# Patient Record
Sex: MALE | Race: OTHER | HISPANIC OR LATINO | URBAN - METROPOLITAN AREA
[De-identification: names, ages, dates, MRNs, and addresses within clinical notes are randomized per-mention and may not be internally consistent; named-entity substitution may affect disease eponyms.]

---

## 2019-08-28 ENCOUNTER — OUTPATIENT (OUTPATIENT)
Dept: OUTPATIENT SERVICES | Facility: HOSPITAL | Age: 69
LOS: 1 days | End: 2019-08-28
Payer: COMMERCIAL

## 2019-08-28 VITALS
RESPIRATION RATE: 16 BRPM | HEIGHT: 60 IN | WEIGHT: 158.07 LBS | OXYGEN SATURATION: 99 % | DIASTOLIC BLOOD PRESSURE: 89 MMHG | TEMPERATURE: 98 F | HEART RATE: 80 BPM | SYSTOLIC BLOOD PRESSURE: 174 MMHG

## 2019-08-28 DIAGNOSIS — Z95.5 PRESENCE OF CORONARY ANGIOPLASTY IMPLANT AND GRAFT: ICD-10-CM

## 2019-08-28 DIAGNOSIS — N18.6 END STAGE RENAL DISEASE: ICD-10-CM

## 2019-08-28 DIAGNOSIS — I10 ESSENTIAL (PRIMARY) HYPERTENSION: ICD-10-CM

## 2019-08-28 DIAGNOSIS — I77.0 ARTERIOVENOUS FISTULA, ACQUIRED: Chronic | ICD-10-CM

## 2019-08-28 DIAGNOSIS — Z95.5 PRESENCE OF CORONARY ANGIOPLASTY IMPLANT AND GRAFT: Chronic | ICD-10-CM

## 2019-08-28 DIAGNOSIS — Z01.818 ENCOUNTER FOR OTHER PREPROCEDURAL EXAMINATION: ICD-10-CM

## 2019-08-28 DIAGNOSIS — Z95.828 PRESENCE OF OTHER VASCULAR IMPLANTS AND GRAFTS: Chronic | ICD-10-CM

## 2019-08-28 PROCEDURE — 87641 MR-STAPH DNA AMP PROBE: CPT

## 2019-08-28 PROCEDURE — 87640 STAPH A DNA AMP PROBE: CPT

## 2019-08-28 PROCEDURE — G0463: CPT

## 2019-08-28 RX ORDER — SODIUM CHLORIDE 9 MG/ML
3 INJECTION INTRAMUSCULAR; INTRAVENOUS; SUBCUTANEOUS EVERY 8 HOURS
Refills: 0 | Status: DISCONTINUED | OUTPATIENT
Start: 2019-09-06 | End: 2019-09-14

## 2019-08-28 NOTE — H&P PST ADULT - NSICDXPASTSURGICALHX_GEN_ALL_CORE_FT
PAST SURGICAL HISTORY:  AVF (arteriovenous fistula) Left Arm    S/P dialysis catheter insertion Right chest    Stented coronary artery

## 2019-08-28 NOTE — H&P PST ADULT - HISTORY OF PRESENT ILLNESS
70 yo male with history of HTN, DM, HLD, CAD, Hyperphosphatemia, Stroke, reports the above. Patient is going for Right Elbow Arteriovenous Fistula Creation on 9/6/19

## 2019-08-28 NOTE — H&P PST ADULT - NSANTHOSAYNRD_GEN_A_CORE
No. MONAE screening performed.  STOP BANG Legend: 0-2 = LOW Risk; 3-4 = INTERMEDIATE Risk; 5-8 = HIGH Risk

## 2019-08-28 NOTE — H&P PST ADULT - NSICDXPASTMEDICALHX_GEN_ALL_CORE_FT
PAST MEDICAL HISTORY:  Coronary artery disease with stent    Diabetes     History of BPH     Hyperlipidemia     Hyperphosphatemia     Hypertension     Stroke

## 2019-08-28 NOTE — H&P PST ADULT - REASON FOR ADMISSION
I need another access for dialysis. I am actually having HD via my right chest catheter. The left AVF does not work

## 2019-08-28 NOTE — H&P PST ADULT - NSICDXPROBLEM_GEN_ALL_CORE_FT
PROBLEM DIAGNOSES  Problem: End stage renal disease  Assessment and Plan: Right Elbow Arteriovenous Fistula Creation    Problem: Stented coronary artery  Assessment and Plan: Continue ASA daily  Follow your doctor's instruction regarding Plavix    Problem: Hypertension  Assessment and Plan: Continue your BP medications daily as prescribed

## 2019-08-29 LAB
MRSA PCR RESULT.: SIGNIFICANT CHANGE UP
S AUREUS DNA NOSE QL NAA+PROBE: SIGNIFICANT CHANGE UP

## 2019-09-05 ENCOUNTER — TRANSCRIPTION ENCOUNTER (OUTPATIENT)
Age: 69
End: 2019-09-05

## 2019-09-05 RX ORDER — CHLORHEXIDINE GLUCONATE 213 G/1000ML
1 SOLUTION TOPICAL DAILY
Refills: 0 | Status: DISCONTINUED | OUTPATIENT
Start: 2019-09-06 | End: 2019-09-14

## 2019-09-06 ENCOUNTER — OUTPATIENT (OUTPATIENT)
Dept: OUTPATIENT SERVICES | Facility: HOSPITAL | Age: 69
LOS: 1 days | End: 2019-09-06
Payer: COMMERCIAL

## 2019-09-06 VITALS
RESPIRATION RATE: 16 BRPM | SYSTOLIC BLOOD PRESSURE: 174 MMHG | OXYGEN SATURATION: 99 % | HEART RATE: 71 BPM | DIASTOLIC BLOOD PRESSURE: 56 MMHG | HEIGHT: 60 IN | WEIGHT: 158.07 LBS | TEMPERATURE: 98 F

## 2019-09-06 VITALS
DIASTOLIC BLOOD PRESSURE: 62 MMHG | RESPIRATION RATE: 19 BRPM | SYSTOLIC BLOOD PRESSURE: 127 MMHG | HEART RATE: 76 BPM | OXYGEN SATURATION: 97 % | TEMPERATURE: 99 F

## 2019-09-06 DIAGNOSIS — Z95.5 PRESENCE OF CORONARY ANGIOPLASTY IMPLANT AND GRAFT: Chronic | ICD-10-CM

## 2019-09-06 DIAGNOSIS — Z95.828 PRESENCE OF OTHER VASCULAR IMPLANTS AND GRAFTS: Chronic | ICD-10-CM

## 2019-09-06 DIAGNOSIS — I77.0 ARTERIOVENOUS FISTULA, ACQUIRED: Chronic | ICD-10-CM

## 2019-09-06 DIAGNOSIS — N18.6 END STAGE RENAL DISEASE: ICD-10-CM

## 2019-09-06 LAB
ANION GAP SERPL CALC-SCNC: 6 MMOL/L — SIGNIFICANT CHANGE UP (ref 5–17)
BUN SERPL-MCNC: 30 MG/DL — HIGH (ref 7–18)
CALCIUM SERPL-MCNC: 9.4 MG/DL — SIGNIFICANT CHANGE UP (ref 8.4–10.5)
CHLORIDE SERPL-SCNC: 99 MMOL/L — SIGNIFICANT CHANGE UP (ref 96–108)
CO2 SERPL-SCNC: 31 MMOL/L — SIGNIFICANT CHANGE UP (ref 22–31)
CREAT SERPL-MCNC: 7.57 MG/DL — HIGH (ref 0.5–1.3)
GLUCOSE BLDC GLUCOMTR-MCNC: 112 MG/DL — HIGH (ref 70–99)
GLUCOSE BLDC GLUCOMTR-MCNC: 132 MG/DL — HIGH (ref 70–99)
GLUCOSE SERPL-MCNC: 137 MG/DL — HIGH (ref 70–99)
POTASSIUM SERPL-MCNC: 4.3 MMOL/L — SIGNIFICANT CHANGE UP (ref 3.5–5.3)
POTASSIUM SERPL-SCNC: 4.3 MMOL/L — SIGNIFICANT CHANGE UP (ref 3.5–5.3)
SODIUM SERPL-SCNC: 136 MMOL/L — SIGNIFICANT CHANGE UP (ref 135–145)

## 2019-09-06 PROCEDURE — 36820 AV FUSION/FOREARM VEIN: CPT | Mod: RT

## 2019-09-06 PROCEDURE — 82962 GLUCOSE BLOOD TEST: CPT

## 2019-09-06 PROCEDURE — 36415 COLL VENOUS BLD VENIPUNCTURE: CPT

## 2019-09-06 PROCEDURE — 80048 BASIC METABOLIC PNL TOTAL CA: CPT

## 2019-09-06 RX ORDER — ACETAMINOPHEN 500 MG
1000 TABLET ORAL ONCE
Refills: 0 | Status: DISCONTINUED | OUTPATIENT
Start: 2019-09-06 | End: 2019-09-06

## 2019-09-06 RX ORDER — METOPROLOL TARTRATE 50 MG
1 TABLET ORAL
Qty: 0 | Refills: 0 | DISCHARGE

## 2019-09-06 RX ORDER — SODIUM CHLORIDE 9 MG/ML
1000 INJECTION INTRAMUSCULAR; INTRAVENOUS; SUBCUTANEOUS
Refills: 0 | Status: DISCONTINUED | OUTPATIENT
Start: 2019-09-06 | End: 2019-09-06

## 2019-09-06 RX ORDER — OXYCODONE AND ACETAMINOPHEN 5; 325 MG/1; MG/1
1 TABLET ORAL ONCE
Refills: 0 | Status: DISCONTINUED | OUTPATIENT
Start: 2019-09-06 | End: 2019-09-14

## 2019-09-06 RX ADMIN — SODIUM CHLORIDE 3 MILLILITER(S): 9 INJECTION INTRAMUSCULAR; INTRAVENOUS; SUBCUTANEOUS at 09:33

## 2019-09-06 RX ADMIN — CHLORHEXIDINE GLUCONATE 1 APPLICATION(S): 213 SOLUTION TOPICAL at 09:35

## 2019-09-06 NOTE — ASU PATIENT PROFILE, ADULT - PSH
AVF (arteriovenous fistula)  Left Arm  S/P dialysis catheter insertion  Right chest  Stented coronary artery

## 2019-09-06 NOTE — ASU DISCHARGE PLAN (ADULT/PEDIATRIC) - CALL YOUR DOCTOR IF YOU HAVE ANY OF THE FOLLOWING:
Fever greater than (need to indicate Fahrenheit or Celsius)/Wound/Surgical Site with redness, or foul smelling discharge or pus/Pain not relieved by Medications/Bleeding that does not stop/Swelling that gets worse Numbness, tingling, color or temperature change to extremity/Bleeding that does not stop/Swelling that gets worse/Wound/Surgical Site with redness, or foul smelling discharge or pus/Pain not relieved by Medications/Fever greater than (need to indicate Fahrenheit or Celsius)

## 2019-09-06 NOTE — ASU DISCHARGE PLAN (ADULT/PEDIATRIC) - CARE PROVIDER_API CALL
Nabeel Hoyt (MD)  Surgery; Thoracic Surgery; Vascular Surgery  [address] [facility], [address]  [place] [address]  Phone: [phone]  Fax: [phone]  Follow Up Time: 2 weeks

## 2019-09-06 NOTE — ASU PATIENT PROFILE, ADULT - PMH
Coronary artery disease  with stent  Diabetes    History of BPH    Hyperlipidemia    Hyperphosphatemia    Hypertension    Stroke

## 2019-10-11 PROBLEM — I10 ESSENTIAL (PRIMARY) HYPERTENSION: Chronic | Status: ACTIVE | Noted: 2019-08-28

## 2019-10-11 PROBLEM — Z87.438 PERSONAL HISTORY OF OTHER DISEASES OF MALE GENITAL ORGANS: Chronic | Status: ACTIVE | Noted: 2019-08-28

## 2019-10-11 PROBLEM — E83.39 OTHER DISORDERS OF PHOSPHORUS METABOLISM: Chronic | Status: ACTIVE | Noted: 2019-08-28

## 2019-10-11 PROBLEM — I25.10 ATHEROSCLEROTIC HEART DISEASE OF NATIVE CORONARY ARTERY WITHOUT ANGINA PECTORIS: Chronic | Status: ACTIVE | Noted: 2019-08-28

## 2019-10-11 PROBLEM — E11.9 TYPE 2 DIABETES MELLITUS WITHOUT COMPLICATIONS: Chronic | Status: ACTIVE | Noted: 2019-08-28

## 2019-10-11 PROBLEM — E78.5 HYPERLIPIDEMIA, UNSPECIFIED: Chronic | Status: ACTIVE | Noted: 2019-08-28

## 2019-10-18 ENCOUNTER — OUTPATIENT (OUTPATIENT)
Dept: OUTPATIENT SERVICES | Facility: HOSPITAL | Age: 69
LOS: 1 days | End: 2019-10-18
Payer: COMMERCIAL

## 2019-10-18 VITALS
WEIGHT: 160.06 LBS | HEART RATE: 62 BPM | OXYGEN SATURATION: 99 % | TEMPERATURE: 97 F | DIASTOLIC BLOOD PRESSURE: 50 MMHG | HEIGHT: 64 IN | SYSTOLIC BLOOD PRESSURE: 117 MMHG | RESPIRATION RATE: 18 BRPM

## 2019-10-18 DIAGNOSIS — Z95.5 PRESENCE OF CORONARY ANGIOPLASTY IMPLANT AND GRAFT: Chronic | ICD-10-CM

## 2019-10-18 DIAGNOSIS — I10 ESSENTIAL (PRIMARY) HYPERTENSION: ICD-10-CM

## 2019-10-18 DIAGNOSIS — N18.6 END STAGE RENAL DISEASE: ICD-10-CM

## 2019-10-18 DIAGNOSIS — I25.10 ATHEROSCLEROTIC HEART DISEASE OF NATIVE CORONARY ARTERY WITHOUT ANGINA PECTORIS: ICD-10-CM

## 2019-10-18 DIAGNOSIS — Z01.818 ENCOUNTER FOR OTHER PREPROCEDURAL EXAMINATION: ICD-10-CM

## 2019-10-18 DIAGNOSIS — E11.9 TYPE 2 DIABETES MELLITUS WITHOUT COMPLICATIONS: ICD-10-CM

## 2019-10-18 DIAGNOSIS — Z95.828 PRESENCE OF OTHER VASCULAR IMPLANTS AND GRAFTS: Chronic | ICD-10-CM

## 2019-10-18 DIAGNOSIS — I77.0 ARTERIOVENOUS FISTULA, ACQUIRED: Chronic | ICD-10-CM

## 2019-10-18 PROCEDURE — 87640 STAPH A DNA AMP PROBE: CPT

## 2019-10-18 PROCEDURE — G0463: CPT

## 2019-10-18 RX ORDER — CLOPIDOGREL BISULFATE 75 MG/1
1 TABLET, FILM COATED ORAL
Qty: 0 | Refills: 0 | DISCHARGE

## 2019-10-18 NOTE — H&P PST ADULT - NEGATIVE ALLERGY TYPES
no outdoor environmental allergies/no indoor environmental allergies/no reactions to medicines/no reactions to insect bites/no reactions to food/no reactions to animals

## 2019-10-18 NOTE — H&P PST ADULT - VENOUS THROMBOEMBOLISM CURRENT STATUS
(1) other risk factor (includes escalating BMI, pack-years of smoking, diabetes requiring insulin, chemotherapy, female gender and length of surgery)/(2) central venous access

## 2019-10-18 NOTE — H&P PST ADULT - NSICDXPROBLEM_GEN_ALL_CORE_FT
PROBLEM DIAGNOSES  Problem: End stage renal disease  Assessment and Plan: Right Elbow Arteriovenous Fistula Creation    Problem: Stented coronary artery  Assessment and Plan: Continue ASA daily  Follow your doctor's instruction regarding Plavix    Problem: Hypertension  Assessment and Plan: Continue your BP medications daily as prescribed PROBLEM DIAGNOSES  Problem: Diabetes mellitus  Assessment and Plan: Hold insulin injection on the day of surgery.    Problem: HTN, age 0-18  Assessment and Plan: Continue antihypertensive medication as prescribed.    Problem: ESRD on dialysis  Assessment and Plan: Revision of right elbow AV fistula    Problem: CAD S/P percutaneous coronary angioplasty  Assessment and Plan: Continue Aspirin daily.

## 2019-10-18 NOTE — H&P PST ADULT - NSICDXPASTMEDICALHX_GEN_ALL_CORE_FT
PAST MEDICAL HISTORY:  Coronary artery disease with stent    Diabetes     History of BPH     Hyperlipidemia     Hyperphosphatemia     Hypertension     Stroke PAST MEDICAL HISTORY:  Back pain     Coronary artery disease with stent    Diabetes     ESRD on hemodialysis     History of BPH     Hyperlipidemia     Hyperphosphatemia     Hypertension     Stroke

## 2019-10-18 NOTE — H&P PST ADULT - NSICDXPASTSURGICALHX_GEN_ALL_CORE_FT
PAST SURGICAL HISTORY:  AVF (arteriovenous fistula) Left Arm    S/P dialysis catheter insertion Right chest    Stented coronary artery PAST SURGICAL HISTORY:  AVF (arteriovenous fistula) right arm    S/P dialysis catheter insertion Right chest perma cath    Stented coronary artery

## 2019-10-18 NOTE — H&P PST ADULT - REASON FOR ADMISSION
I need another access for dialysis. I am actually having HD via my right chest catheter. The left AVF does not work "I need revision of right AV fistula in my elbow"

## 2019-10-18 NOTE — H&P PST ADULT - NS MD HP INPLANTS MED DEV
Vascular stents/Clips/Cardiac stent Vascular stents/Clips/Cardiac stent, right chest perma cath, right elbow AV fistula

## 2019-10-18 NOTE — H&P PST ADULT - HISTORY OF PRESENT ILLNESS
68 yo male with history of HTN, DM, HLD, CAD, Hyperphosphatemia, Stroke, reports the above. Patient is going for Right Elbow Arteriovenous Fistula Creation on 9/6/19 70 yo male with history of ESRD on HD, HTN, DM, HLD, CAD with PCI on daily aspirin, no longer on Plavix, Hyperphosphatemia, Stroke presents today for presurgical evaluation. Patient is going for Right Elbow Arteriovenous Fistula revision on 10/25/19.

## 2019-10-18 NOTE — H&P PST ADULT - ASSESSMENT
68 yo male is scheduled for : Right Elbow Arteriovenous Fistula Creation, on 9/6/19 68 yo male is scheduled for : Right Elbow Arteriovenous Fistula revision on 10/25/19.

## 2019-10-19 LAB
MRSA PCR RESULT.: SIGNIFICANT CHANGE UP
S AUREUS DNA NOSE QL NAA+PROBE: SIGNIFICANT CHANGE UP

## 2019-10-22 PROBLEM — M54.9 DORSALGIA, UNSPECIFIED: Chronic | Status: ACTIVE | Noted: 2019-10-18

## 2019-10-22 PROBLEM — N18.6 END STAGE RENAL DISEASE: Chronic | Status: ACTIVE | Noted: 2019-10-18

## 2019-10-24 VITALS
SYSTOLIC BLOOD PRESSURE: 135 MMHG | TEMPERATURE: 98 F | HEART RATE: 65 BPM | WEIGHT: 164.91 LBS | OXYGEN SATURATION: 97 % | HEIGHT: 61 IN | RESPIRATION RATE: 16 BRPM | DIASTOLIC BLOOD PRESSURE: 42 MMHG

## 2019-10-24 NOTE — H&P ADULT - NSICDXPASTSURGICALHX_GEN_ALL_CORE_FT
PAST SURGICAL HISTORY:  AVF (arteriovenous fistula) right arm    S/P dialysis catheter insertion Right chest perma cath    Stented coronary artery PAST SURGICAL HISTORY:  AVF (arteriovenous fistula) right arm x 2, left arm x 1    S/P dialysis catheter insertion Right chest perma cath    Stented coronary artery 2004-5 at [facility]

## 2019-10-24 NOTE — H&P ADULT - RS GEN PE MLT RESP DETAILS PC
no rales/no rhonchi/good air movement/respirations non-labored/no wheezes/clear to auscultation bilaterally/breath sounds equal

## 2019-10-24 NOTE — H&P ADULT - HISTORY OF PRESENT ILLNESS
SKELETON HX    70 y/o Malagasy Speaking Male w/ PMHX HTN, HLD, DM, Hyperphosphatemia, CVA, ESRD on Dialysis s/p AV fistula creation 9/6/19 to R UE (who presently requires revision of AV fistula), CAD s/p prior PCI,     who presented to cardiologist c/o…  Pt had a NST on 10/11/19 showed a small, severe, partially reversible defect of the basal to mid inferior wall. Echo on 10/11/19 showed EF 67%, no wall motion abnormalities, decreased LV diastolic compliance/ elevated LA pressure.   Due to pts risk factors, CCS Angina Class _ Sx, abnormal NST, need for pre-operative clearance prior to AV fistula revision, pt is referred for cardiac catheterization w/ possible intervention. 70 y/o, former smoker, French Speaking Male w/ PMHX HTN, HLD, DM, Hyperphosphatemia, CVA (2017 - residual R sided weakness), ESRD on Dialysis (Tue/Thurs/Sat) s/p AV fistula creation 9/6/19 to R UE (who presently requires revision of AV fistula), Parkinson's dz, CAD (s/p PCI @ [facility] 14-15 years ago), who presented to cardiologist with c/o intermittent right sided, non-radiating, mild "stabbing" chest pain. He denies any SOB, dizziness, palpitations, syncope, PND, melena, fevers, or chills. Of note, he does admit to orthopnea, but states that he only sleeps with one pillow. Pt had a NST on 10/11/19 showed a small, severe, partially reversible defect of the basal to mid inferior wall. Echo on 10/11/19 showed EF 67%, no wall motion abnormalities, decreased LV diastolic compliance/ elevated LA pressure. Due to pts risk factors, CCS Angina Class IV Sx, known CAD, abnormal NST, and need for pre-operative clearance prior to AV fistula revision, pt is referred for cardiac catheterization w/ possible intervention.     Of note, per pharmacy, patient is prescribed 50 units of insulin twice daily, patient states he takes it on some sort of sliding scale, will take "35-40" units if his bg is 150-200, will take "40-45" units if his bg is 200-250. Cannot say what he would do if his bg was higher than that. The pharmacy states that patient last picked up cincalcet in December. Plavix script x 6 months was sent in January by a Dr. Miah Young, last of 6 month course picked up July 2019.

## 2019-10-24 NOTE — H&P ADULT - NSHPLABSRESULTS_GEN_ALL_CORE
13.3   7.82  )-----------( 148      ( 25 Oct 2019 09:45 )             42.1     10-25    138  |  92<L>  |  33<H>  ----------------------------<  153<H>  5.9<H>   |  31  |  8.17<H>    Ca    9.9      25 Oct 2019 09:45    TPro  8.7<H>  /  Alb  4.5  /  TBili  0.4  /  DBili  x   /  AST  18  /  ALT  9<L>  /  AlkPhos  161<H>  10-25    PT/INR - ( 25 Oct 2019 09:45 )   PT: 12.6 sec;   INR: 1.11          PTT - ( 25 Oct 2019 09:45 )  PTT:32.1 sec    EKG: NSR @ 65 BPM. RBBB. No evidence of acute ischemia.

## 2019-10-24 NOTE — H&P ADULT - NSICDXPASTMEDICALHX_GEN_ALL_CORE_FT
PAST MEDICAL HISTORY:  Back pain     Coronary artery disease with stent    Diabetes     ESRD on hemodialysis     History of BPH     Hyperlipidemia     Hyperphosphatemia     Hypertension     Stroke PAST MEDICAL HISTORY:  Back pain     Coronary artery disease with stent    Diabetes     ESRD on hemodialysis     History of BPH     Hyperlipidemia     Hyperphosphatemia     Hypertension     Parkinsons disease     Stroke 2017, residual right sided weakness

## 2019-10-24 NOTE — H&P ADULT - NSHPSOCIALHISTORY_GEN_ALL_CORE
Denies ETOH and Recreational drug use. States that he quit smoking 30 years ago, before that was smoking 4-6 cigarettes per week.

## 2019-10-24 NOTE — H&P ADULT - REASON FOR ADMISSION
CCS Angina Class _ Sx, abnormal NST, need for pre-operative clearance prior to AV fistula revision Cardiac cath, need for pre-operative clearance prior to AV fistula revision

## 2019-10-24 NOTE — H&P ADULT - VASCULAR DETAILS
Bilateral non functioning fistulas on upper extremities. Functioning fistula present in R arm (palpable thrill)

## 2019-10-24 NOTE — H&P ADULT - ASSESSMENT
70 y/o, former smoker, Armenian Speaking Male w/ PMHX HTN, HLD, DM, Hyperphosphatemia, CVA (residual R sided weakness), ESRD on Dialysis (Tue/Thurs/Sat) s/p AV fistula creation 9/6/19 to R UE (who presently requires revision of AV fistula), Parkinson's dz, CAD (s/p PCI @ [facility] 14-15 years ago), who presents to [facility] for cardiac catheterization in the setting of pts risk factors, CCS Angina Class IV Sx, abnormal NST, known CAD, and need for pre-operative clearance prior to AV fistula revision.    ASA Class III  Mallampati Class III    Patient took his morning dose of aspirin this am. Per Dr. Ramos, to hold off on load due to future AV fistula revision.   No IV fluids ordered as patient is on Dialysis.   Diabetes orders in place.     Risks & benefits of procedure and alternative therapy have been explained to the patient including but not limited to: allergic reaction, bleeding with possible need for blood transfusion, infection, renal and vascular compromise, limb damage, arrhythmia, stroke, vessel dissection/perforation, Myocardial infarction, emergent CABG. Informed consent obtained and in chart. 70 y/o, former smoker, Marshallese Speaking Male w/ PMHX HTN, HLD, DM, Hyperphosphatemia, CVA (residual R sided weakness), ESRD on Dialysis (Tue/Thurs/Sat) s/p AV fistula creation 9/6/19 to R UE (who presently requires revision of AV fistula), Parkinson's dz, CAD (s/p PCI @ [facility] 14-15 years ago), who presents to [facility] for cardiac catheterization in the setting of pts risk factors, CCS Angina Class IV Sx, abnormal NST, known CAD, and need for pre-operative clearance prior to AV fistula revision.    ASA Class III  Mallampati Class III    Patient took his morning dose of aspirin this am. Per Dr. Ramos, to hold off on load due to future AV fistula revision.   No IV fluids ordered as patient is on Dialysis.   Diabetes orders in place.   Potassium 5.9, Dr. Ramos aware.     Risks & benefits of procedure and alternative therapy have been explained to the patient including but not limited to: allergic reaction, bleeding with possible need for blood transfusion, infection, renal and vascular compromise, limb damage, arrhythmia, stroke, vessel dissection/perforation, Myocardial infarction, emergent CABG. Informed consent obtained and in chart.       Consent and hx obtained with [facility]  # [id number].

## 2019-10-25 ENCOUNTER — OUTPATIENT (OUTPATIENT)
Dept: OUTPATIENT SERVICES | Facility: HOSPITAL | Age: 69
LOS: 1 days | Discharge: HOME | End: 2019-10-25
Payer: MEDICARE

## 2019-10-25 DIAGNOSIS — I67.9 CEREBROVASCULAR DISEASE, UNSPECIFIED: ICD-10-CM

## 2019-10-25 DIAGNOSIS — I25.82 CHRONIC TOTAL OCCLUSION OF CORONARY ARTERY: ICD-10-CM

## 2019-10-25 DIAGNOSIS — E78.5 HYPERLIPIDEMIA, UNSPECIFIED: ICD-10-CM

## 2019-10-25 DIAGNOSIS — Z95.828 PRESENCE OF OTHER VASCULAR IMPLANTS AND GRAFTS: Chronic | ICD-10-CM

## 2019-10-25 DIAGNOSIS — I25.110 ATHEROSCLEROTIC HEART DISEASE OF NATIVE CORONARY ARTERY WITH UNSTABLE ANGINA PECTORIS: ICD-10-CM

## 2019-10-25 DIAGNOSIS — I25.84 CORONARY ATHEROSCLEROSIS DUE TO CALCIFIED CORONARY LESION: ICD-10-CM

## 2019-10-25 DIAGNOSIS — Z79.84 LONG TERM (CURRENT) USE OF ORAL HYPOGLYCEMIC DRUGS: ICD-10-CM

## 2019-10-25 DIAGNOSIS — I10 ESSENTIAL (PRIMARY) HYPERTENSION: ICD-10-CM

## 2019-10-25 DIAGNOSIS — E11.9 TYPE 2 DIABETES MELLITUS WITHOUT COMPLICATIONS: ICD-10-CM

## 2019-10-25 DIAGNOSIS — R94.39 ABNORMAL RESULT OF OTHER CARDIOVASCULAR FUNCTION STUDY: ICD-10-CM

## 2019-10-25 DIAGNOSIS — Z95.5 PRESENCE OF CORONARY ANGIOPLASTY IMPLANT AND GRAFT: Chronic | ICD-10-CM

## 2019-10-25 DIAGNOSIS — I77.0 ARTERIOVENOUS FISTULA, ACQUIRED: Chronic | ICD-10-CM

## 2019-10-25 LAB
ALBUMIN SERPL ELPH-MCNC: 4.5 G/DL — SIGNIFICANT CHANGE UP (ref 3.3–5)
ALP SERPL-CCNC: 161 U/L — HIGH (ref 40–120)
ALT FLD-CCNC: 9 U/L — LOW (ref 10–45)
ANION GAP SERPL CALC-SCNC: 15 MMOL/L — SIGNIFICANT CHANGE UP (ref 5–17)
APTT BLD: 32.1 SEC — SIGNIFICANT CHANGE UP (ref 27.5–36.3)
AST SERPL-CCNC: 18 U/L — SIGNIFICANT CHANGE UP (ref 10–40)
BASOPHILS # BLD AUTO: 0.05 K/UL — SIGNIFICANT CHANGE UP (ref 0–0.2)
BASOPHILS NFR BLD AUTO: 0.6 % — SIGNIFICANT CHANGE UP (ref 0–2)
BILIRUB SERPL-MCNC: 0.4 MG/DL — SIGNIFICANT CHANGE UP (ref 0.2–1.2)
BUN SERPL-MCNC: 33 MG/DL — HIGH (ref 7–23)
CALCIUM SERPL-MCNC: 9.9 MG/DL — SIGNIFICANT CHANGE UP (ref 8.4–10.5)
CHLORIDE SERPL-SCNC: 92 MMOL/L — LOW (ref 96–108)
CHOLEST SERPL-MCNC: 96 MG/DL — SIGNIFICANT CHANGE UP (ref 10–199)
CK SERPL-CCNC: 49 U/L — SIGNIFICANT CHANGE UP (ref 30–200)
CO2 SERPL-SCNC: 31 MMOL/L — SIGNIFICANT CHANGE UP (ref 22–31)
CREAT SERPL-MCNC: 8.17 MG/DL — HIGH (ref 0.5–1.3)
CRP SERPL-MCNC: 0.42 MG/DL — HIGH (ref 0–0.4)
EOSINOPHIL # BLD AUTO: 0.31 K/UL — SIGNIFICANT CHANGE UP (ref 0–0.5)
EOSINOPHIL NFR BLD AUTO: 4 % — SIGNIFICANT CHANGE UP (ref 0–6)
GLUCOSE BLDC GLUCOMTR-MCNC: 103 MG/DL — HIGH (ref 70–99)
GLUCOSE SERPL-MCNC: 153 MG/DL — HIGH (ref 70–99)
HBA1C BLD-MCNC: 5.9 % — HIGH (ref 4–5.6)
HCT VFR BLD CALC: 42.1 % — SIGNIFICANT CHANGE UP (ref 39–50)
HDLC SERPL-MCNC: 38 MG/DL — LOW
HGB BLD-MCNC: 13.3 G/DL — SIGNIFICANT CHANGE UP (ref 13–17)
IMM GRANULOCYTES NFR BLD AUTO: 0.3 % — SIGNIFICANT CHANGE UP (ref 0–1.5)
INR BLD: 1.11 — SIGNIFICANT CHANGE UP (ref 0.88–1.16)
LIPID PNL WITH DIRECT LDL SERPL: 23 MG/DL — SIGNIFICANT CHANGE UP
LYMPHOCYTES # BLD AUTO: 1.75 K/UL — SIGNIFICANT CHANGE UP (ref 1–3.3)
LYMPHOCYTES # BLD AUTO: 22.4 % — SIGNIFICANT CHANGE UP (ref 13–44)
MCHC RBC-ENTMCNC: 30.4 PG — SIGNIFICANT CHANGE UP (ref 27–34)
MCHC RBC-ENTMCNC: 31.6 GM/DL — LOW (ref 32–36)
MCV RBC AUTO: 96.3 FL — SIGNIFICANT CHANGE UP (ref 80–100)
MONOCYTES # BLD AUTO: 0.67 K/UL — SIGNIFICANT CHANGE UP (ref 0–0.9)
MONOCYTES NFR BLD AUTO: 8.6 % — SIGNIFICANT CHANGE UP (ref 2–14)
NEUTROPHILS # BLD AUTO: 5.02 K/UL — SIGNIFICANT CHANGE UP (ref 1.8–7.4)
NEUTROPHILS NFR BLD AUTO: 64.1 % — SIGNIFICANT CHANGE UP (ref 43–77)
NRBC # BLD: 0 /100 WBCS — SIGNIFICANT CHANGE UP (ref 0–0)
PLATELET # BLD AUTO: 148 K/UL — LOW (ref 150–400)
POTASSIUM SERPL-MCNC: 5.9 MMOL/L — HIGH (ref 3.5–5.3)
POTASSIUM SERPL-SCNC: 5.9 MMOL/L — HIGH (ref 3.5–5.3)
PROT SERPL-MCNC: 8.7 G/DL — HIGH (ref 6–8.3)
PROTHROM AB SERPL-ACNC: 12.6 SEC — SIGNIFICANT CHANGE UP (ref 10–12.9)
RBC # BLD: 4.37 M/UL — SIGNIFICANT CHANGE UP (ref 4.2–5.8)
RBC # FLD: 14.6 % — HIGH (ref 10.3–14.5)
SODIUM SERPL-SCNC: 138 MMOL/L — SIGNIFICANT CHANGE UP (ref 135–145)
TOTAL CHOLESTEROL/HDL RATIO MEASUREMENT: 2.5 RATIO — LOW (ref 3.4–9.6)
TRIGL SERPL-MCNC: 176 MG/DL — HIGH (ref 10–149)
WBC # BLD: 7.82 K/UL — SIGNIFICANT CHANGE UP (ref 3.8–10.5)
WBC # FLD AUTO: 7.82 K/UL — SIGNIFICANT CHANGE UP (ref 3.8–10.5)

## 2019-10-25 PROCEDURE — 93010 ELECTROCARDIOGRAM REPORT: CPT | Mod: 59

## 2019-10-25 PROCEDURE — C1769: CPT

## 2019-10-25 PROCEDURE — 82962 GLUCOSE BLOOD TEST: CPT

## 2019-10-25 PROCEDURE — 93458 L HRT ARTERY/VENTRICLE ANGIO: CPT

## 2019-10-25 PROCEDURE — 85025 COMPLETE CBC W/AUTO DIFF WBC: CPT

## 2019-10-25 PROCEDURE — 85730 THROMBOPLASTIN TIME PARTIAL: CPT

## 2019-10-25 PROCEDURE — C1760: CPT

## 2019-10-25 PROCEDURE — 86140 C-REACTIVE PROTEIN: CPT

## 2019-10-25 PROCEDURE — 82550 ASSAY OF CK (CPK): CPT

## 2019-10-25 PROCEDURE — 93005 ELECTROCARDIOGRAM TRACING: CPT

## 2019-10-25 PROCEDURE — C1894: CPT

## 2019-10-25 PROCEDURE — 83036 HEMOGLOBIN GLYCOSYLATED A1C: CPT

## 2019-10-25 PROCEDURE — 80053 COMPREHEN METABOLIC PANEL: CPT

## 2019-10-25 PROCEDURE — C1887: CPT

## 2019-10-25 PROCEDURE — 80061 LIPID PANEL: CPT

## 2019-10-25 PROCEDURE — 85610 PROTHROMBIN TIME: CPT

## 2019-10-25 PROCEDURE — 93567 NJX CAR CTH SPRVLV AORTGRPHY: CPT

## 2019-10-25 PROCEDURE — 93567 NJX CAR CTH SPRVLV AORTGRPHY: CPT | Mod: 59

## 2019-10-25 PROCEDURE — 93458 L HRT ARTERY/VENTRICLE ANGIO: CPT | Mod: 26

## 2019-10-25 RX ORDER — GLUCAGON INJECTION, SOLUTION 0.5 MG/.1ML
1 INJECTION, SOLUTION SUBCUTANEOUS ONCE
Refills: 0 | Status: DISCONTINUED | OUTPATIENT
Start: 2019-10-25 | End: 2019-11-17

## 2019-10-25 RX ORDER — DEXTROSE 50 % IN WATER 50 %
15 SYRINGE (ML) INTRAVENOUS ONCE
Refills: 0 | Status: DISCONTINUED | OUTPATIENT
Start: 2019-10-25 | End: 2019-11-17

## 2019-10-25 RX ORDER — AMLODIPINE BESYLATE 2.5 MG/1
1 TABLET ORAL
Qty: 0 | Refills: 0 | DISCHARGE

## 2019-10-25 RX ORDER — SODIUM CHLORIDE 9 MG/ML
1000 INJECTION, SOLUTION INTRAVENOUS
Refills: 0 | Status: DISCONTINUED | OUTPATIENT
Start: 2019-10-25 | End: 2019-11-17

## 2019-10-25 RX ORDER — DEXTROSE 50 % IN WATER 50 %
12.5 SYRINGE (ML) INTRAVENOUS ONCE
Refills: 0 | Status: DISCONTINUED | OUTPATIENT
Start: 2019-10-25 | End: 2019-11-17

## 2019-10-25 RX ORDER — DEXTROSE 50 % IN WATER 50 %
25 SYRINGE (ML) INTRAVENOUS ONCE
Refills: 0 | Status: DISCONTINUED | OUTPATIENT
Start: 2019-10-25 | End: 2019-11-17

## 2019-10-25 RX ORDER — INSULIN LISPRO 100/ML
VIAL (ML) SUBCUTANEOUS ONCE
Refills: 0 | Status: DISCONTINUED | OUTPATIENT
Start: 2019-10-25 | End: 2019-11-17

## 2019-10-25 RX ORDER — TAMSULOSIN HYDROCHLORIDE 0.4 MG/1
1 CAPSULE ORAL
Qty: 0 | Refills: 0 | DISCHARGE

## 2019-10-25 NOTE — PROGRESS NOTE ADULT - SUBJECTIVE AND OBJECTIVE BOX
Interventional Cardiology PA SDA Discharge Note    Patient without complaints. Ambulated and voided without difficulties    Afebrile, VSS    Ext: Right Groin: No hematoma, no bruit, dressing; C/D/I  	  Pulses: 1+ intact DP/PT to baseline     A/P:  68 y/o, former smoker, Citizen of [place] Speaking Male w/ PMHX HTN, HLD, DM, Hyperphosphatemia, CVA (residual R sided weakness), ESRD on Dialysis (Tue/Thurs/Sat) s/p AV fistula creation 9/6/19 to R UE (who presently requires revision of AV fistula), Parkinson's dz, CAD (s/p PCI @ [facility] 14-15 years ago), who presents to [facility] for cardiac catheterization in the setting of pts risk factors, CCS Angina Class IV Sx, abnormal NST, known CAD, and need for pre-operative clearance prior to AV fistula revision.  Pt is now s/p dx cardiac cath on 10/25/19 revealing pLAD 30% before stent 20% ISR, m/dLAD mild disease, D1 small size, LCx calcified and continues as large OM1, RCA dominant, heavily calcified, pRCA 30%, mRCA 50%, dRCA 100% long  w/ L-R collaterals to distal PDA/PLS, EF 65%.      1.	Stable for discharge as per attending Dr. Ramos after bed rest, pt voids, groin stable and 30 minutes of ambulation.  2.	Follow-up with PMD/Cardiologist Dr. Ramos in 1-2 weeks  3.	Discharged forms signed and copies in chart

## 2019-11-15 PROBLEM — G20 PARKINSON'S DISEASE: Chronic | Status: ACTIVE | Noted: 2019-10-25

## 2019-11-15 PROBLEM — I63.9 CEREBRAL INFARCTION, UNSPECIFIED: Chronic | Status: ACTIVE | Noted: 2019-08-28

## 2019-11-18 ENCOUNTER — TRANSCRIPTION ENCOUNTER (OUTPATIENT)
Age: 69
End: 2019-11-18

## 2019-11-18 RX ORDER — SODIUM CHLORIDE 9 MG/ML
3 INJECTION INTRAMUSCULAR; INTRAVENOUS; SUBCUTANEOUS EVERY 8 HOURS
Refills: 0 | Status: DISCONTINUED | OUTPATIENT
Start: 2019-11-19 | End: 2019-11-19

## 2019-11-19 ENCOUNTER — OUTPATIENT (OUTPATIENT)
Dept: OUTPATIENT SERVICES | Facility: HOSPITAL | Age: 69
LOS: 1 days | End: 2019-11-19
Payer: COMMERCIAL

## 2019-11-19 VITALS
HEART RATE: 71 BPM | SYSTOLIC BLOOD PRESSURE: 138 MMHG | DIASTOLIC BLOOD PRESSURE: 52 MMHG | RESPIRATION RATE: 16 BRPM | OXYGEN SATURATION: 100 % | TEMPERATURE: 98 F

## 2019-11-19 VITALS
OXYGEN SATURATION: 100 % | HEART RATE: 73 BPM | DIASTOLIC BLOOD PRESSURE: 77 MMHG | RESPIRATION RATE: 20 BRPM | SYSTOLIC BLOOD PRESSURE: 147 MMHG | TEMPERATURE: 98 F

## 2019-11-19 DIAGNOSIS — I77.0 ARTERIOVENOUS FISTULA, ACQUIRED: Chronic | ICD-10-CM

## 2019-11-19 DIAGNOSIS — Z95.5 PRESENCE OF CORONARY ANGIOPLASTY IMPLANT AND GRAFT: Chronic | ICD-10-CM

## 2019-11-19 DIAGNOSIS — Z95.828 PRESENCE OF OTHER VASCULAR IMPLANTS AND GRAFTS: Chronic | ICD-10-CM

## 2019-11-19 DIAGNOSIS — N18.6 END STAGE RENAL DISEASE: ICD-10-CM

## 2019-11-19 LAB
ANION GAP SERPL CALC-SCNC: 9 MMOL/L — SIGNIFICANT CHANGE UP (ref 5–17)
BUN SERPL-MCNC: 31 MG/DL — HIGH (ref 7–18)
CALCIUM SERPL-MCNC: 10 MG/DL — SIGNIFICANT CHANGE UP (ref 8.4–10.5)
CHLORIDE SERPL-SCNC: 97 MMOL/L — SIGNIFICANT CHANGE UP (ref 96–108)
CO2 SERPL-SCNC: 30 MMOL/L — SIGNIFICANT CHANGE UP (ref 22–31)
CREAT SERPL-MCNC: 8.01 MG/DL — HIGH (ref 0.5–1.3)
GLUCOSE BLDC GLUCOMTR-MCNC: 130 MG/DL — HIGH (ref 70–99)
GLUCOSE BLDC GLUCOMTR-MCNC: 93 MG/DL — SIGNIFICANT CHANGE UP (ref 70–99)
GLUCOSE SERPL-MCNC: 121 MG/DL — HIGH (ref 70–99)
POTASSIUM SERPL-MCNC: 5 MMOL/L — SIGNIFICANT CHANGE UP (ref 3.5–5.3)
POTASSIUM SERPL-SCNC: 5 MMOL/L — SIGNIFICANT CHANGE UP (ref 3.5–5.3)
SODIUM SERPL-SCNC: 136 MMOL/L — SIGNIFICANT CHANGE UP (ref 135–145)

## 2019-11-19 PROCEDURE — 80048 BASIC METABOLIC PNL TOTAL CA: CPT

## 2019-11-19 PROCEDURE — 36832 AV FISTULA REVISION OPEN: CPT | Mod: RT

## 2019-11-19 PROCEDURE — C1889: CPT

## 2019-11-19 PROCEDURE — 82962 GLUCOSE BLOOD TEST: CPT

## 2019-11-19 PROCEDURE — 36415 COLL VENOUS BLD VENIPUNCTURE: CPT

## 2019-11-19 RX ORDER — CHLORHEXIDINE GLUCONATE 213 G/1000ML
1 SOLUTION TOPICAL ONCE
Refills: 0 | Status: COMPLETED | OUTPATIENT
Start: 2019-11-19 | End: 2019-11-19

## 2019-11-19 RX ORDER — OXYCODONE AND ACETAMINOPHEN 5; 325 MG/1; MG/1
1 TABLET ORAL ONCE
Refills: 0 | Status: DISCONTINUED | OUTPATIENT
Start: 2019-11-19 | End: 2019-11-19

## 2019-11-19 RX ADMIN — CHLORHEXIDINE GLUCONATE 1 APPLICATION(S): 213 SOLUTION TOPICAL at 10:40

## 2019-11-19 RX ADMIN — SODIUM CHLORIDE 3 MILLILITER(S): 9 INJECTION INTRAMUSCULAR; INTRAVENOUS; SUBCUTANEOUS at 10:39

## 2019-11-19 NOTE — ASU PATIENT PROFILE, ADULT - PMH
Back pain    Coronary artery disease  with stent  Diabetes    ESRD on hemodialysis    History of BPH    Hyperlipidemia    Hyperphosphatemia    Hypertension    Parkinsons disease    Stroke  2017, residual right sided weakness

## 2019-11-19 NOTE — ASU PATIENT PROFILE, ADULT - PSH
AVF (arteriovenous fistula)  right arm x 2, left arm x 1  S/P dialysis catheter insertion  Right chest perma cath  Stented coronary artery  2004-5 at [facility]

## 2019-11-19 NOTE — ASU DISCHARGE PLAN (ADULT/PEDIATRIC) - CALL YOUR DOCTOR IF YOU HAVE ANY OF THE FOLLOWING:
Bleeding that does not stop/Pain not relieved by Medications/Fever greater than (need to indicate Fahrenheit or Celsius)/Wound/Surgical Site with redness, or foul smelling discharge or pus/Numbness, tingling, color or temperature change to extremity

## 2019-11-19 NOTE — ASU DISCHARGE PLAN (ADULT/PEDIATRIC) - CARE PROVIDER_API CALL
Nabeel Hoyt (MD)  Surgery; Thoracic Surgery; Vascular Surgery  [address] [facility], [address]  [place] [id number]  Phone: [phone]  Fax: [phone]  Follow Up Time:

## 2020-05-02 ENCOUNTER — INPATIENT (INPATIENT)
Facility: HOSPITAL | Age: 70
LOS: 3 days | Discharge: HOME | DRG: 314 | End: 2020-05-06
Attending: HOSPITALIST | Admitting: HOSPITALIST
Payer: COMMERCIAL

## 2020-05-02 VITALS
TEMPERATURE: 98 F | RESPIRATION RATE: 16 BRPM | WEIGHT: 119.93 LBS | HEIGHT: 64 IN | DIASTOLIC BLOOD PRESSURE: 84 MMHG | HEART RATE: 96 BPM | OXYGEN SATURATION: 100 % | SYSTOLIC BLOOD PRESSURE: 152 MMHG

## 2020-05-02 DIAGNOSIS — Z95.5 PRESENCE OF CORONARY ANGIOPLASTY IMPLANT AND GRAFT: Chronic | ICD-10-CM

## 2020-05-02 DIAGNOSIS — Z95.828 PRESENCE OF OTHER VASCULAR IMPLANTS AND GRAFTS: Chronic | ICD-10-CM

## 2020-05-02 DIAGNOSIS — E11.9 TYPE 2 DIABETES MELLITUS WITHOUT COMPLICATIONS: ICD-10-CM

## 2020-05-02 DIAGNOSIS — T82.858A STENOSIS OF OTHER VASCULAR PROSTHETIC DEVICES, IMPLANTS AND GRAFTS, INITIAL ENCOUNTER: ICD-10-CM

## 2020-05-02 DIAGNOSIS — R22.0 LOCALIZED SWELLING, MASS AND LUMP, HEAD: ICD-10-CM

## 2020-05-02 DIAGNOSIS — I25.10 ATHEROSCLEROTIC HEART DISEASE OF NATIVE CORONARY ARTERY WITHOUT ANGINA PECTORIS: ICD-10-CM

## 2020-05-02 DIAGNOSIS — G20 PARKINSON'S DISEASE: ICD-10-CM

## 2020-05-02 DIAGNOSIS — I10 ESSENTIAL (PRIMARY) HYPERTENSION: ICD-10-CM

## 2020-05-02 DIAGNOSIS — I82.B12 ACUTE EMBOLISM AND THROMBOSIS OF LEFT SUBCLAVIAN VEIN: ICD-10-CM

## 2020-05-02 DIAGNOSIS — R68.89 OTHER GENERAL SYMPTOMS AND SIGNS: ICD-10-CM

## 2020-05-02 DIAGNOSIS — Z02.9 ENCOUNTER FOR ADMINISTRATIVE EXAMINATIONS, UNSPECIFIED: ICD-10-CM

## 2020-05-02 DIAGNOSIS — N18.6 END STAGE RENAL DISEASE: ICD-10-CM

## 2020-05-02 DIAGNOSIS — I77.0 ARTERIOVENOUS FISTULA, ACQUIRED: Chronic | ICD-10-CM

## 2020-05-02 DIAGNOSIS — Z71.89 OTHER SPECIFIED COUNSELING: ICD-10-CM

## 2020-05-02 LAB
ALBUMIN SERPL ELPH-MCNC: 3.3 G/DL — SIGNIFICANT CHANGE UP (ref 3.3–5)
ALP SERPL-CCNC: 124 U/L — HIGH (ref 40–120)
ALT FLD-CCNC: 19 U/L — SIGNIFICANT CHANGE UP (ref 10–45)
ANION GAP SERPL CALC-SCNC: 14 MMOL/L — SIGNIFICANT CHANGE UP (ref 5–17)
APTT BLD: 36.2 SEC — SIGNIFICANT CHANGE UP (ref 27.5–36.3)
AST SERPL-CCNC: 39 U/L — SIGNIFICANT CHANGE UP (ref 10–40)
BASOPHILS # BLD AUTO: 0 K/UL — SIGNIFICANT CHANGE UP (ref 0–0.2)
BASOPHILS NFR BLD AUTO: 0 % — SIGNIFICANT CHANGE UP (ref 0–2)
BILIRUB SERPL-MCNC: 0.3 MG/DL — SIGNIFICANT CHANGE UP (ref 0.2–1.2)
BUN SERPL-MCNC: 33 MG/DL — HIGH (ref 7–23)
CALCIUM SERPL-MCNC: 10.5 MG/DL — SIGNIFICANT CHANGE UP (ref 8.4–10.5)
CHLORIDE SERPL-SCNC: 93 MMOL/L — LOW (ref 96–108)
CO2 SERPL-SCNC: 32 MMOL/L — HIGH (ref 22–31)
CREAT SERPL-MCNC: 6.63 MG/DL — HIGH (ref 0.5–1.3)
EOSINOPHIL # BLD AUTO: 0.05 K/UL — SIGNIFICANT CHANGE UP (ref 0–0.5)
EOSINOPHIL NFR BLD AUTO: 1 % — SIGNIFICANT CHANGE UP (ref 0–6)
GLUCOSE BLDC GLUCOMTR-MCNC: 211 MG/DL — HIGH (ref 70–99)
GLUCOSE SERPL-MCNC: 138 MG/DL — HIGH (ref 70–99)
HCT VFR BLD CALC: 29.9 % — LOW (ref 39–50)
HGB BLD-MCNC: 8.7 G/DL — LOW (ref 13–17)
IMM GRANULOCYTES NFR BLD AUTO: 0.6 % — SIGNIFICANT CHANGE UP (ref 0–1.5)
INR BLD: 0.92 RATIO — SIGNIFICANT CHANGE UP (ref 0.88–1.16)
LYMPHOCYTES # BLD AUTO: 0.63 K/UL — LOW (ref 1–3.3)
LYMPHOCYTES # BLD AUTO: 13 % — SIGNIFICANT CHANGE UP (ref 13–44)
MCHC RBC-ENTMCNC: 29.1 GM/DL — LOW (ref 32–36)
MCHC RBC-ENTMCNC: 29.4 PG — SIGNIFICANT CHANGE UP (ref 27–34)
MCV RBC AUTO: 101 FL — HIGH (ref 80–100)
MONOCYTES # BLD AUTO: 0.65 K/UL — SIGNIFICANT CHANGE UP (ref 0–0.9)
MONOCYTES NFR BLD AUTO: 13.4 % — SIGNIFICANT CHANGE UP (ref 2–14)
NEUTROPHILS # BLD AUTO: 3.48 K/UL — SIGNIFICANT CHANGE UP (ref 1.8–7.4)
NEUTROPHILS NFR BLD AUTO: 72 % — SIGNIFICANT CHANGE UP (ref 43–77)
NRBC # BLD: 0 /100 WBCS — SIGNIFICANT CHANGE UP (ref 0–0)
PLATELET # BLD AUTO: 167 K/UL — SIGNIFICANT CHANGE UP (ref 150–400)
POTASSIUM SERPL-MCNC: 4 MMOL/L — SIGNIFICANT CHANGE UP (ref 3.5–5.3)
POTASSIUM SERPL-SCNC: 4 MMOL/L — SIGNIFICANT CHANGE UP (ref 3.5–5.3)
PROT SERPL-MCNC: 7.2 G/DL — SIGNIFICANT CHANGE UP (ref 6–8.3)
PROTHROM AB SERPL-ACNC: 10.6 SEC — SIGNIFICANT CHANGE UP (ref 10–12.9)
RBC # BLD: 2.96 M/UL — LOW (ref 4.2–5.8)
RBC # FLD: 17.4 % — HIGH (ref 10.3–14.5)
SARS-COV-2 RNA SPEC QL NAA+PROBE: SIGNIFICANT CHANGE UP
SODIUM SERPL-SCNC: 139 MMOL/L — SIGNIFICANT CHANGE UP (ref 135–145)
WBC # BLD: 4.84 K/UL — SIGNIFICANT CHANGE UP (ref 3.8–10.5)
WBC # FLD AUTO: 4.84 K/UL — SIGNIFICANT CHANGE UP (ref 3.8–10.5)

## 2020-05-02 PROCEDURE — 93990 DOPPLER FLOW TESTING: CPT | Mod: 26

## 2020-05-02 PROCEDURE — 93971 EXTREMITY STUDY: CPT | Mod: 26,59

## 2020-05-02 PROCEDURE — 93010 ELECTROCARDIOGRAM REPORT: CPT

## 2020-05-02 PROCEDURE — 71045 X-RAY EXAM CHEST 1 VIEW: CPT | Mod: 26

## 2020-05-02 PROCEDURE — 99285 EMERGENCY DEPT VISIT HI MDM: CPT

## 2020-05-02 PROCEDURE — 71250 CT THORAX DX C-: CPT | Mod: 26

## 2020-05-02 PROCEDURE — 99222 1ST HOSP IP/OBS MODERATE 55: CPT

## 2020-05-02 PROCEDURE — 99233 SBSQ HOSP IP/OBS HIGH 50: CPT | Mod: GC

## 2020-05-02 RX ORDER — AMLODIPINE BESYLATE 2.5 MG/1
10 TABLET ORAL ONCE
Refills: 0 | Status: DISCONTINUED | OUTPATIENT
Start: 2020-05-02 | End: 2020-05-02

## 2020-05-02 RX ORDER — DEXTROSE 50 % IN WATER 50 %
15 SYRINGE (ML) INTRAVENOUS ONCE
Refills: 0 | Status: DISCONTINUED | OUTPATIENT
Start: 2020-05-02 | End: 2020-05-06

## 2020-05-02 RX ORDER — CARVEDILOL PHOSPHATE 80 MG/1
25 CAPSULE, EXTENDED RELEASE ORAL EVERY 12 HOURS
Refills: 0 | Status: DISCONTINUED | OUTPATIENT
Start: 2020-05-02 | End: 2020-05-06

## 2020-05-02 RX ORDER — GLUCAGON INJECTION, SOLUTION 0.5 MG/.1ML
1 INJECTION, SOLUTION SUBCUTANEOUS ONCE
Refills: 0 | Status: DISCONTINUED | OUTPATIENT
Start: 2020-05-02 | End: 2020-05-06

## 2020-05-02 RX ORDER — INSULIN GLARGINE 100 [IU]/ML
30 INJECTION, SOLUTION SUBCUTANEOUS AT BEDTIME
Refills: 0 | Status: DISCONTINUED | OUTPATIENT
Start: 2020-05-02 | End: 2020-05-04

## 2020-05-02 RX ORDER — HEPARIN SODIUM 5000 [USP'U]/ML
INJECTION INTRAVENOUS; SUBCUTANEOUS
Qty: 25000 | Refills: 0 | Status: DISCONTINUED | OUTPATIENT
Start: 2020-05-02 | End: 2020-05-05

## 2020-05-02 RX ORDER — HEPARIN SODIUM 5000 [USP'U]/ML
4000 INJECTION INTRAVENOUS; SUBCUTANEOUS EVERY 6 HOURS
Refills: 0 | Status: DISCONTINUED | OUTPATIENT
Start: 2020-05-02 | End: 2020-05-02

## 2020-05-02 RX ORDER — SODIUM CHLORIDE 9 MG/ML
1000 INJECTION, SOLUTION INTRAVENOUS
Refills: 0 | Status: DISCONTINUED | OUTPATIENT
Start: 2020-05-02 | End: 2020-05-06

## 2020-05-02 RX ORDER — INSULIN LISPRO 100/ML
VIAL (ML) SUBCUTANEOUS
Refills: 0 | Status: DISCONTINUED | OUTPATIENT
Start: 2020-05-02 | End: 2020-05-06

## 2020-05-02 RX ORDER — HEPARIN SODIUM 5000 [USP'U]/ML
2000 INJECTION INTRAVENOUS; SUBCUTANEOUS EVERY 6 HOURS
Refills: 0 | Status: DISCONTINUED | OUTPATIENT
Start: 2020-05-02 | End: 2020-05-02

## 2020-05-02 RX ORDER — INSULIN LISPRO 100/ML
3 VIAL (ML) SUBCUTANEOUS
Refills: 0 | Status: DISCONTINUED | OUTPATIENT
Start: 2020-05-02 | End: 2020-05-04

## 2020-05-02 RX ORDER — DEXTROSE 50 % IN WATER 50 %
25 SYRINGE (ML) INTRAVENOUS ONCE
Refills: 0 | Status: DISCONTINUED | OUTPATIENT
Start: 2020-05-02 | End: 2020-05-06

## 2020-05-02 RX ORDER — AMLODIPINE BESYLATE 2.5 MG/1
10 TABLET ORAL DAILY
Refills: 0 | Status: DISCONTINUED | OUTPATIENT
Start: 2020-05-03 | End: 2020-05-06

## 2020-05-02 RX ORDER — HEPARIN SODIUM 5000 [USP'U]/ML
2000 INJECTION INTRAVENOUS; SUBCUTANEOUS EVERY 6 HOURS
Refills: 0 | Status: DISCONTINUED | OUTPATIENT
Start: 2020-05-02 | End: 2020-05-04

## 2020-05-02 RX ORDER — HEPARIN SODIUM 5000 [USP'U]/ML
INJECTION INTRAVENOUS; SUBCUTANEOUS
Qty: 25000 | Refills: 0 | Status: DISCONTINUED | OUTPATIENT
Start: 2020-05-02 | End: 2020-05-02

## 2020-05-02 RX ORDER — INSULIN LISPRO 100/ML
VIAL (ML) SUBCUTANEOUS AT BEDTIME
Refills: 0 | Status: DISCONTINUED | OUTPATIENT
Start: 2020-05-02 | End: 2020-05-06

## 2020-05-02 RX ORDER — CARBIDOPA AND LEVODOPA 25; 100 MG/1; MG/1
1 TABLET ORAL
Refills: 0 | Status: DISCONTINUED | OUTPATIENT
Start: 2020-05-02 | End: 2020-05-05

## 2020-05-02 RX ORDER — TAMSULOSIN HYDROCHLORIDE 0.4 MG/1
0.4 CAPSULE ORAL AT BEDTIME
Refills: 0 | Status: DISCONTINUED | OUTPATIENT
Start: 2020-05-02 | End: 2020-05-06

## 2020-05-02 RX ORDER — DEXTROSE 50 % IN WATER 50 %
12.5 SYRINGE (ML) INTRAVENOUS ONCE
Refills: 0 | Status: DISCONTINUED | OUTPATIENT
Start: 2020-05-02 | End: 2020-05-06

## 2020-05-02 RX ORDER — HEPARIN SODIUM 5000 [USP'U]/ML
4500 INJECTION INTRAVENOUS; SUBCUTANEOUS ONCE
Refills: 0 | Status: COMPLETED | OUTPATIENT
Start: 2020-05-02 | End: 2020-05-02

## 2020-05-02 RX ORDER — ATORVASTATIN CALCIUM 80 MG/1
40 TABLET, FILM COATED ORAL AT BEDTIME
Refills: 0 | Status: DISCONTINUED | OUTPATIENT
Start: 2020-05-02 | End: 2020-05-06

## 2020-05-02 RX ORDER — HEPARIN SODIUM 5000 [USP'U]/ML
4500 INJECTION INTRAVENOUS; SUBCUTANEOUS EVERY 6 HOURS
Refills: 0 | Status: DISCONTINUED | OUTPATIENT
Start: 2020-05-02 | End: 2020-05-04

## 2020-05-02 RX ORDER — ASPIRIN/CALCIUM CARB/MAGNESIUM 324 MG
81 TABLET ORAL DAILY
Refills: 0 | Status: DISCONTINUED | OUTPATIENT
Start: 2020-05-02 | End: 2020-05-06

## 2020-05-02 RX ORDER — DEXTROSE 50 % IN WATER 50 %
25 SYRINGE (ML) INTRAVENOUS ONCE
Refills: 0 | Status: DISCONTINUED | OUTPATIENT
Start: 2020-05-02 | End: 2020-05-05

## 2020-05-02 RX ORDER — HEPARIN SODIUM 5000 [USP'U]/ML
4000 INJECTION INTRAVENOUS; SUBCUTANEOUS ONCE
Refills: 0 | Status: DISCONTINUED | OUTPATIENT
Start: 2020-05-02 | End: 2020-05-02

## 2020-05-02 RX ADMIN — TAMSULOSIN HYDROCHLORIDE 0.4 MILLIGRAM(S): 0.4 CAPSULE ORAL at 22:32

## 2020-05-02 RX ADMIN — Medication 0.1 MILLIGRAM(S): at 22:31

## 2020-05-02 RX ADMIN — CARBIDOPA AND LEVODOPA 1 TABLET(S): 25; 100 TABLET ORAL at 22:53

## 2020-05-02 RX ADMIN — HEPARIN SODIUM 4500 UNIT(S): 5000 INJECTION INTRAVENOUS; SUBCUTANEOUS at 18:55

## 2020-05-02 RX ADMIN — HEPARIN SODIUM 1100 UNIT(S)/HR: 5000 INJECTION INTRAVENOUS; SUBCUTANEOUS at 18:58

## 2020-05-02 RX ADMIN — INSULIN GLARGINE 30 UNIT(S): 100 INJECTION, SOLUTION SUBCUTANEOUS at 22:53

## 2020-05-02 RX ADMIN — Medication 81 MILLIGRAM(S): at 22:32

## 2020-05-02 RX ADMIN — ATORVASTATIN CALCIUM 40 MILLIGRAM(S): 80 TABLET, FILM COATED ORAL at 22:32

## 2020-05-02 RX ADMIN — CARVEDILOL PHOSPHATE 25 MILLIGRAM(S): 80 CAPSULE, EXTENDED RELEASE ORAL at 22:32

## 2020-05-02 NOTE — H&P ADULT - PROBLEM SELECTOR PLAN 3
Patient presenting with 2 months of non productive cough, previously found to have GGOs during admission in Jan as well as currently CT chest this admission.  - currently on RA, no WOB   - initial COVID testing neg  - noted the have lymphopenia   - obtain remaining labs cinthya, CRP, procal, will defer ddimer in setting of active clot Patient presenting with 2 months of non productive cough, previously found to have GGOs during admission in Jan as well as currently CT chest this admission.  - currently on RA, no WOB   - noted the have lymphopenia   - obtain remaining labs ferritin, CRP, procal, will defer ddimer in setting of active clot  - initial COVID testing neg, will re-test, f/u results Patient presenting with 2 months of non productive cough, previously found to have bilateral GGOs during admission in Jan as well as currently CT chest this admission.  - currently on RA, no WOB   - noted the have lymphopenia   - obtain remaining labs ferritin, CRP, procal with next labs  - defer ddimer in setting of active clot  - initial COVID testing neg, given high suspivion will re-test, f/u results

## 2020-05-02 NOTE — H&P ADULT - HISTORY OF PRESENT ILLNESS
69  Latvian Speaking male with PMH HTN, HLD, DM, CVA (2017 - residual R sided weakness), ESRD on Dialysis (Tue/Thurs/Sat), Parkinson disease, CAD (s/p PCI ~2005), who was sent in from dialysis for with facial and R arm swelling. Patient did not undergo dialysis last night. Patient himself had noted facial swelling and arm swelling x 1 month- however was not able to see physician for further evaluation. Patient has a complicated vascular hx- He initially had a LUE AVF placed in 2012 which was found to be non functional in 2019. At some point, patient had LUE stent placed 10 years at [facility].  A R permcath was placed in addition to RUE AVF graft. Of note, patient was admitted on Jan 2020 for sepsis 2/2 GPC bacteremia in setting of HD permacath s/p removal on 1/29/10. He had a R upper extremity fistula placed a year ago through which he was able to continued dialysis, which was deemed to functional with no issues. He was since completed 14 day course of Vanco with HD since.       Patient otherwise denying fevers, cough, dyspnea, abdominal pain, nausea, vomiting. Of note, patient had ongoing cough x 2 months. While admitted in Jan 2020 he had imaging which noted GGOs. Patient was tested for COVID a month ago unsure of results.     In ED, afebrile, tachycardic to 100s, SBP 170s-180s and 99% on RA. Labs notable for macrocytic anemia to Hb 8.7, lymphopenia, K is 4.0. Left subclavian vein DVT, no R sided DVT. CTAP non contrast  with bilateral patchy infiltrates. COVID 19 neg, Vascular surgery consulted recommending initiation of AC. 69  Uzbek Speaking male with PMH HTN, HLD, DM, CVA (2017 - residual R sided weakness), ESRD on Dialysis (Tue/Thurs/Sat), Parkinson disease, CAD (s/p PCI ~2005), who was sent in from dialysis for with facial and R arm swelling. Attempted to call wife to obtain collateral information but went to voicemail.     Patient himself had noted facial swelling and arm swelling x 1 month- however was not able to see physician for further evaluation. Patient has a complicated vascular hx- He initially had a LUE AVF placed in 2012 which was found to be non functional in 2019. At some point, patient had LUE stent placed 10 years at [facility].  A right permcath was placed in addition to RUE AVF graft. Of note, patient was admitted on Jan 2020 for sepsis 2/2 GPC bacteremia in setting of HD permacath s/p removal on 1/29/10. He had a R upper extremity fistula placed a year ago through which he was able to continued dialysis, which was deemed to functional with no issues. He was since completed 14 day course of Vanco with HD since. Today patient was noted to have worsening facial swelling today at Hd and was sent in without starting dialysis session.    Patient otherwise denying fevers, cough, dyspnea, abdominal pain, nausea, vomiting. Of note, patient had ongoing cough x 2 months. While admitted in Jan 2020 he had imaging which noted GGOs. Patient was tested for COVID a month ago unsure of results.     In ED, afebrile, tachycardic to 100s, SBP 170s-180s and 99% on RA. Labs notable for macrocytic anemia to Hb 8.7, lymphopenia, K is 4.0. Left subclavian vein DVT, no R sided DVT. CTAP non contrast  with bilateral patchy infiltrates. COVID 19 neg, Vascular surgery consulted recommending initiation of AC. 69  Polish Speaking male with PMH HTN, HLD, DM, CVA (2017 - residual R sided weakness), ESRD on Dialysis (Tue/Thurs/Sat), Parkinson disease, CAD (s/p PCI ~2005), who was sent in from dialysis for with facial and R arm swelling. Attempted to call wife to obtain collateral information but went to voicemail.     Patient himself had noted facial swelling and arm swelling x 1 month- however was not able to see physician for further evaluation. Patient has a complicated vascular hx- He initially had a LUE AVF placed in 2012 which was found to be non functional in 2019. At some point, patient had LUE stent placed 10 years at [facility].  A right permcath was placed in addition to RUE AVF graft. Of note, patient was admitted on Jan 2020 for sepsis 2/2 GPC bacteremia in setting of HD permacath s/p removal on 1/29/10. He had a R upper extremity fistula placed a year ago through which he was able to continued dialysis, which was deemed to functional with no issues. He was since completed 14 day course of Vanco with HD since. Today patient was noted to have worsening facial swelling today at Hd and was sent in without starting dialysis session.    Patient otherwise denying fevers dyspnea, abdominal pain, nausea, vomiting. Of note, patient had ongoing cough x 2 months. While admitted in Jan 2020 he had imaging which noted GGOs. Patient was tested for COVID a month ago unsure of results.     In ED, afebrile, tachycardic to 100s, SBP 170s-180s and 99% on RA. Labs notable for macrocytic anemia to Hb 8.7, lymphopenia, K is 4.0. Left subclavian vein DVT, no R sided DVT. CT Chest non contrast with bilateral patchy infiltrates. COVID 19 neg, Vascular surgery consulted recommending initiation of AC.

## 2020-05-02 NOTE — H&P ADULT - NSHPSOCIALHISTORY_GEN_ALL_CORE
Former smoking and alcohol use, quit 30 years ago  Lives with wife and adult son at home Former smoking and alcohol use, quit 30 years ago  Lives with wife and adult son at home  Ambulates with walker

## 2020-05-02 NOTE — H&P ADULT - ASSESSMENT
69  Salvadorean Speaking male with PMH HTN, HLD, DM, CVA (2017 - residual R sided weakness), ESRD on Dialysis (Tue/Thurs/Sat), Parkinson disease, CAD (s/p PCI ~2005), who was sent in from dialysis for with facial and R arm swelling, found to have possible cental stenosis of RUE AVF as well as subclavian vein DVT as well as  concern for COVID 19 69  Albanian Speaking male with PMH HTN, HLD, DM, CVA (2017 - residual R sided weakness), ESRD on Dialysis (Tue/Thurs/Sat), Parkinson disease, CAD (s/p PCI ~2005), who was sent in from dialysis for with facial and R arm swelling, found to have possible cental stenosis of RUE AVF as well as L subclavian vein DVT as well as  concern for COVID 19

## 2020-05-02 NOTE — H&P ADULT - NSHPREVIEWOFSYSTEMS_GEN_ALL_CORE
REVIEW OF SYSTEMS:    CONSTITUTIONAL: No weakness, fevers or chills  EYES/ENT: No visual changes;  No vertigo or throat pain   NECK: + facial swelling No pain or stiffness  RESPIRATORY: + cough, No wheezing, hemoptysis; No shortness of breath  CARDIOVASCULAR: No chest pain or palpitations  GASTROINTESTINAL: No abdominal pain; nausea, vomiting;  diarrhea or constipation. No hemetemesis, melena or hematochezia.  GENITOURINARY: No dysuria, frequency or hematuria  NEUROLOGICAL: No numbness or weakness  SKIN: No itching, burning, rashes, or lesions   All other review of systems is negative unless indicated above.

## 2020-05-02 NOTE — H&P ADULT - PROBLEM SELECTOR PLAN 1
Patient presenting with facial swelling and RUE swelling in setting of prior bilateral UE AVF grafts/revisions with concern for stenosis of RUE AVF graft  - currently otherwise fistula deemed functional Patient presenting with facial swelling and RUE swelling in setting of prior bilateral UE AVF grafts/revisions with concern for stenosis of RUE AVF graft  - currently otherwise fistula deemed functional  - IR consult in AM for fistula

## 2020-05-02 NOTE — H&P ADULT - PROBLEM SELECTOR PLAN 9
Briefly discussed with patient- would want wife to be proxy with son involved in decision making. Full code at this time

## 2020-05-02 NOTE — ED PROVIDER NOTE - OBJECTIVE STATEMENT
[employer]- Chinese #[id number] 69 year old male PMH ESRD (T/Th/Sat), HTN, CAD s/p stent, p/w facial and arm swelling. Patient BIBEMS from dialysis, did not start today's session. States he woke up 1 month ago and noticed facial swelling. Swelling has remained stable for past month. Has not seen doctor for swelling. No recent medication changes, no change in soaps/shampoo//etc. Denies difficulty swallowing or breathing. Had fever ~1 month ago, tested for COVID but unsure of results. Today, denies fever, chills, cough, cp, sob, abd pain, N/V, leg swelling, or weakness.       [employer]- Thai #[id number]

## 2020-05-02 NOTE — H&P ADULT - PROBLEM SELECTOR PLAN 2
L subclavian vein DVT found on duplex this admission  - currently on heparin gtt, trend PTT and monitor for bleeding  - IR consult in AM  - f/u vascular surgery recs L subclavian vein DVT found on duplex this admission  - currently on heparin gtt, trend PTT and monitor for bleeding  - f/u vascular surgery recs

## 2020-05-02 NOTE — ED PROVIDER NOTE - PHYSICAL EXAMINATION
GENERAL: A&Ox3, non-toxic appearing, no acute distress  HEENT: NCAT, EOMI, oral mucosa moist, normal conjunctiva, no tonsillar erythema or exudate, no neck erythema, no sublingual firmness  RESP: CTAB, no respiratory distress, no wheezes/rhonchi/rales, speaking in full sentences  CV: RRR, no murmurs/rubs/gallops, facial swelling w/o weeping, +R arm swelling w/ 2+ pitting  ABDOMEN: soft, non-tender, non-distended, no guarding  MSK: no visible deformities  NEURO: no focal sensory or motor deficits, SHAH  SKIN: warm, normal color, well perfused, no rash  PSYCH: normal affect    -Guido Nevarez, PGY-1

## 2020-05-02 NOTE — H&P ADULT - PROBLEM SELECTOR PLAN 6
Patient ambulates with walker at baseline. Per scanned in records from [facility] in [facility] last dosing sinemet  QID  - will dose 1 tab now  - clarify dosing in AM Patient ambulates with walker at baseline. Per scanned in records from [facility] in [facility] last dosing sinemet  QID  - will start prior dosing  - clarify dosing in AM

## 2020-05-02 NOTE — CONSULT NOTE ADULT - ASSESSMENT
69M with ESRD on HD p/w right facial and arm swelling for one month, found to have right subclavian thrombus.    -no acute vascular surgery intervention  -recommend therapeutic anticoagulation    page [phone] with vascular surgery questions  Altagracia Amaral, PGY-4 69M with ESRD on HD p/w right facial and arm swelling for one month, found to have thrombosis of the left subclavian stent    -no acute vascular surgery intervention  -follow up duplex official read  -recommend therapeutic anticoagulation    page [phone] with vascular surgery questions  Altagracia Amaral, PGY-4 69M with ESRD on HD p/w right facial and arm swelling for one month, likely 2/2 central stenosis    -no acute vascular surgery intervention  -recommend IR consultation for non emergent fistulogram, as RUE swelling possibly 2/2 central stenosis but fistula still functional  -left sided stent occlusion likely chronic, will follow up official read of duplex  discussed with Dr. Hoyt       page [phone] with vascular surgery questions  Altagracia Amaral, PGY-4

## 2020-05-02 NOTE — ED ADULT NURSE NOTE - OBJECTIVE STATEMENT
69 y M hx of ESRD on dialysis receiving Tu, Th, and Saturday, HTN, and CAD reports from dialysis center with facial swelling x 5 weeks. Pt. reporting going to receive dialysis today and being sent here for facial swelling. Reporting having fevers a month ago and not receiving COVID swab results. Denies SOB, cough, pain or difficulty swallowing, N/V/D. A&Ox3- Beninese speaking. Facial swelling and RUE edema noted on admission. Skin warm and dry- LUE fistula + thrill. Breathing comfortably- no distress. Abdomen soft, nontender, nondistended. Safety maintained- bed locked in lowest position.

## 2020-05-02 NOTE — H&P ADULT - NSHPPHYSICALEXAM_GEN_ALL_CORE
PHYSICAL EXAM:    Vital Signs Last 24 Hrs  T(C): 36.5 (02 May 2020 20:16), Max: 36.9 (02 May 2020 13:10)  T(F): 97.7 (02 May 2020 20:16), Max: 98.4 (02 May 2020 13:10)  HR: 109 (02 May 2020 20:16) (91 - 109)  BP: 182/77 (02 May 2020 20:16) (145/52 - 185/74)  BP(mean): --  RR: 20 (02 May 2020 20:16) (16 - 20)  SpO2: 98% (02 May 2020 20:16) (98% - 100%)    General: No acute distress, resting in bed  HEENT: Facial plethora. PERRL. EOMI.  No scleral icterus or injection.    Neck: Supple.  Full ROM.  No JVD.  No thyromegaly. No lymphadenopathy.   Heart: RRR.  Normal S1 and S2.  No murmurs, rubs, or gallops.   Lungs: CTAB. No wheezes, crackles, or rhonchi.    Abdomen: BS+, soft, NT/ND.  No organomegaly.  Skin: Warm and dry.  No rashes.  Extremities: No edema, clubbing, or cyanosis.  2+ peripheral pulses b/l.  Musculoskeletal: No deformities.  No spinal or paraspinal tenderness.  Neuro: A&Ox3.  CN II-XII intact.  5/5 strength in UE and LE b/l.  Tactile sensation intact in UE and LE b/l.  Cerebellar function intact PHYSICAL EXAM:    Vital Signs Last 24 Hrs  T(C): 36.5 (02 May 2020 20:16), Max: 36.9 (02 May 2020 13:10)  T(F): 97.7 (02 May 2020 20:16), Max: 98.4 (02 May 2020 13:10)  HR: 109 (02 May 2020 20:16) (91 - 109)  BP: 182/77 (02 May 2020 20:16) (145/52 - 185/74)  BP(mean): --  RR: 20 (02 May 2020 20:16) (16 - 20)  SpO2: 98% (02 May 2020 20:16) (98% - 100%)    General: No acute distress, resting in bed  HEENT: Facial plethora. PERRL. EOMI.  No scleral icterus or injection.    Neck: Supple.  Full ROM.  No JVD.  No thyromegaly. No lymphadenopathy.   Heart: RRR.  Normal S1 and S2.  No murmurs  Lungs: Diminished breath sounds bilateral  Abdomen: BS+, soft, NT/ND.  No organomegaly.  Skin: Warm and dry.  No rashes.  Extremities: No edema, clubbing, or cyanosis.  Difficult to palpate radial/ulnar pulses bilaterals, but otherwise warm with full strength  Neuro: A&Ox3.  4/5 strength in RE and 5/5 LE b/l.  Tactile sensation intact in UE and LE b/l.

## 2020-05-02 NOTE — ED PROVIDER NOTE - NS ED ROS FT
GENERAL: no fever, no chills  EYES: no change in vision, no irritation, no discharge, no redness, no pain  HEENT: no trouble swallowing or speaking, no throat pain, no ear pain, +facial swelling  CARDIAC: no chest pain, no palpitations   PULMONARY: no cough, no shortness of breath, no wheezing  GI: no abdominal pain, no nausea, no vomiting  : no changes in urination, no dysuria, +oliguric  SKIN: no rashes  NEURO: no headache, no numbness, no weakness  MSK: no joint pain, no muscle pain, + midline back pain, no calf pain     -Guido Nevarez, PGY-1

## 2020-05-02 NOTE — H&P ADULT - PROBLEM SELECTOR PLAN 4
ESRD on HD TThSa via RUE AVF with complex UE vascular. Last full session on Thur, today was sent in from HD without full session   - currently with normal K/ no vol overload, no need for urgent Hd at this time  - nephrology consult in AM  - ok to use fistula   - f/u med rec in AM ESRD on HD TThSa via RUE AVF with complex UE vascular. Last full session on Thur, today was sent in from HD without full session   - currently with normal K/ no vol overload, no need for urgent Hd at this time  - nephrology consult in AM  - ok to use fistula per vascular  - f/u med rec in AM

## 2020-05-02 NOTE — H&P ADULT - PROBLEM SELECTOR PLAN 5
CAD s/p stent in 2005 on ASA at home  - c/w ASA   - c/w statin  - per records appears on carvedilol 25 BId, will resume here  - obtain EKG CAD s/p stent in 2005 on ASA at home  - c/w ASA   - c/w statin  - per records appears on carvedilol 25 BID, will resume here  - obtain EKG

## 2020-05-02 NOTE — ED PROVIDER NOTE - ATTENDING CONTRIBUTION TO CARE
70yo male pmh ESRD facial swelling x 1 month, slightly worsening. Otherwise feels well and is asymptomatic. Sent in from HD, did not receive session today. On exam pt with RUE swelling extending to hand and diffuse facial swellign with right side of face more severe than left. No airway compromise, lungs clear, abd soft, AVF +thrill, normal sensation and strength in extremities. Concern for RUE DVT vs AVF thrombus vs SVC syndrome vs pancoast tumor causing congestion. Labs, US RUE, CXR, possible CT chest if any findings on CXR. Will require admission for HD.

## 2020-05-02 NOTE — ED PROVIDER NOTE - PROGRESS NOTE DETAILS
DH: Informed by vascular tech that patient w/ functioning fistula of RUE and no obvious occlusion, however, left subclavian vein appears thrombosed at site of former graft. Will cs vascular surgery regarding treatment options, possible AC.

## 2020-05-02 NOTE — H&P ADULT - PROBLEM SELECTOR PLAN 7
Hx of HTN unclear of home meds appears per scanned in records in [facility] appears amlodipine 10 mg and carvedilol 25 mg BID  - c/w amlodipine  - will resume carvedilol 12.5 mg BID Hx of HTN unclear of home meds appears per scanned in records in [facility] appears amlodipine 10 mg and carvedilol 25 mg BID and clonidine 0.1 mg BID  - c/w amlodipine   - will resume carvedilol 12.5 mg BID  - resume clonidine 0.1 mg BID

## 2020-05-02 NOTE — ED PROCEDURE NOTE - PROCEDURE ADDITIONAL DETAILS
Peripheral IV access in the Emergency Department obtained under dynamic ultrasound guidance with dark nonpulsatile blood return in the L brachial vein. 18g catheter was flushed afterwards without any resistance or resulting extravasation. IV catheter confirmed in compressible vein after insertion.

## 2020-05-02 NOTE — ED ADULT NURSE REASSESSMENT NOTE - NS ED NURSE REASSESS COMMENT FT1
received patient from day RN Pat, a/o x 3, heparin gtt running per order, comfort and safety provided.

## 2020-05-02 NOTE — H&P ADULT - NSHPLABSRESULTS_GEN_ALL_CORE
CBC Full  -  ( 02 May 2020 14:56 )  WBC Count : 4.84 K/uL  Hemoglobin : 8.7 g/dL  Hematocrit : 29.9 %  Platelet Count - Automated : 167 K/uL  Mean Cell Volume : 101.0 fl  Mean Cell Hemoglobin : 29.4 pg  Mean Cell Hemoglobin Concentration : 29.1 gm/dL  Auto Neutrophil # : 3.48 K/uL  Auto Lymphocyte # : 0.63 K/uL  Auto Monocyte # : 0.65 K/uL  Auto Eosinophil # : 0.05 K/uL  Auto Basophil # : 0.00 K/uL  Auto Neutrophil % : 72.0 %  Auto Lymphocyte % : 13.0 %  Auto Monocyte % : 13.4 %  Auto Eosinophil % : 1.0 %  Auto Basophil % : 0.0 %    05-02    139  |  93<L>  |  33<H>  ----------------------------<  138<H>  4.0   |  32<H>  |  6.63<H>    Ca    10.5      02 May 2020 14:56    TPro  7.2  /  Alb  3.3  /  TBili  0.3  /  DBili  x   /  AST  39  /  ALT  19  /  AlkPhos  124<H>  05-02    LIVER FUNCTIONS - ( 02 May 2020 14:56 )  Alb: 3.3 g/dL / Pro: 7.2 g/dL / ALK PHOS: 124 U/L / ALT: 19 U/L / AST: 39 U/L / GGT: x             PT/INR - ( 02 May 2020 14:56 )   PT: 10.6 sec;   INR: 0.92 ratio         PTT - ( 02 May 2020 14:56 )  PTT:36.2 sec      COVID-19 PCR . (05.02.20 @ 17:37)    COVID-19 PCR: NotDetec: This test has been validated by [employer] to be accurate;  though it has not been FDA cleared/approved by the usual pathway.      Imaging:    < from: CT Chest No Cont (05.02.20 @ 17:20) >    IMPRESSION:     Patchy opacities are noted within both lungs. Differential diagnoses includes infection, such as viral pneumonia and/or injury to the lung secondary to infection.    No superior sulcus tumor as clinically questioned.    Anasarca.    < end of copied text >      < from: [facility] Duplex Upper Ext Vein Scan, Right (05.02.20 @ 16:30) >    INTERPRETATION:  Left subclavian vein DVT.    No evidence of right upper extremity deep venous thrombosis.    < end of copied text > CBC Full  -  ( 02 May 2020 14:56 )  WBC Count : 4.84 K/uL  Hemoglobin : 8.7 g/dL  Hematocrit : 29.9 %  Platelet Count - Automated : 167 K/uL  Mean Cell Volume : 101.0 fl  Mean Cell Hemoglobin : 29.4 pg  Mean Cell Hemoglobin Concentration : 29.1 gm/dL  Auto Neutrophil # : 3.48 K/uL  Auto Lymphocyte # : 0.63 K/uL  Auto Monocyte # : 0.65 K/uL  Auto Eosinophil # : 0.05 K/uL  Auto Basophil # : 0.00 K/uL  Auto Neutrophil % : 72.0 %  Auto Lymphocyte % : 13.0 %  Auto Monocyte % : 13.4 %  Auto Eosinophil % : 1.0 %  Auto Basophil % : 0.0 %    05-02    139  |  93<L>  |  33<H>  ----------------------------<  138<H>  4.0   |  32<H>  |  6.63<H>    Ca    10.5      02 May 2020 14:56    TPro  7.2  /  Alb  3.3  /  TBili  0.3  /  DBili  x   /  AST  39  /  ALT  19  /  AlkPhos  124<H>  05-02    LIVER FUNCTIONS - ( 02 May 2020 14:56 )  Alb: 3.3 g/dL / Pro: 7.2 g/dL / ALK PHOS: 124 U/L / ALT: 19 U/L / AST: 39 U/L / GGT: x             PT/INR - ( 02 May 2020 14:56 )   PT: 10.6 sec;   INR: 0.92 ratio         PTT - ( 02 May 2020 14:56 )  PTT:36.2 sec      COVID-19 PCR . (05.02.20 @ 17:37)    COVID-19 PCR: NotDetec: This test has been validated by [employer] to be accurate;  though it has not been FDA cleared/approved by the usual pathway.      Imaging:    < from: CT Chest No Cont (05.02.20 @ 17:20) >    IMPRESSION:     Patchy opacities are noted within both lungs. Differential diagnoses includes infection, such as viral pneumonia and/or injury to the lung secondary to infection.    No superior sulcus tumor as clinically questioned.    Anasarca.    < end of copied text >      < from: [facility] Duplex Upper Ext Vein Scan, Right (05.02.20 @ 16:30) >    INTERPRETATION:  Left subclavian vein DVT.    No evidence of right upper extremity deep venous thrombosis.    < end of copied text >    labs and imaging personally reviewed

## 2020-05-02 NOTE — H&P ADULT - PROBLEM SELECTOR PLAN 10
DVT: on full dose AC  Diet: Renal/CC diet    Transitions of Care Status:  1.  Name of PCP:  2.  PCP Contacted on Admission: [ ] Y    [ ] N    3.  PCP contacted at Discharge: [ ] Y    [ ] N    [ ] N/A  4.  Post-Discharge Appointment Date and Location:  5.  Summary of Handoff given to PCP: DVT: on full dose AC  Diet: Renal/CC diet  Ambulate with assistance  Transitions of Care Status:  1.  Name of PCP:  2.  PCP Contacted on Admission: [ ] Y    [ ] N    3.  PCP contacted at Discharge: [ ] Y    [ ] N    [ ] N/A  4.  Post-Discharge Appointment Date and Location:  5.  Summary of Handoff given to PCP:

## 2020-05-02 NOTE — ED PROVIDER NOTE - CARE PLAN
Principal Discharge DX:	Facial swelling Principal Discharge DX:	Facial swelling  Secondary Diagnosis:	Suspected COVID-19 virus infection

## 2020-05-02 NOTE — H&P ADULT - NSICDXPASTMEDICALHX_GEN_ALL_CORE_FT
PAST MEDICAL HISTORY:  Back pain     CAD (coronary artery disease)     Coronary artery disease with stent    Diabetes     ESRD (end stage renal disease)     ESRD on hemodialysis     History of BPH     Hyperlipidemia     Hyperphosphatemia     Hypertension     Hypertension     Parkinsons disease     Stroke 2017, residual right sided weakness

## 2020-05-02 NOTE — H&P ADULT - ATTENDING COMMENTS
I have precepted this case with house staff and agree with resident note above and have edited it where appropriate.  As per hospital policy, my physical exam has been deferred amidst COVID-19 outbreak. Physical exam from ER Provider note reviewed.   69  Citizen of [place] Speaking male with PMH HTN, HLD, DM, CVA (2017 - residual R sided weakness), ESRD on Dialysis (Tue/Thurs/Sat), Parkinson disease, CAD (s/p PCI ~2005), who was sent in from dialysis for with facial and R arm swelling, found to have possible cental stenosis of RUE AVF as well as L subclavian vein DVT as well as  concern for COVID 19. Repeat covid pending. Needs IR eval for fistulogram in am. House renal to be called for nonemergent HD in am defer to am team to arrange. Per vascular, fistula still functional despite possible central stenosis. c/w hep gtt for subclav dvt.   Care to be assumed by day hospitalist at 8 am.  This patient was assigned to me by the hospitalist in charge; my involvement in this case has consisted of the initial history, physical, chart review, and management plan.  This patient was previously unknown to me.

## 2020-05-02 NOTE — H&P ADULT - NSICDXPASTSURGICALHX_GEN_ALL_CORE_FT
PAST SURGICAL HISTORY:  AVF (arteriovenous fistula) right arm x with revision, left arm x 1    S/P dialysis catheter insertion Right chest perma cath removed in jan 2020    S/P primary angioplasty with coronary stent     Stented coronary artery 2004-5 at [facility]

## 2020-05-02 NOTE — H&P ADULT - PROBLEM SELECTOR PLAN 8
Hx of DM on insulin 70/30, patient unsure of dose but report possibly 30 units BID at home  - home dose approximates to 42u long acting and 6 units premeal  - given uncertainty of home dose, will give Lantus 25 units and ISS  - ISS and FSG qAC and qHS Hx of DM on insulin 70/30, patient unsure of dose but report possibly 30 units BID at home  - home dose approximates to 42u long acting and 6 units premeal  - given uncertainty of home dose, will give Lantus 30 units, premeal 3 and ISS  - ISS and FSG qAC and qHS

## 2020-05-02 NOTE — ED PROVIDER NOTE - CLINICAL SUMMARY MEDICAL DECISION MAKING FREE TEXT BOX
Guido Nevarez, PGY1: 69 year old male PMH ESRD (T/Th/Sat), HTN, CAD s/p stent, p/w facial and arm swelling x1 month, stable. Patient from dialysis, unable to do today's session. No fever, SOB, cough, CP. C/f thoracic mass vs UE DVT vs fluid retention. Plan for EKG, CXR, labs, COVID swab, reassess.

## 2020-05-02 NOTE — CONSULT NOTE ADULT - SUBJECTIVE AND OBJECTIVE BOX
VASCULAR SURGERY CONSULT NOTE    69M with ESRD on HD p/w right facial and arm swelling for one month, found to have srigh       PAST MEDICAL & SURGICAL HISTORY:  CAD (coronary artery disease)  Hypertension  ESRD (end stage renal disease)  S/P primary angioplasty with coronary stent    [  ] No significant past history as reviewed with the patient and family    FAMILY HISTORY:    [  ] Family history not pertinent as reviewed with the patient and family    SOCIAL HISTORY:    MEDICATIONS  (STANDING):  heparin   Injectable 4000 Unit(s) IV Push once  heparin  Infusion.  Unit(s)/Hr (10 mL/Hr) IV Continuous <Continuous>    MEDICATIONS  (PRN):  heparin   Injectable 4000 Unit(s) IV Push every 6 hours PRN For aPTT less than 40  heparin   Injectable 2000 Unit(s) IV Push every 6 hours PRN For aPTT between 40 - 57    Allergies    No Known Allergies    Intolerances        Vital Signs Last 24 Hrs  T(C): 36.1 (02 May 2020 16:38), Max: 36.9 (02 May 2020 13:10)  T(F): 97 (02 May 2020 16:38), Max: 98.4 (02 May 2020 13:10)  HR: 91 (02 May 2020 18:08) (91 - 96)  BP: 185/74 (02 May 2020 18:08) (152/84 - 185/74)  BP(mean): --  RR: 20 (02 May 2020 18:08) (16 - 20)  SpO2: 99% (02 May 2020 18:08) (99% - 100%)  Daily Height in cm: 162.56 (02 May 2020 13:10)    Daily                             8.7    4.84  )-----------( 167      ( 02 May 2020 14:56 )             29.9     05-02    139  |  93<L>  |  33<H>  ----------------------------<  138<H>  4.0   |  32<H>  |  6.63<H>    Ca    10.5      02 May 2020 14:56    TPro  7.2  /  Alb  3.3  /  TBili  0.3  /  DBili  x   /  AST  39  /  ALT  19  /  AlkPhos  124<H>  05-02    PT/INR - ( 02 May 2020 14:56 )   PT: 10.6 sec;   INR: 0.92 ratio         PTT - ( 02 May 2020 14:56 )  PTT:36.2 sec      IMAGING STUDIES: VASCULAR SURGERY CONSULT NOTE    69M with ESRD on HD p/w right facial and arm swelling for one month, found to have right subclavian thrombus.      PAST MEDICAL & SURGICAL HISTORY:  CAD (coronary artery disease)  Hypertension  ESRD (end stage renal disease)  S/P primary angioplasty with coronary stent    [  ] No significant past history as reviewed with the patient and family    FAMILY HISTORY:    [  ] Family history not pertinent as reviewed with the patient and family    SOCIAL HISTORY:    MEDICATIONS  (STANDING):  heparin   Injectable 4000 Unit(s) IV Push once  heparin  Infusion.  Unit(s)/Hr (10 mL/Hr) IV Continuous <Continuous>    MEDICATIONS  (PRN):  heparin   Injectable 4000 Unit(s) IV Push every 6 hours PRN For aPTT less than 40  heparin   Injectable 2000 Unit(s) IV Push every 6 hours PRN For aPTT between 40 - 57    Allergies    No Known Allergies    Intolerances        Vital Signs Last 24 Hrs  T(C): 36.1 (02 May 2020 16:38), Max: 36.9 (02 May 2020 13:10)  T(F): 97 (02 May 2020 16:38), Max: 98.4 (02 May 2020 13:10)  HR: 91 (02 May 2020 18:08) (91 - 96)  BP: 185/74 (02 May 2020 18:08) (152/84 - 185/74)  BP(mean): --  RR: 20 (02 May 2020 18:08) (16 - 20)  SpO2: 99% (02 May 2020 18:08) (99% - 100%)  Daily Height in cm: 162.56 (02 May 2020 13:10)    Daily                             8.7    4.84  )-----------( 167      ( 02 May 2020 14:56 )             29.9     05-02    139  |  93<L>  |  33<H>  ----------------------------<  138<H>  4.0   |  32<H>  |  6.63<H>    Ca    10.5      02 May 2020 14:56    TPro  7.2  /  Alb  3.3  /  TBili  0.3  /  DBili  x   /  AST  39  /  ALT  19  /  AlkPhos  124<H>  05-02    PT/INR - ( 02 May 2020 14:56 )   PT: 10.6 sec;   INR: 0.92 ratio         PTT - ( 02 May 2020 14:56 )  PTT:36.2 sec      IMAGING STUDIES: VASCULAR SURGERY CONSULT NOTE    69M with ESRD on HD p/w worsening right facial and arm swelling for one month, found to have occlusion of left subclavian stent. As per patient, he noticed the swelling three weeks ago and now it has extended to his right face. He reports that he has LUE stents placed ten years ago at [facility], where most of his care has been. He also says that his fistula in the right arm was placed one year ago and has been functioning well. He denies taking aspirin or any anticoagulation at home.       PAST MEDICAL & SURGICAL HISTORY:  CAD (coronary artery disease)  Hypertension  ESRD (end stage renal disease)  S/P primary angioplasty with coronary stent      FAMILY HISTORY:    [  ] Family history not pertinent as reviewed with the patient and family    SOCIAL HISTORY:    MEDICATIONS  (STANDING):  heparin   Injectable 4000 Unit(s) IV Push once  heparin  Infusion.  Unit(s)/Hr (10 mL/Hr) IV Continuous <Continuous>    MEDICATIONS  (PRN):  heparin   Injectable 4000 Unit(s) IV Push every 6 hours PRN For aPTT less than 40  heparin   Injectable 2000 Unit(s) IV Push every 6 hours PRN For aPTT between 40 - 57    Allergies    No Known Allergies    Intolerances    PHYSICAL EXAM    Vital Signs Last 24 Hrs  T(C): 36.1 (02 May 2020 16:38), Max: 36.9 (02 May 2020 13:10)  T(F): 97 (02 May 2020 16:38), Max: 98.4 (02 May 2020 13:10)  HR: 91 (02 May 2020 18:08) (91 - 96)  BP: 185/74 (02 May 2020 18:08) (152/84 - 185/74)  BP(mean): --  RR: 20 (02 May 2020 18:08) (16 - 20)  SpO2: 99% (02 May 2020 18:08) (99% - 100%)  Daily Height in cm: 162.56 (02 May 2020 13:10)    Daily     General: WN/WD NAD  Neurology: A&Ox3, nonfocal, SHAH x 4  Head:  apparent swelling of the face  ENT:  Mucosa moist, no ulcerations  Neck:  Supple, no sinuses or palpable masses  Lymphatic:  No palpable cervical, supraclavicular, axillary or inguinal adenopathy  Respiratory: CTA B/L  CV: RRR, S1S2, no murmur  Abdominal: Soft, NT, ND no palpable mass  MSK: right arm with swelling, no phlegmasia, palpable thrill in the AVF and distal pulses, left arm with no swelling, palpable distal pulses                            8.7    4.84  )-----------( 167      ( 02 May 2020 14:56 )             29.9     05-02    139  |  93<L>  |  33<H>  ----------------------------<  138<H>  4.0   |  32<H>  |  6.63<H>    Ca    10.5      02 May 2020 14:56    TPro  7.2  /  Alb  3.3  /  TBili  0.3  /  DBili  x   /  AST  39  /  ALT  19  /  AlkPhos  124<H>  05-02    PT/INR - ( 02 May 2020 14:56 )   PT: 10.6 sec;   INR: 0.92 ratio         PTT - ( 02 May 2020 14:56 )  PTT:36.2 sec      IMAGING STUDIES: VASCULAR SURGERY CONSULT NOTE    69M with ESRD on HD p/w worsening right facial and arm swelling for one month and found to have occlusion of left subclavian stent. As per patient, he noticed the swelling three weeks ago and now it has extended to his right face. RUE brachiocephalic AVF placed by Dr. Hoyt on 9/6/2019 and reportedly has been revised but is still functioning. He reports that he has LUE stents placed ten years ago at [facility], where most of his care has been. He denies taking aspirin or any anticoagulation at home.       PAST MEDICAL & SURGICAL HISTORY:  CAD (coronary artery disease)  Hypertension  ESRD (end stage renal disease)  S/P primary angioplasty with coronary stent      FAMILY HISTORY:    [  ] Family history not pertinent as reviewed with the patient and family    SOCIAL HISTORY:    MEDICATIONS  (STANDING):  heparin   Injectable 4000 Unit(s) IV Push once  heparin  Infusion.  Unit(s)/Hr (10 mL/Hr) IV Continuous <Continuous>    MEDICATIONS  (PRN):  heparin   Injectable 4000 Unit(s) IV Push every 6 hours PRN For aPTT less than 40  heparin   Injectable 2000 Unit(s) IV Push every 6 hours PRN For aPTT between 40 - 57    Allergies    No Known Allergies    Intolerances    PHYSICAL EXAM    Vital Signs Last 24 Hrs  T(C): 36.1 (02 May 2020 16:38), Max: 36.9 (02 May 2020 13:10)  T(F): 97 (02 May 2020 16:38), Max: 98.4 (02 May 2020 13:10)  HR: 91 (02 May 2020 18:08) (91 - 96)  BP: 185/74 (02 May 2020 18:08) (152/84 - 185/74)  BP(mean): --  RR: 20 (02 May 2020 18:08) (16 - 20)  SpO2: 99% (02 May 2020 18:08) (99% - 100%)  Daily Height in cm: 162.56 (02 May 2020 13:10)    Daily     General: WN/WD NAD  Neurology: A&Ox3, nonfocal, SHAH x 4  Head:  apparent swelling of the face  ENT:  Mucosa moist, no ulcerations  Neck:  Supple, no sinuses or palpable masses  Lymphatic:  No palpable cervical, supraclavicular, axillary or inguinal adenopathy  Respiratory: CTA B/L  CV: RRR, S1S2, no murmur  Abdominal: Soft, NT, ND no palpable mass  MSK: right arm with swelling, no phlegmasia, palpable thrill in the AVF and distal pulses, left arm with no swelling, palpable distal pulses                            8.7    4.84  )-----------( 167      ( 02 May 2020 14:56 )             29.9     05-02    139  |  93<L>  |  33<H>  ----------------------------<  138<H>  4.0   |  32<H>  |  6.63<H>    Ca    10.5      02 May 2020 14:56    TPro  7.2  /  Alb  3.3  /  TBili  0.3  /  DBili  x   /  AST  39  /  ALT  19  /  AlkPhos  124<H>  05-02    PT/INR - ( 02 May 2020 14:56 )   PT: 10.6 sec;   INR: 0.92 ratio         PTT - ( 02 May 2020 14:56 )  PTT:36.2 sec      IMAGING STUDIES:

## 2020-05-03 ENCOUNTER — TRANSCRIPTION ENCOUNTER (OUTPATIENT)
Age: 70
End: 2020-05-03

## 2020-05-03 LAB
A1C WITH ESTIMATED AVERAGE GLUCOSE RESULT: 6.3 % — HIGH (ref 4–5.6)
ANION GAP SERPL CALC-SCNC: 16 MMOL/L — SIGNIFICANT CHANGE UP (ref 5–17)
APTT BLD: 114.4 SEC — HIGH (ref 27.5–36.3)
APTT BLD: 177.9 SEC — CRITICAL HIGH (ref 27.5–36.3)
APTT BLD: 69.9 SEC — HIGH (ref 27.5–36.3)
BASE EXCESS BLDV CALC-SCNC: 7.1 MMOL/L — HIGH (ref -2–2)
BUN SERPL-MCNC: 42 MG/DL — HIGH (ref 7–23)
CA-I SERPL-SCNC: 1.29 MMOL/L — SIGNIFICANT CHANGE UP (ref 1.12–1.3)
CALCIUM SERPL-MCNC: 9.8 MG/DL — SIGNIFICANT CHANGE UP (ref 8.4–10.5)
CHLORIDE BLDV-SCNC: 99 MMOL/L — SIGNIFICANT CHANGE UP (ref 96–108)
CHLORIDE SERPL-SCNC: 94 MMOL/L — LOW (ref 96–108)
CO2 BLDV-SCNC: 34 MMOL/L — HIGH (ref 22–30)
CO2 SERPL-SCNC: 28 MMOL/L — SIGNIFICANT CHANGE UP (ref 22–31)
CREAT SERPL-MCNC: 7.67 MG/DL — HIGH (ref 0.5–1.3)
CRP SERPL-MCNC: 1.23 MG/DL — HIGH (ref 0–0.4)
D DIMER BLD IA.RAPID-MCNC: 441 NG/ML DDU — HIGH
ESTIMATED AVERAGE GLUCOSE: 134 MG/DL — HIGH (ref 68–114)
FERRITIN SERPL-MCNC: 3073 NG/ML — HIGH (ref 30–400)
GAS PNL BLDV: 137 MMOL/L — SIGNIFICANT CHANGE UP (ref 135–145)
GAS PNL BLDV: SIGNIFICANT CHANGE UP
GAS PNL BLDV: SIGNIFICANT CHANGE UP
GLUCOSE BLDC GLUCOMTR-MCNC: 101 MG/DL — HIGH (ref 70–99)
GLUCOSE BLDC GLUCOMTR-MCNC: 121 MG/DL — HIGH (ref 70–99)
GLUCOSE BLDC GLUCOMTR-MCNC: 96 MG/DL — SIGNIFICANT CHANGE UP (ref 70–99)
GLUCOSE BLDV-MCNC: 142 MG/DL — HIGH (ref 70–99)
GLUCOSE SERPL-MCNC: 120 MG/DL — HIGH (ref 70–99)
HCO3 BLDV-SCNC: 33 MMOL/L — HIGH (ref 21–29)
HCT VFR BLD CALC: 25.7 % — LOW (ref 39–50)
HCT VFR BLD CALC: 27.8 % — LOW (ref 39–50)
HCT VFR BLDA CALC: 26 % — LOW (ref 39–50)
HGB BLD CALC-MCNC: 8.4 G/DL — LOW (ref 13–17)
HGB BLD-MCNC: 7.5 G/DL — LOW (ref 13–17)
HGB BLD-MCNC: 8.1 G/DL — LOW (ref 13–17)
LACTATE BLDV-MCNC: 1.6 MMOL/L — SIGNIFICANT CHANGE UP (ref 0.7–2)
LDH SERPL L TO P-CCNC: 208 U/L — SIGNIFICANT CHANGE UP (ref 50–242)
MAGNESIUM SERPL-MCNC: 2.3 MG/DL — SIGNIFICANT CHANGE UP (ref 1.6–2.6)
MCHC RBC-ENTMCNC: 29.1 GM/DL — LOW (ref 32–36)
MCHC RBC-ENTMCNC: 29.2 GM/DL — LOW (ref 32–36)
MCHC RBC-ENTMCNC: 29.5 PG — SIGNIFICANT CHANGE UP (ref 27–34)
MCHC RBC-ENTMCNC: 29.6 PG — SIGNIFICANT CHANGE UP (ref 27–34)
MCV RBC AUTO: 101.2 FL — HIGH (ref 80–100)
MCV RBC AUTO: 101.5 FL — HIGH (ref 80–100)
NRBC # BLD: 0 /100 WBCS — SIGNIFICANT CHANGE UP (ref 0–0)
NRBC # BLD: 0 /100 WBCS — SIGNIFICANT CHANGE UP (ref 0–0)
OTHER CELLS CSF MANUAL: 8 ML/DL — LOW (ref 18–22)
PCO2 BLDV: 55 MMHG — HIGH (ref 35–50)
PH BLDV: 7.39 — SIGNIFICANT CHANGE UP (ref 7.35–7.45)
PHOSPHATE SERPL-MCNC: 5.6 MG/DL — HIGH (ref 2.5–4.5)
PLATELET # BLD AUTO: 159 K/UL — SIGNIFICANT CHANGE UP (ref 150–400)
PLATELET # BLD AUTO: 161 K/UL — SIGNIFICANT CHANGE UP (ref 150–400)
PO2 BLDV: 40 MMHG — SIGNIFICANT CHANGE UP (ref 25–45)
POTASSIUM BLDV-SCNC: 3.8 MMOL/L — SIGNIFICANT CHANGE UP (ref 3.5–5.3)
POTASSIUM SERPL-MCNC: 4.7 MMOL/L — SIGNIFICANT CHANGE UP (ref 3.5–5.3)
POTASSIUM SERPL-SCNC: 4.7 MMOL/L — SIGNIFICANT CHANGE UP (ref 3.5–5.3)
PROCALCITONIN SERPL-MCNC: 1.34 NG/ML — HIGH (ref 0.02–0.1)
RBC # BLD: 2.54 M/UL — LOW (ref 4.2–5.8)
RBC # BLD: 2.74 M/UL — LOW (ref 4.2–5.8)
RBC # FLD: 17.8 % — HIGH (ref 10.3–14.5)
RBC # FLD: 18.1 % — HIGH (ref 10.3–14.5)
SAO2 % BLDV: 68 % — SIGNIFICANT CHANGE UP (ref 67–88)
SARS-COV-2 RNA SPEC QL NAA+PROBE: SIGNIFICANT CHANGE UP
SODIUM SERPL-SCNC: 138 MMOL/L — SIGNIFICANT CHANGE UP (ref 135–145)
WBC # BLD: 4.58 K/UL — SIGNIFICANT CHANGE UP (ref 3.8–10.5)
WBC # BLD: 5.24 K/UL — SIGNIFICANT CHANGE UP (ref 3.8–10.5)
WBC # FLD AUTO: 4.58 K/UL — SIGNIFICANT CHANGE UP (ref 3.8–10.5)
WBC # FLD AUTO: 5.24 K/UL — SIGNIFICANT CHANGE UP (ref 3.8–10.5)

## 2020-05-03 PROCEDURE — 99223 1ST HOSP IP/OBS HIGH 75: CPT | Mod: GC

## 2020-05-03 RX ORDER — CARBIDOPA AND LEVODOPA 25; 100 MG/1; MG/1
1 TABLET ORAL
Qty: 0 | Refills: 0 | DISCHARGE

## 2020-05-03 RX ADMIN — CARBIDOPA AND LEVODOPA 1 TABLET(S): 25; 100 TABLET ORAL at 17:49

## 2020-05-03 RX ADMIN — Medication 0.1 MILLIGRAM(S): at 17:49

## 2020-05-03 RX ADMIN — Medication 0.1 MILLIGRAM(S): at 05:33

## 2020-05-03 RX ADMIN — CARBIDOPA AND LEVODOPA 1 TABLET(S): 25; 100 TABLET ORAL at 11:26

## 2020-05-03 RX ADMIN — HEPARIN SODIUM 900 UNIT(S)/HR: 5000 INJECTION INTRAVENOUS; SUBCUTANEOUS at 03:59

## 2020-05-03 RX ADMIN — Medication 3 UNIT(S): at 18:54

## 2020-05-03 RX ADMIN — CARBIDOPA AND LEVODOPA 1 TABLET(S): 25; 100 TABLET ORAL at 05:34

## 2020-05-03 RX ADMIN — HEPARIN SODIUM 800 UNIT(S)/HR: 5000 INJECTION INTRAVENOUS; SUBCUTANEOUS at 17:59

## 2020-05-03 RX ADMIN — HEPARIN SODIUM 800 UNIT(S)/HR: 5000 INJECTION INTRAVENOUS; SUBCUTANEOUS at 10:58

## 2020-05-03 RX ADMIN — CARVEDILOL PHOSPHATE 25 MILLIGRAM(S): 80 CAPSULE, EXTENDED RELEASE ORAL at 17:49

## 2020-05-03 RX ADMIN — AMLODIPINE BESYLATE 10 MILLIGRAM(S): 2.5 TABLET ORAL at 05:34

## 2020-05-03 RX ADMIN — CARVEDILOL PHOSPHATE 25 MILLIGRAM(S): 80 CAPSULE, EXTENDED RELEASE ORAL at 05:34

## 2020-05-03 RX ADMIN — HEPARIN SODIUM 0 UNIT(S)/HR: 5000 INJECTION INTRAVENOUS; SUBCUTANEOUS at 02:46

## 2020-05-03 RX ADMIN — Medication 81 MILLIGRAM(S): at 11:25

## 2020-05-03 NOTE — PROGRESS NOTE ADULT - PROBLEM SELECTOR PLAN 10
DVT: on full dose AC  Diet: Renal/CC diet  Ambulate with assistance  Transitions of Care Status:  1.  Name of PCP:  2.  PCP Contacted on Admission: [ ] Y    [ ] N    3.  PCP contacted at Discharge: [ ] Y    [ ] N    [ ] N/A  4.  Post-Discharge Appointment Date and Location:  5.  Summary of Handoff given to PCP:

## 2020-05-03 NOTE — PROGRESS NOTE ADULT - PROBLEM SELECTOR PLAN 2
L subclavian vein DVT found on duplex this admission  - currently on heparin gtt (5/2- ), trend PTT and monitor for bleeding  - f/u vascular surgery recs (pager [phone]), appreciate recs

## 2020-05-03 NOTE — DISCHARGE NOTE PROVIDER - CARE PROVIDER_API CALL
Brendon Garg)  Diagnostic Radiology; [facility]  [address] [place] [address]  Phone: [phone]  Fax: [phone]  Follow Up Time:     Nabeel Hoyt)  Surgery; Thoracic Surgery; Vascular Surgery  [address] [place] [address]  Phone: [phone]  Fax: [phone]  Follow Up Time:

## 2020-05-03 NOTE — PROGRESS NOTE ADULT - PROBLEM SELECTOR PLAN 4
ESRD on HD TThSa via RUE AVF with complex UE vascular. Last full session on Thur, today was sent in from HD without full session   - currently with normal K/ no vol overload, no need for urgent Hd at this time  - nephrology consult in AM on 5/3  - ok to use fistula per vascular sx  - f/u med rec in AM

## 2020-05-03 NOTE — CONSULT NOTE ADULT - SUBJECTIVE AND OBJECTIVE BOX
[facility] DIVISION OF KIDNEY DISEASES AND HYPERTENSION -- [phone]  -- INITIAL CONSULT NOTE  --------------------------------------------------------------------------------  HPI: 69-year-old male with medical history of HTN, HLD, DM, CVA (2017 - residual R sided weakness), ESRD on Dialysis (Tue/Thurs/Sat), Parkinson disease, CAD (s/p PCI ~2005), who was sent in from dialysis for with facial and R arm swelling, found to have possible cental stenosis of RUE AVF as well as L subclavian vein DVT (CT 5/2) on heparin gtt (5/2-). Pt. also admitted as r/o COVID 19 given chronic cough and GGO on CTA. Nephrology consulted for ESRD management. Pt. goes to [facility] and follows with ? Dr Pineda. Last outpatient HD on 4/30/20.    PAST HISTORY  --------------------------------------------------------------------------------  PAST MEDICAL & SURGICAL HISTORY:  CAD (coronary artery disease)  Hypertension  ESRD (end stage renal disease)  Parkinsons disease  ESRD on hemodialysis  Back pain  History of BPH  Stroke: 2017, residual right sided weakness  Hyperlipidemia  Coronary artery disease: with stent  Hyperphosphatemia  Hypertension  Diabetes  S/P primary angioplasty with coronary stent  Stented coronary artery: 2004-5 at [facility]  AVF (arteriovenous fistula): right arm x with revision, left arm x 1  S/P dialysis catheter insertion: Right chest perma cath removed in jan 2020    FAMILY HISTORY:  FH: type 2 diabetes: In father    PAST SOCIAL HISTORY:    ALLERGIES & MEDICATIONS  --------------------------------------------------------------------------------  Allergies    No Known Allergies    Intolerances      Standing Inpatient Medications  amLODIPine   Tablet 10 milliGRAM(s) Oral daily  aspirin enteric coated 81 milliGRAM(s) Oral daily  atorvastatin 40 milliGRAM(s) Oral at bedtime  carbidopa/levodopa  25/250 1 Tablet(s) Oral four times a day  carvedilol 25 milliGRAM(s) Oral every 12 hours  cloNIDine 0.1 milliGRAM(s) Oral every 12 hours  dextrose 5%. 1000 milliLiter(s) IV Continuous <Continuous>  dextrose 50% Injectable 12.5 Gram(s) IV Push once  dextrose 50% Injectable 25 Gram(s) IV Push once  dextrose 50% Injectable 25 Gram(s) IV Push once  guaiFENesin  milliGRAM(s) Oral every 12 hours  heparin  Infusion.  Unit(s)/Hr IV Continuous <Continuous>  insulin glargine Injectable (LANTUS) 30 Unit(s) SubCutaneous at bedtime  insulin lispro (HumaLOG) corrective regimen sliding scale   SubCutaneous three times a day before meals  insulin lispro (HumaLOG) corrective regimen sliding scale   SubCutaneous at bedtime  insulin lispro Injectable (HumaLOG) 3 Unit(s) SubCutaneous three times a day before meals  tamsulosin 0.4 milliGRAM(s) Oral at bedtime    PRN Inpatient Medications  benzonatate 100 milliGRAM(s) Oral three times a day PRN  dextrose 40% Gel 15 Gram(s) Oral once PRN  glucagon  Injectable 1 milliGRAM(s) IntraMuscular once PRN  heparin   Injectable 4500 Unit(s) IV Push every 6 hours PRN  heparin   Injectable 2000 Unit(s) IV Push every 6 hours PRN      REVIEW OF SYSTEMS  --------------------------------------------------------------------------------  Unable to obtain    VITALS/PHYSICAL EXAM  --------------------------------------------------------------------------------  T(C): 36.9 (05-03-20 @ 05:26), Max: 36.9 (05-02-20 @ 13:10)  HR: 65 (05-03-20 @ 05:26) (65 - 109)  BP: 157/67 (05-03-20 @ 05:26) (136/71 - 185/74)  RR: 20 (05-03-20 @ 05:26) (16 - 20)  SpO2: 98% (05-03-20 @ 05:26) (97% - 100%)  Wt(kg): --  Height (cm): 162.56 (05-02-20 @ 13:10)  Weight (kg): 58.2 (05-02-20 @ 18:43)  BMI (kg/m2): 22 (05-02-20 @ 18:43)  BSA (m2): 1.62 (05-02-20 @ 18:43)      05-02-20 @ 07:01  -  05-03-20 @ 07:00  --------------------------------------------------------  IN: 0 mL / OUT: 50 mL / NET: -50 mL    05-03-20 @ 07:01  -  05-03-20 @ 11:36  --------------------------------------------------------  IN: 240 mL / OUT: 0 mL / NET: 240 mL      Physical Exam:  	Vascular access: BHAVESH HUANG    LABS/STUDIES  --------------------------------------------------------------------------------              8.1    5.24  >-----------<  159      [05-03-20 @ 10:14]              27.8     138  |  94  |  42  ----------------------------<  120      [05-03-20 @ 10:14]  4.7   |  28  |  7.67        Ca     9.8     [05-03-20 @ 10:14]      Mg     2.3     [05-03-20 @ 10:14]      Phos  5.6     [05-03-20 @ 10:14]    TPro  7.2  /  Alb  3.3  /  TBili  0.3  /  DBili  x   /  AST  39  /  ALT  19  /  AlkPhos  124  [05-02-20 @ 14:56]    PT/INR: PT 10.6 , INR 0.92       [05-02-20 @ 14:56]  PTT: 114.4      [05-03-20 @ 10:14]          [05-03-20 @ 01:39]    Creatinine Trend:  SCr 7.67 [05-03 @ 10:14]  SCr 6.63 [05-02 @ 14:56]

## 2020-05-03 NOTE — PROGRESS NOTE ADULT - ASSESSMENT
69  Serbian Speaking male with PMH HTN, HLD, DM, CVA (2017 - residual R sided weakness), ESRD on Dialysis (Tue/Thurs/Sat), Parkinson disease, CAD (s/p PCI ~2005), who was sent in from dialysis for with facial and R arm swelling, found to have possible cental stenosis of RUE AVF as well as L subclavian vein DVT (CT 5/2) on heparin gtt (5/2-), r/o COVID 19 given chronic cough and GGO on CTA.    Vascular Sx following; appreciate IR and Nephrology consult recommendations. 69  [place] Speaking male with PMH HTN, HLD, DM, CVA (2017 - residual R sided weakness), ESRD on Dialysis (Tue/Thurs/Sat), Parkinson disease, CAD (s/p PCI ~2005), who was sent in from dialysis for with facial and R arm swelling, found to have possible cental stenosis of RUE AVF as well as L subclavian vein DVT (CT 5/2) on heparin gtt (5/2-), r/o COVID 19 given chronic cough and GGO on CTA.    Vascular Sx following; appreciate IR and Nephrology recommendations.

## 2020-05-03 NOTE — PROGRESS NOTE ADULT - PROBLEM SELECTOR PLAN 6
Patient ambulates with walker at baseline. Per scanned in records from [facility] in [facility] last dosing sinemet  QID  - will start prior dosing  - clarify dosing in AM Patient ambulates with walker at baseline. Per scanned in records from [facility] in [facility] last dosing sinemet  QID  - will start prior dosing  - clarify dosing in AM; will med rec 5/3 am with pharmacy

## 2020-05-03 NOTE — DISCHARGE NOTE PROVIDER - CARE PROVIDERS DIRECT ADDRESSES
[email or web address]

## 2020-05-03 NOTE — PROGRESS NOTE ADULT - SUBJECTIVE AND OBJECTIVE BOX
Amita Serrano MD, S  Internal Medicine PGY1  [facility] Pager: [phone] | NS: [phone]  After 7pm, please call RAHUL FERNÁNDEZ  69y  MRN: [id number]    : ***  Subjective/Overnight events: No acute events overnight, on RA, afebrile. On heparin gtt for L subclavian vein DVT. Patient seen and examined. Denies CP, HA, Abd pain, N/V, F/C, diarrhea/constipation, dysuria.     ROS: facial swelling, cough (x2 months)    Patient is a 69y old  Male who presents with a chief complaint of facial swelling (02 May 2020 21:00)    MEDICATIONS  (STANDING):  amLODIPine   Tablet 10 milliGRAM(s) Oral daily  aspirin enteric coated 81 milliGRAM(s) Oral daily  atorvastatin 40 milliGRAM(s) Oral at bedtime  carbidopa/levodopa  25/250 1 Tablet(s) Oral four times a day  carvedilol 25 milliGRAM(s) Oral every 12 hours  cloNIDine 0.1 milliGRAM(s) Oral every 12 hours  dextrose 5%. 1000 milliLiter(s) (50 mL/Hr) IV Continuous <Continuous>  dextrose 50% Injectable 12.5 Gram(s) IV Push once  dextrose 50% Injectable 25 Gram(s) IV Push once  dextrose 50% Injectable 25 Gram(s) IV Push once  heparin  Infusion.  Unit(s)/Hr (11 mL/Hr) IV Continuous <Continuous>  insulin glargine Injectable (LANTUS) 30 Unit(s) SubCutaneous at bedtime  insulin lispro (HumaLOG) corrective regimen sliding scale   SubCutaneous three times a day before meals  insulin lispro (HumaLOG) corrective regimen sliding scale   SubCutaneous at bedtime  insulin lispro Injectable (HumaLOG) 3 Unit(s) SubCutaneous three times a day before meals  tamsulosin 0.4 milliGRAM(s) Oral at bedtime    MEDICATIONS  (PRN):  dextrose 40% Gel 15 Gram(s) Oral once PRN Blood Glucose LESS THAN 70 milliGRAM(s)/deciliter  glucagon  Injectable 1 milliGRAM(s) IntraMuscular once PRN Glucose LESS THAN 70 milligrams/deciliter  heparin   Injectable 4500 Unit(s) IV Push every 6 hours PRN For aPTT less than 40  heparin   Injectable 2000 Unit(s) IV Push every 6 hours PRN For aPTT between 40 - 57      Objective:    Vitals: Vital Signs Last 24 Hrs  T(C): 36.9 (05-03-20 @ 05:26), Max: 36.9 (05-02-20 @ 13:10)  T(F): 98.5 (05-03-20 @ 05:26), Max: 98.5 (05-03-20 @ 05:26)  HR: 65 (05-03-20 @ 05:26) (65 - 109)  BP: 157/67 (05-03-20 @ 05:26) (136/71 - 185/74)  BP(mean): --  RR: 20 (05-03-20 @ 05:26) (16 - 20)  SpO2: 98% (05-03-20 @ 05:26) (97% - 100%)            I&O's Summary    02 May 2020 07:01  -  03 May 2020 07:00  --------------------------------------------------------  IN: 0 mL / OUT: 50 mL / NET: -50 mL        PHYSICAL EXAM:  GENERAL: NAD, Awake and alert, on RA  HEENT: Eyes tracking, sclera clear, PERRL EOMI. Facial fullness, no JVD  CV: RRR S1S2, no m/r/g  PULM: CTA b/l, no crackles or wheezes  ABD: Soft, nontender, nondistended, +BS  EXT:  No edema on b/l LE, R arm with swelling, R AVF with +distal pulses, no swelling in L arm  SKIN: Warm and well perfused, no erythema or rashes noted  NEURO: A&O x3, motor strength 5/5 b/l, CNII-XII grossly intact         LABS:  05-02    139  |  93<L>  |  33<H>  ----------------------------<  138<H>  4.0   |  32<H>  |  6.63<H>    Ca    10.5      02 May 2020 14:56    TPro  7.2  /  Alb  3.3  /  TBili  0.3  /  DBili  x   /  AST  39  /  ALT  19  /  AlkPhos  124<H>  05-02      PT/INR - ( 02 May 2020 14:56 )   PT: 10.6 sec;   INR: 0.92 ratio         PTT - ( 03 May 2020 01:39 )  PTT:177.9 sec                                        7.5    4.58  )-----------( 161      ( 03 May 2020 01:39 )             25.7                         8.7    4.84  )-----------( 167      ( 02 May 2020 14:56 )             29.9     CAPILLARY BLOOD GLUCOSE      POCT Blood Glucose.: 96 mg/dL (03 May 2020 08:20)  POCT Blood Glucose.: 211 mg/dL (02 May 2020 20:25)      RADIOLOGY & ADDITIONAL TESTS:    Imaging Personally Reviewed:  [ ] YES  [ ] NO      Consultants involved in case:   Consultant(s) Notes Reviewed:  [ ] YES  [ ] NO:   Care Discussed with Consultants/Other Providers [ ] YES  [ ] NO Amita Serrano MD, S  Internal Medicine PGY1  [facility] Pager: [phone] | NS: [phone]  After 7pm, please call RAHUL FERNÁNDEZ  69y  MRN: [id number]    : [id number]  Subjective/Overnight events: No acute events overnight, on RA, afebrile. On heparin gtt for L subclavian vein DVT. C/o cough. Patient seen and examined. Denies CP, HA, Abd pain, N/V, F/C, diarrhea/constipation, dysuria.     Outpt nephrologist is Dr. Pineda ? ([facility]?) last seen approx 1 year ago, Pharmacy that pt uses is [facility], and HD center is [facility] <[email or web address]>([address] [facility], [place] [address]). Wife is Cristobal ([phone]). Per patient last HD session was Tuesday.     ROS: facial swelling (x2mo), cough (x2 months)    Patient is a 69y old  Male who presents with a chief complaint of facial swelling (02 May 2020 21:00)    MEDICATIONS  (STANDING):  amLODIPine   Tablet 10 milliGRAM(s) Oral daily  aspirin enteric coated 81 milliGRAM(s) Oral daily  atorvastatin 40 milliGRAM(s) Oral at bedtime  carbidopa/levodopa  25/250 1 Tablet(s) Oral four times a day  carvedilol 25 milliGRAM(s) Oral every 12 hours  cloNIDine 0.1 milliGRAM(s) Oral every 12 hours  dextrose 5%. 1000 milliLiter(s) (50 mL/Hr) IV Continuous <Continuous>  dextrose 50% Injectable 12.5 Gram(s) IV Push once  dextrose 50% Injectable 25 Gram(s) IV Push once  dextrose 50% Injectable 25 Gram(s) IV Push once  heparin  Infusion.  Unit(s)/Hr (11 mL/Hr) IV Continuous <Continuous>  insulin glargine Injectable (LANTUS) 30 Unit(s) SubCutaneous at bedtime  insulin lispro (HumaLOG) corrective regimen sliding scale   SubCutaneous three times a day before meals  insulin lispro (HumaLOG) corrective regimen sliding scale   SubCutaneous at bedtime  insulin lispro Injectable (HumaLOG) 3 Unit(s) SubCutaneous three times a day before meals  tamsulosin 0.4 milliGRAM(s) Oral at bedtime    MEDICATIONS  (PRN):  dextrose 40% Gel 15 Gram(s) Oral once PRN Blood Glucose LESS THAN 70 milliGRAM(s)/deciliter  glucagon  Injectable 1 milliGRAM(s) IntraMuscular once PRN Glucose LESS THAN 70 milligrams/deciliter  heparin   Injectable 4500 Unit(s) IV Push every 6 hours PRN For aPTT less than 40  heparin   Injectable 2000 Unit(s) IV Push every 6 hours PRN For aPTT between 40 - 57      Objective:    Vitals: Vital Signs Last 24 Hrs  T(C): 36.9 (05-03-20 @ 05:26), Max: 36.9 (05-02-20 @ 13:10)  T(F): 98.5 (05-03-20 @ 05:26), Max: 98.5 (05-03-20 @ 05:26)  HR: 65 (05-03-20 @ 05:26) (65 - 109)  BP: 157/67 (05-03-20 @ 05:26) (136/71 - 185/74)  BP(mean): --  RR: 20 (05-03-20 @ 05:26) (16 - 20)  SpO2: 98% (05-03-20 @ 05:26) (97% - 100%)            I&O's Summary    02 May 2020 07:01  -  03 May 2020 07:00  --------------------------------------------------------  IN: 0 mL / OUT: 50 mL / NET: -50 mL        PHYSICAL EXAM:  GENERAL: NAD, Awake and alert, on RA  HEENT: Eyes tracking, sclera clear, PERRL EOMI. Facial fullness, no JVD  CV: RRR S1S2, no m/r/g  PULM: CTA b/l, no crackles or wheezes  ABD: Soft, nontender, nondistended, +BS  EXT:  No edema on b/l LE, R arm with swelling, R AVF with +distal pulses, no swelling in L arm  SKIN: Warm and well perfused, no erythema or rashes noted  NEURO: A&O x3, motor strength 5/5 b/l, CNII-XII grossly intact         LABS:  05-02    139  |  93<L>  |  33<H>  ----------------------------<  138<H>  4.0   |  32<H>  |  6.63<H>    Ca    10.5      02 May 2020 14:56    TPro  7.2  /  Alb  3.3  /  TBili  0.3  /  DBili  x   /  AST  39  /  ALT  19  /  AlkPhos  124<H>  05-02      PT/INR - ( 02 May 2020 14:56 )   PT: 10.6 sec;   INR: 0.92 ratio         PTT - ( 03 May 2020 01:39 )  PTT:177.9 sec                                        7.5    4.58  )-----------( 161      ( 03 May 2020 01:39 )             25.7                         8.7    4.84  )-----------( 167      ( 02 May 2020 14:56 )             29.9     CAPILLARY BLOOD GLUCOSE      POCT Blood Glucose.: 96 mg/dL (03 May 2020 08:20)  POCT Blood Glucose.: 211 mg/dL (02 May 2020 20:25)      RADIOLOGY & ADDITIONAL TESTS:    Imaging Personally Reviewed:  [ ] YES  [ ] NO      Consultants involved in case:   Consultant(s) Notes Reviewed:  [ ] YES  [ ] NO:   Care Discussed with Consultants/Other Providers [ ] YES  [ ] NO

## 2020-05-03 NOTE — PROGRESS NOTE ADULT - PROBLEM SELECTOR PLAN 5
CAD s/p stent in 2005 on ASA at home  - c/w ASA   - c/w statin  - per records appears on carvedilol 25 BID, will resume here  - obtain EKG

## 2020-05-03 NOTE — PROGRESS NOTE ADULT - PROBLEM SELECTOR PLAN 3
Patient presenting with 2 months of non productive cough, previously found to have bilateral GGOs during admission in Jan as well as currently CT chest this admission (5/2).  - currently on RA, no WOB   - noted the have lymphopenia   - obtain remaining labs ferritin, CRP, procal with next labs  - defer ddimer in setting of active clot  - initial COVID testing neg; f/u repeat test

## 2020-05-03 NOTE — DISCHARGE NOTE PROVIDER - NSDCCPCAREPLAN_GEN_ALL_CORE_FT
PRINCIPAL DISCHARGE DIAGNOSIS  Diagnosis: Arm swelling  Assessment and Plan of Treatment: You presented to the hospital with facial and arm swelling x 1 month. This was thought to be due to an occulsion in your right fistula (for dialysis). Vascular surgery was consulted and recommended getting something called a fistulogram which is a special x-ray procedure that checks the blood flow in your fistula. For this procedure interventional radiology was consulted and recommended holding off on the procedure at this time due to chest imaging that was concerning for COVID. They have recommended getting the procedure done at a later time outside of the hospital.  The fistula is still considered functional and can continue to be used for dialysis. Please follow up with interventional radiology [phone] following your discharge for further instructions regarding obtaining the fistulogram. Please follow up with your primary care doctor 1-2 weeks following your discharge for further management.      SECONDARY DISCHARGE DIAGNOSES  Diagnosis: Subclavian vein occlusion  Assessment and Plan of Treatment: You were found to have a blood clot in one of your veins in your left arm. For this you were started on a blood thinner (eliquis). You should continue to take this medication for 6 months. Please continue to take all your medications as recommended following your discharge. Please make an appointment to see the vascular surgery team (Dr. Garg) for further management. Please follow up with your primary care doctor 1-2 weeks following your discharge for further management.    Diagnosis: Suspected COVID-19 virus infection  Assessment and Plan of Treatment: You presented with symptoms concerning for COVID. You have had a positive test result in the past. Your COVID results on this admission were however negative. Imaging done of your chest shows signs of inflammation that may be due to your previous COVID infection. Please consult or primary care doctor or return to the E.D should you experience recurring fevers, chills, Chest pain or difficulty breathing. PRINCIPAL DISCHARGE DIAGNOSIS  Diagnosis: Arm swelling  Assessment and Plan of Treatment: You presented to the hospital with facial and arm swelling x 1 month. This was thought to be due to an occulsion in your right fistula (for dialysis). Vascular surgery was consulted and recommended getting something called a fistulogram which is a special x-ray procedure that checks the blood flow in your fistula. For this procedure interventional radiology was consulted and recommended holding off on the procedure at this time due to chest imaging that was concerning for COVID. They have recommended getting the procedure done at a later time outside of the hospital.  The fistula is still considered functional and can continue to be used for dialysis. Please follow up with interventional radiology [phone] following your discharge for further instructions regarding obtaining the fistulogram. Please follow up with your primary care doctor 1-2 weeks following your discharge for further management.      SECONDARY DISCHARGE DIAGNOSES  Diagnosis: Subclavian vein occlusion  Assessment and Plan of Treatment: You were found to have a blood clot in one of your veins in your left arm. For this you were started on a blood thinner (eliquis). You should continue to take this medication for 6 months. Please continue to take all your medications as recommended following your discharge. Please make an appointment to see the vascular surgery team (Dr. Garg) for further management. Please follow up with your primary care doctor 1-2 weeks following your discharge for further management.    Diagnosis: Type 2 diabetes mellitus  Assessment and Plan of Treatment: You have a history of diabetes for which you were on insulin at home. You were placed on insulin during this admission which led to severely low blood sugar levels. Your insulin has since been discontinued. Please DO NOT continue taking insulin at home following your discharge. Please follow up with your primary care doctor 1-2 weeks following your discharge for further management of your disease.    Diagnosis: Suspected COVID-19 virus infection  Assessment and Plan of Treatment: You presented with symptoms concerning for COVID. You have had a positive test result in the past. Your COVID results on this admission were however negative. Imaging done of your chest shows signs of inflammation that may be due to your previous COVID infection. Please consult or primary care doctor or return to the E.D should you experience recurring fevers, chills, Chest pain or difficulty breathing.

## 2020-05-03 NOTE — CONSULT NOTE ADULT - ASSESSMENT
Pt. with ESRD on HD admitted with DVT    ESRD on HD   Pt. with ESRD on HD TIW (MWF). Last HD as outpatient was on 7/20/18 via RIJ tunneled HD catheter. Pt. clinically stable. Serum potassium WNL today. Will arrange for maintenance HD today. Monitor labs. Pt. with ESRD on HD admitted with DVT    ESRD on HD   Pt. with ESRD on HD TIW (TTS). Last HD as outpatient was on 4/30/20 via AVG. Pt. clinically stable. Serum potassium WNL today. Will arrange for maintenance HD tomorrow. Vascular note reviewed. Monitor labs.    HTN  BP at target range. Monitor BP on current BP medication1. Plan for HD tomorrow. Low salt diet.    Anemia  Patient with anemia in the setting of ESRD. Hemoglobin below target range. Will need to determine if patient receives JOVAN. Monitor hemoglobin. Pt. with ESRD on HD admitted with DVT    ESRD on HD   Pt. with ESRD on HD TIW (TTS). Last HD as outpatient was on 4/30/20 via AVG. Pt. clinically stable. Serum potassium WNL today. Will arrange for maintenance HD tomorrow or Tuesday depending on labs. Will need to obtain consent; wife unavailable [phone]. Vascular note reviewed. Monitor labs.    HTN  BP at target range. Monitor BP on current BP medication1. Plan for HD tomorrow. Low salt diet.    Anemia due to ckd  Patient with anemia in the setting of ESRD. Hemoglobin below target range. Will need to determine if patient receives JOVAN. Monitor hemoglobin.    vascular access problem- right avf; right side swelling, right face swelling; ?clot in setting of r.o covid19  RUE brachiocephalic AVF placed by Dr. Hoyt on 9/6/2019    IR for fistulagram per vascular surgery  follow up vasc  surgery/IR  hd depending on labs and vascular situation

## 2020-05-03 NOTE — DISCHARGE NOTE PROVIDER - NSDCMRMEDTOKEN_GEN_ALL_CORE_FT
amLODIPine 10 mg oral tablet: 1 tab(s) orally once a day  Artificial Tears ophthalmic solution: 1 drop(s) to each affected eye 4 times a day, As Needed  aspirin 81 mg oral tablet: 1 tab(s) orally once a day  carbidopa-levodopa 25 mg-100 mg oral tablet: 1 tab(s) orally 4 times a day    CORRECTION: Patient will bring home meds  carvedilol 25 mg oral tablet: 1 tab(s) orally 2 times a day  cinacalcet 60 mg oral tablet: 1 tab(s) orally once a day  cloNIDine 0.1 mg oral tablet: 1 tab(s) orally 2 times a day  gabapentin 300 mg oral capsule: 1 cap(s) orally 2 times a day  NovoLOG FlexPen 100 units/mL injectable solution: 45 unit(s) injectable 2 times a day  Refresh PM ophthalmic ointment: 1 application to each affected eye once a day (at bedtime)  rosuvastatin 20 mg oral tablet: 1 tab(s) orally once a day  sevelamer carbonate 800 mg oral tablet: 2 tab(s) orally 3 times a day (with meals)  tamsulosin 0.4 mg oral capsule: 1 cap(s) orally once a day  Thera-Tabs M oral tablet: 1 tab(s) orally once a day amLODIPine 10 mg oral tablet: 1 tab(s) orally once a day  Artificial Tears ophthalmic solution: 1 drop(s) to each affected eye 4 times a day, As Needed  aspirin 81 mg oral tablet: 1 tab(s) orally once a day  carbidopa-levodopa 25 mg-100 mg oral tablet: 1 tab(s) orally 4 times a day    carvedilol 12.5 mg oral tablet: 1 tab(s) orally 2 times a day  cinacalcet 60 mg oral tablet: 1 tab(s) orally once a day    NOTE: per pharmacy last filled in October 2019 - needs prior authorization  cloNIDine 0.1 mg oral tablet: 1 tab(s) orally 2 times a day  ferrous sulfate 325 mg (65 mg elemental iron) oral tablet: 1 tab(s) orally 2 times a day  gabapentin 300 mg oral capsule: 1 cap(s) orally 2 times a day    NOTE: per pharmacy last filled in October 2019  NovoLOG Mix 70/30 subcutaneous suspension: NOTE: Pharmacy prescription is for 50 units BID, not able to verify with patient  Refresh PM ophthalmic ointment: 1 application to each affected eye once a day (at bedtime)  Renal Caps oral capsule: 1 cap(s) orally once a day  rosuvastatin 10 mg oral tablet: 1 tab(s) orally once a day  sevelamer carbonate 800 mg oral tablet: 1 tab(s) orally 3 times a day (with meals)  tamsulosin 0.4 mg oral capsule: 1 cap(s) orally once a day (at bedtime) amLODIPine 10 mg oral tablet: 1 tab(s) orally once a day  Artificial Tears ophthalmic solution: 1 drop(s) to each affected eye 4 times a day, As Needed  aspirin 81 mg oral tablet: 1 tab(s) orally once a day  carbidopa-levodopa 25 mg-100 mg oral tablet: 1 tab(s) orally 4 times a day    carvedilol 12.5 mg oral tablet: 1 tab(s) orally 2 times a day  cloNIDine 0.1 mg oral tablet: 1 tab(s) orally 2 times a day  Eliquis 5 mg oral tablet: 1 tab(s) orally 2 times a day  ferrous sulfate 325 mg (65 mg elemental iron) oral tablet: 1 tab(s) orally 2 times a day  Refresh PM ophthalmic ointment: 1 application to each affected eye once a day (at bedtime)  Renal Caps oral capsule: 1 cap(s) orally once a day  rosuvastatin 10 mg oral tablet: 1 tab(s) orally once a day  sevelamer carbonate 800 mg oral tablet: 1 tab(s) orally 3 times a day (with meals)  tamsulosin 0.4 mg oral capsule: 1 cap(s) orally once a day (at bedtime)

## 2020-05-03 NOTE — PROGRESS NOTE ADULT - PROBLEM SELECTOR PLAN 8
Hx of DM on insulin 70/30, patient unsure of dose but report possibly 30 units BID at home  - home dose approximates to 42u long acting and 6 units premeal  - given uncertainty of home dose, will give Lantus 30 units, premeal 3 and ISS  - ISS and FSG qAC and qHS

## 2020-05-03 NOTE — DISCHARGE NOTE PROVIDER - HOSPITAL COURSE
69  Citizen of [place] Speaking male with PMH HTN, HLD, DM, CVA (2017 - residual R sided weakness), ESRD on Dialysis (Tue/Thurs/Sat), Parkinson disease, CAD (s/p PCI ~2005), who was sent in from dialysis for with facial and R arm swelling. Attempted to call wife to obtain collateral information but went to voicemail.         Patient himself had noted facial swelling and arm swelling x 1 month- however was not able to see physician for further evaluation. Patient has a complicated vascular hx- He initially had a LUE AVF placed in 2012 which was found to be non functional in 2019. At some point, patient had LUE stent placed 10 years at [facility].  A right permcath was placed in addition to RUE AVF graft. Of note, patient was admitted on Jan 2020 for sepsis 2/2 GPC bacteremia in setting of HD permacath s/p removal on 1/29/10. He had a R upper extremity fistula placed a year ago through which he was able to continued dialysis, which was deemed to functional with no issues. He was since completed 14 day course of Vanco with HD since. Today patient was noted to have worsening facial swelling today at Hd and was sent in without starting dialysis session.        Patient otherwise denying fevers dyspnea, abdominal pain, nausea, vomiting. Of note, patient had ongoing cough x 2 months. While admitted in Jan 2020 he had imaging which noted GGOs. Patient was tested for COVID a month ago unsure of results.         In ED, afebrile, tachycardic to 100s, SBP 170s-180s and 99% on RA. Labs notable for macrocytic anemia to Hb 8.7, lymphopenia, K is 4.0. Left subclavian vein DVT, no R sided DVT. CT Chest non contrast with bilateral patchy infiltrates. COVID 19 neg, Vascular surgery consulted recommending initiation of AC. 69  [place] Speaking male with PMH HTN, HLD, DM, CVA (2017 - residual R sided weakness), ESRD on Dialysis (Tue/Thurs/Sat), Parkinson disease, CAD (s/p PCI ~2005), who was sent in from dialysis for with facial and R arm swelling. Attempted to call wife to obtain collateral information but went to voicemail.         Patient himself had noted facial swelling and arm swelling x 1 month- however was not able to see physician for further evaluation. Patient has a complicated vascular hx- He initially had a LUE AVF placed in 2012 which was found to be non functional in 2019. At some point, patient had LUE stent placed 10 years at [facility].  A right permcath was placed in addition to RUE AVF graft. Of note, patient was admitted on Jan 2020 for sepsis 2/2 GPC bacteremia in setting of HD permacath s/p removal on 1/29/10. He had a R upper extremity fistula placed a year ago through which he was able to continued dialysis, which was deemed to functional with no issues. He was since completed 14 day course of Vanco with HD since. Today patient was noted to have worsening facial swelling today at Hd and was sent in without starting dialysis session.        Patient otherwise denying fevers dyspnea, abdominal pain, nausea, vomiting. Of note, patient had ongoing cough x 2 months. While admitted in Jan 2020 he had imaging which noted GGOs. Patient was tested for COVID a month ago unsure of results.         In ED, afebrile, tachycardic to 100s, SBP 170s-180s and 99% on RA. Labs notable for macrocytic anemia to Hb 8.7, lymphopenia, K is 4.0. Left subclavian vein DVT, no R sided DVT. CT Chest non contrast with bilateral patchy infiltrates. COVID 19 neg, Vascular surgery consulted recommending initiation of AC. Patient initially started on heparin gtt pending possible fistulogram for RUE AVF. IR consulted for fistulogram, rec'd deferring fistulogram for later time due to lung findings concerning for covid. Patient transitioned to oral AC for subclavian joana thrombosis. Now hemodynamically stable to follow up with vascular surgery and IR outpatient

## 2020-05-03 NOTE — PROGRESS NOTE ADULT - PROBLEM SELECTOR PLAN 1
RIGHT ARM. Patient presenting with facial swelling and RUE swelling in setting of prior bilateral UE AVF grafts/revisions with concern for stenosis of RUE AVF graft ("central stenosis")  - currently otherwise fistula deemed functional per Vascular Sx 5/2, without acute vascular sx intervention recommended  - IR consult in  5/3 AM for non-emergent fistulogram

## 2020-05-04 DIAGNOSIS — D63.8 ANEMIA IN OTHER CHRONIC DISEASES CLASSIFIED ELSEWHERE: ICD-10-CM

## 2020-05-04 DIAGNOSIS — N25.0 RENAL OSTEODYSTROPHY: ICD-10-CM

## 2020-05-04 DIAGNOSIS — R22.0 LOCALIZED SWELLING, MASS AND LUMP, HEAD: ICD-10-CM

## 2020-05-04 LAB
ANION GAP SERPL CALC-SCNC: 18 MMOL/L — HIGH (ref 5–17)
APTT BLD: 134.3 SEC — CRITICAL HIGH (ref 27.5–36.3)
APTT BLD: 54.2 SEC — HIGH (ref 27.5–36.3)
BASOPHILS # BLD AUTO: 0.01 K/UL — SIGNIFICANT CHANGE UP (ref 0–0.2)
BASOPHILS NFR BLD AUTO: 0.2 % — SIGNIFICANT CHANGE UP (ref 0–2)
BUN SERPL-MCNC: 50 MG/DL — HIGH (ref 7–23)
CALCIUM SERPL-MCNC: 9.7 MG/DL — SIGNIFICANT CHANGE UP (ref 8.4–10.5)
CHLORIDE SERPL-SCNC: 93 MMOL/L — LOW (ref 96–108)
CO2 SERPL-SCNC: 26 MMOL/L — SIGNIFICANT CHANGE UP (ref 22–31)
CREAT SERPL-MCNC: 8.91 MG/DL — HIGH (ref 0.5–1.3)
EOSINOPHIL # BLD AUTO: 0.06 K/UL — SIGNIFICANT CHANGE UP (ref 0–0.5)
EOSINOPHIL NFR BLD AUTO: 1.1 % — SIGNIFICANT CHANGE UP (ref 0–6)
FERRITIN SERPL-MCNC: 2844 NG/ML — HIGH (ref 30–400)
FOLATE SERPL-MCNC: 14.4 NG/ML — SIGNIFICANT CHANGE UP
GLUCOSE BLDC GLUCOMTR-MCNC: 101 MG/DL — HIGH (ref 70–99)
GLUCOSE BLDC GLUCOMTR-MCNC: 106 MG/DL — HIGH (ref 70–99)
GLUCOSE BLDC GLUCOMTR-MCNC: 120 MG/DL — HIGH (ref 70–99)
GLUCOSE BLDC GLUCOMTR-MCNC: 147 MG/DL — HIGH (ref 70–99)
GLUCOSE BLDC GLUCOMTR-MCNC: 153 MG/DL — HIGH (ref 70–99)
GLUCOSE BLDC GLUCOMTR-MCNC: 153 MG/DL — HIGH (ref 70–99)
GLUCOSE BLDC GLUCOMTR-MCNC: 189 MG/DL — HIGH (ref 70–99)
GLUCOSE BLDC GLUCOMTR-MCNC: 38 MG/DL — CRITICAL LOW (ref 70–99)
GLUCOSE BLDC GLUCOMTR-MCNC: 43 MG/DL — CRITICAL LOW (ref 70–99)
GLUCOSE BLDC GLUCOMTR-MCNC: 57 MG/DL — LOW (ref 70–99)
GLUCOSE BLDC GLUCOMTR-MCNC: 58 MG/DL — LOW (ref 70–99)
GLUCOSE BLDC GLUCOMTR-MCNC: 71 MG/DL — SIGNIFICANT CHANGE UP (ref 70–99)
GLUCOSE BLDC GLUCOMTR-MCNC: 80 MG/DL — SIGNIFICANT CHANGE UP (ref 70–99)
GLUCOSE BLDC GLUCOMTR-MCNC: 87 MG/DL — SIGNIFICANT CHANGE UP (ref 70–99)
GLUCOSE BLDC GLUCOMTR-MCNC: 88 MG/DL — SIGNIFICANT CHANGE UP (ref 70–99)
GLUCOSE BLDC GLUCOMTR-MCNC: 89 MG/DL — SIGNIFICANT CHANGE UP (ref 70–99)
GLUCOSE SERPL-MCNC: 51 MG/DL — LOW (ref 70–99)
HCT VFR BLD CALC: 27.4 % — LOW (ref 39–50)
HCV AB S/CO SERPL IA: 0.21 S/CO — SIGNIFICANT CHANGE UP (ref 0–0.99)
HCV AB SERPL-IMP: SIGNIFICANT CHANGE UP
HGB BLD-MCNC: 8.1 G/DL — LOW (ref 13–17)
IMM GRANULOCYTES NFR BLD AUTO: 0.4 % — SIGNIFICANT CHANGE UP (ref 0–1.5)
IRON SATN MFR SERPL: 43 % — SIGNIFICANT CHANGE UP (ref 16–55)
IRON SATN MFR SERPL: 79 UG/DL — SIGNIFICANT CHANGE UP (ref 45–165)
LYMPHOCYTES # BLD AUTO: 0.62 K/UL — LOW (ref 1–3.3)
LYMPHOCYTES # BLD AUTO: 11.1 % — LOW (ref 13–44)
MAGNESIUM SERPL-MCNC: 2.4 MG/DL — SIGNIFICANT CHANGE UP (ref 1.6–2.6)
MCHC RBC-ENTMCNC: 29.5 PG — SIGNIFICANT CHANGE UP (ref 27–34)
MCHC RBC-ENTMCNC: 29.6 GM/DL — LOW (ref 32–36)
MCV RBC AUTO: 99.6 FL — SIGNIFICANT CHANGE UP (ref 80–100)
MONOCYTES # BLD AUTO: 0.44 K/UL — SIGNIFICANT CHANGE UP (ref 0–0.9)
MONOCYTES NFR BLD AUTO: 7.9 % — SIGNIFICANT CHANGE UP (ref 2–14)
NEUTROPHILS # BLD AUTO: 4.44 K/UL — SIGNIFICANT CHANGE UP (ref 1.8–7.4)
NEUTROPHILS NFR BLD AUTO: 79.3 % — HIGH (ref 43–77)
NRBC # BLD: 0 /100 WBCS — SIGNIFICANT CHANGE UP (ref 0–0)
PHOSPHATE SERPL-MCNC: 5.8 MG/DL — HIGH (ref 2.5–4.5)
PLATELET # BLD AUTO: 158 K/UL — SIGNIFICANT CHANGE UP (ref 150–400)
POTASSIUM SERPL-MCNC: 4.2 MMOL/L — SIGNIFICANT CHANGE UP (ref 3.5–5.3)
POTASSIUM SERPL-SCNC: 4.2 MMOL/L — SIGNIFICANT CHANGE UP (ref 3.5–5.3)
RBC # BLD: 2.75 M/UL — LOW (ref 4.2–5.8)
RBC # FLD: 18.4 % — HIGH (ref 10.3–14.5)
SODIUM SERPL-SCNC: 137 MMOL/L — SIGNIFICANT CHANGE UP (ref 135–145)
TIBC SERPL-MCNC: 184 UG/DL — LOW (ref 220–430)
TRANSFERRIN SERPL-MCNC: 127 MG/DL — LOW (ref 200–360)
UIBC SERPL-MCNC: 104 UG/DL — LOW (ref 110–370)
VIT B12 SERPL-MCNC: 640 PG/ML — SIGNIFICANT CHANGE UP (ref 232–1245)
WBC # BLD: 5.59 K/UL — SIGNIFICANT CHANGE UP (ref 3.8–10.5)
WBC # FLD AUTO: 5.59 K/UL — SIGNIFICANT CHANGE UP (ref 3.8–10.5)

## 2020-05-04 PROCEDURE — 99233 SBSQ HOSP IP/OBS HIGH 50: CPT | Mod: GC

## 2020-05-04 RX ORDER — INSULIN GLARGINE 100 [IU]/ML
11 INJECTION, SOLUTION SUBCUTANEOUS AT BEDTIME
Refills: 0 | Status: DISCONTINUED | OUTPATIENT
Start: 2020-05-04 | End: 2020-05-04

## 2020-05-04 RX ORDER — SODIUM CHLORIDE 9 MG/ML
1000 INJECTION, SOLUTION INTRAVENOUS
Refills: 0 | Status: DISCONTINUED | OUTPATIENT
Start: 2020-05-04 | End: 2020-05-04

## 2020-05-04 RX ORDER — ERYTHROPOIETIN 10000 [IU]/ML
20000 INJECTION, SOLUTION INTRAVENOUS; SUBCUTANEOUS ONCE
Refills: 0 | Status: COMPLETED | OUTPATIENT
Start: 2020-05-04 | End: 2020-05-04

## 2020-05-04 RX ORDER — SEVELAMER CARBONATE 2400 MG/1
800 POWDER, FOR SUSPENSION ORAL
Refills: 0 | Status: DISCONTINUED | OUTPATIENT
Start: 2020-05-04 | End: 2020-05-06

## 2020-05-04 RX ORDER — CARVEDILOL PHOSPHATE 80 MG/1
1 CAPSULE, EXTENDED RELEASE ORAL
Qty: 0 | Refills: 0 | DISCHARGE

## 2020-05-04 RX ORDER — DEXTROSE 50 % IN WATER 50 %
25 SYRINGE (ML) INTRAVENOUS ONCE
Refills: 0 | Status: COMPLETED | OUTPATIENT
Start: 2020-05-04 | End: 2020-05-04

## 2020-05-04 RX ORDER — INSULIN ASPART 100 [IU]/ML
45 INJECTION, SOLUTION SUBCUTANEOUS
Qty: 0 | Refills: 0 | DISCHARGE

## 2020-05-04 RX ORDER — SEVELAMER CARBONATE 2400 MG/1
2 POWDER, FOR SUSPENSION ORAL
Qty: 0 | Refills: 0 | DISCHARGE

## 2020-05-04 RX ORDER — INSULIN GLARGINE 100 [IU]/ML
7 INJECTION, SOLUTION SUBCUTANEOUS AT BEDTIME
Refills: 0 | Status: DISCONTINUED | OUTPATIENT
Start: 2020-05-04 | End: 2020-05-05

## 2020-05-04 RX ORDER — INSULIN GLARGINE 100 [IU]/ML
15 INJECTION, SOLUTION SUBCUTANEOUS ONCE
Refills: 0 | Status: COMPLETED | OUTPATIENT
Start: 2020-05-04 | End: 2020-05-04

## 2020-05-04 RX ORDER — SODIUM CHLORIDE 9 MG/ML
1000 INJECTION, SOLUTION INTRAVENOUS
Refills: 0 | Status: DISCONTINUED | OUTPATIENT
Start: 2020-05-04 | End: 2020-05-06

## 2020-05-04 RX ORDER — DEXTROSE 50 % IN WATER 50 %
50 SYRINGE (ML) INTRAVENOUS ONCE
Refills: 0 | Status: COMPLETED | OUTPATIENT
Start: 2020-05-04 | End: 2020-05-04

## 2020-05-04 RX ORDER — TAMSULOSIN HYDROCHLORIDE 0.4 MG/1
1 CAPSULE ORAL
Qty: 0 | Refills: 0 | DISCHARGE

## 2020-05-04 RX ORDER — MULTIVIT-MIN/FERROUS GLUCONATE 9 MG/15 ML
1 LIQUID (ML) ORAL
Qty: 0 | Refills: 0 | DISCHARGE

## 2020-05-04 RX ORDER — ROSUVASTATIN CALCIUM 5 MG/1
1 TABLET ORAL
Qty: 0 | Refills: 0 | DISCHARGE

## 2020-05-04 RX ADMIN — TAMSULOSIN HYDROCHLORIDE 0.4 MILLIGRAM(S): 0.4 CAPSULE ORAL at 23:13

## 2020-05-04 RX ADMIN — Medication 600 MILLIGRAM(S): at 23:11

## 2020-05-04 RX ADMIN — Medication 50 MILLILITER(S): at 13:01

## 2020-05-04 RX ADMIN — HEPARIN SODIUM 900 UNIT(S)/HR: 5000 INJECTION INTRAVENOUS; SUBCUTANEOUS at 03:35

## 2020-05-04 RX ADMIN — SEVELAMER CARBONATE 800 MILLIGRAM(S): 2400 POWDER, FOR SUSPENSION ORAL at 23:18

## 2020-05-04 RX ADMIN — Medication 600 MILLIGRAM(S): at 06:51

## 2020-05-04 RX ADMIN — CARVEDILOL PHOSPHATE 25 MILLIGRAM(S): 80 CAPSULE, EXTENDED RELEASE ORAL at 06:51

## 2020-05-04 RX ADMIN — SODIUM CHLORIDE 50 MILLILITER(S): 9 INJECTION, SOLUTION INTRAVENOUS at 02:09

## 2020-05-04 RX ADMIN — HEPARIN SODIUM 2000 UNIT(S): 5000 INJECTION INTRAVENOUS; SUBCUTANEOUS at 03:40

## 2020-05-04 RX ADMIN — AMLODIPINE BESYLATE 10 MILLIGRAM(S): 2.5 TABLET ORAL at 06:52

## 2020-05-04 RX ADMIN — CARBIDOPA AND LEVODOPA 1 TABLET(S): 25; 100 TABLET ORAL at 06:52

## 2020-05-04 RX ADMIN — CARBIDOPA AND LEVODOPA 1 TABLET(S): 25; 100 TABLET ORAL at 01:16

## 2020-05-04 RX ADMIN — ATORVASTATIN CALCIUM 40 MILLIGRAM(S): 80 TABLET, FILM COATED ORAL at 01:16

## 2020-05-04 RX ADMIN — ERYTHROPOIETIN 20000 UNIT(S): 10000 INJECTION, SOLUTION INTRAVENOUS; SUBCUTANEOUS at 20:14

## 2020-05-04 RX ADMIN — CARVEDILOL PHOSPHATE 25 MILLIGRAM(S): 80 CAPSULE, EXTENDED RELEASE ORAL at 23:12

## 2020-05-04 RX ADMIN — Medication 25 GRAM(S): at 06:04

## 2020-05-04 RX ADMIN — SEVELAMER CARBONATE 800 MILLIGRAM(S): 2400 POWDER, FOR SUSPENSION ORAL at 13:24

## 2020-05-04 RX ADMIN — Medication 0.1 MILLIGRAM(S): at 06:52

## 2020-05-04 RX ADMIN — CARBIDOPA AND LEVODOPA 1 TABLET(S): 25; 100 TABLET ORAL at 23:11

## 2020-05-04 RX ADMIN — Medication 0.5 MILLIGRAM(S): at 05:51

## 2020-05-04 RX ADMIN — CARBIDOPA AND LEVODOPA 1 TABLET(S): 25; 100 TABLET ORAL at 13:13

## 2020-05-04 RX ADMIN — Medication 25 GRAM(S): at 09:15

## 2020-05-04 RX ADMIN — HEPARIN SODIUM 0 UNIT(S)/HR: 5000 INJECTION INTRAVENOUS; SUBCUTANEOUS at 14:56

## 2020-05-04 RX ADMIN — INSULIN GLARGINE 15 UNIT(S): 100 INJECTION, SOLUTION SUBCUTANEOUS at 01:16

## 2020-05-04 RX ADMIN — INSULIN GLARGINE 7 UNIT(S): 100 INJECTION, SOLUTION SUBCUTANEOUS at 23:52

## 2020-05-04 RX ADMIN — TAMSULOSIN HYDROCHLORIDE 0.4 MILLIGRAM(S): 0.4 CAPSULE ORAL at 01:18

## 2020-05-04 RX ADMIN — ATORVASTATIN CALCIUM 40 MILLIGRAM(S): 80 TABLET, FILM COATED ORAL at 23:14

## 2020-05-04 RX ADMIN — Medication 0.1 MILLIGRAM(S): at 23:12

## 2020-05-04 RX ADMIN — Medication 81 MILLIGRAM(S): at 13:14

## 2020-05-04 NOTE — PROGRESS NOTE ADULT - PROBLEM SELECTOR PLAN 4
ESRD on HD T Th Sa via RUE AVF with complex UE vascular.   -Last full session on Thur, 4/30  - currently with normal K/ no vol overload, no need for urgent Hd at this time  - nephrology consulted, appreciate recs   - ok to use fistula per vascular sx

## 2020-05-04 NOTE — PROGRESS NOTE ADULT - PROBLEM SELECTOR PLAN 7
Hx of HTN unclear of home meds appears per scanned in records in [facility] appears amlodipine 10 mg and carvedilol 25 mg BID and clonidine 0.1 mg BID  - c/w amlodipine   - will resume carvedilol 12.5 mg BID  - resume clonidine 0.1 mg BID Hx of HTN unclear of home meds appears per scanned in records in [facility] appears amlodipine 10 mg and carvedilol 25 mg BID and clonidine 0.1 mg BID  - c/w amlodipine   - c/w carvedilol 12.5 mg BID  - c/w clonidine 0.1 mg BID

## 2020-05-04 NOTE — CHART NOTE - NSCHARTNOTEFT_GEN_A_CORE
Notified by RN for agitation. Pt seen and evaluated at bedside. Pt was pulling out his IV line, throwing out urinal, screaming at staffs. 0.5mg IV ativan given. Will continue to closely monitor. Will endorese to day team.     Hafsa Allred PA-C  [id number]

## 2020-05-04 NOTE — PROGRESS NOTE ADULT - ASSESSMENT
69  C[place] Speaking male with PMH HTN, HLD, DM, CVA (2017 - residual R sided weakness), ESRD on Dialysis (Tue/Thurs/Sat), Parkinson disease, CAD (s/p PCI ~2005), who was sent in from dialysis for with facial and R arm swelling, found to have possible cental stenosis of RUE AVF as well as L subclavian vein DVT (CT 5/2) on heparin gtt (5/2-), r/o COVID 19 given chronic cough and GGO on CTA.

## 2020-05-04 NOTE — PROGRESS NOTE ADULT - PROBLEM SELECTOR PLAN 2
L subclavian vein DVT found on duplex this admission  - currently on heparin gtt (5/2- ), trend PTT and monitor for bleeding  - f/u vascular surgery recs (pager [phone]), appreciate recs

## 2020-05-04 NOTE — PROGRESS NOTE ADULT - PROBLEM SELECTOR PLAN 1
RIGHT ARM.   -Patient presenting with facial swelling and RUE swelling in setting of prior bilateral UE AVF grafts/revisions with concern for stenosis of RUE AVF graft ("central stenosis")  - Vasc consulted, recs IR consultation for non emergent fistulogram, as RUE swelling possibly 2/2 central stenosis but fistula still functional  - IR consult for non-emergent fistulogram RIGHT ARM.   -Patient presenting with facial swelling and RUE swelling in setting of prior bilateral UE AVF grafts/revisions with concern for stenosis of RUE AVF graft ("central stenosis")  - Vasc consulted, recs IR consultation for non emergent fistulogram, as RUE swelling possibly 2/2 central stenosis but fistula still functional  - IR consulted for non-emergent fistulogram, states would hold for now given concern for COVID on imaging despite negative covid pcr x 2 RIGHT ARM.   -Patient presenting with facial swelling and RUE swelling in setting of prior bilateral UE AVF grafts/revisions with concern for stenosis of RUE AVF graft ("central stenosis")  - Vasc consulted, recs IR consultation for non emergent fistulogram, as RUE swelling possibly 2/2 central stenosis but fistula still functional  - IR consulted for non-emergent fistulogram, states would hold for now given concern for COVID on imaging , possible outpatient follow up

## 2020-05-04 NOTE — PROGRESS NOTE ADULT - PROBLEM SELECTOR PLAN 8
Hx of DM on insulin 70/30, patient unsure of dose but report possibly 30 units BID at home  - home dose approximates to 42u long acting and 6 units premeal  - given uncertainty of home dose, will give Lantus 30 units, premeal 3 and ISS  - ISS and FSG qAC and qHS Hx of DM on insulin 70/30, patient unsure of dose but report possibly 30 units BID at home  - home dose approximates to 42u long acting and 6 units pre-meal  - c/w Lantus 30 units, premeal 3 and ISS  - ISS and FSG qAC and qHS Hx of DM on insulin 70/30, patient unsure of dose but report possibly 30 units BID at home  - home dose approximates to 42u long acting and 6 units pre-meal  - Intermittently hypoglycemic on this admission likely 2/2 being NPO  -Restart feeds  -Start lantus 11 units with ISS  - ISS and FSG qAC and qHS

## 2020-05-04 NOTE — CHART NOTE - NSCHARTNOTEFT_GEN_A_CORE
INTERVENTIONAL RADIOLOGY    consulted for possible fistulagram for possible central stenosis. at this time, the AVG is functioning well, face and arm swelling have been present and stable for 1 month, and the patient has significant patchy opacities within the lungs, will defer fistulagram until lung findings clear. if patient has prolonged hospitalization, may consider repeat imaging and fistulagram if imaging and clinical status is improved vs. outpatient follow up.    Interventional Radiology  [id number]

## 2020-05-04 NOTE — PHARMACOTHERAPY INTERVENTION NOTE - COMMENTS
Updated Outpatient Medication Review with patient's medication list from [facility] pharmacy. Unable to verify list with patient/wife, wife states son left medication bottles at hospital security desk. Discrepancies from pharmacy's list communicated with MD.    Mary Bell, PharmD   [phone]

## 2020-05-04 NOTE — PROGRESS NOTE ADULT - PROBLEM SELECTOR PLAN 1
Verbal consent in chart  last HD session was 4/28/2020  scheduled for HD today  Renal diet  dose meds for ESRD

## 2020-05-04 NOTE — PROGRESS NOTE ADULT - PROBLEM SELECTOR PLAN 3
Patient presenting with 2 months of non productive cough, previously found to have bilateral GGOs during admission in Jan as well as on CT chest on this admission (5/2).  - currently on RA, no WOB   - noted to have lymphopenia   - obtain remaining labs ferritin, CRP, procal with next labs  - defer d-dimer in setting of active clot  - initial COVID testing neg; f/u repeat test Patient presenting with 2 months of non productive cough, previously found to have bilateral GGOs during admission in Jan as well as on CT chest on this admission (5/2).  - currently on RA, no WOB   - noted to have lymphopenia   - defer d-dimer in setting of active clot  - COVID Negative x 2

## 2020-05-04 NOTE — PROGRESS NOTE ADULT - SUBJECTIVE AND OBJECTIVE BOX
[facility] – [place] [place] [facility] - MARCIA Wills / MARCIA Hoyt / ANGEL Dykes/ MARCIA Pisano/ MARCIA Paul/ KADE Claros / KEN Pierson / ARCHIE Campa  ---------------------------------------------------------------------------------------------------------------  seen and examined today for ESRD  Interval : NAD  VITALS:  T(F): 98.1 (05-04-20 @ 06:50), Max: 98.3 (05-03-20 @ 12:48)  HR: 65 (05-04-20 @ 06:50)  BP: 125/51 (05-04-20 @ 06:50)  RR: 18 (05-04-20 @ 06:50)  SpO2: 96% (05-04-20 @ 06:50)  Wt(kg): --    05-03 @ 07:01  -  05-04 @ 07:00  --------------------------------------------------------  IN: 720 mL / OUT: 0 mL / NET: 720 mL      Physical Exam :-  Constitutional: NAD  HEENT: facial swelling  Neck: Supple.  Respiratory: Bilateral equal breath sounds,  Cardiovascular: S1, S2 normal,  Gastrointestinal: Bowel Sounds present, soft, non tender.  Extremities: RUE swelling noted, AVF working well  Neurological: Alert and Oriented x 3, no focal deficits  Psychiatric: Normal mood, normal affect  Data:-  Allergies :   No Known Allergies    Hospital Medications:   MEDICATIONS  (STANDING):  amLODIPine   Tablet 10 milliGRAM(s) Oral daily  aspirin enteric coated 81 milliGRAM(s) Oral daily  atorvastatin 40 milliGRAM(s) Oral at bedtime  carbidopa/levodopa  25/250 1 Tablet(s) Oral four times a day  carvedilol 25 milliGRAM(s) Oral every 12 hours  cloNIDine 0.1 milliGRAM(s) Oral every 12 hours  dextrose 5% + sodium chloride 0.9%. 1000 milliLiter(s) (50 mL/Hr) IV Continuous <Continuous>  dextrose 5%. 1000 milliLiter(s) (50 mL/Hr) IV Continuous <Continuous>  dextrose 50% Injectable 12.5 Gram(s) IV Push once  dextrose 50% Injectable 25 Gram(s) IV Push once  dextrose 50% Injectable 25 Gram(s) IV Push once  guaiFENesin  milliGRAM(s) Oral every 12 hours  heparin  Infusion.  Unit(s)/Hr (11 mL/Hr) IV Continuous <Continuous>  insulin lispro (HumaLOG) corrective regimen sliding scale   SubCutaneous three times a day before meals  insulin lispro (HumaLOG) corrective regimen sliding scale   SubCutaneous at bedtime  insulin lispro Injectable (HumaLOG) 3 Unit(s) SubCutaneous three times a day before meals  tamsulosin 0.4 milliGRAM(s) Oral at bedtime    05-04    137  |  93<L>  |  50<H>  ----------------------------<  51<L>  4.2   |  26  |  8.91<H>    Ca    9.7      04 May 2020 09:58  Phos  5.8     05-04  Mg     2.4     05-04    TPro  7.2  /  Alb  3.3  /  TBili  0.3  /  DBili      /  AST  39  /  ALT  19  /  AlkPhos  124<H>  05-02    Creatinine Trend: 8.91 <--, 7.67 <--, 6.63 <--                        8.1    5.59  )-----------( 158      ( 04 May 2020 09:58 )             27.4

## 2020-05-04 NOTE — PROGRESS NOTE ADULT - SUBJECTIVE AND OBJECTIVE BOX
Sariah Coy MD  PGY 1 Department of Internal Medicine  Pager: [phone] ([facility])   Pager: [phone] ([facility])    Patient is a 69y old  Male who presents with a chief complaint of facial swelling (03 May 2020 12:45)      SUBJECTIVE / OVERNIGHT EVENTS: Pt seen and examined. No acute overnight events.       MEDICATIONS  (STANDING):  amLODIPine   Tablet 10 milliGRAM(s) Oral daily  aspirin enteric coated 81 milliGRAM(s) Oral daily  atorvastatin 40 milliGRAM(s) Oral at bedtime  carbidopa/levodopa  25/250 1 Tablet(s) Oral four times a day  carvedilol 25 milliGRAM(s) Oral every 12 hours  cloNIDine 0.1 milliGRAM(s) Oral every 12 hours  dextrose 5% + sodium chloride 0.9%. 1000 milliLiter(s) (50 mL/Hr) IV Continuous <Continuous>  dextrose 5%. 1000 milliLiter(s) (50 mL/Hr) IV Continuous <Continuous>  dextrose 50% Injectable 12.5 Gram(s) IV Push once  dextrose 50% Injectable 25 Gram(s) IV Push once  dextrose 50% Injectable 25 Gram(s) IV Push once  guaiFENesin  milliGRAM(s) Oral every 12 hours  heparin  Infusion.  Unit(s)/Hr (11 mL/Hr) IV Continuous <Continuous>  insulin glargine Injectable (LANTUS) 30 Unit(s) SubCutaneous at bedtime  insulin lispro (HumaLOG) corrective regimen sliding scale   SubCutaneous three times a day before meals  insulin lispro (HumaLOG) corrective regimen sliding scale   SubCutaneous at bedtime  insulin lispro Injectable (HumaLOG) 3 Unit(s) SubCutaneous three times a day before meals  tamsulosin 0.4 milliGRAM(s) Oral at bedtime    MEDICATIONS  (PRN):  benzonatate 100 milliGRAM(s) Oral three times a day PRN Cough  dextrose 40% Gel 15 Gram(s) Oral once PRN Blood Glucose LESS THAN 70 milliGRAM(s)/deciliter  glucagon  Injectable 1 milliGRAM(s) IntraMuscular once PRN Glucose LESS THAN 70 milligrams/deciliter  heparin   Injectable 4500 Unit(s) IV Push every 6 hours PRN For aPTT less than 40  heparin   Injectable 2000 Unit(s) IV Push every 6 hours PRN For aPTT between 40 - 57      I&O's Summary    03 May 2020 07:01  -  04 May 2020 07:00  --------------------------------------------------------  IN: 720 mL / OUT: 0 mL / NET: 720 mL        Vital Signs Last 24 Hrs  T(C): 36.7 (04 May 2020 06:50), Max: 36.8 (03 May 2020 12:48)  T(F): 98.1 (04 May 2020 06:50), Max: 98.3 (03 May 2020 12:48)  HR: 65 (04 May 2020 06:50) (63 - 72)  BP: 125/51 (04 May 2020 06:50) (107/45 - 195/67)  BP(mean): --  RR: 18 (04 May 2020 06:50) (18 - 20)  SpO2: 96% (04 May 2020 06:50) (95% - 100%)    CAPILLARY BLOOD GLUCOSE      POCT Blood Glucose.: 89 mg/dL (04 May 2020 07:40)  POCT Blood Glucose.: 189 mg/dL (04 May 2020 06:22)  POCT Blood Glucose.: 43 mg/dL (04 May 2020 05:57)  POCT Blood Glucose.: 38 mg/dL (04 May 2020 05:55)  POCT Blood Glucose.: 88 mg/dL (04 May 2020 01:33)  POCT Blood Glucose.: 87 mg/dL (04 May 2020 00:49)  POCT Blood Glucose.: 101 mg/dL (04 May 2020 00:32)  POCT Blood Glucose.: 80 mg/dL (03 May 2020 23:58)  POCT Blood Glucose.: 121 mg/dL (03 May 2020 18:50)  POCT Blood Glucose.: 101 mg/dL (03 May 2020 12:21)  POCT Blood Glucose.: 96 mg/dL (03 May 2020 08:20)      PHYSICAL EXAM:  GENERAL: NAD, Awake and alert, on RA  HEENT: Eyes tracking, sclera clear, PERRL EOMI. Facial fullness, no JVD  CV: RRR S1S2, no m/r/g  PULM: CTA b/l, no crackles or wheezes  ABD: Soft, nontender, nondistended, +BS  EXT:  No edema on b/l LE, R arm with swelling, R AVF with +distal pulses, no swelling in L arm  SKIN: Warm and well perfused, no erythema or rashes noted  NEURO: A&O x3, motor strength 5/5 b/l, CNII-XII grossly intact          LABS:                        8.1    5.24  )-----------( 159      ( 03 May 2020 10:14 )             27.8     Auto Eosinophil # x     / Auto Eosinophil % x     / Auto Neutrophil # x     / Auto Neutrophil % x     / BANDS % x                            7.5    4.58  )-----------( 161      ( 03 May 2020 01:39 )             25.7     Auto Eosinophil # x     / Auto Eosinophil % x     / Auto Neutrophil # x     / Auto Neutrophil % x     / BANDS % x                            8.7    4.84  )-----------( 167      ( 02 May 2020 14:56 )             29.9     Auto Eosinophil # 0.05  / Auto Eosinophil % 1.0   / Auto Neutrophil # 3.48  / Auto Neutrophil % 72.0  / BANDS % x        05-03    138  |  94<L>  |  42<H>  ----------------------------<  120<H>  4.7   |  28  |  7.67<H>  05-02    139  |  93<L>  |  33<H>  ----------------------------<  138<H>  4.0   |  32<H>  |  6.63<H>    Ca    9.8      03 May 2020 10:14  Mg     2.3     05-03  Phos  5.6     05-03  TPro  7.2  /  Alb  3.3  /  TBili  0.3  /  DBili  x   /  AST  39  /  ALT  19  /  AlkPhos  124<H>  05-02    PT/INR - ( 02 May 2020 14:56 )   PT: 10.6 sec;   INR: 0.92 ratio         PTT - ( 04 May 2020 02:28 )  PTT:54.2 sec              RADIOLOGY & ADDITIONAL TESTS:    Imaging Personally Reviewed:    Consultant(s) Notes Reviewed:      Care Discussed with Consultants/Other Providers: Sariah Coy MD  PGY 1 Department of Internal Medicine  Pager: [phone] ([facility])   Pager: [phone] ([facility])    Patient is a 69y old  Male who presents with a chief complaint of facial swelling (03 May 2020 12:45)      SUBJECTIVE / OVERNIGHT EVENTS: Pt seen and examined. No acute overnight events. Denies fevers, chills, N/V, CP, SOB      MEDICATIONS  (STANDING):  amLODIPine   Tablet 10 milliGRAM(s) Oral daily  aspirin enteric coated 81 milliGRAM(s) Oral daily  atorvastatin 40 milliGRAM(s) Oral at bedtime  carbidopa/levodopa  25/250 1 Tablet(s) Oral four times a day  carvedilol 25 milliGRAM(s) Oral every 12 hours  cloNIDine 0.1 milliGRAM(s) Oral every 12 hours  dextrose 5% + sodium chloride 0.9%. 1000 milliLiter(s) (50 mL/Hr) IV Continuous <Continuous>  dextrose 5%. 1000 milliLiter(s) (50 mL/Hr) IV Continuous <Continuous>  dextrose 50% Injectable 12.5 Gram(s) IV Push once  dextrose 50% Injectable 25 Gram(s) IV Push once  dextrose 50% Injectable 25 Gram(s) IV Push once  guaiFENesin  milliGRAM(s) Oral every 12 hours  heparin  Infusion.  Unit(s)/Hr (11 mL/Hr) IV Continuous <Continuous>  insulin glargine Injectable (LANTUS) 30 Unit(s) SubCutaneous at bedtime  insulin lispro (HumaLOG) corrective regimen sliding scale   SubCutaneous three times a day before meals  insulin lispro (HumaLOG) corrective regimen sliding scale   SubCutaneous at bedtime  insulin lispro Injectable (HumaLOG) 3 Unit(s) SubCutaneous three times a day before meals  tamsulosin 0.4 milliGRAM(s) Oral at bedtime    MEDICATIONS  (PRN):  benzonatate 100 milliGRAM(s) Oral three times a day PRN Cough  dextrose 40% Gel 15 Gram(s) Oral once PRN Blood Glucose LESS THAN 70 milliGRAM(s)/deciliter  glucagon  Injectable 1 milliGRAM(s) IntraMuscular once PRN Glucose LESS THAN 70 milligrams/deciliter  heparin   Injectable 4500 Unit(s) IV Push every 6 hours PRN For aPTT less than 40  heparin   Injectable 2000 Unit(s) IV Push every 6 hours PRN For aPTT between 40 - 57      I&O's Summary    03 May 2020 07:01  -  04 May 2020 07:00  --------------------------------------------------------  IN: 720 mL / OUT: 0 mL / NET: 720 mL        Vital Signs Last 24 Hrs  T(C): 36.7 (04 May 2020 06:50), Max: 36.8 (03 May 2020 12:48)  T(F): 98.1 (04 May 2020 06:50), Max: 98.3 (03 May 2020 12:48)  HR: 65 (04 May 2020 06:50) (63 - 72)  BP: 125/51 (04 May 2020 06:50) (107/45 - 195/67)  BP(mean): --  RR: 18 (04 May 2020 06:50) (18 - 20)  SpO2: 96% (04 May 2020 06:50) (95% - 100%)    CAPILLARY BLOOD GLUCOSE      POCT Blood Glucose.: 89 mg/dL (04 May 2020 07:40)  POCT Blood Glucose.: 189 mg/dL (04 May 2020 06:22)  POCT Blood Glucose.: 43 mg/dL (04 May 2020 05:57)  POCT Blood Glucose.: 38 mg/dL (04 May 2020 05:55)  POCT Blood Glucose.: 88 mg/dL (04 May 2020 01:33)  POCT Blood Glucose.: 87 mg/dL (04 May 2020 00:49)  POCT Blood Glucose.: 101 mg/dL (04 May 2020 00:32)  POCT Blood Glucose.: 80 mg/dL (03 May 2020 23:58)  POCT Blood Glucose.: 121 mg/dL (03 May 2020 18:50)  POCT Blood Glucose.: 101 mg/dL (03 May 2020 12:21)  POCT Blood Glucose.: 96 mg/dL (03 May 2020 08:20)      PHYSICAL EXAM:  GENERAL: NAD, Awake and alert, on RA  HEENT: Eyes tracking, sclera clear, PERRL EOMI. Facial fullness, no JVD  CV: RRR S1S2, no m/r/g  PULM: Coarse breath sounds B/L  ABD: Soft, nontender, nondistended, +BS  EXT:  No edema on b/l LE, R arm with swelling, R AVF with +distal pulses, no swelling in L arm  SKIN: Warm and well perfused, no erythema or rashes noted  NEURO: A&O x3, Non-focal       LABS:                        8.1    5.24  )-----------( 159      ( 03 May 2020 10:14 )             27.8     Auto Eosinophil # x     / Auto Eosinophil % x     / Auto Neutrophil # x     / Auto Neutrophil % x     / BANDS % x                            7.5    4.58  )-----------( 161      ( 03 May 2020 01:39 )             25.7     Auto Eosinophil # x     / Auto Eosinophil % x     / Auto Neutrophil # x     / Auto Neutrophil % x     / BANDS % x                            8.7    4.84  )-----------( 167      ( 02 May 2020 14:56 )             29.9     Auto Eosinophil # 0.05  / Auto Eosinophil % 1.0   / Auto Neutrophil # 3.48  / Auto Neutrophil % 72.0  / BANDS % x        05-03    138  |  94<L>  |  42<H>  ----------------------------<  120<H>  4.7   |  28  |  7.67<H>  05-02    139  |  93<L>  |  33<H>  ----------------------------<  138<H>  4.0   |  32<H>  |  6.63<H>    Ca    9.8      03 May 2020 10:14  Mg     2.3     05-03  Phos  5.6     05-03  TPro  7.2  /  Alb  3.3  /  TBili  0.3  /  DBili  x   /  AST  39  /  ALT  19  /  AlkPhos  124<H>  05-02    PT/INR - ( 02 May 2020 14:56 )   PT: 10.6 sec;   INR: 0.92 ratio         PTT - ( 04 May 2020 02:28 )  PTT:54.2 sec              RADIOLOGY & ADDITIONAL TESTS:    Imaging Personally Reviewed:    Consultant(s) Notes Reviewed:      Care Discussed with Consultants/Other Providers:

## 2020-05-04 NOTE — PROGRESS NOTE ADULT - PROBLEM SELECTOR PLAN 6
Patient ambulates with walker at baseline. Per scanned in records from [facility] in [facility] last dosing sinemet  QID  - will start prior dosing  - clarify dosing in AM; will med rec 5/3 am with pharmacy Patient ambulates with walker at baseline. Per scanned in records from [facility] in [facility] last dosing sinemet  QID  - c/ w prior dosing

## 2020-05-04 NOTE — PROGRESS NOTE ADULT - ASSESSMENT
69  Serbian Speaking male with PMH HTN, HLD, DM, CVA (2017 - residual R sided weakness), ESRD on Dialysis (Tue/Thurs/Sat), Parkinson disease, CAD (s/p PCI ~2005), who was sent in from dialysis for with facial and R arm swelling.

## 2020-05-04 NOTE — CHART NOTE - NSCHARTNOTEFT_GEN_A_CORE
Vascular surgery team called regarding possible RUE fistulogram. IR specified they do not believe that the fistulogram is urgent and in the setting of his other comorbidities and lung infiltrates, this may not be the ideal time to perform the fistulogram. Vascular surgery is in agreement that this is not time sensitive and does not need to be performed if it subjects the patient to unnecessary risk. As long as the fistula continues to function, and given the chronic nature of the swelling, patient may perform fistulogram as an outpatient. He should continue to perform conservative measures to aid in the swelling such as elevation or wrapping. If the fistula ceases to function, please re-consult vascular surgery and interventional radiology as the fistulogram would be urgent at this time.     Case discussed with fellow, Dr. Jay Dejesus,  Thank you    Vascular surgery   x [phone]

## 2020-05-05 LAB
ANION GAP SERPL CALC-SCNC: 15 MMOL/L — SIGNIFICANT CHANGE UP (ref 5–17)
APTT BLD: 129.2 SEC — CRITICAL HIGH (ref 27.5–36.3)
APTT BLD: 39.7 SEC — HIGH (ref 27.5–36.3)
BLD GP AB SCN SERPL QL: NEGATIVE — SIGNIFICANT CHANGE UP
BUN SERPL-MCNC: 39 MG/DL — HIGH (ref 7–23)
CALCIUM SERPL-MCNC: 10.2 MG/DL — SIGNIFICANT CHANGE UP (ref 8.4–10.5)
CHLORIDE SERPL-SCNC: 97 MMOL/L — SIGNIFICANT CHANGE UP (ref 96–108)
CO2 SERPL-SCNC: 28 MMOL/L — SIGNIFICANT CHANGE UP (ref 22–31)
CREAT SERPL-MCNC: 7.66 MG/DL — HIGH (ref 0.5–1.3)
GLUCOSE BLDC GLUCOMTR-MCNC: 150 MG/DL — HIGH (ref 70–99)
GLUCOSE BLDC GLUCOMTR-MCNC: 159 MG/DL — HIGH (ref 70–99)
GLUCOSE BLDC GLUCOMTR-MCNC: 176 MG/DL — HIGH (ref 70–99)
GLUCOSE BLDC GLUCOMTR-MCNC: 207 MG/DL — HIGH (ref 70–99)
GLUCOSE BLDC GLUCOMTR-MCNC: 44 MG/DL — CRITICAL LOW (ref 70–99)
GLUCOSE BLDC GLUCOMTR-MCNC: 48 MG/DL — LOW (ref 70–99)
GLUCOSE SERPL-MCNC: 38 MG/DL — CRITICAL LOW (ref 70–99)
HCT VFR BLD CALC: 27.7 % — LOW (ref 39–50)
HCT VFR BLD CALC: 28 % — LOW (ref 39–50)
HGB BLD-MCNC: 8.1 G/DL — LOW (ref 13–17)
HGB BLD-MCNC: 8.2 G/DL — LOW (ref 13–17)
MAGNESIUM SERPL-MCNC: 2.2 MG/DL — SIGNIFICANT CHANGE UP (ref 1.6–2.6)
MCHC RBC-ENTMCNC: 28.9 GM/DL — LOW (ref 32–36)
MCHC RBC-ENTMCNC: 29.5 PG — SIGNIFICANT CHANGE UP (ref 27–34)
MCHC RBC-ENTMCNC: 29.6 GM/DL — LOW (ref 32–36)
MCHC RBC-ENTMCNC: 30.3 PG — SIGNIFICANT CHANGE UP (ref 27–34)
MCV RBC AUTO: 101.8 FL — HIGH (ref 80–100)
MCV RBC AUTO: 102.2 FL — HIGH (ref 80–100)
NRBC # BLD: 0 /100 WBCS — SIGNIFICANT CHANGE UP (ref 0–0)
NRBC # BLD: 0 /100 WBCS — SIGNIFICANT CHANGE UP (ref 0–0)
PHOSPHATE SERPL-MCNC: 5 MG/DL — HIGH (ref 2.5–4.5)
PLATELET # BLD AUTO: 153 K/UL — SIGNIFICANT CHANGE UP (ref 150–400)
PLATELET # BLD AUTO: 155 K/UL — SIGNIFICANT CHANGE UP (ref 150–400)
POTASSIUM SERPL-MCNC: 4.2 MMOL/L — SIGNIFICANT CHANGE UP (ref 3.5–5.3)
POTASSIUM SERPL-SCNC: 4.2 MMOL/L — SIGNIFICANT CHANGE UP (ref 3.5–5.3)
RBC # BLD: 2.71 M/UL — LOW (ref 4.2–5.8)
RBC # BLD: 2.75 M/UL — LOW (ref 4.2–5.8)
RBC # FLD: 19.3 % — HIGH (ref 10.3–14.5)
RBC # FLD: 19.4 % — HIGH (ref 10.3–14.5)
RH IG SCN BLD-IMP: POSITIVE — SIGNIFICANT CHANGE UP
SODIUM SERPL-SCNC: 140 MMOL/L — SIGNIFICANT CHANGE UP (ref 135–145)
WBC # BLD: 5.6 K/UL — SIGNIFICANT CHANGE UP (ref 3.8–10.5)
WBC # BLD: 5.95 K/UL — SIGNIFICANT CHANGE UP (ref 3.8–10.5)
WBC # FLD AUTO: 5.6 K/UL — SIGNIFICANT CHANGE UP (ref 3.8–10.5)
WBC # FLD AUTO: 5.95 K/UL — SIGNIFICANT CHANGE UP (ref 3.8–10.5)

## 2020-05-05 PROCEDURE — 99232 SBSQ HOSP IP/OBS MODERATE 35: CPT | Mod: GC

## 2020-05-05 RX ORDER — ERYTHROPOIETIN 10000 [IU]/ML
20000 INJECTION, SOLUTION INTRAVENOUS; SUBCUTANEOUS
Refills: 0 | Status: DISCONTINUED | OUTPATIENT
Start: 2020-05-07 | End: 2020-05-06

## 2020-05-05 RX ORDER — DEXTROSE 50 % IN WATER 50 %
15 SYRINGE (ML) INTRAVENOUS ONCE
Refills: 0 | Status: COMPLETED | OUTPATIENT
Start: 2020-05-05 | End: 2020-05-05

## 2020-05-05 RX ORDER — CARBIDOPA AND LEVODOPA 25; 100 MG/1; MG/1
1 TABLET ORAL
Refills: 0 | Status: DISCONTINUED | OUTPATIENT
Start: 2020-05-05 | End: 2020-05-06

## 2020-05-05 RX ORDER — APIXABAN 2.5 MG/1
5 TABLET, FILM COATED ORAL
Refills: 0 | Status: DISCONTINUED | OUTPATIENT
Start: 2020-05-05 | End: 2020-05-06

## 2020-05-05 RX ORDER — HEPARIN SODIUM 5000 [USP'U]/ML
900 INJECTION INTRAVENOUS; SUBCUTANEOUS
Qty: 25000 | Refills: 0 | Status: DISCONTINUED | OUTPATIENT
Start: 2020-05-05 | End: 2020-05-05

## 2020-05-05 RX ORDER — DEXTROSE 50 % IN WATER 50 %
25 SYRINGE (ML) INTRAVENOUS ONCE
Refills: 0 | Status: COMPLETED | OUTPATIENT
Start: 2020-05-05 | End: 2020-05-05

## 2020-05-05 RX ORDER — APIXABAN 2.5 MG/1
5 TABLET, FILM COATED ORAL
Refills: 0 | Status: DISCONTINUED | OUTPATIENT
Start: 2020-05-05 | End: 2020-05-05

## 2020-05-05 RX ORDER — SODIUM CHLORIDE 9 MG/ML
1000 INJECTION, SOLUTION INTRAVENOUS
Refills: 0 | Status: DISCONTINUED | OUTPATIENT
Start: 2020-05-05 | End: 2020-05-06

## 2020-05-05 RX ORDER — HYDRALAZINE HCL 50 MG
5 TABLET ORAL ONCE
Refills: 0 | Status: COMPLETED | OUTPATIENT
Start: 2020-05-05 | End: 2020-05-05

## 2020-05-05 RX ADMIN — Medication 5 MILLIGRAM(S): at 21:29

## 2020-05-05 RX ADMIN — SODIUM CHLORIDE 50 MILLILITER(S): 9 INJECTION, SOLUTION INTRAVENOUS at 09:20

## 2020-05-05 RX ADMIN — Medication 0.1 MILLIGRAM(S): at 05:47

## 2020-05-05 RX ADMIN — SEVELAMER CARBONATE 800 MILLIGRAM(S): 2400 POWDER, FOR SUSPENSION ORAL at 18:17

## 2020-05-05 RX ADMIN — AMLODIPINE BESYLATE 10 MILLIGRAM(S): 2.5 TABLET ORAL at 05:47

## 2020-05-05 RX ADMIN — Medication 1: at 12:10

## 2020-05-05 RX ADMIN — Medication 81 MILLIGRAM(S): at 13:58

## 2020-05-05 RX ADMIN — Medication 600 MILLIGRAM(S): at 18:18

## 2020-05-05 RX ADMIN — CARBIDOPA AND LEVODOPA 1 TABLET(S): 25; 100 TABLET ORAL at 23:04

## 2020-05-05 RX ADMIN — ATORVASTATIN CALCIUM 40 MILLIGRAM(S): 80 TABLET, FILM COATED ORAL at 21:29

## 2020-05-05 RX ADMIN — APIXABAN 5 MILLIGRAM(S): 2.5 TABLET, FILM COATED ORAL at 16:00

## 2020-05-05 RX ADMIN — Medication 0.1 MILLIGRAM(S): at 18:18

## 2020-05-05 RX ADMIN — CARVEDILOL PHOSPHATE 25 MILLIGRAM(S): 80 CAPSULE, EXTENDED RELEASE ORAL at 18:18

## 2020-05-05 RX ADMIN — CARVEDILOL PHOSPHATE 25 MILLIGRAM(S): 80 CAPSULE, EXTENDED RELEASE ORAL at 05:47

## 2020-05-05 RX ADMIN — TAMSULOSIN HYDROCHLORIDE 0.4 MILLIGRAM(S): 0.4 CAPSULE ORAL at 21:29

## 2020-05-05 RX ADMIN — HEPARIN SODIUM 700 UNIT(S)/HR: 5000 INJECTION INTRAVENOUS; SUBCUTANEOUS at 12:00

## 2020-05-05 RX ADMIN — SEVELAMER CARBONATE 800 MILLIGRAM(S): 2400 POWDER, FOR SUSPENSION ORAL at 09:53

## 2020-05-05 RX ADMIN — Medication 25 GRAM(S): at 09:15

## 2020-05-05 RX ADMIN — CARBIDOPA AND LEVODOPA 1 TABLET(S): 25; 100 TABLET ORAL at 13:58

## 2020-05-05 RX ADMIN — Medication 100 MILLIGRAM(S): at 13:58

## 2020-05-05 RX ADMIN — CARBIDOPA AND LEVODOPA 1 TABLET(S): 25; 100 TABLET ORAL at 05:47

## 2020-05-05 RX ADMIN — HEPARIN SODIUM 0 UNIT(S)/HR: 5000 INJECTION INTRAVENOUS; SUBCUTANEOUS at 10:40

## 2020-05-05 RX ADMIN — SODIUM CHLORIDE 50 MILLILITER(S): 9 INJECTION, SOLUTION INTRAVENOUS at 21:28

## 2020-05-05 RX ADMIN — HEPARIN SODIUM 900 UNIT(S)/HR: 5000 INJECTION INTRAVENOUS; SUBCUTANEOUS at 01:00

## 2020-05-05 RX ADMIN — Medication 600 MILLIGRAM(S): at 05:47

## 2020-05-05 RX ADMIN — Medication 15 GRAM(S): at 09:20

## 2020-05-05 RX ADMIN — CARBIDOPA AND LEVODOPA 1 TABLET(S): 25; 100 TABLET ORAL at 20:15

## 2020-05-05 RX ADMIN — Medication 1: at 18:17

## 2020-05-05 RX ADMIN — SEVELAMER CARBONATE 800 MILLIGRAM(S): 2400 POWDER, FOR SUSPENSION ORAL at 12:17

## 2020-05-05 NOTE — PROGRESS NOTE ADULT - PROBLEM SELECTOR PLAN 4
ESRD on HD T Th Sa via RUE AVF with complex UE vascular.   -Last full session on Thur, 4/30  - currently with normal K/ no vol overload, no need for urgent Hd at this time  - nephrology consulted, appreciate recs   - ok to use fistula per vascular sx ESRD on HD T Th Sa via RUE AVF with complex UE vascular.   -Last full session on Thur, 5/4 NEXT HD 5/7  - currently with normal K/ no vol overload, no need for urgent Hd at this time  - nephrology consulted, appreciate recs   - ok to use fistula per vascular sx

## 2020-05-05 NOTE — PHYSICAL THERAPY INITIAL EVALUATION ADULT - PERTINENT HX OF CURRENT PROBLEM, REHAB EVAL
70 yo Venezuelan Speaking male w/ PMH HTN, HLD, DM, CVA (2017 - residual R sided weakness), ESRD on Dialysis (Tue/Thurs/Sat), PD, CAD (s/p PCI ~2005), who was sent in from HD w/ facial and R arm swelling, found to have possible central stenosis of RUE AVF as well as L subclavian vein DVT (CT 5/2) on heparin gtt (5/2-), r/o COVID 19 given chronic cough and GGO on CTA.

## 2020-05-05 NOTE — PROGRESS NOTE ADULT - PROBLEM SELECTOR PLAN 1
RIGHT ARM.   -Patient presenting with facial swelling and RUE swelling in setting of prior bilateral UE AVF grafts/revisions with concern for stenosis of RUE AVF graft ("central stenosis")  - Vasc consulted, recs IR consultation for non emergent fistulogram, as RUE swelling possibly 2/2 central stenosis but fistula still functional  - IR consulted for non-emergent fistulogram, states would hold for now given concern for COVID on imaging , possible outpatient follow up

## 2020-05-05 NOTE — PROGRESS NOTE ADULT - ASSESSMENT
69  Japanese Speaking male with PMH HTN, HLD, DM, CVA (2017 - residual R sided weakness), ESRD on Dialysis (Tue/Thurs/Sat), Parkinson disease, CAD (s/p PCI ~2005), who was sent in from dialysis for with facial and R arm swelling, found to have possible cental stenosis of RUE AVF as well as L subclavian vein DVT (CT 5/2) on heparin gtt (5/2-), r/o COVID 19 given chronic cough and GGO on CTA.

## 2020-05-05 NOTE — PROVIDER CONTACT NOTE (OTHER) - ACTION/TREATMENT ORDERED:
Hydralazine ordered. Continue to monitor.
Give pt 8oz of apple juice, recheck fingerstick.
Initiate hypoglycemic protocol. D50 25 mg administered, FS rechecked- 189. Continue to monitor.

## 2020-05-05 NOTE — PROGRESS NOTE ADULT - SUBJECTIVE AND OBJECTIVE BOX
[facility] - MARCIA Wills / MARCIA Hoyt / ANGEL Dykes/ AMRCIA Pisano/ MARCIA Paul/ KADE Claros / KEN Pierson / ARCHIE Campa  ---------------------------------------------------------------------------------------------------------------  seen and examined today for ESRD  Interval : tolerated HD well yesterday  VITALS:  T(F): 98.3 (05-05-20 @ 12:14), Max: 98.3 (05-05-20 @ 12:14)  HR: 71 (05-05-20 @ 12:14)  BP: 122/63 (05-05-20 @ 12:14)  RR: 18 (05-05-20 @ 12:14)  SpO2: 99% (05-05-20 @ 12:14)  Wt(kg): --    05-04 @ 07:01  -  05-05 @ 07:00  --------------------------------------------------------  IN: 840 mL / OUT: 2100 mL / NET: -1260 mL    05-05 @ 07:01  -  05-05 @ 13:40  --------------------------------------------------------  IN: 480 mL / OUT: 0 mL / NET: 480 mL      Physical Exam :-  Constitutional: NAD  Neck: Supple.  Respiratory: Bilateral equal breath sounds,  Cardiovascular: S1, S2 normal,  Gastrointestinal: Bowel Sounds present, soft, non tender.  Extremities: face and right UE edema +  Neurological: Alert and Oriented x 3, no focal deficits  Psychiatric: Normal mood, normal affect  Data:-  Allergies :   No Known Allergies    Hospital Medications:   MEDICATIONS  (STANDING):  amLODIPine   Tablet 10 milliGRAM(s) Oral daily  apixaban 5 milliGRAM(s) Oral two times a day  aspirin enteric coated 81 milliGRAM(s) Oral daily  atorvastatin 40 milliGRAM(s) Oral at bedtime  carbidopa/levodopa  25/250 1 Tablet(s) Oral four times a day  carvedilol 25 milliGRAM(s) Oral every 12 hours  cloNIDine 0.1 milliGRAM(s) Oral every 12 hours  dextrose 5% + sodium chloride 0.9%. 1000 milliLiter(s) (50 mL/Hr) IV Continuous <Continuous>  dextrose 5%. 1000 milliLiter(s) (50 mL/Hr) IV Continuous <Continuous>  dextrose 5%. 1000 milliLiter(s) (50 mL/Hr) IV Continuous <Continuous>  dextrose 50% Injectable 12.5 Gram(s) IV Push once  dextrose 50% Injectable 25 Gram(s) IV Push once  guaiFENesin  milliGRAM(s) Oral every 12 hours  insulin lispro (HumaLOG) corrective regimen sliding scale   SubCutaneous three times a day before meals  insulin lispro (HumaLOG) corrective regimen sliding scale   SubCutaneous at bedtime  sevelamer carbonate 800 milliGRAM(s) Oral three times a day with meals  tamsulosin 0.4 milliGRAM(s) Oral at bedtime    05-05    140  |  97  |  39<H>  ----------------------------<  38<LL>  4.2   |  28  |  7.66<H>    Ca    10.2      05 May 2020 09:32  Phos  5.0     05-05  Mg     2.2     05-05      Creatinine Trend: 7.66 <--, 8.91 <--, 7.67 <--, 6.63 <--                        8.2    5.60  )-----------( 153      ( 05 May 2020 09:32 )             27.7

## 2020-05-05 NOTE — PHYSICAL THERAPY INITIAL EVALUATION ADULT - ADDITIONAL COMMENTS
Pt lives w/ family in an apt w/ 12 steps to negotiate. PTA indep w/ st cane indoors and RW outdoors.

## 2020-05-05 NOTE — PROGRESS NOTE ADULT - ASSESSMENT
69  [place] Speaking male with PMH HTN, HLD, DM, CVA (2017 - residual R sided weakness), ESRD on Dialysis (Tue/Thurs/Sat), Parkinson disease, CAD (s/p PCI ~2005), who was sent in from dialysis for with facial and R arm swelling.

## 2020-05-05 NOTE — PROGRESS NOTE ADULT - PROBLEM SELECTOR PLAN 6
Patient ambulates with walker at baseline. Per scanned in records from [facility] in [facility] last dosing sinemet  QID  - c/ w prior dosing

## 2020-05-05 NOTE — PROGRESS NOTE ADULT - PROBLEM SELECTOR PLAN 7
Hx of HTN unclear of home meds appears per scanned in records in [facility] appears amlodipine 10 mg and carvedilol 25 mg BID and clonidine 0.1 mg BID  - c/w amlodipine   - c/w carvedilol 12.5 mg BID  - c/w clonidine 0.1 mg BID

## 2020-05-05 NOTE — PROGRESS NOTE ADULT - SUBJECTIVE AND OBJECTIVE BOX
Sariah Coy MD  PGY 1 Department of Internal Medicine  Pager: [phone] ([facility])   Pager: [phone] ([facility])    Patient is a 69y old  Male who presents with a chief complaint of facial swelling (04 May 2020 11:55)      SUBJECTIVE / OVERNIGHT EVENTS: Pt seen and examined. No acute overnight events. Denies fevers, chills, CP, SOB, Abdominal pain, N/V, Constipation, Diarrhea        MEDICATIONS  (STANDING):  amLODIPine   Tablet 10 milliGRAM(s) Oral daily  aspirin enteric coated 81 milliGRAM(s) Oral daily  atorvastatin 40 milliGRAM(s) Oral at bedtime  carbidopa/levodopa  25/250 1 Tablet(s) Oral four times a day  carvedilol 25 milliGRAM(s) Oral every 12 hours  cloNIDine 0.1 milliGRAM(s) Oral every 12 hours  dextrose 5% + sodium chloride 0.9%. 1000 milliLiter(s) (50 mL/Hr) IV Continuous <Continuous>  dextrose 5%. 1000 milliLiter(s) (50 mL/Hr) IV Continuous <Continuous>  dextrose 50% Injectable 12.5 Gram(s) IV Push once  dextrose 50% Injectable 25 Gram(s) IV Push once  dextrose 50% Injectable 25 Gram(s) IV Push once  guaiFENesin  milliGRAM(s) Oral every 12 hours  heparin  Infusion. 900 Unit(s)/Hr (9 mL/Hr) IV Continuous <Continuous>  insulin glargine Injectable (LANTUS) 7 Unit(s) SubCutaneous at bedtime  insulin lispro (HumaLOG) corrective regimen sliding scale   SubCutaneous three times a day before meals  insulin lispro (HumaLOG) corrective regimen sliding scale   SubCutaneous at bedtime  sevelamer carbonate 800 milliGRAM(s) Oral three times a day with meals  tamsulosin 0.4 milliGRAM(s) Oral at bedtime    MEDICATIONS  (PRN):  benzonatate 100 milliGRAM(s) Oral three times a day PRN Cough  dextrose 40% Gel 15 Gram(s) Oral once PRN Blood Glucose LESS THAN 70 milliGRAM(s)/deciliter  glucagon  Injectable 1 milliGRAM(s) IntraMuscular once PRN Glucose LESS THAN 70 milligrams/deciliter      I&O's Summary    04 May 2020 07:01  -  05 May 2020 07:00  --------------------------------------------------------  IN: 840 mL / OUT: 2100 mL / NET: -1260 mL        Vital Signs Last 24 Hrs  T(C): 36.6 (05 May 2020 05:09), Max: 36.8 (04 May 2020 17:30)  T(F): 97.8 (05 May 2020 05:09), Max: 98.2 (04 May 2020 17:30)  HR: 69 (05 May 2020 05:09) (61 - 87)  BP: 141/69 (05 May 2020 05:09) (141/69 - 189/63)  BP(mean): --  RR: 16 (05 May 2020 05:09) (16 - 18)  SpO2: 97% (05 May 2020 05:09) (96% - 98%)    CAPILLARY BLOOD GLUCOSE      POCT Blood Glucose.: 147 mg/dL (04 May 2020 23:48)  POCT Blood Glucose.: 153 mg/dL (04 May 2020 22:05)  POCT Blood Glucose.: 120 mg/dL (04 May 2020 17:45)  POCT Blood Glucose.: 57 mg/dL (04 May 2020 12:58)  POCT Blood Glucose.: 71 mg/dL (04 May 2020 12:30)  POCT Blood Glucose.: 153 mg/dL (04 May 2020 09:25)  POCT Blood Glucose.: 58 mg/dL (04 May 2020 08:51)  POCT Blood Glucose.: 89 mg/dL (04 May 2020 07:40)      PHYSICAL EXAM:  GENERAL: NAD, Awake and alert, on RA  HEENT: Eyes tracking, sclera clear, PERRL EOMI. Facial fullness, no JVD  CV: RRR S1S2, no m/r/g  PULM: Coarse breath sounds B/L  ABD: Soft, nontender, nondistended, +BS  EXT:  No edema on b/l LE, R arm with swelling, R AVF with +distal pulses, no swelling in L arm  SKIN: Warm and well perfused, no erythema or rashes noted  NEURO: A&O x3, Non-focal       LABS:                        8.1    5.59  )-----------( 158      ( 04 May 2020 09:58 )             27.4     Auto Eosinophil # 0.06  / Auto Eosinophil % 1.1   / Auto Neutrophil # 4.44  / Auto Neutrophil % 79.3  / BANDS % x                            8.1    5.24  )-----------( 159      ( 03 May 2020 10:14 )             27.8     Auto Eosinophil # x     / Auto Eosinophil % x     / Auto Neutrophil # x     / Auto Neutrophil % x     / BANDS % x        05-04    137  |  93<L>  |  50<H>  ----------------------------<  51<L>  4.2   |  26  |  8.91<H>  05-03    138  |  94<L>  |  42<H>  ----------------------------<  120<H>  4.7   |  28  |  7.67<H>    Ca    9.7      04 May 2020 09:58  Mg     2.4     05-04  Phos  5.8     05-04    PTT - ( 05 May 2020 00:06 )  PTT:39.7 sec              RADIOLOGY & ADDITIONAL TESTS:    Imaging Personally Reviewed:    Consultant(s) Notes Reviewed:      Care Discussed with Consultants/Other Providers: Sariah Coy MD  PGY 1 Department of Internal Medicine  Pager: [phone] ([facility])   Pager: [phone] ([facility])    Patient is a 69y old  Male who presents with a chief complaint of facial swelling (04 May 2020 11:55)    : [id number]    SUBJECTIVE / OVERNIGHT EVENTS: Pt seen and examined. No acute overnight events. Endorses cough  Denies fevers, chills, CP, SOB, Abdominal pain, N/V, Constipation, Diarrhea        MEDICATIONS  (STANDING):  amLODIPine   Tablet 10 milliGRAM(s) Oral daily  aspirin enteric coated 81 milliGRAM(s) Oral daily  atorvastatin 40 milliGRAM(s) Oral at bedtime  carbidopa/levodopa  25/250 1 Tablet(s) Oral four times a day  carvedilol 25 milliGRAM(s) Oral every 12 hours  cloNIDine 0.1 milliGRAM(s) Oral every 12 hours  dextrose 5% + sodium chloride 0.9%. 1000 milliLiter(s) (50 mL/Hr) IV Continuous <Continuous>  dextrose 5%. 1000 milliLiter(s) (50 mL/Hr) IV Continuous <Continuous>  dextrose 50% Injectable 12.5 Gram(s) IV Push once  dextrose 50% Injectable 25 Gram(s) IV Push once  dextrose 50% Injectable 25 Gram(s) IV Push once  guaiFENesin  milliGRAM(s) Oral every 12 hours  heparin  Infusion. 900 Unit(s)/Hr (9 mL/Hr) IV Continuous <Continuous>  insulin glargine Injectable (LANTUS) 7 Unit(s) SubCutaneous at bedtime  insulin lispro (HumaLOG) corrective regimen sliding scale   SubCutaneous three times a day before meals  insulin lispro (HumaLOG) corrective regimen sliding scale   SubCutaneous at bedtime  sevelamer carbonate 800 milliGRAM(s) Oral three times a day with meals  tamsulosin 0.4 milliGRAM(s) Oral at bedtime    MEDICATIONS  (PRN):  benzonatate 100 milliGRAM(s) Oral three times a day PRN Cough  dextrose 40% Gel 15 Gram(s) Oral once PRN Blood Glucose LESS THAN 70 milliGRAM(s)/deciliter  glucagon  Injectable 1 milliGRAM(s) IntraMuscular once PRN Glucose LESS THAN 70 milligrams/deciliter      I&O's Summary    04 May 2020 07:01  -  05 May 2020 07:00  --------------------------------------------------------  IN: 840 mL / OUT: 2100 mL / NET: -1260 mL        Vital Signs Last 24 Hrs  T(C): 36.6 (05 May 2020 05:09), Max: 36.8 (04 May 2020 17:30)  T(F): 97.8 (05 May 2020 05:09), Max: 98.2 (04 May 2020 17:30)  HR: 69 (05 May 2020 05:09) (61 - 87)  BP: 141/69 (05 May 2020 05:09) (141/69 - 189/63)  BP(mean): --  RR: 16 (05 May 2020 05:09) (16 - 18)  SpO2: 97% (05 May 2020 05:09) (96% - 98%)    CAPILLARY BLOOD GLUCOSE      POCT Blood Glucose.: 147 mg/dL (04 May 2020 23:48)  POCT Blood Glucose.: 153 mg/dL (04 May 2020 22:05)  POCT Blood Glucose.: 120 mg/dL (04 May 2020 17:45)  POCT Blood Glucose.: 57 mg/dL (04 May 2020 12:58)  POCT Blood Glucose.: 71 mg/dL (04 May 2020 12:30)  POCT Blood Glucose.: 153 mg/dL (04 May 2020 09:25)  POCT Blood Glucose.: 58 mg/dL (04 May 2020 08:51)  POCT Blood Glucose.: 89 mg/dL (04 May 2020 07:40)      PHYSICAL EXAM:  GENERAL: NAD, Awake and alert, on RA  HEENT: Eyes tracking, sclera clear, PERRL EOMI. Facial fullness, no JVD  CV: RRR S1S2, no m/r/g  PULM: Coarse breath sounds B/L  ABD: Soft, nontender, nondistended, +BS  EXT:  No edema on b/l LE, R arm with swelling, R AVF with +distal pulses, no swelling in L arm  SKIN: Warm and well perfused, no erythema or rashes noted  NEURO: A&O x3, Non-focal       LABS:                        8.1    5.59  )-----------( 158      ( 04 May 2020 09:58 )             27.4     Auto Eosinophil # 0.06  / Auto Eosinophil % 1.1   / Auto Neutrophil # 4.44  / Auto Neutrophil % 79.3  / BANDS % x                            8.1    5.24  )-----------( 159      ( 03 May 2020 10:14 )             27.8     Auto Eosinophil # x     / Auto Eosinophil % x     / Auto Neutrophil # x     / Auto Neutrophil % x     / BANDS % x        05-04    137  |  93<L>  |  50<H>  ----------------------------<  51<L>  4.2   |  26  |  8.91<H>  05-03    138  |  94<L>  |  42<H>  ----------------------------<  120<H>  4.7   |  28  |  7.67<H>    Ca    9.7      04 May 2020 09:58  Mg     2.4     05-04  Phos  5.8     05-04    PTT - ( 05 May 2020 00:06 )  PTT:39.7 sec              RADIOLOGY & ADDITIONAL TESTS:    Imaging Personally Reviewed:    Consultant(s) Notes Reviewed:      Care Discussed with Consultants/Other Providers:

## 2020-05-05 NOTE — PROGRESS NOTE ADULT - PROBLEM SELECTOR PLAN 3
Patient presenting with 2 months of non productive cough, previously found to have bilateral GGOs during admission in Jan as well as on CT chest on this admission (5/2).  - currently on RA, no WOB   - noted to have lymphopenia   - defer d-dimer in setting of active clot  - COVID Negative x 2

## 2020-05-05 NOTE — PROGRESS NOTE ADULT - PROBLEM SELECTOR PLAN 2
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

## 2020-05-05 NOTE — PROGRESS NOTE ADULT - PROBLEM SELECTOR PLAN 8
Hx of DM on insulin 70/30, patient unsure of dose but report possibly 30 units BID at home  - home dose approximates to 42u long acting and 6 units pre-meal  - Intermittently hypoglycemic on this admission likely 2/2 being NPO  -Restart feeds  -Start lantus 11 units with ISS  - ISS and FSG qAC and qHS

## 2020-05-06 ENCOUNTER — TRANSCRIPTION ENCOUNTER (OUTPATIENT)
Age: 70
End: 2020-05-06

## 2020-05-06 VITALS
OXYGEN SATURATION: 100 % | RESPIRATION RATE: 18 BRPM | HEART RATE: 74 BPM | SYSTOLIC BLOOD PRESSURE: 169 MMHG | TEMPERATURE: 98 F | DIASTOLIC BLOOD PRESSURE: 72 MMHG

## 2020-05-06 LAB
ANION GAP SERPL CALC-SCNC: 16 MMOL/L — SIGNIFICANT CHANGE UP (ref 5–17)
BUN SERPL-MCNC: 54 MG/DL — HIGH (ref 7–23)
CALCIUM SERPL-MCNC: 9.9 MG/DL — SIGNIFICANT CHANGE UP (ref 8.4–10.5)
CHLORIDE SERPL-SCNC: 91 MMOL/L — LOW (ref 96–108)
CO2 SERPL-SCNC: 25 MMOL/L — SIGNIFICANT CHANGE UP (ref 22–31)
CREAT SERPL-MCNC: 8.73 MG/DL — HIGH (ref 0.5–1.3)
GLUCOSE BLDC GLUCOMTR-MCNC: 114 MG/DL — HIGH (ref 70–99)
GLUCOSE BLDC GLUCOMTR-MCNC: 132 MG/DL — HIGH (ref 70–99)
GLUCOSE BLDC GLUCOMTR-MCNC: 137 MG/DL — HIGH (ref 70–99)
GLUCOSE SERPL-MCNC: 152 MG/DL — HIGH (ref 70–99)
HCT VFR BLD CALC: 26.7 % — LOW (ref 39–50)
HGB BLD-MCNC: 8 G/DL — LOW (ref 13–17)
MAGNESIUM SERPL-MCNC: 2.2 MG/DL — SIGNIFICANT CHANGE UP (ref 1.6–2.6)
MCHC RBC-ENTMCNC: 30 GM/DL — LOW (ref 32–36)
MCHC RBC-ENTMCNC: 30.4 PG — SIGNIFICANT CHANGE UP (ref 27–34)
MCV RBC AUTO: 101.5 FL — HIGH (ref 80–100)
NRBC # BLD: 0 /100 WBCS — SIGNIFICANT CHANGE UP (ref 0–0)
PHOSPHATE SERPL-MCNC: 4.8 MG/DL — HIGH (ref 2.5–4.5)
PLATELET # BLD AUTO: 138 K/UL — LOW (ref 150–400)
POTASSIUM SERPL-MCNC: 4.5 MMOL/L — SIGNIFICANT CHANGE UP (ref 3.5–5.3)
POTASSIUM SERPL-SCNC: 4.5 MMOL/L — SIGNIFICANT CHANGE UP (ref 3.5–5.3)
RBC # BLD: 2.63 M/UL — LOW (ref 4.2–5.8)
RBC # FLD: 19.4 % — HIGH (ref 10.3–14.5)
SODIUM SERPL-SCNC: 132 MMOL/L — LOW (ref 135–145)
WBC # BLD: 4.65 K/UL — SIGNIFICANT CHANGE UP (ref 3.8–10.5)
WBC # FLD AUTO: 4.65 K/UL — SIGNIFICANT CHANGE UP (ref 3.8–10.5)

## 2020-05-06 PROCEDURE — 71250 CT THORAX DX C-: CPT

## 2020-05-06 PROCEDURE — 83550 IRON BINDING TEST: CPT

## 2020-05-06 PROCEDURE — 86850 RBC ANTIBODY SCREEN: CPT

## 2020-05-06 PROCEDURE — 84295 ASSAY OF SERUM SODIUM: CPT

## 2020-05-06 PROCEDURE — 82962 GLUCOSE BLOOD TEST: CPT

## 2020-05-06 PROCEDURE — 87635 SARS-COV-2 COVID-19 AMP PRB: CPT

## 2020-05-06 PROCEDURE — 93990 DOPPLER FLOW TESTING: CPT

## 2020-05-06 PROCEDURE — 84100 ASSAY OF PHOSPHORUS: CPT

## 2020-05-06 PROCEDURE — 93005 ELECTROCARDIOGRAM TRACING: CPT

## 2020-05-06 PROCEDURE — 82803 BLOOD GASES ANY COMBINATION: CPT

## 2020-05-06 PROCEDURE — 85610 PROTHROMBIN TIME: CPT

## 2020-05-06 PROCEDURE — 86803 HEPATITIS C AB TEST: CPT

## 2020-05-06 PROCEDURE — 82746 ASSAY OF FOLIC ACID SERUM: CPT

## 2020-05-06 PROCEDURE — 84145 PROCALCITONIN (PCT): CPT

## 2020-05-06 PROCEDURE — 99261: CPT

## 2020-05-06 PROCEDURE — 85379 FIBRIN DEGRADATION QUANT: CPT

## 2020-05-06 PROCEDURE — 86140 C-REACTIVE PROTEIN: CPT

## 2020-05-06 PROCEDURE — 83540 ASSAY OF IRON: CPT

## 2020-05-06 PROCEDURE — 86901 BLOOD TYPING SEROLOGIC RH(D): CPT

## 2020-05-06 PROCEDURE — 84466 ASSAY OF TRANSFERRIN: CPT

## 2020-05-06 PROCEDURE — 83615 LACTATE (LD) (LDH) ENZYME: CPT

## 2020-05-06 PROCEDURE — 83036 HEMOGLOBIN GLYCOSYLATED A1C: CPT

## 2020-05-06 PROCEDURE — 71045 X-RAY EXAM CHEST 1 VIEW: CPT

## 2020-05-06 PROCEDURE — 97161 PT EVAL LOW COMPLEX 20 MIN: CPT

## 2020-05-06 PROCEDURE — 86900 BLOOD TYPING SEROLOGIC ABO: CPT

## 2020-05-06 PROCEDURE — 82330 ASSAY OF CALCIUM: CPT

## 2020-05-06 PROCEDURE — 85027 COMPLETE CBC AUTOMATED: CPT

## 2020-05-06 PROCEDURE — 82607 VITAMIN B-12: CPT

## 2020-05-06 PROCEDURE — 99285 EMERGENCY DEPT VISIT HI MDM: CPT | Mod: 25

## 2020-05-06 PROCEDURE — 93971 EXTREMITY STUDY: CPT

## 2020-05-06 PROCEDURE — 82728 ASSAY OF FERRITIN: CPT

## 2020-05-06 PROCEDURE — 85014 HEMATOCRIT: CPT

## 2020-05-06 PROCEDURE — 82435 ASSAY OF BLOOD CHLORIDE: CPT

## 2020-05-06 PROCEDURE — 82947 ASSAY GLUCOSE BLOOD QUANT: CPT

## 2020-05-06 PROCEDURE — 83605 ASSAY OF LACTIC ACID: CPT

## 2020-05-06 PROCEDURE — 80053 COMPREHEN METABOLIC PANEL: CPT

## 2020-05-06 PROCEDURE — 99239 HOSP IP/OBS DSCHRG MGMT >30: CPT | Mod: GC

## 2020-05-06 PROCEDURE — 84132 ASSAY OF SERUM POTASSIUM: CPT

## 2020-05-06 PROCEDURE — 85730 THROMBOPLASTIN TIME PARTIAL: CPT

## 2020-05-06 PROCEDURE — 80048 BASIC METABOLIC PNL TOTAL CA: CPT

## 2020-05-06 PROCEDURE — 83735 ASSAY OF MAGNESIUM: CPT

## 2020-05-06 RX ORDER — CINACALCET 30 MG/1
1 TABLET, FILM COATED ORAL
Qty: 0 | Refills: 0 | DISCHARGE

## 2020-05-06 RX ORDER — INSULIN ASPART 100 [IU]/ML
0 INJECTION, SUSPENSION SUBCUTANEOUS
Qty: 0 | Refills: 0 | DISCHARGE

## 2020-05-06 RX ORDER — GABAPENTIN 400 MG/1
1 CAPSULE ORAL
Qty: 0 | Refills: 0 | DISCHARGE

## 2020-05-06 RX ORDER — APIXABAN 2.5 MG/1
1 TABLET, FILM COATED ORAL
Qty: 60 | Refills: 0
Start: 2020-05-06 | End: 2020-06-04

## 2020-05-06 RX ADMIN — CARBIDOPA AND LEVODOPA 1 TABLET(S): 25; 100 TABLET ORAL at 05:07

## 2020-05-06 RX ADMIN — Medication 81 MILLIGRAM(S): at 12:29

## 2020-05-06 RX ADMIN — APIXABAN 5 MILLIGRAM(S): 2.5 TABLET, FILM COATED ORAL at 17:02

## 2020-05-06 RX ADMIN — AMLODIPINE BESYLATE 10 MILLIGRAM(S): 2.5 TABLET ORAL at 05:07

## 2020-05-06 RX ADMIN — CARVEDILOL PHOSPHATE 25 MILLIGRAM(S): 80 CAPSULE, EXTENDED RELEASE ORAL at 05:07

## 2020-05-06 RX ADMIN — CARVEDILOL PHOSPHATE 25 MILLIGRAM(S): 80 CAPSULE, EXTENDED RELEASE ORAL at 17:02

## 2020-05-06 RX ADMIN — CARBIDOPA AND LEVODOPA 1 TABLET(S): 25; 100 TABLET ORAL at 17:02

## 2020-05-06 RX ADMIN — Medication 600 MILLIGRAM(S): at 17:02

## 2020-05-06 RX ADMIN — APIXABAN 5 MILLIGRAM(S): 2.5 TABLET, FILM COATED ORAL at 05:07

## 2020-05-06 RX ADMIN — SEVELAMER CARBONATE 800 MILLIGRAM(S): 2400 POWDER, FOR SUSPENSION ORAL at 17:03

## 2020-05-06 RX ADMIN — Medication 0.1 MILLIGRAM(S): at 17:01

## 2020-05-06 RX ADMIN — SEVELAMER CARBONATE 800 MILLIGRAM(S): 2400 POWDER, FOR SUSPENSION ORAL at 12:29

## 2020-05-06 RX ADMIN — Medication 600 MILLIGRAM(S): at 05:07

## 2020-05-06 RX ADMIN — Medication 0.1 MILLIGRAM(S): at 05:07

## 2020-05-06 RX ADMIN — CARBIDOPA AND LEVODOPA 1 TABLET(S): 25; 100 TABLET ORAL at 12:29

## 2020-05-06 RX ADMIN — SEVELAMER CARBONATE 800 MILLIGRAM(S): 2400 POWDER, FOR SUSPENSION ORAL at 08:46

## 2020-05-06 NOTE — PROGRESS NOTE ADULT - PROBLEM SELECTOR PLAN 10
DVT: on full dose AC  Diet: Renal/CC diet  Ambulate with assistance  Transitions of Care Status:  1.  Name of PCP:  2.  PCP Contacted on Admission: [ ] Y    [ ] N    3.  PCP contacted at Discharge: [ ] Y    [ ] N    [ ] N/A  4.  Post-Discharge Appointment Date and Location:  5.  Summary of Handoff given to PCP: DVT: on eliquis   Diet: Renal/CC diet  Ambulate with assistance  Transitions of Care Status:  1.  Name of PCP:  2.  PCP Contacted on Admission: [ ] Y    [ ] N    3.  PCP contacted at Discharge: [ ] Y    [ ] N    [ ] N/A  4.  Post-Discharge Appointment Date and Location:  5.  Summary of Handoff given to PCP:

## 2020-05-06 NOTE — PROGRESS NOTE ADULT - PROBLEM SELECTOR PLAN 4
ESRD on HD T Th Sa via RUE AVF with complex UE vascular.   -Last full session on Thur, 5/4 NEXT HD 5/7  - currently with normal K/ no vol overload, no need for urgent Hd at this time  - nephrology consulted, appreciate recs   - ok to use fistula per vascular sx

## 2020-05-06 NOTE — PROGRESS NOTE ADULT - ASSESSMENT
69  [place] Speaking male with PMH HTN, HLD, DM, CVA (2017 - residual R sided weakness), ESRD on Dialysis (Tue/Thurs/Sat), Parkinson disease, CAD (s/p PCI ~2005), who was sent in from dialysis for with facial and R arm swelling.

## 2020-05-06 NOTE — PROGRESS NOTE ADULT - PROBLEM SELECTOR PLAN 6
Patient ambulates with walker at baseline. Per scanned in records from [facility] in [facility] last dosing sinemet  QID  - c/ w prior dosing

## 2020-05-06 NOTE — PROGRESS NOTE ADULT - SUBJECTIVE AND OBJECTIVE BOX
Sariah Coy MD  PGY 1 Department of Internal Medicine  Pager: [phone] ([facility])   Pager: [phone] ([facility])    Patient is a 69y old  Male who presents with a chief complaint of facial swelling (05 May 2020 13:39)      SUBJECTIVE / OVERNIGHT EVENTS: Pt seen and examined. No acute overnight events. Denies fevers, chills, CP, SOB, Abdominal pain, N/V, Constipation, Diarrhea        MEDICATIONS  (STANDING):  amLODIPine   Tablet 10 milliGRAM(s) Oral daily  apixaban 5 milliGRAM(s) Oral two times a day  aspirin enteric coated 81 milliGRAM(s) Oral daily  atorvastatin 40 milliGRAM(s) Oral at bedtime  carbidopa/levodopa  25/100 1 Tablet(s) Oral four times a day  carvedilol 25 milliGRAM(s) Oral every 12 hours  cloNIDine 0.1 milliGRAM(s) Oral every 12 hours  dextrose 5% + sodium chloride 0.9%. 1000 milliLiter(s) (50 mL/Hr) IV Continuous <Continuous>  dextrose 5%. 1000 milliLiter(s) (50 mL/Hr) IV Continuous <Continuous>  dextrose 5%. 1000 milliLiter(s) (50 mL/Hr) IV Continuous <Continuous>  dextrose 50% Injectable 12.5 Gram(s) IV Push once  dextrose 50% Injectable 25 Gram(s) IV Push once  guaiFENesin  milliGRAM(s) Oral every 12 hours  insulin lispro (HumaLOG) corrective regimen sliding scale   SubCutaneous three times a day before meals  insulin lispro (HumaLOG) corrective regimen sliding scale   SubCutaneous at bedtime  sevelamer carbonate 800 milliGRAM(s) Oral three times a day with meals  tamsulosin 0.4 milliGRAM(s) Oral at bedtime    MEDICATIONS  (PRN):  benzonatate 100 milliGRAM(s) Oral three times a day PRN Cough  dextrose 40% Gel 15 Gram(s) Oral once PRN Blood Glucose LESS THAN 70 milliGRAM(s)/deciliter  glucagon  Injectable 1 milliGRAM(s) IntraMuscular once PRN Glucose LESS THAN 70 milligrams/deciliter      I&O's Summary    05 May 2020 07:01  -  06 May 2020 07:00  --------------------------------------------------------  IN: 840 mL / OUT: 0 mL / NET: 840 mL        Vital Signs Last 24 Hrs  T(C): 37.1 (06 May 2020 04:15), Max: 37.1 (06 May 2020 04:15)  T(F): 98.7 (06 May 2020 04:15), Max: 98.7 (06 May 2020 04:15)  HR: 79 (06 May 2020 04:15) (71 - 79)  BP: 151/72 (06 May 2020 04:15) (122/63 - 201/69)  BP(mean): --  RR: 18 (06 May 2020 04:15) (18 - 18)  SpO2: 97% (06 May 2020 04:15) (96% - 99%)    CAPILLARY BLOOD GLUCOSE      POCT Blood Glucose.: 132 mg/dL (06 May 2020 01:53)  POCT Blood Glucose.: 150 mg/dL (05 May 2020 22:05)  POCT Blood Glucose.: 159 mg/dL (05 May 2020 17:27)  POCT Blood Glucose.: 176 mg/dL (05 May 2020 12:10)  POCT Blood Glucose.: 207 mg/dL (05 May 2020 09:33)  POCT Blood Glucose.: 44 mg/dL (05 May 2020 09:15)  POCT Blood Glucose.: 48 mg/dL (05 May 2020 09:00)      PHYSICAL EXAM:     GENERAL: NAD, Awake and alert, on RA  HEENT: Eyes tracking, sclera clear, PERRL EOMI. Facial fullness, no JVD  CV: RRR S1S2, no m/r/g  PULM: Coarse breath sounds B/L  ABD: Soft, nontender, nondistended, +BS  EXT:  No edema on b/l LE, R arm with swelling, R AVF with +distal pulses, no swelling in L arm  SKIN: Warm and well perfused, no erythema or rashes noted  NEURO: A&O x3, Non-focal       LABS:                        8.2    5.60  )-----------( 153      ( 05 May 2020 09:32 )             27.7     Auto Eosinophil # x     / Auto Eosinophil % x     / Auto Neutrophil # x     / Auto Neutrophil % x     / BANDS % x                            8.1    5.59  )-----------( 158      ( 04 May 2020 09:58 )             27.4     Auto Eosinophil # 0.06  / Auto Eosinophil % 1.1   / Auto Neutrophil # 4.44  / Auto Neutrophil % 79.3  / BANDS % x        05-05    140  |  97  |  39<H>  ----------------------------<  38<LL>  4.2   |  28  |  7.66<H>  05-04    137  |  93<L>  |  50<H>  ----------------------------<  51<L>  4.2   |  26  |  8.91<H>    Ca    10.2      05 May 2020 09:32  Mg     2.2     05-05  Phos  5.0     05-05    PTT - ( 05 May 2020 09:32 )  PTT:129.2 sec              RADIOLOGY & ADDITIONAL TESTS:    Imaging Personally Reviewed:    Consultant(s) Notes Reviewed:      Care Discussed with Consultants/Other Providers: Sariah Coy MD  PGY 1 Department of Internal Medicine  Pager: [phone] ([facility])   Pager: [phone] ([facility])    Patient is a 69y old  Male who presents with a chief complaint of facial swelling (05 May 2020 13:39)    : [id number]    SUBJECTIVE / OVERNIGHT EVENTS: Pt seen and examined. No acute overnight events. Endorses cough. Denies fevers, chills, CP, SOB, Abdominal pain, N/V, Constipation, Diarrhea        MEDICATIONS  (STANDING):  amLODIPine   Tablet 10 milliGRAM(s) Oral daily  apixaban 5 milliGRAM(s) Oral two times a day  aspirin enteric coated 81 milliGRAM(s) Oral daily  atorvastatin 40 milliGRAM(s) Oral at bedtime  carbidopa/levodopa  25/100 1 Tablet(s) Oral four times a day  carvedilol 25 milliGRAM(s) Oral every 12 hours  cloNIDine 0.1 milliGRAM(s) Oral every 12 hours  dextrose 5% + sodium chloride 0.9%. 1000 milliLiter(s) (50 mL/Hr) IV Continuous <Continuous>  dextrose 5%. 1000 milliLiter(s) (50 mL/Hr) IV Continuous <Continuous>  dextrose 5%. 1000 milliLiter(s) (50 mL/Hr) IV Continuous <Continuous>  dextrose 50% Injectable 12.5 Gram(s) IV Push once  dextrose 50% Injectable 25 Gram(s) IV Push once  guaiFENesin  milliGRAM(s) Oral every 12 hours  insulin lispro (HumaLOG) corrective regimen sliding scale   SubCutaneous three times a day before meals  insulin lispro (HumaLOG) corrective regimen sliding scale   SubCutaneous at bedtime  sevelamer carbonate 800 milliGRAM(s) Oral three times a day with meals  tamsulosin 0.4 milliGRAM(s) Oral at bedtime    MEDICATIONS  (PRN):  benzonatate 100 milliGRAM(s) Oral three times a day PRN Cough  dextrose 40% Gel 15 Gram(s) Oral once PRN Blood Glucose LESS THAN 70 milliGRAM(s)/deciliter  glucagon  Injectable 1 milliGRAM(s) IntraMuscular once PRN Glucose LESS THAN 70 milligrams/deciliter      I&O's Summary    05 May 2020 07:01  -  06 May 2020 07:00  --------------------------------------------------------  IN: 840 mL / OUT: 0 mL / NET: 840 mL        Vital Signs Last 24 Hrs  T(C): 37.1 (06 May 2020 04:15), Max: 37.1 (06 May 2020 04:15)  T(F): 98.7 (06 May 2020 04:15), Max: 98.7 (06 May 2020 04:15)  HR: 79 (06 May 2020 04:15) (71 - 79)  BP: 151/72 (06 May 2020 04:15) (122/63 - 201/69)  BP(mean): --  RR: 18 (06 May 2020 04:15) (18 - 18)  SpO2: 97% (06 May 2020 04:15) (96% - 99%)    CAPILLARY BLOOD GLUCOSE      POCT Blood Glucose.: 132 mg/dL (06 May 2020 01:53)  POCT Blood Glucose.: 150 mg/dL (05 May 2020 22:05)  POCT Blood Glucose.: 159 mg/dL (05 May 2020 17:27)  POCT Blood Glucose.: 176 mg/dL (05 May 2020 12:10)  POCT Blood Glucose.: 207 mg/dL (05 May 2020 09:33)  POCT Blood Glucose.: 44 mg/dL (05 May 2020 09:15)  POCT Blood Glucose.: 48 mg/dL (05 May 2020 09:00)      PHYSICAL EXAM:     GENERAL: NAD, Awake and alert, on RA  HEENT: Eyes tracking, sclera clear, PERRL EOMI. Facial fullness, no JVD  CV: RRR S1S2, no m/r/g  PULM: Coarse breath sounds B/L  ABD: Soft, nontender, nondistended, +BS  EXT:  No edema on b/l LE, R arm with swelling, R AVF with +distal pulses, no swelling in L arm  SKIN: Warm and well perfused, no erythema or rashes noted  NEURO: A&O x3, Non-focal       LABS:                        8.2    5.60  )-----------( 153      ( 05 May 2020 09:32 )             27.7     Auto Eosinophil # x     / Auto Eosinophil % x     / Auto Neutrophil # x     / Auto Neutrophil % x     / BANDS % x                            8.1    5.59  )-----------( 158      ( 04 May 2020 09:58 )             27.4     Auto Eosinophil # 0.06  / Auto Eosinophil % 1.1   / Auto Neutrophil # 4.44  / Auto Neutrophil % 79.3  / BANDS % x        05-05    140  |  97  |  39<H>  ----------------------------<  38<LL>  4.2   |  28  |  7.66<H>  05-04    137  |  93<L>  |  50<H>  ----------------------------<  51<L>  4.2   |  26  |  8.91<H>    Ca    10.2      05 May 2020 09:32  Mg     2.2     05-05  Phos  5.0     05-05    PTT - ( 05 May 2020 09:32 )  PTT:129.2 sec              RADIOLOGY & ADDITIONAL TESTS:    Imaging Personally Reviewed:    Consultant(s) Notes Reviewed:      Care Discussed with Consultants/Other Providers: Sariah Coy MD  PGY 1 Department of Internal Medicine  Pager: [phone] ([facility])   Pager: [phone] ([facility])    Patient is a 69y old  Male who presents with a chief complaint of facial swelling (05 May 2020 13:39)    : [id number]    SUBJECTIVE / OVERNIGHT EVENTS: Pt seen and examined. No acute overnight events. Endorses cough. Denies fevers, chills, CP, SOB, Abdominal pain, N/V, Constipation, Diarrhea        MEDICATIONS  (STANDING):  amLODIPine   Tablet 10 milliGRAM(s) Oral daily  apixaban 5 milliGRAM(s) Oral two times a day  aspirin enteric coated 81 milliGRAM(s) Oral daily  atorvastatin 40 milliGRAM(s) Oral at bedtime  carbidopa/levodopa  25/100 1 Tablet(s) Oral four times a day  carvedilol 25 milliGRAM(s) Oral every 12 hours  cloNIDine 0.1 milliGRAM(s) Oral every 12 hours  dextrose 5% + sodium chloride 0.9%. 1000 milliLiter(s) (50 mL/Hr) IV Continuous <Continuous>  dextrose 5%. 1000 milliLiter(s) (50 mL/Hr) IV Continuous <Continuous>  dextrose 5%. 1000 milliLiter(s) (50 mL/Hr) IV Continuous <Continuous>  dextrose 50% Injectable 12.5 Gram(s) IV Push once  dextrose 50% Injectable 25 Gram(s) IV Push once  guaiFENesin  milliGRAM(s) Oral every 12 hours  insulin lispro (HumaLOG) corrective regimen sliding scale   SubCutaneous three times a day before meals  insulin lispro (HumaLOG) corrective regimen sliding scale   SubCutaneous at bedtime  sevelamer carbonate 800 milliGRAM(s) Oral three times a day with meals  tamsulosin 0.4 milliGRAM(s) Oral at bedtime    MEDICATIONS  (PRN):  benzonatate 100 milliGRAM(s) Oral three times a day PRN Cough  dextrose 40% Gel 15 Gram(s) Oral once PRN Blood Glucose LESS THAN 70 milliGRAM(s)/deciliter  glucagon  Injectable 1 milliGRAM(s) IntraMuscular once PRN Glucose LESS THAN 70 milligrams/deciliter      I&O's Summary    05 May 2020 07:01  -  06 May 2020 07:00  --------------------------------------------------------  IN: 840 mL / OUT: 0 mL / NET: 840 mL        Vital Signs Last 24 Hrs  T(C): 37.1 (06 May 2020 04:15), Max: 37.1 (06 May 2020 04:15)  T(F): 98.7 (06 May 2020 04:15), Max: 98.7 (06 May 2020 04:15)  HR: 79 (06 May 2020 04:15) (71 - 79)  BP: 151/72 (06 May 2020 04:15) (122/63 - 201/69)  BP(mean): --  RR: 18 (06 May 2020 04:15) (18 - 18)  SpO2: 97% (06 May 2020 04:15) (96% - 99%)    CAPILLARY BLOOD GLUCOSE      POCT Blood Glucose.: 132 mg/dL (06 May 2020 01:53)  POCT Blood Glucose.: 150 mg/dL (05 May 2020 22:05)  POCT Blood Glucose.: 159 mg/dL (05 May 2020 17:27)  POCT Blood Glucose.: 176 mg/dL (05 May 2020 12:10)  POCT Blood Glucose.: 207 mg/dL (05 May 2020 09:33)  POCT Blood Glucose.: 44 mg/dL (05 May 2020 09:15)  POCT Blood Glucose.: 48 mg/dL (05 May 2020 09:00)      PHYSICAL EXAM:     GENERAL: NAD, Awake and alert, on RA  HEENT: Eyes tracking, sclera clear, PERRL EOMI. Facial fullness, no JVD  CV: RRR S1S2, no m/r/g  PULM: Coarse breath sounds B/L  ABD: Soft, nontender, nondistended, +BS  EXT:  No edema on b/l LE, R arm with swelling, R AVF with +distal pulses, no swelling in L arm  SKIN: Warm and well perfused, no erythema or rashes noted  NEURO: A&O x3, Non-focal       LABS:                        8.2    5.60  )-----------( 153      ( 05 May 2020 09:32 )             27.7     Auto Eosinophil # x     / Auto Eosinophil % x     / Auto Neutrophil # x     / Auto Neutrophil % x     / BANDS % x                            8.1    5.59  )-----------( 158      ( 04 May 2020 09:58 )             27.4     Auto Eosinophil # 0.06  / Auto Eosinophil % 1.1   / Auto Neutrophil # 4.44  / Auto Neutrophil % 79.3  / BANDS % x        05-05    140  |  97  |  39<H>  ----------------------------<  38<LL>  4.2   |  28  |  7.66<H>  05-04    137  |  93<L>  |  50<H>  ----------------------------<  51<L>  4.2   |  26  |  8.91<H>    Ca    10.2      05 May 2020 09:32  Mg     2.2     05-05  Phos  5.0     05-05    PTT - ( 05 May 2020 09:32 )  PTT:129.2 sec              RADIOLOGY & ADDITIONAL TESTS:    Imaging Personally Reviewed:    Consultant(s) Notes Reviewed:  Renal    Care Discussed with Consultants/Other Providers:

## 2020-05-06 NOTE — PROGRESS NOTE ADULT - I WAS PHYSICALLY PRESENT FOR THE KEY PORTIONS OF THE EVALUATION AND MANAGEMENT (E/M) SERVICE PROVIDED.  I AGREE WITH THE ABOVE HISTORY, PHYSICAL, AND PLAN WHICH I HAVE REVIEWED AND EDITED WHERE APPROPRIATE
Up to BR with assist to void large amount, [facility] instructions given, voices understanding, U/2 after voiding.
Statement Selected

## 2020-05-06 NOTE — DISCHARGE NOTE NURSING/CASE MANAGEMENT/SOCIAL WORK - PATIENT PORTAL LINK FT
You can access the FollowMyHealth Patient Portal offered by [facility] by registering at the following website: [email or web address]. By joining "[employer]’s FollowMyHealth portal, you will also be able to view your health information using other applications (apps) compatible with our system.

## 2020-05-06 NOTE — PROGRESS NOTE ADULT - REASON FOR ADMISSION
facial swelling

## 2020-05-06 NOTE — PROGRESS NOTE ADULT - ASSESSMENT
69  Japanese Speaking male with PMH HTN, HLD, DM, CVA (2017 - residual R sided weakness), ESRD on Dialysis (Tue/Thurs/Sat), Parkinson disease, CAD (s/p PCI ~2005), who was sent in from dialysis for with facial and R arm swelling, found to have possible cental stenosis of RUE AVF as well as L subclavian vein DVT (CT 5/2) on heparin gtt (5/2-), r/o COVID 19 given chronic cough and GGO on CTA. 69  [place] Speaking male with PMH HTN, HLD, DM, CVA (2017 - residual R sided weakness), ESRD on Dialysis (Tue/Thurs/Sat), Parkinson disease, CAD (s/p PCI ~2005), who was sent in from dialysis for with facial and R arm swelling, found to have possible cental stenosis of RUE AVF as well as L subclavian vein DVT (CT 5/2) on heparin gtt (5/2-), r/o COVID 19 given chronic cough and GGO on CTA.

## 2020-05-06 NOTE — PROGRESS NOTE ADULT - PROBLEM SELECTOR PLAN 8
Hx of DM on insulin 70/30, patient unsure of dose but report possibly 30 units BID at home  - home dose approximates to 42u long acting and 6 units pre-meal  - Intermittently hypoglycemic on this admission   -Lantus discontinued  - ISS and FSG qAC and qHS

## 2020-05-06 NOTE — PROGRESS NOTE ADULT - SUBJECTIVE AND OBJECTIVE BOX
[facility] - MARCIA Wills / MARCIA Hoyt / ANGEL Dykes/ MARCIA Pisano/ MARCIA Paul/ KADE Claros / KEN Pierson / ARCHIE Campa  ---------------------------------------------------------------------------------------------------------------  seen and examined today for ESRD  Interval : NAD  VITALS:  T(F): 98.7 (05-06-20 @ 04:15), Max: 98.7 (05-06-20 @ 04:15)  HR: 79 (05-06-20 @ 04:15)  BP: 151/72 (05-06-20 @ 04:15)  RR: 18 (05-06-20 @ 04:15)  SpO2: 97% (05-06-20 @ 04:15)  Wt(kg): --    05-05 @ 07:01  -  05-06 @ 07:00  --------------------------------------------------------  IN: 840 mL / OUT: 0 mL / NET: 840 mL      Physical Exam :-  Constitutional: NAD  HEENT: facial swelling +  Respiratory: Bilateral equal breath sounds,  Cardiovascular: S1, S2 normal,  Gastrointestinal: Bowel Sounds present, soft, non tender.  Extremities: No edema  Neurological: Alert and Oriented x 3, no focal deficits  Psychiatric: Normal mood, normal affect  Data:-  Allergies :   No Known Allergies    Hospital Medications:   MEDICATIONS  (STANDING):  amLODIPine   Tablet 10 milliGRAM(s) Oral daily  apixaban 5 milliGRAM(s) Oral two times a day  aspirin enteric coated 81 milliGRAM(s) Oral daily  atorvastatin 40 milliGRAM(s) Oral at bedtime  carbidopa/levodopa  25/100 1 Tablet(s) Oral four times a day  carvedilol 25 milliGRAM(s) Oral every 12 hours  cloNIDine 0.1 milliGRAM(s) Oral every 12 hours  dextrose 5% + sodium chloride 0.9%. 1000 milliLiter(s) (50 mL/Hr) IV Continuous <Continuous>  dextrose 5%. 1000 milliLiter(s) (50 mL/Hr) IV Continuous <Continuous>  dextrose 5%. 1000 milliLiter(s) (50 mL/Hr) IV Continuous <Continuous>  dextrose 50% Injectable 12.5 Gram(s) IV Push once  dextrose 50% Injectable 25 Gram(s) IV Push once  guaiFENesin  milliGRAM(s) Oral every 12 hours  insulin lispro (HumaLOG) corrective regimen sliding scale   SubCutaneous three times a day before meals  insulin lispro (HumaLOG) corrective regimen sliding scale   SubCutaneous at bedtime  sevelamer carbonate 800 milliGRAM(s) Oral three times a day with meals  tamsulosin 0.4 milliGRAM(s) Oral at bedtime    05-06    132<L>  |  91<L>  |  54<H>  ----------------------------<  152<H>  4.5   |  25  |  8.73<H>    Ca    9.9      06 May 2020 10:16  Phos  4.8     05-06  Mg     2.2     05-06      Creatinine Trend: 8.73 <--, 7.66 <--, 8.91 <--, 7.67 <--, 6.63 <--                        8.0    4.65  )-----------( 138      ( 06 May 2020 10:16 )             26.7

## 2020-05-06 NOTE — PROGRESS NOTE ADULT - PROBLEM SELECTOR PLAN 7
Hx of HTN unclear of home meds appears per scanned in records in [facility] appears amlodipine 10 mg and carvedilol 25 mg BID and clonidine 0.1 mg BID  - c/w amlodipine   - c/w carvedilol 12.5 mg BID  - c/w clonidine 0.1 mg BID Hx of HTN unclear of home meds appears per scanned in records in [facility] appears amlodipine 10 mg and carvedilol 25 mg BID and clonidine 0.1 mg BID  - c/w amlodipine   - c/w carvedilol 25 mg BID  - c/w clonidine 0.1 mg BID

## 2020-05-06 NOTE — PROGRESS NOTE ADULT - ATTENDING COMMENTS
For any question, call:  Cell # [phone]  Pager # [phone]  Callback # [phone]
For any question, call:  Cell # [phone]  Pager # [phone]  Callback # [phone]
For any question, call:  Cell # [phone]  Pager # [phone]  Callback # [phone]
69 year old male w ESRD p/w facial and arm swelling, found to have possible cental stenosis of RUE AVF as well as L subclavian vein DVT. No intervention at this time per Vascular. Transitioned to Eliquis today. D/c planning for tomorrow.
I have reviewed this case with the resident and agree with the contents of this note.
-Patient seen/examined on 5/6/20. Case/plan discussed with the intern and resident as reviewed/edited by me above and in any comments below.  -Patient stable/improved for DC today with outpatient vascular and renal follow up.  -36 minutes spent on the discharge process.
Await vascular input.

## 2020-05-06 NOTE — PROGRESS NOTE ADULT - PROBLEM SELECTOR PLAN 5
CAD s/p stent in 2005 on ASA at home  - c/w ASA   - c/w statin  - per records appears on carvedilol 25 BID, will resume here  - obtain EKG CAD s/p stent in 2005 on ASA at home  - c/w ASA   - c/w statin  - per records appears on carvedilol 25mg BID, resumed here

## 2020-06-01 ENCOUNTER — OUTPATIENT (OUTPATIENT)
Dept: OUTPATIENT SERVICES | Facility: HOSPITAL | Age: 70
LOS: 1 days | End: 2020-06-01

## 2020-06-01 DIAGNOSIS — Z95.828 PRESENCE OF OTHER VASCULAR IMPLANTS AND GRAFTS: Chronic | ICD-10-CM

## 2020-06-01 DIAGNOSIS — Z95.5 PRESENCE OF CORONARY ANGIOPLASTY IMPLANT AND GRAFT: Chronic | ICD-10-CM

## 2020-06-01 DIAGNOSIS — I77.0 ARTERIOVENOUS FISTULA, ACQUIRED: Chronic | ICD-10-CM

## 2020-06-08 NOTE — DISCHARGE NOTE PROVIDER - NSDCCAREPROVSEEN_GEN_ALL_CORE_FT
CC:  Patient presents with: Follow - Up: Blood pressure, anxiety, liver enzymes       HPI: 54year old male presenting for video visit to follow-up on blood pressure, elevated liver enzymes, and anxiety.    Has not traveled at all since March and now [employer] needs:         Medical: Not on file        Non-medical: Not on file    Tobacco Use      Smoking status: Former Smoker        Packs/day: 0.00        Years: 10.00        Pack years: 0        Quit date: 2018        Years since quittin.5      Smokeles • Metoprolol Succinate ER 50 MG Oral Tablet 24 Hr TAKE 1 AND 1/2 TABS BY MOUTH DAILY TOTALLING 75MG 135 tablet 1       Patient has no known allergies.       Vitals:    06/08/20  1538   Pulse: 70     Wt Readings from Last 6 Encounters:  12/03/19 : 194 lb ( provide continuity of care in the best interest of the provider-patient relationship, due to the ongoing public health crisis/national emergency and because of restrictions of visitation.  There are limitations of this visit as no physical exam could be per [facility] Medicine, Team 2

## 2020-06-23 DIAGNOSIS — Z71.89 OTHER SPECIFIED COUNSELING: ICD-10-CM

## 2020-06-23 PROBLEM — I10 ESSENTIAL (PRIMARY) HYPERTENSION: Chronic | Status: ACTIVE | Noted: 2020-05-02

## 2020-06-23 PROBLEM — N18.6 END STAGE RENAL DISEASE: Chronic | Status: ACTIVE | Noted: 2020-05-02

## 2020-06-23 PROBLEM — I25.10 ATHEROSCLEROTIC HEART DISEASE OF NATIVE CORONARY ARTERY WITHOUT ANGINA PECTORIS: Chronic | Status: ACTIVE | Noted: 2020-05-02

## 2020-12-21 NOTE — H&P PST ADULT - HEIGHT IN CM
Staging Info: By selecting yes to the question above you will include information on AJCC 8 tumor staging in your Mohs note. Information on tumor staging will be automatically added for SCCs on the head and neck. AJCC 8 includes tumor size, tumor depth, perineural involvement and bone invasion. 162.56

## 2021-03-11 NOTE — H&P ADULT - ASSESSMENT
69 y/o, former smoker, Guinean Speaking Male POOR HISTORIAN  w/ PMHX HTN, HLD, DM (on insulin), Hyperphosphatemia, CVA (2017 - residual R sided weakness), ESRD on Dialysis (Tue/Thurs/Sat) s/p AV fistula creation 9/6/19 to RUE ( s/p revision of AV fistula), Parkinson's dz, hypothyroidism, BPH, Pneumonia due to COVID in the past,  CAD s/p dx cardiac cath on 10/25/19 (see information below), LHC on 11/23/20 as per MD note s/p PCI to dLCX who presented to cardiologist for follow-up after his recent admission to  for NSTEMI. LHC in  as per MD note revealed severe 2 vessel disease and pt underwent PTCA of OM1 lesion, significant residual LAD disease, who now presents for staged PCI of residual LAD.               ASA _III____				Mallampati class: ___III______	                Sedation Plan:  Moderate     Patient Is Suitable Candidate For Sedation  Yes       Risks & benefits of procedure and sedation and risks and benefits for the alternative therapy have been explained to the patient in layman’s terms including but not limited to: allergic reaction, bleeding, infection, arrhythmia, respiratory compromise, renal and vascular compromise, limb damage, MI, CVA, emergent CABG/Vascular Surgery and death. Informed consent obtained and in chart.    Plan:  -consent signed and in chart (with  # [id number])  -no IVF (pt is ESRD on HD, s/p last HD yesterday 3/11, due for next session 3/13)  -ASA/ Plavix 75mg (takes daily)

## 2021-03-11 NOTE — H&P ADULT - NSICDXPASTSURGICALHX_GEN_ALL_CORE_FT
PAST SURGICAL HISTORY:  AVF (arteriovenous fistula) right arm x with revision, left arm x 1    S/P dialysis catheter insertion Right chest perma cath removed in jan 2020    S/P primary angioplasty with coronary stent     Stented coronary artery 2004-5 at [facility]

## 2021-03-11 NOTE — H&P ADULT - NSHPLABSRESULTS_GEN_ALL_CORE
ECG: NSR @ 70bpm, RBBB, QTc 481      Labs:                          10.6   5.18  )-----------( 108      ( 12 Mar 2021 09:46 )             33.2       Auto Neutrophil %: 76.2 % (03-12-21 @ 09:46)  Auto Immature Granulocyte %: 0.2 % (03-12-21 @ 09:46)    03-12    139  |  93<L>  |  32<H>  ----------------------------<  159<H>  4.8   |  32<H>  |  7.77<H>      Calcium, Total Serum: 10.8 mg/dL (03-12-21 @ 09:46)      LFTs:             8.4  | 0.4  | 13       ------------------[210     ( 12 Mar 2021 09:46 )  4.6  | x    | <5               Coags:     12.6   ----< 1.05    ( 12 Mar 2021 09:46 )     31.4        CARDIAC MARKERS ( 12 Mar 2021 09:46 )  x     / x     / 50 U/L / x     / 1.9 ng/mL

## 2021-03-11 NOTE — H&P ADULT - HISTORY OF PRESENT ILLNESS
COVID: results pending, expedited in corelab  pharmacy:  Escort:   cardiologist:     verify meds      69 y/o, former smoker, Hebrew Speaking Male w/ PMHX HTN, HLD, DM (on insulin), Hyperphosphatemia, CVA (2017 - residual R sided weakness), ESRD on Dialysis (Tue/Thurs/Sat) s/p AV fistula creation 9/6/19 to RUE ( s/p revision of AV fistula), Parkinson's dz, BPH, Pneumonia due to COVID in the past,  CAD s/p dx cardiac cath on 10/25/19 (see information below), LHC on 11/23/20 as per MD note s/p PCI to dLCX who presented to cardiologist for follow-up after his recent admission to  for NSTEMI. LHC in  as per MD note revealed severe 2 vessel disease and pt underwent PTCA of OM1 lesion, significant residual LAD disease. Pt claims that he is doing well. Denies CP, SOB, dizziness, syncope, LE edema, PND/orthopnea, palpitations, n/v, fever/chills, blood in the stool. Pt claims that he is compliant with his DAPT. ECHO was done on 2/2/21 which showed mild MR, EF 55%, basal inferolateral, basal anterolateral, mid inferolateral and mid anterolateral segments are hypokinetic. In light of pt's risk factors, residual CAD, pt is now referred to [facility] for recommended staged PCI.     dx cardiac cath in [facility] on 10/25/19 revealing pLAD 30% before stent 20% ISR, m/dLAD mild disease, D1 small size, LCx calcified and continues as large OM1, RCA dominant, heavily calcified, pRCA 30%, mRCA 50%, dRCA 100% long  w/ L-R collaterals to distal PDA/PLS, EF 65%.   COVID: results pending, expedited in corelab  pharmacy: [facility] [address] [phone]  Escort: wife   cardiologist: Richard Gillis     The history was taken with the help of [employer] [employer]  # [id number] (Seun)    verify meds      71 y/o, former smoker, Citizen [place] Speaking Male POOR HISTORIAN  w/ PMHX HTN, HLD, DM (on insulin), Hyperphosphatemia, CVA (2017 - residual R sided weakness), ESRD on Dialysis (Tue/Thurs/Sat) s/p AV fistula creation 9/6/19 to RUE ( s/p revision of AV fistula), Parkinson's dz, hypothyroidism, BPH, Pneumonia due to COVID in the past,  CAD s/p dx cardiac cath on 10/25/19 (see information below), LHC on 11/23/20 as per MD note s/p PCI to dLCX who presented to cardiologist for follow-up after his recent admission to  for NSTEMI. LHC in  as per MD note revealed severe 2 vessel disease and pt underwent PTCA of OM1 lesion, significant residual LAD disease. Pt claims that he has intermittent 7/10 SSCP independent of activity with accompanied palpitations.  Denies SOB, dizziness, syncope, LE edema, PND/orthopnea, n/v, fever/chills, blood in the stool. Pt claims that he is compliant with his DAPT. ECHO was done on 2/2/21 which showed mild MR, EF 55%, basal inferolateral, basal anterolateral, mid inferolateral and mid anterolateral segments are hypokinetic. In light of pt's risk factors, residual CAD, pt is now referred to [facility] for recommended staged PCI.     dx cardiac cath in [facility] on 10/25/19 revealing pLAD 30% before stent 20% ISR, m/dLAD mild disease, D1 small size, LCx calcified and continues as large OM1, RCA dominant, heavily calcified, pRCA 30%, mRCA 50%, dRCA 100% long  w/ L-R collaterals to distal PDA/PLS, EF 65%.   COVID: 3/10 NEG HIE  pharmacy: [facility] [address] [phone]  Escort: wife   cardiologist: Richard Gillis     The history was taken with the help of [employer] [employer]  # [id number] (Seun)    69 y/o, former smoker, Azeri Speaking Male POOR HISTORIAN  w/ PMHX HTN, HLD, DM (on insulin), Hyperphosphatemia, CVA (2017 - residual R sided weakness), ESRD on Dialysis (Tue/Thurs/Sat) s/p AV fistula creation 9/6/19 to RUE ( s/p revision of AV fistula), Parkinson's dz, hypothyroidism, BPH, Pneumonia due to COVID in the past,  CAD s/p dx cardiac cath on 10/25/19 (see information below), LHC on 11/23/20 as per MD note s/p PCI to dLCX who presented to cardiologist for follow-up after his recent admission to  for NSTEMI. LHC in  as per MD note revealed severe 2 vessel disease and pt underwent PTCA of OM1 lesion, significant residual LAD disease. Pt claims that he has intermittent 7/10 SSCP independent of activity with accompanied palpitations.  Denies SOB, dizziness, syncope, LE edema, PND/orthopnea, n/v, fever/chills, blood in the stool. Pt claims that he is compliant with his DAPT. ECHO was done on 2/2/21 which showed mild MR, EF 55%, basal inferolateral, basal anterolateral, mid inferolateral and mid anterolateral segments are hypokinetic. In light of pt's risk factors, residual CAD, pt is now referred to [facility] for recommended staged PCI.     dx cardiac cath in [facility] on 10/25/19 revealing pLAD 30% before stent 20% ISR, m/dLAD mild disease, D1 small size, LCx calcified and continues as large OM1, RCA dominant, heavily calcified, pRCA 30%, mRCA 50%, dRCA 100% long  w/ L-R collaterals to distal PDA/PLS, EF 65%.

## 2021-03-12 ENCOUNTER — INPATIENT (INPATIENT)
Facility: HOSPITAL | Age: 71
LOS: 0 days | Discharge: HOME | DRG: 246 | End: 2021-03-13
Attending: INTERNAL MEDICINE | Admitting: INTERNAL MEDICINE
Payer: MEDICARE

## 2021-03-12 VITALS
RESPIRATION RATE: 16 BRPM | SYSTOLIC BLOOD PRESSURE: 145 MMHG | OXYGEN SATURATION: 99 % | HEART RATE: 66 BPM | DIASTOLIC BLOOD PRESSURE: 60 MMHG

## 2021-03-12 DIAGNOSIS — Z95.5 PRESENCE OF CORONARY ANGIOPLASTY IMPLANT AND GRAFT: Chronic | ICD-10-CM

## 2021-03-12 DIAGNOSIS — I77.0 ARTERIOVENOUS FISTULA, ACQUIRED: Chronic | ICD-10-CM

## 2021-03-12 DIAGNOSIS — Z95.828 PRESENCE OF OTHER VASCULAR IMPLANTS AND GRAFTS: Chronic | ICD-10-CM

## 2021-03-12 LAB
A1C WITH ESTIMATED AVERAGE GLUCOSE RESULT: 6.7 % — HIGH (ref 4–5.6)
ALBUMIN SERPL ELPH-MCNC: 4.6 G/DL — SIGNIFICANT CHANGE UP (ref 3.3–5)
ALP SERPL-CCNC: 210 U/L — HIGH (ref 40–120)
ALT FLD-CCNC: <5 U/L — LOW (ref 10–45)
ANION GAP SERPL CALC-SCNC: 14 MMOL/L — SIGNIFICANT CHANGE UP (ref 5–17)
APTT BLD: 31.4 SEC — SIGNIFICANT CHANGE UP (ref 27.5–35.5)
AST SERPL-CCNC: 13 U/L — SIGNIFICANT CHANGE UP (ref 10–40)
BASOPHILS # BLD AUTO: 0.02 K/UL — SIGNIFICANT CHANGE UP (ref 0–0.2)
BASOPHILS NFR BLD AUTO: 0.4 % — SIGNIFICANT CHANGE UP (ref 0–2)
BILIRUB SERPL-MCNC: 0.4 MG/DL — SIGNIFICANT CHANGE UP (ref 0.2–1.2)
BUN SERPL-MCNC: 32 MG/DL — HIGH (ref 7–23)
CALCIUM SERPL-MCNC: 10.8 MG/DL — HIGH (ref 8.4–10.5)
CHLORIDE SERPL-SCNC: 93 MMOL/L — LOW (ref 96–108)
CHOLEST SERPL-MCNC: 114 MG/DL — SIGNIFICANT CHANGE UP
CK MB CFR SERPL CALC: 1.9 NG/ML — SIGNIFICANT CHANGE UP (ref 0–6.7)
CK SERPL-CCNC: 50 U/L — SIGNIFICANT CHANGE UP (ref 30–200)
CO2 SERPL-SCNC: 32 MMOL/L — HIGH (ref 22–31)
CREAT SERPL-MCNC: 7.77 MG/DL — HIGH (ref 0.5–1.3)
CRP SERPL-MCNC: 35.6 MG/L — HIGH (ref 0–4)
EOSINOPHIL # BLD AUTO: 0.09 K/UL — SIGNIFICANT CHANGE UP (ref 0–0.5)
EOSINOPHIL NFR BLD AUTO: 1.7 % — SIGNIFICANT CHANGE UP (ref 0–6)
ESTIMATED AVERAGE GLUCOSE: 146 MG/DL — HIGH (ref 68–114)
GLUCOSE BLDC GLUCOMTR-MCNC: 124 MG/DL — HIGH (ref 70–99)
GLUCOSE BLDC GLUCOMTR-MCNC: 141 MG/DL — HIGH (ref 70–99)
GLUCOSE BLDC GLUCOMTR-MCNC: 180 MG/DL — HIGH (ref 70–99)
GLUCOSE SERPL-MCNC: 159 MG/DL — HIGH (ref 70–99)
HCT VFR BLD CALC: 33.2 % — LOW (ref 39–50)
HDLC SERPL-MCNC: 50 MG/DL — SIGNIFICANT CHANGE UP
HGB BLD-MCNC: 10.6 G/DL — LOW (ref 13–17)
IMM GRANULOCYTES NFR BLD AUTO: 0.2 % — SIGNIFICANT CHANGE UP (ref 0–1.5)
INR BLD: 1.05 — SIGNIFICANT CHANGE UP (ref 0.88–1.16)
LIPID PNL WITH DIRECT LDL SERPL: 40 MG/DL — SIGNIFICANT CHANGE UP
LYMPHOCYTES # BLD AUTO: 0.63 K/UL — LOW (ref 1–3.3)
LYMPHOCYTES # BLD AUTO: 12.2 % — LOW (ref 13–44)
MCHC RBC-ENTMCNC: 31.7 PG — SIGNIFICANT CHANGE UP (ref 27–34)
MCHC RBC-ENTMCNC: 31.9 GM/DL — LOW (ref 32–36)
MCV RBC AUTO: 99.4 FL — SIGNIFICANT CHANGE UP (ref 80–100)
MONOCYTES # BLD AUTO: 0.48 K/UL — SIGNIFICANT CHANGE UP (ref 0–0.9)
MONOCYTES NFR BLD AUTO: 9.3 % — SIGNIFICANT CHANGE UP (ref 2–14)
NEUTROPHILS # BLD AUTO: 3.95 K/UL — SIGNIFICANT CHANGE UP (ref 1.8–7.4)
NEUTROPHILS NFR BLD AUTO: 76.2 % — SIGNIFICANT CHANGE UP (ref 43–77)
NON HDL CHOLESTEROL: 64 MG/DL — SIGNIFICANT CHANGE UP
NRBC # BLD: 0 /100 WBCS — SIGNIFICANT CHANGE UP (ref 0–0)
PLATELET # BLD AUTO: 108 K/UL — LOW (ref 150–400)
POTASSIUM SERPL-MCNC: 4.8 MMOL/L — SIGNIFICANT CHANGE UP (ref 3.5–5.3)
POTASSIUM SERPL-SCNC: 4.8 MMOL/L — SIGNIFICANT CHANGE UP (ref 3.5–5.3)
PROT SERPL-MCNC: 8.4 G/DL — HIGH (ref 6–8.3)
PROTHROM AB SERPL-ACNC: 12.6 SEC — SIGNIFICANT CHANGE UP (ref 10.6–13.6)
RBC # BLD: 3.34 M/UL — LOW (ref 4.2–5.8)
RBC # FLD: 16.5 % — HIGH (ref 10.3–14.5)
SODIUM SERPL-SCNC: 139 MMOL/L — SIGNIFICANT CHANGE UP (ref 135–145)
TRIGL SERPL-MCNC: 120 MG/DL — SIGNIFICANT CHANGE UP
WBC # BLD: 5.18 K/UL — SIGNIFICANT CHANGE UP (ref 3.8–10.5)
WBC # FLD AUTO: 5.18 K/UL — SIGNIFICANT CHANGE UP (ref 3.8–10.5)

## 2021-03-12 PROCEDURE — 93458 L HRT ARTERY/VENTRICLE ANGIO: CPT | Mod: 26,59

## 2021-03-12 PROCEDURE — 99152 MOD SED SAME PHYS/QHP 5/>YRS: CPT

## 2021-03-12 PROCEDURE — 92928 PRQ TCAT PLMT NTRAC ST 1 LES: CPT | Mod: LC

## 2021-03-12 PROCEDURE — 93571 IV DOP VEL&/PRESS C FLO 1ST: CPT | Mod: 26,LD

## 2021-03-12 RX ORDER — CLOPIDOGREL BISULFATE 75 MG/1
75 TABLET, FILM COATED ORAL DAILY
Refills: 0 | Status: DISCONTINUED | OUTPATIENT
Start: 2021-03-12 | End: 2021-03-12

## 2021-03-12 RX ORDER — DEXTROSE 50 % IN WATER 50 %
12.5 SYRINGE (ML) INTRAVENOUS ONCE
Refills: 0 | Status: DISCONTINUED | OUTPATIENT
Start: 2021-03-12 | End: 2021-03-13

## 2021-03-12 RX ORDER — SODIUM CHLORIDE 9 MG/ML
1000 INJECTION, SOLUTION INTRAVENOUS
Refills: 0 | Status: DISCONTINUED | OUTPATIENT
Start: 2021-03-12 | End: 2021-03-13

## 2021-03-12 RX ORDER — TICAGRELOR 90 MG/1
180 TABLET ORAL ONCE
Refills: 0 | Status: COMPLETED | OUTPATIENT
Start: 2021-03-12 | End: 2021-03-12

## 2021-03-12 RX ORDER — CHLORHEXIDINE GLUCONATE 213 G/1000ML
1 SOLUTION TOPICAL ONCE
Refills: 0 | Status: DISCONTINUED | OUTPATIENT
Start: 2021-03-12 | End: 2021-03-12

## 2021-03-12 RX ORDER — GLUCAGON INJECTION, SOLUTION 0.5 MG/.1ML
1 INJECTION, SOLUTION SUBCUTANEOUS ONCE
Refills: 0 | Status: DISCONTINUED | OUTPATIENT
Start: 2021-03-12 | End: 2021-03-13

## 2021-03-12 RX ORDER — DEXTROSE 50 % IN WATER 50 %
25 SYRINGE (ML) INTRAVENOUS ONCE
Refills: 0 | Status: DISCONTINUED | OUTPATIENT
Start: 2021-03-12 | End: 2021-03-13

## 2021-03-12 RX ORDER — AMLODIPINE BESYLATE 2.5 MG/1
1 TABLET ORAL
Qty: 0 | Refills: 0 | DISCHARGE

## 2021-03-12 RX ORDER — TAMSULOSIN HYDROCHLORIDE 0.4 MG/1
0.4 CAPSULE ORAL DAILY
Refills: 0 | Status: DISCONTINUED | OUTPATIENT
Start: 2021-03-12 | End: 2021-03-13

## 2021-03-12 RX ORDER — SEVELAMER CARBONATE 2400 MG/1
800 POWDER, FOR SUSPENSION ORAL
Refills: 0 | Status: DISCONTINUED | OUTPATIENT
Start: 2021-03-12 | End: 2021-03-13

## 2021-03-12 RX ORDER — FERROUS SULFATE 325(65) MG
325 TABLET ORAL DAILY
Refills: 0 | Status: DISCONTINUED | OUTPATIENT
Start: 2021-03-12 | End: 2021-03-13

## 2021-03-12 RX ORDER — DEXTROSE 50 % IN WATER 50 %
15 SYRINGE (ML) INTRAVENOUS ONCE
Refills: 0 | Status: DISCONTINUED | OUTPATIENT
Start: 2021-03-12 | End: 2021-03-13

## 2021-03-12 RX ORDER — SODIUM ZIRCONIUM CYCLOSILICATE 10 G/10G
5 POWDER, FOR SUSPENSION ORAL
Refills: 0 | Status: DISCONTINUED | OUTPATIENT
Start: 2021-03-12 | End: 2021-03-13

## 2021-03-12 RX ORDER — ASPIRIN/CALCIUM CARB/MAGNESIUM 324 MG
325 TABLET ORAL ONCE
Refills: 0 | Status: COMPLETED | OUTPATIENT
Start: 2021-03-12 | End: 2021-03-12

## 2021-03-12 RX ORDER — CARBIDOPA AND LEVODOPA 25; 100 MG/1; MG/1
1 TABLET ORAL
Refills: 0 | Status: DISCONTINUED | OUTPATIENT
Start: 2021-03-12 | End: 2021-03-13

## 2021-03-12 RX ORDER — TICAGRELOR 90 MG/1
90 TABLET ORAL EVERY 12 HOURS
Refills: 0 | Status: DISCONTINUED | OUTPATIENT
Start: 2021-03-12 | End: 2021-03-13

## 2021-03-12 RX ORDER — INSULIN LISPRO 100/ML
2 VIAL (ML) SUBCUTANEOUS
Refills: 0 | Status: DISCONTINUED | OUTPATIENT
Start: 2021-03-12 | End: 2021-03-13

## 2021-03-12 RX ORDER — CLOPIDOGREL BISULFATE 75 MG/1
75 TABLET, FILM COATED ORAL ONCE
Refills: 0 | Status: COMPLETED | OUTPATIENT
Start: 2021-03-12 | End: 2021-03-12

## 2021-03-12 RX ORDER — PANTOPRAZOLE SODIUM 20 MG/1
40 TABLET, DELAYED RELEASE ORAL
Refills: 0 | Status: DISCONTINUED | OUTPATIENT
Start: 2021-03-12 | End: 2021-03-13

## 2021-03-12 RX ORDER — ATORVASTATIN CALCIUM 80 MG/1
80 TABLET, FILM COATED ORAL AT BEDTIME
Refills: 0 | Status: DISCONTINUED | OUTPATIENT
Start: 2021-03-12 | End: 2021-03-13

## 2021-03-12 RX ORDER — LISINOPRIL 2.5 MG/1
10 TABLET ORAL DAILY
Refills: 0 | Status: DISCONTINUED | OUTPATIENT
Start: 2021-03-13 | End: 2021-03-13

## 2021-03-12 RX ORDER — ASPIRIN/CALCIUM CARB/MAGNESIUM 324 MG
81 TABLET ORAL DAILY
Refills: 0 | Status: DISCONTINUED | OUTPATIENT
Start: 2021-03-12 | End: 2021-03-13

## 2021-03-12 RX ORDER — CARVEDILOL PHOSPHATE 80 MG/1
12.5 CAPSULE, EXTENDED RELEASE ORAL
Refills: 0 | Status: DISCONTINUED | OUTPATIENT
Start: 2021-03-12 | End: 2021-03-13

## 2021-03-12 RX ORDER — INSULIN LISPRO 100/ML
VIAL (ML) SUBCUTANEOUS
Refills: 0 | Status: DISCONTINUED | OUTPATIENT
Start: 2021-03-12 | End: 2021-03-13

## 2021-03-12 RX ORDER — AMLODIPINE BESYLATE 2.5 MG/1
5 TABLET ORAL DAILY
Refills: 0 | Status: DISCONTINUED | OUTPATIENT
Start: 2021-03-12 | End: 2021-03-13

## 2021-03-12 RX ORDER — CARBIDOPA AND LEVODOPA 25; 100 MG/1; MG/1
1 TABLET ORAL
Qty: 0 | Refills: 0 | DISCHARGE

## 2021-03-12 RX ORDER — INSULIN GLARGINE 100 [IU]/ML
6 INJECTION, SOLUTION SUBCUTANEOUS AT BEDTIME
Refills: 0 | Status: DISCONTINUED | OUTPATIENT
Start: 2021-03-12 | End: 2021-03-13

## 2021-03-12 RX ORDER — ROSUVASTATIN CALCIUM 5 MG/1
1 TABLET ORAL
Qty: 0 | Refills: 0 | DISCHARGE

## 2021-03-12 RX ADMIN — INSULIN GLARGINE 6 UNIT(S): 100 INJECTION, SOLUTION SUBCUTANEOUS at 22:08

## 2021-03-12 RX ADMIN — SODIUM ZIRCONIUM CYCLOSILICATE 5 GRAM(S): 10 POWDER, FOR SUSPENSION ORAL at 18:00

## 2021-03-12 RX ADMIN — CLOPIDOGREL BISULFATE 75 MILLIGRAM(S): 75 TABLET, FILM COATED ORAL at 10:44

## 2021-03-12 RX ADMIN — AMLODIPINE BESYLATE 5 MILLIGRAM(S): 2.5 TABLET ORAL at 18:00

## 2021-03-12 RX ADMIN — Medication 325 MILLIGRAM(S): at 10:44

## 2021-03-12 RX ADMIN — SEVELAMER CARBONATE 800 MILLIGRAM(S): 2400 POWDER, FOR SUSPENSION ORAL at 18:00

## 2021-03-12 RX ADMIN — CARBIDOPA AND LEVODOPA 1 TABLET(S): 25; 100 TABLET ORAL at 18:00

## 2021-03-12 RX ADMIN — ATORVASTATIN CALCIUM 80 MILLIGRAM(S): 80 TABLET, FILM COATED ORAL at 22:06

## 2021-03-12 RX ADMIN — Medication 1: at 17:59

## 2021-03-12 RX ADMIN — TAMSULOSIN HYDROCHLORIDE 0.4 MILLIGRAM(S): 0.4 CAPSULE ORAL at 22:06

## 2021-03-12 RX ADMIN — Medication 2 UNIT(S): at 17:59

## 2021-03-12 RX ADMIN — Medication 0.1 MILLIGRAM(S): at 18:00

## 2021-03-12 RX ADMIN — Medication 325 MILLIGRAM(S): at 18:00

## 2021-03-12 RX ADMIN — CARVEDILOL PHOSPHATE 12.5 MILLIGRAM(S): 80 CAPSULE, EXTENDED RELEASE ORAL at 18:00

## 2021-03-12 RX ADMIN — TICAGRELOR 90 MILLIGRAM(S): 90 TABLET ORAL at 22:07

## 2021-03-12 NOTE — CONSULT NOTE ADULT - SUBJECTIVE AND OBJECTIVE BOX
Patient is a 70y old  Male who presents with a chief complaint of staged PCI (11 Mar 2021 11:39)    HPI:  COVID: 3/10 NEG HIE  pharmacy: [facility] [address] [phone]  Escort: wife   cardiologist: Richard Gillis     The history from chart reveiw was taken with the help of [place] [employer]  # [id number] (Seun) & dialysis consent and hx from #[id number] (Marguerite)    71 y/o, former smoker, Libyan Speaking Male POOR HISTORIAN  w/ PMHX HTN, HLD, DM (on insulin), Hyperphosphatemia, CVA (2017 - residual R sided weakness), ESRD on Dialysis (Tue/Thurs/Sat) s/p AV fistula creation 9/6/19 to RUE ( s/p revision of AV fistula), Parkinson's dz, hypothyroidism, BPH, Pneumonia due to COVID in the past,  CAD s/p dx cardiac cath on 10/25/19 (see information below), LHC on 11/23/20 as per MD note s/p PCI to dLCX who presented to [facility] for recommended staged PCI. Now s/p intervention with plan for admission overnight for observation. Renal consulted for dialysis.    PAST MEDICAL & SURGICAL HISTORY:  CAD (coronary artery disease)    Hypertension    ESRD (end stage renal disease)    Parkinsons disease    ESRD on hemodialysis    Back pain    History of BPH    Stroke  2017, residual right sided weakness    Hyperlipidemia    Coronary artery disease  with stent    Hyperphosphatemia    Hypertension    Diabetes    S/P primary angioplasty with coronary stent    Stented coronary artery  2004-5 at [facility]    AVF (arteriovenous fistula)  right arm x with revision, left arm x 1    S/P dialysis catheter insertion  Right chest perma cath removed in jan 2020        Allergies:  No Known Allergies      Home Medications:   amLODIPine   Tablet 5 milliGRAM(s) Oral daily  aspirin enteric coated 81 milliGRAM(s) Oral daily  atorvastatin 80 milliGRAM(s) Oral at bedtime  carbidopa/levodopa  25/100 1 Tablet(s) Oral <User Schedule>  carvedilol 12.5 milliGRAM(s) Oral two times a day  cloNIDine 0.1 milliGRAM(s) Oral two times a day  ferrous    sulfate 325 milliGRAM(s) Oral daily  pantoprazole    Tablet 40 milliGRAM(s) Oral before breakfast  sevelamer carbonate 800 milliGRAM(s) Oral three times a day with meals  sodium zirconium cyclosilicate 5 Gram(s) Oral two times a day  tamsulosin 0.4 milliGRAM(s) Oral daily  ticagrelor 90 milliGRAM(s) Oral every 12 hours      Hospital Medications:   MEDICATIONS  (STANDING):  amLODIPine   Tablet 5 milliGRAM(s) Oral daily  aspirin enteric coated 81 milliGRAM(s) Oral daily  atorvastatin 80 milliGRAM(s) Oral at bedtime  carbidopa/levodopa  25/100 1 Tablet(s) Oral <User Schedule>  carvedilol 12.5 milliGRAM(s) Oral two times a day  cloNIDine 0.1 milliGRAM(s) Oral two times a day  ferrous    sulfate 325 milliGRAM(s) Oral daily  pantoprazole    Tablet 40 milliGRAM(s) Oral before breakfast  sevelamer carbonate 800 milliGRAM(s) Oral three times a day with meals  sodium zirconium cyclosilicate 5 Gram(s) Oral two times a day  tamsulosin 0.4 milliGRAM(s) Oral daily  ticagrelor 90 milliGRAM(s) Oral every 12 hours      SOCIAL HISTORY:  Denies ETOh, Smoking,     Family History:  FAMILY HISTORY:  FH: type 2 diabetes  In father    ROS:  RESPIRATORY: No shortness of breath  CARDIOVASCULAR: No Chest pain  MUSCULOSKELETAL: No leg swelling    VITALS:  T(F): --  HR: --  BP: --  RR: --  SpO2: --  Wt(kg): --      CAPILLARY BLOOD GLUCOSE      POCT Blood Glucose.: 141 mg/dL (12 Mar 2021 13:29)    PHYSICAL EXAM:  GENERAL: Alert, awake, oriented x3 on RA  CHEST/LUNG: Bilateral clear breath sounds, no rales  HEART: Regular rate and rhythm, no murmur  EXTREMITIES: no pedal oedema  ACCESS: RUE AVF w/bruit and mild thrill     LABS:  03-12    139  |  93<L>  |  32<H>  ----------------------------<  159<H>  4.8   |  32<H>  |  7.77<H>    Ca    10.8<H>      12 Mar 2021 09:46    TPro  8.4<H>  /  Alb  4.6  /  TBili  0.4  /  DBili      /  AST  13  /  ALT  <5<L>  /  AlkPhos  210<H>  03-12    Creatinine Trend: 7.77 <--                        10.6   5.18  )-----------( 108      ( 12 Mar 2021 09:46 )             33.2       Urine Studies:            RADIOLOGY & ADDITIONAL STUDIES:    EKG findings reviewed.    Imaging Personally Reviewed:  [x] YES  [ ] NO    Consultant(s) and primary physician Notes Reviewed:  [x] YES  [ ] NO    Care Discussed with Primary team/ Consultants/Other Providers [x] YES  [ ] NO

## 2021-03-12 NOTE — PATIENT PROFILE ADULT - TOBACCO USE
SAFETY CHECKLIST  ID Bands and Risk clasps correct and in place (DNR, Fall risk, Allergy, Latex, Limb):  Yes  All Lines Reconciled and labeled correctly: Yes  Whiteboard updated:Yes  Environmental interventions (bed/chair alarm on, call light, side rails, restraints, sitter....): Yes    Kelsie Landry RN on 2/24/2020 at 7:20 AM         Never smoker

## 2021-03-12 NOTE — CONSULT NOTE ADULT - ASSESSMENT
Assessment/Plan:   69 y/o, former smoker, Georgian Speaking Male POOR HISTORIAN  w/ PMHX HTN, HLD, DM (on insulin), Hyperphosphatemia, CVA (2017 - residual R sided weakness), ESRD on Dialysis (Tue/Thurs/Sat) s/p AV fistula creation 9/6/19 to RUE ( s/p revision of AV fistula), Parkinson's dz, hypothyroidism, BPH, Pneumonia due to COVID in the past,  CAD s/p dx cardiac cath on 10/25/19 (see information below), LHC on 11/23/20 as per MD note s/p PCI to dLCX who presented to [facility] for recommended staged PCI. Now s/p intervention with plan for admission overnight for observation. Renal consulted for dialysis.    ESRD on HD TTS @ [address]  Usual rx 3.5h 2.5L; records pending  HD tomorrow per regular schedule  EDW tbd  Euvolaemic, electrolytes and bicarb acceptable  BP: UF with HD, norvasc 5, coreg 12.5q12h. clonidine 0.1 BID  Anaemia- epo with HD, on PO iron  BMD: renvela 800 TID    Daily weights, strict I&Os, renally dose meds, renal diet    Thank you for the opportunity to participate in the care of your patient. The nephrology service remains available to assist with any questions or concerns. Please feel free to reach us by paging the on-call nephrology fellow for urgent issues or as below.     Julia Mg M.D.   PGY-4  Pager: [phone]

## 2021-03-13 ENCOUNTER — TRANSCRIPTION ENCOUNTER (OUTPATIENT)
Age: 71
End: 2021-03-13

## 2021-03-13 VITALS — TEMPERATURE: 98 F

## 2021-03-13 LAB
ANION GAP SERPL CALC-SCNC: 17 MMOL/L — SIGNIFICANT CHANGE UP (ref 5–17)
BUN SERPL-MCNC: 50 MG/DL — HIGH (ref 7–23)
CALCIUM SERPL-MCNC: 9.8 MG/DL — SIGNIFICANT CHANGE UP (ref 8.4–10.5)
CHLORIDE SERPL-SCNC: 93 MMOL/L — LOW (ref 96–108)
CO2 SERPL-SCNC: 26 MMOL/L — SIGNIFICANT CHANGE UP (ref 22–31)
CREAT SERPL-MCNC: 9.62 MG/DL — HIGH (ref 0.5–1.3)
GLUCOSE BLDC GLUCOMTR-MCNC: 109 MG/DL — HIGH (ref 70–99)
GLUCOSE SERPL-MCNC: 100 MG/DL — HIGH (ref 70–99)
HBV CORE AB SER-ACNC: SIGNIFICANT CHANGE UP
HBV SURFACE AB SER-ACNC: REACTIVE — SIGNIFICANT CHANGE UP
HBV SURFACE AG SER-ACNC: SIGNIFICANT CHANGE UP
HCT VFR BLD CALC: 28.4 % — LOW (ref 39–50)
HCV AB S/CO SERPL IA: 0.09 S/CO — SIGNIFICANT CHANGE UP
HCV AB SERPL-IMP: SIGNIFICANT CHANGE UP
HGB BLD-MCNC: 9.2 G/DL — LOW (ref 13–17)
MAGNESIUM SERPL-MCNC: 2.3 MG/DL — SIGNIFICANT CHANGE UP (ref 1.6–2.6)
MCHC RBC-ENTMCNC: 31.1 PG — SIGNIFICANT CHANGE UP (ref 27–34)
MCHC RBC-ENTMCNC: 32.4 GM/DL — SIGNIFICANT CHANGE UP (ref 32–36)
MCV RBC AUTO: 95.9 FL — SIGNIFICANT CHANGE UP (ref 80–100)
NRBC # BLD: 0 /100 WBCS — SIGNIFICANT CHANGE UP (ref 0–0)
PHOSPHATE SERPL-MCNC: 5.3 MG/DL — HIGH (ref 2.5–4.5)
PLATELET # BLD AUTO: 100 K/UL — LOW (ref 150–400)
POTASSIUM SERPL-MCNC: 5.1 MMOL/L — SIGNIFICANT CHANGE UP (ref 3.5–5.3)
POTASSIUM SERPL-SCNC: 5.1 MMOL/L — SIGNIFICANT CHANGE UP (ref 3.5–5.3)
RBC # BLD: 2.96 M/UL — LOW (ref 4.2–5.8)
RBC # FLD: 16.4 % — HIGH (ref 10.3–14.5)
SODIUM SERPL-SCNC: 136 MMOL/L — SIGNIFICANT CHANGE UP (ref 135–145)
WBC # BLD: 5.83 K/UL — SIGNIFICANT CHANGE UP (ref 3.8–10.5)
WBC # FLD AUTO: 5.83 K/UL — SIGNIFICANT CHANGE UP (ref 3.8–10.5)

## 2021-03-13 PROCEDURE — 99239 HOSP IP/OBS DSCHRG MGMT >30: CPT

## 2021-03-13 RX ORDER — CLOPIDOGREL BISULFATE 75 MG/1
1 TABLET, FILM COATED ORAL
Qty: 0 | Refills: 0 | DISCHARGE

## 2021-03-13 RX ORDER — TICAGRELOR 90 MG/1
1 TABLET ORAL
Qty: 60 | Refills: 0
Start: 2021-03-13 | End: 2021-04-11

## 2021-03-13 RX ORDER — TICAGRELOR 90 MG/1
1 TABLET ORAL
Qty: 60 | Refills: 11
Start: 2021-03-13 | End: 2022-03-07

## 2021-03-13 RX ADMIN — Medication 325 MILLIGRAM(S): at 08:33

## 2021-03-13 RX ADMIN — TICAGRELOR 90 MILLIGRAM(S): 90 TABLET ORAL at 08:33

## 2021-03-13 RX ADMIN — SEVELAMER CARBONATE 800 MILLIGRAM(S): 2400 POWDER, FOR SUSPENSION ORAL at 08:37

## 2021-03-13 RX ADMIN — CARBIDOPA AND LEVODOPA 1 TABLET(S): 25; 100 TABLET ORAL at 06:49

## 2021-03-13 RX ADMIN — PANTOPRAZOLE SODIUM 40 MILLIGRAM(S): 20 TABLET, DELAYED RELEASE ORAL at 06:50

## 2021-03-13 RX ADMIN — SODIUM ZIRCONIUM CYCLOSILICATE 5 GRAM(S): 10 POWDER, FOR SUSPENSION ORAL at 06:52

## 2021-03-13 RX ADMIN — AMLODIPINE BESYLATE 5 MILLIGRAM(S): 2.5 TABLET ORAL at 14:18

## 2021-03-13 RX ADMIN — Medication 81 MILLIGRAM(S): at 08:33

## 2021-03-13 RX ADMIN — Medication 2 UNIT(S): at 08:08

## 2021-03-13 NOTE — DISCHARGE NOTE PROVIDER - NSDCCPCAREPLAN_GEN_ALL_CORE_FT
PRINCIPAL DISCHARGE DIAGNOSIS  Diagnosis: CAD (coronary artery disease)  Assessment and Plan of Treatment: You were found to have blockages in the arteries of your heart, also known as Coronary Artery Disease. You underwent a cardiac angiogram on 3/12/21 and received a stent to the obtuse marginal artery. You still have one more blockage in the the left anterior descending artery that you must return in 4-6 weeks to have a stent placed.  PLEASE CONTINUE ASPIRIN 81MG DAILY AND BRILINTA 90MG TWICEA  A DAY. DO NOT STOP THESE MEDICATIONS FOR ANY REASON AS THEY ARE KEEPING YOUR STENT OPEN AND PREVENTING A HEART ATTACK.   Avoid strenuous activity or heavy lifting anything more than 5lbs for the next five days. Do not take a bath or swim for the next five days; you may shower. For any bleeding or hematoma formation (hardened blood collection under the skin) at the access site of your right groin please hold pressure and go to the emergency room. Please follow up with Dr. Ramos in 1-2 weeks. For recurrent chest pain, please call your doctor or go to the emergency room.      SECONDARY DISCHARGE DIAGNOSES  Diagnosis: ESRD on hemodialysis  Assessment and Plan of Treatment: Please continue going to Dialysis on Tuesday, Thursday and Saturday. Please continue your Renal tabs daily, Lokelma twice a day and Renvela 800mg three times a day.    Diagnosis: HTN (hypertension)  Assessment and Plan of Treatment: Please continue CLONIDINE 0.1MG TWICE DAILY, CARVEDILOL 12.5MG TWICE DAILY, AMLODIPINE 5MG DAILY, LISINOPRIL 10MG DAILY as listed to keep your blood pressure controlled. For blood pressure that is too high or too low please see your doctor or go to the emergency room as necessary.    Diagnosis: HLD (hyperlipidemia)  Assessment and Plan of Treatment: Please continue ATORVASTATIN 80MG at bedtime to keep your cholesterol low. High cholesterol contributes to heart disease.    Diagnosis: DM (diabetes mellitus)  Assessment and Plan of Treatment: Your Hemoglobin A1c is 6.7% and your goal A1c is less than 7.0%. This number measures your average blood sugar level over the last three months. Please continue your medications as listed for diabetes. Maintain a low carbohydrate, low sugar diet, exercise, monitor your fingerstick blood sugars regarly and follow up with your Endocrinologist/Primary Care Doctor.     PRINCIPAL DISCHARGE DIAGNOSIS  Diagnosis: CAD (coronary artery disease)  Assessment and Plan of Treatment: You were found to have blockages in the arteries of your heart, also known as Coronary Artery Disease. You underwent a cardiac angiogram on 3/12/21 and received a stent to the obtuse marginal artery. You still have one more blockage in the the left anterior descending artery that you must return in 4-6 weeks to have a stent placed.  PLEASE CONTINUE ASPIRIN 81MG DAILY AND BRILINTA 90MG TWICEA  A DAY. DO NOT STOP THESE MEDICATIONS FOR ANY REASON AS THEY ARE KEEPING YOUR STENT OPEN AND PREVENTING A HEART ATTACK.   Avoid strenuous activity or heavy lifting anything more than 5lbs for the next five days. Do not take a bath or swim for the next five days; you may shower. For any bleeding or hematoma formation (hardened blood collection under the skin) at the access site of your right groin please hold pressure and go to the emergency room. Please follow up with Dr. Ramos in 1-2 weeks and at that time make an appointment to return in 4-6 weeks to get that remaining blockage fixed. For recurrent chest pain, please call your doctor or go to the emergency room.      SECONDARY DISCHARGE DIAGNOSES  Diagnosis: ESRD on hemodialysis  Assessment and Plan of Treatment: Please continue going to Dialysis on Tuesday, Thursday and Saturday. Please continue your Renal tabs daily, Lokelma twice a day and Renvela 800mg three times a day.    Diagnosis: HTN (hypertension)  Assessment and Plan of Treatment: Please continue CLONIDINE 0.1MG TWICE DAILY, CARVEDILOL 12.5MG TWICE DAILY, AMLODIPINE 5MG DAILY, LISINOPRIL 10MG DAILY as listed to keep your blood pressure controlled. For blood pressure that is too high or too low please see your doctor or go to the emergency room as necessary.    Diagnosis: HLD (hyperlipidemia)  Assessment and Plan of Treatment: Please continue ATORVASTATIN 80MG at bedtime to keep your cholesterol low. High cholesterol contributes to heart disease.    Diagnosis: DM (diabetes mellitus)  Assessment and Plan of Treatment: Your Hemoglobin A1c is 6.7% and your goal A1c is less than 7.0%. This number measures your average blood sugar level over the last three months. Please continue your medications as listed for diabetes. Maintain a low carbohydrate, low sugar diet, exercise, monitor your fingerstick blood sugars regarly and follow up with your Endocrinologist/Primary Care Doctor.

## 2021-03-13 NOTE — PROGRESS NOTE ADULT - SUBJECTIVE AND OBJECTIVE BOX
Patient was seen and evaluated on dialysis.     Vitals:  HR: 74 (03-13-21 @ 11:40)  BP: 147/56 (03-13-21 @ 11:40)  Wt(kg): --    PHYSICAL EXAM:  GENERAL: Alert, NAD  CHEST/LUNG: Bilateral clear breath sounds, no rales  HEART: Regular rate and rhythm, no murmur  EXTREMITIES: no pedal edema  ACCESS: RUE AVF w/bruit and mild thrill       Labs:  03-13    136  |  93<L>  |  50<H>  ----------------------------<  100<H>  5.1   |  26  |  9.62<H>    Ca    9.8      13 Mar 2021 05:56  Phos  5.3     03-13  Mg     2.3     03-13    TPro  8.4<H>  /  Alb  4.6  /  TBili  0.4  /  DBili  x   /  AST  13  /  ALT  <5<L>  /  AlkPhos  210<H>  03-12    Continue dialysis:   Dialyzer:   180    QB: 400  K bath: 2  Goal UF:    2.5   L   over       210        min  Patient is tolerating the procedure well.   Continue full treatment as prescribed.

## 2021-03-13 NOTE — PROGRESS NOTE ADULT - ASSESSMENT
71 y/o, former smoker, Thai Speaking Male POOR HISTORIAN  w/ PMHX HTN, HLD, DM (on insulin), Hyperphosphatemia, CVA (2017 - residual R sided weakness), ESRD on Dialysis (Tue/Thurs/Sat) s/p AV fistula creation 9/6/19 to RUE ( s/p revision of AV fistula), Parkinson's dz, hypothyroidism, BPH, Pneumonia due to COVID in the past,  CAD s/p dx cardiac cath on 10/25/19 (see information below), LHC on 11/23/20 as per MD note s/p PCI to dLCX who presented to [facility] for recommended staged PCI. Now s/p intervention with plan for admission overnight for observation. Renal consulted for dialysis.    ESRD on HD TTS @ [address]  Usual rx 3.5h 2.5L; records pending  HD today per regular schedule  EDW tbd  Euvolaemic, electrolytes and bicarb acceptable  BP: UF with HD, norvasc 5, coreg 12.5q12h. clonidine 0.1 BID  Anaemia- epo with HD, on PO iron  BMD: renvela 800 TID    Daily weights, strict I&Os, renally dose meds, renal diet

## 2021-03-13 NOTE — DISCHARGE NOTE PROVIDER - CARE PROVIDER_API CALL
Carlin Ramos (MD)  Cardiovascular Disease; Interventional Cardiology  [address], [address] [place] [address]  Phone: [phone]  Fax: [phone]  Follow Up Time: 1 week

## 2021-03-13 NOTE — DISCHARGE NOTE NURSING/CASE MANAGEMENT/SOCIAL WORK - PATIENT PORTAL LINK FT
You can access the FollowMyHealth Patient Portal offered by [facility] by registering at the following website: [email or web address]. By joining [employer]’s FollowMyHealth portal, you will also be able to view your health information using other applications (apps) compatible with our system.

## 2021-03-13 NOTE — DISCHARGE NOTE PROVIDER - HOSPITAL COURSE
71 y/o, former smoker, [place] Speaking Male POOR HISTORIAN  w/ PMHX HTN, HLD, DM, CAD s/p PCI (SURYA dLCx 11/23/20 with residual OM1 and LAD), Hyperphosphatemia, ESRD on  HD T/Th/Sat (s/p revision of RUE AV fistula), CVA (2017 - residual R sided weakness), Parkinson's dz, hypothyroidism, BPH, COVID with underlying PNA, who presented to cardiologist for follow-up after his recent admission to [facility] for NSTEMI. LHC in  as per MD note revealed severe 2 vessel disease and pt underwent PTCA of OM1 lesion, significant residual LAD disease. Pt claims that he has intermittent 7/10 SSCP independent of activity with accompanied palpitations.  Denies SOB, dizziness, syncope, LE edema, PND/orthopnea, n/v, fever/chills, blood in the stool. Pt claims that he is compliant with his DAPT. ECHO was done on 2/2/21 which showed mild MR, EF 55%, basal inferolateral, basal anterolateral, mid inferolateral and mid anterolateral segments are hypokinetic. In light of pt's risk factors, residual CAD, pt is now referred to [facility] for recommended staged PCI.   Pt now s/p cardiac cath 3/12/21 with SURYA Om1 with residual dLAD with plan to return for staged Roto/PCI in 4-6 weeks, access L groin PC. Post cath pt reloaded with Brilinta 180mg and admitted overnight for observation and telemetry monitoring. Pt underwent regularly scheduled HD session on Saturday with no complications. Pt seen and examined at bedside this AM without any complaints or events overnight, access site stable non TTP, without ecchymosis, bleeding or hematoma, pulse 2+ no bruits. VSS, labs and telemetry reviewed and pt stable for discharge as discussed with Dr. Bashir. Pt has received appropriate discharge instructions, including medication regimen, access site management and follow up with Dr. Ramos in 1-2 weeks.  Discharge medication regimen has been reviewed with attending with plan for patient to take ASA 81mg QD, Brilinta 90mg BID, Clonidine 0.1mg BID, Carvedilol 12.5mg BID, Amlodipine 5mg QD, Atorvastatin 80mg QD, Lisinopril 10mg QD, Renvela 800mg TID, Pantoprazole 40mg QD, Novolin 70/30 3u TID.

## 2021-03-13 NOTE — DISCHARGE NOTE PROVIDER - NSDCMRMEDTOKEN_GEN_ALL_CORE_FT
amLODIPine 5 mg oral tablet: 1 tab(s) orally once a day  aspirin 81 mg oral tablet: 1 tab(s) orally once a day  atorvastatin 80 mg oral tablet: 1 tab(s) orally once a day  carbidopa-levodopa 25 mg-100 mg oral tablet: 1 tab(s) orally 2 times a day  carvedilol 12.5 mg oral tablet: 1 tab(s) orally 2 times a day  cloNIDine 0.1 mg oral tablet: 1 tab(s) orally 2 times a day  ferrous sulfate 325 mg (65 mg elemental iron) oral tablet: 1 tab(s) orally 2 times a day  lisinopril 10 mg oral tablet: 1 tab(s) orally once a day  Lokelma 5 g oral powder for reconstitution: orally 2 times a day  NovoLIN 70/30 subcutaneous suspension: 3 unit(s) subcutaneous 3 times a day  Plavix 75 mg oral tablet: 1 tab(s) orally once a day  Protonix 40 mg oral delayed release tablet: 1 tab(s) orally once a day  Renal Caps oral capsule: 1 cap(s) orally once a day  sevelamer carbonate 800 mg oral tablet: 1 tab(s) orally 3 times a day (with meals)  tamsulosin 0.4 mg oral capsule: 1 cap(s) orally once a day (at bedtime)   amLODIPine 5 mg oral tablet: 1 tab(s) orally once a day  aspirin 81 mg oral tablet: 1 tab(s) orally once a day  atorvastatin 80 mg oral tablet: 1 tab(s) orally once a day  carbidopa-levodopa 25 mg-100 mg oral tablet: 1 tab(s) orally 2 times a day  carvedilol 12.5 mg oral tablet: 1 tab(s) orally 2 times a day  cloNIDine 0.1 mg oral tablet: 1 tab(s) orally 2 times a day  ferrous sulfate 325 mg (65 mg elemental iron) oral tablet: 1 tab(s) orally 2 times a day  lisinopril 10 mg oral tablet: 1 tab(s) orally once a day  Lokelma 5 g oral powder for reconstitution: orally 2 times a day  NovoLIN 70/30 subcutaneous suspension: 3 unit(s) subcutaneous 3 times a day  Protonix 40 mg oral delayed release tablet: 1 tab(s) orally once a day  Renal Caps oral capsule: 1 cap(s) orally once a day  sevelamer carbonate 800 mg oral tablet: 1 tab(s) orally 3 times a day (with meals)  tamsulosin 0.4 mg oral capsule: 1 cap(s) orally once a day (at bedtime)  ticagrelor 90 mg oral tablet: 1 tab(s) orally every 12 hours

## 2021-03-19 DIAGNOSIS — Z87.891 PERSONAL HISTORY OF NICOTINE DEPENDENCE: ICD-10-CM

## 2021-03-19 DIAGNOSIS — E03.9 HYPOTHYROIDISM, UNSPECIFIED: ICD-10-CM

## 2021-03-19 DIAGNOSIS — Z99.2 DEPENDENCE ON RENAL DIALYSIS: ICD-10-CM

## 2021-03-19 DIAGNOSIS — E11.22 TYPE 2 DIABETES MELLITUS WITH DIABETIC CHRONIC KIDNEY DISEASE: ICD-10-CM

## 2021-03-19 DIAGNOSIS — T82.855A STENOSIS OF CORONARY ARTERY STENT, INITIAL ENCOUNTER: ICD-10-CM

## 2021-03-19 DIAGNOSIS — I25.2 OLD MYOCARDIAL INFARCTION: ICD-10-CM

## 2021-03-19 DIAGNOSIS — Z79.82 LONG TERM (CURRENT) USE OF ASPIRIN: ICD-10-CM

## 2021-03-19 DIAGNOSIS — I12.0 HYPERTENSIVE CHRONIC KIDNEY DISEASE WITH STAGE 5 CHRONIC KIDNEY DISEASE OR END STAGE RENAL DISEASE: ICD-10-CM

## 2021-03-19 DIAGNOSIS — G20 PARKINSON'S DISEASE: ICD-10-CM

## 2021-03-19 DIAGNOSIS — D64.9 ANEMIA, UNSPECIFIED: ICD-10-CM

## 2021-03-19 DIAGNOSIS — E83.39 OTHER DISORDERS OF PHOSPHORUS METABOLISM: ICD-10-CM

## 2021-03-19 DIAGNOSIS — Z86.16 PERSONAL HISTORY OF COVID-19: ICD-10-CM

## 2021-03-19 DIAGNOSIS — Z95.5 PRESENCE OF CORONARY ANGIOPLASTY IMPLANT AND GRAFT: ICD-10-CM

## 2021-03-19 DIAGNOSIS — Z79.4 LONG TERM (CURRENT) USE OF INSULIN: ICD-10-CM

## 2021-03-19 DIAGNOSIS — I69.351 HEMIPLEGIA AND HEMIPARESIS FOLLOWING CEREBRAL INFARCTION AFFECTING RIGHT DOMINANT SIDE: ICD-10-CM

## 2021-03-19 DIAGNOSIS — I25.10 ATHEROSCLEROTIC HEART DISEASE OF NATIVE CORONARY ARTERY WITHOUT ANGINA PECTORIS: ICD-10-CM
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
## 2021-03-19 DIAGNOSIS — Z79.02 LONG TERM (CURRENT) USE OF ANTITHROMBOTICS/ANTIPLATELETS: ICD-10-CM

## 2021-03-19 DIAGNOSIS — I34.0 NONRHEUMATIC MITRAL (VALVE) INSUFFICIENCY: ICD-10-CM
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
## 2021-03-19 DIAGNOSIS — E78.5 HYPERLIPIDEMIA, UNSPECIFIED: ICD-10-CM

## 2021-03-19 DIAGNOSIS — N18.6 END STAGE RENAL DISEASE: ICD-10-CM

## 2021-03-19 DIAGNOSIS — N40.0 BENIGN PROSTATIC HYPERPLASIA WITHOUT LOWER URINARY TRACT SYMPTOMS: ICD-10-CM

## 2021-03-31 PROCEDURE — C1894: CPT

## 2021-03-31 PROCEDURE — C1725: CPT

## 2021-03-31 PROCEDURE — 82550 ASSAY OF CK (CPK): CPT

## 2021-03-31 PROCEDURE — 80061 LIPID PANEL: CPT

## 2021-03-31 PROCEDURE — 86706 HEP B SURFACE ANTIBODY: CPT

## 2021-03-31 PROCEDURE — C1760: CPT

## 2021-03-31 PROCEDURE — 85610 PROTHROMBIN TIME: CPT

## 2021-03-31 PROCEDURE — C1753: CPT

## 2021-03-31 PROCEDURE — 85027 COMPLETE CBC AUTOMATED: CPT

## 2021-03-31 PROCEDURE — 87340 HEPATITIS B SURFACE AG IA: CPT

## 2021-03-31 PROCEDURE — 85730 THROMBOPLASTIN TIME PARTIAL: CPT

## 2021-03-31 PROCEDURE — 86704 HEP B CORE ANTIBODY TOTAL: CPT

## 2021-03-31 PROCEDURE — C1769: CPT

## 2021-03-31 PROCEDURE — 83036 HEMOGLOBIN GLYCOSYLATED A1C: CPT

## 2021-03-31 PROCEDURE — 82962 GLUCOSE BLOOD TEST: CPT

## 2021-03-31 PROCEDURE — C1887: CPT

## 2021-03-31 PROCEDURE — 85025 COMPLETE CBC W/AUTO DIFF WBC: CPT

## 2021-03-31 PROCEDURE — 82553 CREATINE MB FRACTION: CPT

## 2021-03-31 PROCEDURE — 86803 HEPATITIS C AB TEST: CPT

## 2021-03-31 PROCEDURE — 86140 C-REACTIVE PROTEIN: CPT

## 2021-03-31 PROCEDURE — 36415 COLL VENOUS BLD VENIPUNCTURE: CPT

## 2021-03-31 PROCEDURE — C1874: CPT

## 2021-03-31 PROCEDURE — 84100 ASSAY OF PHOSPHORUS: CPT

## 2021-03-31 PROCEDURE — 80048 BASIC METABOLIC PNL TOTAL CA: CPT

## 2021-03-31 PROCEDURE — 80053 COMPREHEN METABOLIC PANEL: CPT

## 2021-03-31 PROCEDURE — 90935 HEMODIALYSIS ONE EVALUATION: CPT

## 2021-03-31 PROCEDURE — 83735 ASSAY OF MAGNESIUM: CPT

## 2021-04-22 NOTE — PROGRESS NOTE ADULT - PROBLEM SELECTOR PLAN 7
Unable to offer due to clinical condition ABSCESS Hx of HTN unclear of home meds appears per scanned in records in [facility] appears amlodipine 10 mg and carvedilol 25 mg BID and clonidine 0.1 mg BID  - c/w amlodipine   - will resume carvedilol 12.5 mg BID  - resume clonidine 0.1 mg BID

## 2021-05-12 VITALS
HEIGHT: 62 IN | TEMPERATURE: 98 F | HEART RATE: 69 BPM | OXYGEN SATURATION: 99 % | WEIGHT: 134.04 LBS | DIASTOLIC BLOOD PRESSURE: 53 MMHG | SYSTOLIC BLOOD PRESSURE: 118 MMHG | RESPIRATION RATE: 15 BRPM

## 2021-05-12 NOTE — H&P ADULT - ASSESSMENT
71 y/o, former smoker, Turkish speaking Male poor historian w/ PMHX HTN, HLD, T2DM, CAD s/p multiple PCIs (most recent 3/12/21 w/ SURYA x1 OM1, patent dLCx stent, residual dLAD, access: L groin PC), Hyperphosphatemia, ESRD on HD T/Th/Sat (s/p revision of RUE AV fistula), CVA (2017 - residual R sided weakness), Parkinson's dz, hypothyroidism, BPH, COVID with underlying PNA, who now presents to [facility] referred by Cardiologist Dr. Ramos for staged Rota/PCI of known residual dLAD disease. In light of patient's risk factors, known residual CAD, and CCS class 4 anginal/anginal-equivalent symptoms, patient is referred to [facility] for cardiac catheterization w/ possible intervention if clinically indicated.    - Patient on dialysis T/Th/Sat, last HD 5/13/21, next HD due Sat 5/15/21   - Labs reviewed, K 5.6 (Dr. Ramos aware), other labs stable   - Nephrology consulted, recs pending   - As per Dr. Ramos, will give ASA 81 mg and Brilinta 90 mg pre-procedure   - EKG w/ NSR @ 68 bpm, RBBB, TWI I/aVL/V5-V6   - Mallampati class III   - CCS class IV   - ASA class III   - Sedation plan: moderate   - Patient Is Suitable Candidate For Sedation: yes   - Risks & benefits of procedure and sedation and risks and benefits for the alternative therapy have been explained to the patient in layman’s terms via Turkish telephone  #[id number] including but not limited to: allergic reaction, bleeding, infection, arrhythmia, respiratory compromise, renal and vascular compromise, limb damage, MI, CVA, emergent CABG/Vascular Surgery and death. Informed consent obtained and in chart.   71 y/o, former smoker, Sudanese speaking Male poor historian w/ PMHX HTN, HLD, T2DM, CAD s/p multiple PCIs (most recent 3/12/21 w/ SURYA x1 OM1, patent dLCx stent, residual dLAD, access: L groin PC), Hyperphosphatemia, ESRD on HD T/Th/Sat (s/p revision of RUE AV fistula), CVA (2017 - residual R sided weakness), Parkinson's dz, hypothyroidism, BPH, COVID with underlying PNA, who now presents to [facility] referred by Cardiologist Dr. Ramos for staged Rota/PCI of known residual dLAD disease. In light of patient's risk factors, known residual CAD, and CCS class 4 anginal/anginal-equivalent symptoms, patient is referred to [facility] for staged PCI.     - Patient on dialysis T/Th/Sat, last HD 5/13/21, next HD due Sat 5/15/21   - Labs reviewed, K 5.6 (Dr. Ramos aware), other labs stable   - Nephrology consulted given elevated K, recommended Lokelma 10g pre-procedure and repeat labs post-procedure, will determine based on post-procedure labs timing of next HD session (i.e., today vs tomorrow)   - As per Dr. Ramos, will give ASA 81 mg and Brilinta 90 mg pre-procedure   - EKG w/ NSR @ 68 bpm, RBBB, TWI I/aVL/V5-V6   - Mallampati class III   - CCS class IV   - ASA class III   - Sedation plan: moderate   - Patient Is Suitable Candidate For Sedation: yes   - Risks & benefits of procedure and sedation and risks and benefits for the alternative therapy have been explained to the patient in layman’s terms via Sudanese telephone  #[id number] including but not limited to: allergic reaction, bleeding, infection, arrhythmia, respiratory compromise, renal and vascular compromise, limb damage, MI, CVA, emergent CABG/Vascular Surgery and death. Informed consent obtained and in chart.

## 2021-05-12 NOTE — H&P ADULT - NSICDXPASTSURGICALHX_GEN_ALL_CORE_FT
PAST SURGICAL HISTORY:  AVF (arteriovenous fistula) right arm x with revision, left arm x 1    S/P dialysis catheter insertion Right chest perma cath removed in jan 2020    S/P primary angioplasty with coronary stent     Stented coronary artery 2004-5 at [facility]

## 2021-05-12 NOTE — H&P ADULT - HISTORY OF PRESENT ILLNESS
COVID: Negative 5/10 in HIE  Pharmacy:   Escort: [facility] service  Cardiologist: Dr. Ramos    *Pt states no change in meds since last admission, please confirm on arrival.    69 y/o, former smoker, Korean speaking Male poor historian w/ PMHX HTN, HLD, T2DM, CAD s/p multiple PCIs (most recent 3/12/21 w/ SURYA x1 OM1, patent dLCx stent, residual dLAD, access: L groin PC), Hyperphosphatemia, ESRD on HD T/Th/Sat (s/p revision of RUE AV fistula), CVA (2017 - residual R sided weakness), Parkinson's dz, hypothyroidism, BPH, COVID with underlying PNA, who now presents to [facility] referred by Cardiologist Dr. Ramos for staged Rota/PCI of known residual dLAD disease. Pt endorses continued intermittent 4/10 substernal CP radiating to the jaw since his last procedure w/ onset at rest and alleviated w/ rest and tylenol. Pt denies any SOB, dizziness, syncope, LE edema, PND/orthopnea, n/v, fever/chills, and blood in the stool. Pt reports good compliance w/ DAPT. In light of patient's risk factors, known residual CAD, and CCS class 4 anginal/anginal-equivalent symptoms, patient is referred to [facility] for cardiac catheterization w/ possible intervention if clinically indicated.    ECHO 2/2/21 which showed mild MR, EF 55%, basal inferolateral, basal anterolateral, mid inferolateral and mid anterolateral segments are hypokinetic.   COVID: Negative 5/10 but reswabbing 5/12, f/u  Pharmacy: [facility] [address] [address], [place], [place] [id number] [phone]  Escort: [facility]  Cardiologist: Dr. Ramos    *Pt states no change in meds since last admission, please confirm on arrival.    71 y/o, former smoker, Turkish speaking Male poor historian w/ PMHX HTN, HLD, T2DM, CAD s/p multiple PCIs (most recent 3/12/21 w/ SURYA x1 OM1, patent dLCx stent, residual dLAD, access: L groin PC), Hyperphosphatemia, ESRD on HD T/Th/Sat (s/p revision of RUE AV fistula), CVA (2017 - residual R sided weakness), Parkinson's dz, hypothyroidism, BPH, COVID with underlying PNA, who now presents to [facility] referred by Cardiologist Dr. Ramos for staged Rota/PCI of known residual dLAD disease. Pt endorses continued intermittent 4/10 substernal CP radiating to the jaw since his last procedure w/ onset at rest and alleviated w/ rest and tylenol. Pt denies any SOB, dizziness, syncope, LE edema, PND/orthopnea, n/v, fever/chills, and blood in the stool. Pt reports good compliance w/ DAPT. In light of patient's risk factors, known residual CAD, and CCS class 4 anginal/anginal-equivalent symptoms, patient is referred to [facility] for cardiac catheterization w/ possible intervention if clinically indicated.    ECHO 2/2/21 which showed mild MR, EF 55%, basal inferolateral, basal anterolateral, mid inferolateral and mid anterolateral segments are hypokinetic.   COVID: Negative 5/10 but reswabbing 5/12, f/u  Pharmacy: [facility] [address] [place] [address], [place], [place] [address] [phone]  Escort: [facility] service  Cardiologist: Dr. Ramos    *History obtained via [employer]  ID# [id number]  *Pt states no change in meds since last admission, please confirm on arrival.    69 y/o, former smoker, Gibraltarian speaking Male poor historian w/ PMHX HTN, HLD, T2DM, CAD s/p multiple PCIs (most recent 3/12/21 w/ SURYA x1 OM1, patent dLCx stent, residual dLAD, access: L groin PC), Hyperphosphatemia, ESRD on HD T/Th/Sat (s/p revision of RUE AV fistula), CVA (2017 - residual R sided weakness), Parkinson's dz, hypothyroidism, BPH, COVID with underlying PNA, who now presents to [facility] referred by Cardiologist Dr. Ramos for staged Rota/PCI of known residual dLAD disease. Pt endorses continued intermittent 4/10 substernal CP radiating to the jaw since his last procedure w/ onset at rest and alleviated w/ rest and tylenol. Pt denies any SOB, dizziness, syncope, LE edema, PND/orthopnea, n/v, fever/chills, and blood in the stool. Pt reports good compliance w/ DAPT. In light of patient's risk factors, known residual CAD, and CCS class 4 anginal/anginal-equivalent symptoms, patient is referred to [facility] for cardiac catheterization w/ possible intervention if clinically indicated.    ECHO 2/2/21 which showed mild MR, EF 55%, basal inferolateral, basal anterolateral, mid inferolateral and mid anterolateral segments are hypokinetic.   COVID: Negative 5/10 but reswabbing 5/12, f/u  Pharmacy: [facility] [address], [place], [place] [address] [phone]  Escort: [facility]  Cardiologist: Dr. Ramos    *History obtained via [employer]  ID# [id number]     69 y/o, former smoker, [place] citizen speaking Male poor historian w/ PMHX HTN, HLD, T2DM, CAD s/p multiple PCIs (most recent 3/12/21 w/ SURYA x1 OM1, patent dLCx stent, residual dLAD, access: L groin PC), Hyperphosphatemia, ESRD on HD T/Th/Sat (s/p revision of RUE AV fistula), CVA (2017 - residual R sided weakness), Parkinson's dz, hypothyroidism, BPH, COVID with underlying PNA, who now presents to [facility] referred by Cardiologist Dr. Ramos for staged Rota/PCI of known residual dLAD disease. Pt endorses continued intermittent 4/10 substernal CP radiating to the jaw since his last procedure w/ onset at rest and alleviated w/ rest and tylenol. Pt denies any SOB, dizziness, syncope, LE edema, PND/orthopnea, n/v, fever/chills, and blood in the stool. Pt reports good compliance w/ DAPT. In light of patient's risk factors, known residual CAD, and CCS class 4 anginal/anginal-equivalent symptoms, patient is referred to [facility] for cardiac catheterization w/ possible intervention if clinically indicated.    ECHO 2/2/21 which showed mild MR, EF 55%, basal inferolateral, basal anterolateral, mid inferolateral and mid anterolateral segments are hypokinetic.   COVID: Negative 5/12 per [facility]   Pharmacy: [facility] [address] [place], [place], [place] [address] [phone]  Escort: [facility]  Cardiologist: Dr. Ramos    *History obtained via [employer]  ID# [id number]     69 y/o, former smoker, Albanian speaking Male poor historian w/ PMHX HTN, HLD, T2DM, CAD s/p multiple PCIs (most recent 3/12/21 w/ SURYA x1 OM1, patent dLCx stent, residual dLAD, access: L groin PC), Hyperphosphatemia, ESRD on HD T/Th/Sat (s/p revision of RUE AV fistula), CVA (2017 - residual R sided weakness), Parkinson's dz, hypothyroidism, BPH, COVID with underlying PNA, who now presents to [facility] referred by Cardiologist Dr. Ramos for staged Rota/PCI of known residual dLAD disease. Pt endorses continued intermittent 4/10 substernal CP radiating to the jaw since his last procedure w/ onset at rest and alleviated w/ rest and tylenol. Pt denies any SOB, dizziness, syncope, LE edema, PND/orthopnea, n/v, fever/chills, and blood in the stool. Pt reports good compliance w/ DAPT. In light of patient's risk factors, known residual CAD, and CCS class 4 anginal/anginal-equivalent symptoms, patient is referred to [facility] for staged PCI.     ECHO 2/2/21 which showed mild MR, EF 55%, basal inferolateral, basal anterolateral, mid inferolateral and mid anterolateral segments are hypokinetic.

## 2021-05-14 ENCOUNTER — TRANSCRIPTION ENCOUNTER (OUTPATIENT)
Age: 71
End: 2021-05-14

## 2021-05-14 ENCOUNTER — INPATIENT (INPATIENT)
Facility: HOSPITAL | Age: 71
LOS: 0 days | Discharge: HOME | DRG: 246 | End: 2021-05-15
Attending: HOSPITALIST | Admitting: HOSPITALIST
Payer: MEDICARE

## 2021-05-14 DIAGNOSIS — Z95.828 PRESENCE OF OTHER VASCULAR IMPLANTS AND GRAFTS: Chronic | ICD-10-CM

## 2021-05-14 DIAGNOSIS — Z95.5 PRESENCE OF CORONARY ANGIOPLASTY IMPLANT AND GRAFT: Chronic | ICD-10-CM

## 2021-05-14 DIAGNOSIS — I77.0 ARTERIOVENOUS FISTULA, ACQUIRED: Chronic | ICD-10-CM

## 2021-05-14 LAB
A1C WITH ESTIMATED AVERAGE GLUCOSE RESULT: 7.3 % — HIGH (ref 4–5.6)
ALBUMIN SERPL ELPH-MCNC: 4.5 G/DL — SIGNIFICANT CHANGE UP (ref 3.3–5)
ALP SERPL-CCNC: 206 U/L — HIGH (ref 40–120)
ALT FLD-CCNC: 9 U/L — LOW (ref 10–45)
ANION GAP SERPL CALC-SCNC: 15 MMOL/L — SIGNIFICANT CHANGE UP (ref 5–17)
ANION GAP SERPL CALC-SCNC: 16 MMOL/L — SIGNIFICANT CHANGE UP (ref 5–17)
APTT BLD: 33.3 SEC — SIGNIFICANT CHANGE UP (ref 27.5–35.5)
AST SERPL-CCNC: 14 U/L — SIGNIFICANT CHANGE UP (ref 10–40)
BASOPHILS # BLD AUTO: 0.03 K/UL — SIGNIFICANT CHANGE UP (ref 0–0.2)
BASOPHILS NFR BLD AUTO: 0.4 % — SIGNIFICANT CHANGE UP (ref 0–2)
BILIRUB SERPL-MCNC: 0.4 MG/DL — SIGNIFICANT CHANGE UP (ref 0.2–1.2)
BUN SERPL-MCNC: 38 MG/DL — HIGH (ref 7–23)
BUN SERPL-MCNC: 42 MG/DL — HIGH (ref 7–23)
CALCIUM SERPL-MCNC: 10 MG/DL — SIGNIFICANT CHANGE UP (ref 8.4–10.5)
CALCIUM SERPL-MCNC: 10.2 MG/DL — SIGNIFICANT CHANGE UP (ref 8.4–10.5)
CHLORIDE SERPL-SCNC: 90 MMOL/L — LOW (ref 96–108)
CHLORIDE SERPL-SCNC: 92 MMOL/L — LOW (ref 96–108)
CHOLEST SERPL-MCNC: 77 MG/DL — SIGNIFICANT CHANGE UP
CO2 SERPL-SCNC: 30 MMOL/L — SIGNIFICANT CHANGE UP (ref 22–31)
CO2 SERPL-SCNC: 31 MMOL/L — SIGNIFICANT CHANGE UP (ref 22–31)
CREAT SERPL-MCNC: 6.89 MG/DL — HIGH (ref 0.5–1.3)
CREAT SERPL-MCNC: 7.63 MG/DL — HIGH (ref 0.5–1.3)
EOSINOPHIL # BLD AUTO: 0.23 K/UL — SIGNIFICANT CHANGE UP (ref 0–0.5)
EOSINOPHIL NFR BLD AUTO: 2.7 % — SIGNIFICANT CHANGE UP (ref 0–6)
ESTIMATED AVERAGE GLUCOSE: 163 MG/DL — HIGH (ref 68–114)
GLUCOSE BLDC GLUCOMTR-MCNC: 118 MG/DL — HIGH (ref 70–99)
GLUCOSE BLDC GLUCOMTR-MCNC: 125 MG/DL — HIGH (ref 70–99)
GLUCOSE BLDC GLUCOMTR-MCNC: 147 MG/DL — HIGH (ref 70–99)
GLUCOSE SERPL-MCNC: 132 MG/DL — HIGH (ref 70–99)
GLUCOSE SERPL-MCNC: 155 MG/DL — HIGH (ref 70–99)
HCT VFR BLD CALC: 36.5 % — LOW (ref 39–50)
HDLC SERPL-MCNC: 31 MG/DL — LOW
HGB BLD-MCNC: 11.8 G/DL — LOW (ref 13–17)
IMM GRANULOCYTES NFR BLD AUTO: 0.4 % — SIGNIFICANT CHANGE UP (ref 0–1.5)
INR BLD: 0.98 — SIGNIFICANT CHANGE UP (ref 0.88–1.16)
LIPID PNL WITH DIRECT LDL SERPL: 15 MG/DL — SIGNIFICANT CHANGE UP
LYMPHOCYTES # BLD AUTO: 1.79 K/UL — SIGNIFICANT CHANGE UP (ref 1–3.3)
LYMPHOCYTES # BLD AUTO: 21.2 % — SIGNIFICANT CHANGE UP (ref 13–44)
MAGNESIUM SERPL-MCNC: 2.4 MG/DL — SIGNIFICANT CHANGE UP (ref 1.6–2.6)
MCHC RBC-ENTMCNC: 30.9 PG — SIGNIFICANT CHANGE UP (ref 27–34)
MCHC RBC-ENTMCNC: 32.3 GM/DL — SIGNIFICANT CHANGE UP (ref 32–36)
MCV RBC AUTO: 95.5 FL — SIGNIFICANT CHANGE UP (ref 80–100)
MONOCYTES # BLD AUTO: 0.74 K/UL — SIGNIFICANT CHANGE UP (ref 0–0.9)
MONOCYTES NFR BLD AUTO: 8.8 % — SIGNIFICANT CHANGE UP (ref 2–14)
NEUTROPHILS # BLD AUTO: 5.61 K/UL — SIGNIFICANT CHANGE UP (ref 1.8–7.4)
NEUTROPHILS NFR BLD AUTO: 66.5 % — SIGNIFICANT CHANGE UP (ref 43–77)
NON HDL CHOLESTEROL: 46 MG/DL — SIGNIFICANT CHANGE UP
NRBC # BLD: 0 /100 WBCS — SIGNIFICANT CHANGE UP (ref 0–0)
PHOSPHATE SERPL-MCNC: 3.9 MG/DL — SIGNIFICANT CHANGE UP (ref 2.5–4.5)
PLATELET # BLD AUTO: 122 K/UL — LOW (ref 150–400)
POTASSIUM SERPL-MCNC: 5.1 MMOL/L — SIGNIFICANT CHANGE UP (ref 3.5–5.3)
POTASSIUM SERPL-MCNC: 5.6 MMOL/L — HIGH (ref 3.5–5.3)
POTASSIUM SERPL-SCNC: 5.1 MMOL/L — SIGNIFICANT CHANGE UP (ref 3.5–5.3)
POTASSIUM SERPL-SCNC: 5.6 MMOL/L — HIGH (ref 3.5–5.3)
PROT SERPL-MCNC: 8.7 G/DL — HIGH (ref 6–8.3)
PROTHROM AB SERPL-ACNC: 11.8 SEC — SIGNIFICANT CHANGE UP (ref 10.6–13.6)
RBC # BLD: 3.82 M/UL — LOW (ref 4.2–5.8)
RBC # FLD: 14.1 % — SIGNIFICANT CHANGE UP (ref 10.3–14.5)
SODIUM SERPL-SCNC: 137 MMOL/L — SIGNIFICANT CHANGE UP (ref 135–145)
SODIUM SERPL-SCNC: 137 MMOL/L — SIGNIFICANT CHANGE UP (ref 135–145)
TRIGL SERPL-MCNC: 156 MG/DL — HIGH
WBC # BLD: 8.43 K/UL — SIGNIFICANT CHANGE UP (ref 3.8–10.5)
WBC # FLD AUTO: 8.43 K/UL — SIGNIFICANT CHANGE UP (ref 3.8–10.5)

## 2021-05-14 PROCEDURE — 92978 ENDOLUMINL IVUS OCT C 1ST: CPT | Mod: 26,LD

## 2021-05-14 PROCEDURE — 99152 MOD SED SAME PHYS/QHP 5/>YRS: CPT

## 2021-05-14 PROCEDURE — 99222 1ST HOSP IP/OBS MODERATE 55: CPT

## 2021-05-14 PROCEDURE — 93010 ELECTROCARDIOGRAM REPORT: CPT

## 2021-05-14 PROCEDURE — 93454 CORONARY ARTERY ANGIO S&I: CPT | Mod: 26,59

## 2021-05-14 PROCEDURE — 92928 PRQ TCAT PLMT NTRAC ST 1 LES: CPT | Mod: LD

## 2021-05-14 RX ORDER — LISINOPRIL 2.5 MG/1
10 TABLET ORAL ONCE
Refills: 0 | Status: DISCONTINUED | OUTPATIENT
Start: 2021-05-15 | End: 2021-05-15

## 2021-05-14 RX ORDER — TICAGRELOR 90 MG/1
90 TABLET ORAL
Refills: 0 | Status: DISCONTINUED | OUTPATIENT
Start: 2021-05-15 | End: 2021-05-15

## 2021-05-14 RX ORDER — GLUCAGON INJECTION, SOLUTION 0.5 MG/.1ML
1 INJECTION, SOLUTION SUBCUTANEOUS ONCE
Refills: 0 | Status: DISCONTINUED | OUTPATIENT
Start: 2021-05-14 | End: 2021-05-15

## 2021-05-14 RX ORDER — ATORVASTATIN CALCIUM 80 MG/1
80 TABLET, FILM COATED ORAL AT BEDTIME
Refills: 0 | Status: DISCONTINUED | OUTPATIENT
Start: 2021-05-14 | End: 2021-05-15

## 2021-05-14 RX ORDER — ASPIRIN/CALCIUM CARB/MAGNESIUM 324 MG
81 TABLET ORAL ONCE
Refills: 0 | Status: COMPLETED | OUTPATIENT
Start: 2021-05-14 | End: 2021-05-14

## 2021-05-14 RX ORDER — MORPHINE SULFATE 50 MG/1
2 CAPSULE, EXTENDED RELEASE ORAL ONCE
Refills: 0 | Status: DISCONTINUED | OUTPATIENT
Start: 2021-05-14 | End: 2021-05-14

## 2021-05-14 RX ORDER — ASPIRIN/CALCIUM CARB/MAGNESIUM 324 MG
81 TABLET ORAL DAILY
Refills: 0 | Status: DISCONTINUED | OUTPATIENT
Start: 2021-05-15 | End: 2021-05-15

## 2021-05-14 RX ORDER — DEXTROSE 50 % IN WATER 50 %
25 SYRINGE (ML) INTRAVENOUS ONCE
Refills: 0 | Status: DISCONTINUED | OUTPATIENT
Start: 2021-05-14 | End: 2021-05-15

## 2021-05-14 RX ORDER — SEVELAMER CARBONATE 2400 MG/1
1 POWDER, FOR SUSPENSION ORAL
Qty: 0 | Refills: 0 | DISCHARGE

## 2021-05-14 RX ORDER — DEXTROSE 50 % IN WATER 50 %
12.5 SYRINGE (ML) INTRAVENOUS ONCE
Refills: 0 | Status: DISCONTINUED | OUTPATIENT
Start: 2021-05-14 | End: 2021-05-15

## 2021-05-14 RX ORDER — TAMSULOSIN HYDROCHLORIDE 0.4 MG/1
0.4 CAPSULE ORAL AT BEDTIME
Refills: 0 | Status: DISCONTINUED | OUTPATIENT
Start: 2021-05-14 | End: 2021-05-15

## 2021-05-14 RX ORDER — INSULIN NPH HUM/REG INSULIN HM 70-30/ML
3 VIAL (ML) SUBCUTANEOUS
Qty: 0 | Refills: 0 | DISCHARGE

## 2021-05-14 RX ORDER — SODIUM ZIRCONIUM CYCLOSILICATE 10 G/10G
10 POWDER, FOR SUSPENSION ORAL ONCE
Refills: 0 | Status: COMPLETED | OUTPATIENT
Start: 2021-05-14 | End: 2021-05-14

## 2021-05-14 RX ORDER — CARBIDOPA AND LEVODOPA 25; 100 MG/1; MG/1
1 TABLET ORAL
Refills: 0 | Status: DISCONTINUED | OUTPATIENT
Start: 2021-05-14 | End: 2021-05-15

## 2021-05-14 RX ORDER — FERROUS SULFATE 325(65) MG
1 TABLET ORAL
Qty: 0 | Refills: 0 | DISCHARGE

## 2021-05-14 RX ORDER — DEXTROSE 50 % IN WATER 50 %
15 SYRINGE (ML) INTRAVENOUS ONCE
Refills: 0 | Status: DISCONTINUED | OUTPATIENT
Start: 2021-05-14 | End: 2021-05-15

## 2021-05-14 RX ORDER — SODIUM CHLORIDE 9 MG/ML
1000 INJECTION, SOLUTION INTRAVENOUS
Refills: 0 | Status: DISCONTINUED | OUTPATIENT
Start: 2021-05-14 | End: 2021-05-15

## 2021-05-14 RX ORDER — AMLODIPINE BESYLATE 2.5 MG/1
5 TABLET ORAL DAILY
Refills: 0 | Status: DISCONTINUED | OUTPATIENT
Start: 2021-05-14 | End: 2021-05-15

## 2021-05-14 RX ORDER — CARVEDILOL PHOSPHATE 80 MG/1
12.5 CAPSULE, EXTENDED RELEASE ORAL
Refills: 0 | Status: DISCONTINUED | OUTPATIENT
Start: 2021-05-14 | End: 2021-05-15

## 2021-05-14 RX ORDER — CHLORHEXIDINE GLUCONATE 213 G/1000ML
1 SOLUTION TOPICAL ONCE
Refills: 0 | Status: DISCONTINUED | OUTPATIENT
Start: 2021-05-14 | End: 2021-05-14

## 2021-05-14 RX ORDER — SODIUM ZIRCONIUM CYCLOSILICATE 10 G/10G
10 POWDER, FOR SUSPENSION ORAL DAILY
Refills: 0 | Status: DISCONTINUED | OUTPATIENT
Start: 2021-05-15 | End: 2021-05-15

## 2021-05-14 RX ORDER — TICAGRELOR 90 MG/1
90 TABLET ORAL ONCE
Refills: 0 | Status: COMPLETED | OUTPATIENT
Start: 2021-05-14 | End: 2021-05-14

## 2021-05-14 RX ORDER — SODIUM ZIRCONIUM CYCLOSILICATE 10 G/10G
0 POWDER, FOR SUSPENSION ORAL
Qty: 0 | Refills: 0 | DISCHARGE

## 2021-05-14 RX ORDER — INSULIN LISPRO 100/ML
VIAL (ML) SUBCUTANEOUS ONCE
Refills: 0 | Status: COMPLETED | OUTPATIENT
Start: 2021-05-14 | End: 2021-05-14

## 2021-05-14 RX ORDER — ATORVASTATIN CALCIUM 80 MG/1
1 TABLET, FILM COATED ORAL
Qty: 0 | Refills: 0 | DISCHARGE

## 2021-05-14 RX ADMIN — TAMSULOSIN HYDROCHLORIDE 0.4 MILLIGRAM(S): 0.4 CAPSULE ORAL at 22:58

## 2021-05-14 RX ADMIN — Medication 81 MILLIGRAM(S): at 10:48

## 2021-05-14 RX ADMIN — MORPHINE SULFATE 2 MILLIGRAM(S): 50 CAPSULE, EXTENDED RELEASE ORAL at 18:23

## 2021-05-14 RX ADMIN — AMLODIPINE BESYLATE 5 MILLIGRAM(S): 2.5 TABLET ORAL at 23:00

## 2021-05-14 RX ADMIN — ATORVASTATIN CALCIUM 80 MILLIGRAM(S): 80 TABLET, FILM COATED ORAL at 22:58

## 2021-05-14 RX ADMIN — MORPHINE SULFATE 2 MILLIGRAM(S): 50 CAPSULE, EXTENDED RELEASE ORAL at 18:29

## 2021-05-14 RX ADMIN — CARVEDILOL PHOSPHATE 12.5 MILLIGRAM(S): 80 CAPSULE, EXTENDED RELEASE ORAL at 17:52

## 2021-05-14 RX ADMIN — SODIUM ZIRCONIUM CYCLOSILICATE 10 GRAM(S): 10 POWDER, FOR SUSPENSION ORAL at 11:30

## 2021-05-14 RX ADMIN — Medication 0.1 MILLIGRAM(S): at 17:52

## 2021-05-14 RX ADMIN — CARBIDOPA AND LEVODOPA 1 TABLET(S): 25; 100 TABLET ORAL at 17:52

## 2021-05-14 RX ADMIN — TICAGRELOR 90 MILLIGRAM(S): 90 TABLET ORAL at 10:47

## 2021-05-14 NOTE — DISCHARGE NOTE PROVIDER - NSDCCPCAREPLAN_GEN_ALL_CORE_FT
PRINCIPAL DISCHARGE DIAGNOSIS  Diagnosis: CAD (coronary artery disease)  Assessment and Plan of Treatment: You had successful percutaneous coronary intervention with a drug eluding stent placed in your proximal left anterior descending artery.   Your stent in your left circumflex was widely open.  --Continue Aspirin 81mg by mouth daily and Brilinta 90mg  by mouth twice daily.   --The dressing was removed from your groin prior to discharge and you may shower once you get home.   Avoid baths, hot tubs, or swimming for 5 days to prevent infection.   ---If you notice bleeding from the site, hardening and pain at the site, drainage or redness from the site, coolness/paleness of the extremity, swelling, or fever, please call [phone].  *All of your needed prescriptions have been sent electronically to your pharmacy.  --Please follow up with Dr. Ramos in 1-2 weeks at [facility] ([phone]        SECONDARY DISCHARGE DIAGNOSES  Diagnosis: ESRD on hemodialysis  Assessment and Plan of Treatment: You underwent hemodialysis prior to discharge and tolerated it well.    --Please resume your Tuesday, Thursday and Saturday hemodialysis sessions outpatient.  --Continue to follow up with your outpatient Nephrologist for monitoring.       Diagnosis: Diabetes mellitus, type 2  Assessment and Plan of Treatment: You have a diagnosis of diabetes and should continue all of your diabetic medications as prescribed.       Diagnosis: HLD (hyperlipidemia)  Assessment and Plan of Treatment: Please continue your daily statin to ensure your cardiac stent remains open.  --Continue Atorvastatin 80mg daily      Diagnosis: HTN (hypertension)  Assessment and Plan of Treatment: You have a history of elevated blood pressure and you should continue your blood pressure medications as prescribed.  --Please continue Carvedilol (Coreg) 12.5mg by mouth 2 times daily, Amlodipine 5mg by mouth daily and Lisinopril 10mg by mouth daily      Diagnosis: Encounter for cardiac rehabilitation  Assessment and Plan of Treatment: We have provided you with a prescription for cardiac rehab which is medically supervised exercise program for your heart and has been shown to improve the quantity and quality of life of people with heart disease like yours. You should attend cardiac rehab 3 times per week for 12 weeks. We have provided you with a list of nearby facilities. Please call your insurance carrier to determine which of these facilities are covered under your plan.   ---Please bring this prescription with you to your follow up appointment with your cardiologist who can then further assist you to enroll into a cardiac rehab program.       PRINCIPAL DISCHARGE DIAGNOSIS  Diagnosis: CAD (coronary artery disease)  Assessment and Plan of Treatment: You had successful percutaneous coronary intervention with a drug eluding cardaic stent placed in your proximal left anterior descending artery.  Your previous stent in your left circumflex artery was widely open.  --Continue Aspirin 81mg by mouth once daily and Brilinta 90mg by mouth twice daily.   --The dressing was removed from your right groin site prior to discharge and you may shower once you get home. NO strenous activity or heavy lifting more than 5 lbs for 5 days. Avoid baths, hot tubs, or swimming for 5 days to prevent infection.   ---If you notice bleeding from the site, hardening and pain at the site, drainage or redness from the site, coolness/paleness of the extremity, swelling, or fever, please call [phone].  *All of your needed prescriptions have been sent electronically to your pharmacy.  --Please follow up with Dr. Ramos in 1-2 weeks at [facility].        SECONDARY DISCHARGE DIAGNOSES  Diagnosis: ESRD on hemodialysis  Assessment and Plan of Treatment: You underwent hemodialysis prior to discharge and tolerated it well.    --Please resume your Tuesday, Thursday and Saturday hemodialysis sessions outpatient.  --Continue to follow up with your outpatient Nephrologist for monitoring.       Diagnosis: Diabetes mellitus, type 2  Assessment and Plan of Treatment: You have a diagnosis of diabetes and should continue all of your diabetic medications as prescribed.       Diagnosis: HLD (hyperlipidemia)  Assessment and Plan of Treatment: Please continue your daily statin to ensure your cardiac stent remains open. Continue Atorvastatin 80mg daily      Diagnosis: HTN (hypertension)  Assessment and Plan of Treatment: You have a history of elevated blood pressure and you should continue your blood pressure medications as prescribed.  --Please continue Carvedilol (Coreg) 12.5mg by mouth 2 times daily, Amlodipine 5mg by mouth daily and Lisinopril 10mg by mouth daily.      Diagnosis: Encounter for cardiac rehabilitation  Assessment and Plan of Treatment: We have provided you with a prescription for cardiac rehab which is medically supervised exercise program for your heart and has been shown to improve the quantity and quality of life of people with heart disease like yours. You should attend cardiac rehab 3 times per week for 12 weeks. We have provided you with a list of nearby facilities. Please call your insurance carrier to determine which of these facilities are covered under your plan.   ---Please bring this prescription with you to your follow up appointment with your cardiologist who can then further assist you to enroll into a cardiac rehab program.

## 2021-05-14 NOTE — CONSULT NOTE ADULT - SUBJECTIVE AND OBJECTIVE BOX
Patient is a 70y old  Male who presents with a chief complaint of Cardiac cath (12 May 2021 18:33)    HPI:  COVID: Negative 5/12 per [facility]   History obtained via chart review and  Mila ID [id number]    71 y/o, former smoker, Yoruba speaking Male poor historian w/ PMHX HTN, HLD, T2DM, CAD s/p multiple PCIs (most recent 3/12/21 w/ SURYA x1 OM1, patent dLCx stent, residual dLAD, access: L groin PC), Hyperphosphatemia, ESRD on HD T/Th/Sat (s/p revision of RUE AV fistula), CVA (2017 - residual R sided weakness), Parkinson's dz, hypothyroidism, BPH, COVID with underlying PNA, who now presents to [facility] referred by Cardiologist Dr. Ramos for staged Rota/PCI of known residual dLAD disease. Pt endorses continued intermittent 4/10 substernal CP radiating to the jaw since his last procedure w/ onset at rest and alleviated w/ rest and tylenol. Pt denies any SOB, dizziness, syncope, LE edema, PND/orthopnea, n/v, fever/chills, and blood in the stool. Pt reports good compliance w/ DAPT. In light of patient's risk factors, known residual CAD, and CCS class 4 anginal/anginal-equivalent symptoms, patient is referred to [facility] for staged PCI. Now s/p procedure. Renal consulted for dialysis management. HyperK 5.6 peroperatively with no ECG changes, given lokelma 10g. Pt isplanned for overnight admission and dialysis in AM.    PAST MEDICAL & SURGICAL HISTORY:  Diabetes    Hypertension    Hyperphosphatemia    Coronary artery disease  with stent    Hyperlipidemia    Stroke  2017, residual right sided weakness    History of BPH    Back pain    ESRD on hemodialysis    Parkinsons disease    ESRD (end stage renal disease)    Hypertension    CAD (coronary artery disease)    S/P dialysis catheter insertion  Right chest perma cath removed in jan 2020    AVF (arteriovenous fistula)  right arm x with revision, left arm x 1    Stented coronary artery  2004-5 at [facility]    S/P primary angioplasty with coronary stent        Allergies:  No Known Allergies      Home Medications:   amLODIPine   Tablet 5 milliGRAM(s) Oral daily  aspirin enteric coated 81 milliGRAM(s) Oral daily  atorvastatin 80 milliGRAM(s) Oral at bedtime  carbidopa/levodopa  25/100 1 Tablet(s) Oral <User Schedule>  carvedilol 12.5 milliGRAM(s) Oral two times a day  cloNIDine 0.1 milliGRAM(s) Oral two times a day  dextrose 40% Gel 15 Gram(s) Oral Once  dextrose 5%. 1000 milliLiter(s) IV Continuous <Continuous>  dextrose 5%. 1000 milliLiter(s) IV Continuous <Continuous>  dextrose 50% Injectable 25 Gram(s) IV Push Once  dextrose 50% Injectable 12.5 Gram(s) IV Push Once  dextrose 50% Injectable 25 Gram(s) IV Push Once  glucagon  Injectable 1 milliGRAM(s) IntraMuscular Once  insulin lispro (ADMELOG) corrective regimen sliding scale   SubCutaneous Once  tamsulosin 0.4 milliGRAM(s) Oral at bedtime      Hospital Medications:   MEDICATIONS  (STANDING):  amLODIPine   Tablet 5 milliGRAM(s) Oral daily  aspirin enteric coated 81 milliGRAM(s) Oral daily  atorvastatin 80 milliGRAM(s) Oral at bedtime  carbidopa/levodopa  25/100 1 Tablet(s) Oral <User Schedule>  carvedilol 12.5 milliGRAM(s) Oral two times a day  cloNIDine 0.1 milliGRAM(s) Oral two times a day  dextrose 40% Gel 15 Gram(s) Oral Once  dextrose 5%. 1000 milliLiter(s) (50 mL/Hr) IV Continuous <Continuous>  dextrose 5%. 1000 milliLiter(s) (100 mL/Hr) IV Continuous <Continuous>  dextrose 50% Injectable 25 Gram(s) IV Push Once  dextrose 50% Injectable 12.5 Gram(s) IV Push Once  dextrose 50% Injectable 25 Gram(s) IV Push Once  glucagon  Injectable 1 milliGRAM(s) IntraMuscular Once  insulin lispro (ADMELOG) corrective regimen sliding scale   SubCutaneous Once  tamsulosin 0.4 milliGRAM(s) Oral at bedtime      SOCIAL HISTORY:  Denies ETOh, Smoking,     Family History:  FAMILY HISTORY:  FH: type 2 diabetes  In father    ROS:  RESPIRATORY: No shortness of breath  CARDIOVASCULAR: No Chest pain  MUSCULOSKELETAL: No leg swelling    VITALS:  T(F): --  HR: --  BP: --  RR: --  SpO2: --  Wt(kg): --    Height (cm): 157.5 (05-14 @ 10:50)  Weight (kg): 60.8 (05-14 @ 10:50)  BMI (kg/m2): 24.5 (05-14 @ 10:50)  BSA (m2): 1.61 (05-14 @ 10:50)  CAPILLARY BLOOD GLUCOSE      POCT Blood Glucose.: 125 mg/dL (14 May 2021 13:40)      PHYSICAL EXAM:  GENERAL: Alert, awake, oriented x3 on RA  CHEST/LUNG: Bilateral clear breath sounds, no rales  HEART: Regular rate and rhythm, no murmur  EXTREMITIES: trace pedal oedema  ACCESS: RUE AVF w/bruit and thrill     LABS:  05-14    137  |  90<L>  |  38<H>  ----------------------------<  155<H>  5.6<H>   |  31  |  6.89<H>    Ca    10.2      14 May 2021 09:30  Phos  3.9     05-14    TPro  8.7<H>  /  Alb  4.5  /  TBili  0.4  /  DBili      /  AST  14  /  ALT  9<L>  /  AlkPhos  206<H>  05-14    Creatinine Trend: 6.89 <--                        11.8   8.43  )-----------( 122      ( 14 May 2021 09:30 )             36.5       Urine Studies:            RADIOLOGY & ADDITIONAL STUDIES:    EKG findings reviewed.    Imaging Personally Reviewed:  [x] YES  [ ] NO    Consultant(s) and primary physician Notes Reviewed:  [x] YES  [ ] NO    Care Discussed with Primary team/ Consultants/Other Providers [x] YES  [ ] NO

## 2021-05-14 NOTE — DISCHARGE NOTE PROVIDER - INSTRUCTIONS
A Mediterranean Diet is recommended! Some suggestions include continue incorporating 2 or more servings per day of vegetables, fruits, and whole grains. Increase intake of fish and legumes/beans to 2 or more servings per week. Aim to increase intake of healthy fats, such as olive oil and avocados, and have a handful of nuts/seeds most days. Reduce red/processed meat consumption to 2 or fewer times per week

## 2021-05-14 NOTE — DISCHARGE NOTE PROVIDER - PROVIDER TOKENS
FREE:[LAST:[shanti],FIRST:[wai],PHONE:[(   )    -],FAX:[(   )    -],ADDRESS:[[facility]  [phone]]]

## 2021-05-14 NOTE — PATIENT PROFILE ADULT - FUNCTIONAL SCREEN CURRENT LEVEL: COMMUNICATION, MLM
Patient with reproducible pain on exam. No midline pain. Recommend ice/hest therapy, f/u with spine and  take pain meds PRN.
0 = understands/communicates without difficulty

## 2021-05-14 NOTE — CONSULT NOTE ADULT - ATTENDING COMMENTS
ESRD on HD TTS s/p HD yesterday admitted for planned cardiac cath (staged) s/p first today. Hyperkalemia s/p Lokelma 10gm, Hold Lisinopril tmrw morning if remains hyperkalemic. Plan for dialysis tmrw

## 2021-05-14 NOTE — DISCHARGE NOTE PROVIDER - NSDCFUADDINST_GEN_ALL_CORE_FT
No heavy lifting, strenuous activity, bending, straining or unnecessary stair climbing  for 2 weeks. No sex for 1 week.  No driving for 2 days. You may shower 24 hours following procedure but avoid baths and swimming for 1 week. Check groin site for bleeding and/or swelling daily following procedure. Call your doctor/cardiologist immediately should it occur or if you have increased/persistent pain at the site. Follow up with your cardiologist in 1- 2 weeks. You may call [facility] Cardiovascular unit at  [phone] after office hours and weekends  with any questions or concerns following your procedure. Take medications as prescribed.

## 2021-05-14 NOTE — DISCHARGE NOTE PROVIDER - HOSPITAL COURSE
69 y/o, former smoker, St [place] speaking Male poor historian w/ PMHX HTN, HLD, T2DM, CAD s/p multiple PCIs (most recent 3/12/21 w/ SURYA x1 OM1, patent dLCx stent, residual dLAD, access: L groin PC), Hyperphosphatemia, ESRD on HD T/Th/Sat (s/p revision of RUE AV fistula), CVA (2017 - residual R sided weakness), Parkinson's dz, hypothyroidism, BPH, COVID with underlying PNA, who now presents to [facility] referred by Cardiologist Dr. Ramos for staged Rota/PCI of known residual dLAD disease. Pt endorses continued intermittent 4/10 substernal CP radiating to the jaw since his last procedure w/ onset at rest and alleviated w/ rest and tylenol. Pt denies any SOB, dizziness, syncope, LE edema, PND/orthopnea, n/v, fever/chills, and blood in the stool. Pt reports good compliance w/ DAPT. In light of patient's risk factors, known residual CAD, and CCS class 4 anginal/anginal-equivalent symptoms, patient is referred to [facility] for staged PCI.   ECHO 2/2/21 which showed mild MR, EF 55%, basal inferolateral, basal anterolateral, mid inferolateral and mid anterolateral segments are hypokinetic.    Pt is s/p Staged PCI 5/14:  Lithotripsy assisted PCI/SURYA pLAD, Widely patent previous stent in pLCx, Mod ISR in OM 3, EF 55 TTE, no LV Gram done.  RFA PC.  Pt noted with hyperkalemia 5.6 precath, given Lokelma 10mg stat x 1 with repeat BMP with K+ _____________. Total contrast 106ml given intra-procedure.  Post cath, pt denies any anginal symptoms and underwent  HD on Saturday morning without any complications.   Pt is to resume his regular schedule on Tues, Thurs and Saturdays outpatient.     Pt seen at bedside today, examined found NAD, HD stable, denies any complaints of CP, SOB or palpitations at this time.  VSS.  EKG showed no acute ischemic events.  Tele Monitor/Labs reviewed.  Right Groin access stable without hematoma or bleed.  Right pulse stable, 2+, back to baseline.   Pt is to continue ASA/Brilinta and Atorvastatin as prescribed.   Pt is deemed stable for discharge per Dr De La Cruz  with follow up in 1-2 weeks with Dr Ramos for post discharge check-up.  Rx sent to pt preferred pharmacy.  Discharge plans and instruction discussed with pt who is agreeable with plan.  Pt is advised to return to the nearest ED if worsening symptoms of CP, SOB or palpitations or severe bleeding from access site occurs.    Dx:  Unstable Angina: YES         If YES: 7 day Follow-Up Appointment Made: Yes           Cardiac Rehab (Unstable Angina):            *Education on benefits of Cardiac Rehab provided to patient: Yes         *Referral and Prescription Given for Cardiac Rehab : Yes         *Pt given list of locations & instructed to contact their insurance company to review list of participating providers+    Statin Prescribed (PCI this admission):  Yes  DAPT: Prescriptions for Aspirin/Brilinta:  Yes                      69 y/o, former smoker, Greek speaking Male, poor historian w/ PMHx HTN, HLD, T2DM, CAD s/p multiple PCIs (most recent 3/12/21 w/ SURYA x1 OM1, patent dLCx stent, residual dLAD, access: L groin PC), Hyperphosphatemia, ESRD on HD T/Th/Sat (s/p revision of RUE AV fistula), CVA (2017 - residual R sided weakness), Parkinson's dz, hypothyroidism, BPH, COVID with underlying PNA, who now presents to [facility] referred by Cardiologist Dr. Ramos for staged Rota/PCI of known residual dLAD disease. Pt endorses continued intermittent 4/10 substernal CP radiating to the jaw since his last procedure w/ onset at rest and alleviated w/ rest and Tylenol. Pt denies any SOB, dizziness, syncope, LE edema, PND/orthopnea, n/v, fever/chills, and blood in the stool. Pt reports good compliance w/ DAPT. In light of patient's risk factors, known residual CAD, and CCS class 4 anginal/anginal-equivalent symptoms, patient is referred to [facility] for staged PCI. ECHO 2/2/21 which showed mild MR, EF 55%, basal inferolateral, basal anterolateral, mid inferolateral and mid anterolateral segments are hypokinetic.    Pt is s/p Staged PCI 5/14: Lithotripsy assisted PCI/SURYA pLAD, widely patent previous stent in pLCx, moderate ISR in OM 3, EF 55% on TTE, no LV Gram done.  R groin site Perclose, site w/ mild oozing overnight s/p manual compression.  Pt noted with hyperkalemia 5.6 precath, given Lokelma 10mg stat x 1 with repeat BMP with K5.1. Total contrast 106ml given intra-procedure.  Post cath, pt denies any anginal symptoms. Hyperkalemic 6 in AM and underwent HD on Saturday morning without any complications. Pt is to resume his regular schedule on Tues, Thurs and Saturdays as outpatient.     Pt seen at bedside on day of discharge, examined found NAD, HD stable, denies any complaints of CP, SOB or palpitations at this time.  VSS.  EKG showed no acute ischemic events.  Tele Monitor/Labs reviewed.  Right Groin access stable without hematoma or bleed.  Right pulse stable, 2+, back to baseline.  Pt is to continue ASA/Brilinta and Atorvastatin as prescribed.   Pt is deemed stable for discharge per Dr De La Cruz with follow up in 1-2 weeks with Dr Ramos for post discharge check-up.  Rx sent to pt preferred pharmacy.  Discharge plans and instruction discussed with pt who is agreeable with plan.  Pt is advised to return to the nearest ED if worsening symptoms of CP, SOB or palpitations or severe bleeding from access site occurs.    Cardiac Rehab (Unstable Angina):            *Education on benefits of Cardiac Rehab provided to patient: Yes         *Referral and Prescription Given for Cardiac Rehab : Yes         *Pt given list of locations & instructed to contact their insurance company to review list of participating providers               Discussed with patient via  Eleanor ID# [id number].

## 2021-05-14 NOTE — DISCHARGE NOTE PROVIDER - NSDCMRMEDTOKEN_GEN_ALL_CORE_FT
amLODIPine 5 mg oral tablet: 1 tab(s) orally once a day  aspirin 81 mg oral tablet: 1 tab(s) orally once a day  atorvastatin 80 mg oral tablet: 1 tab(s) orally once a day (at bedtime)  carbidopa-levodopa 25 mg-100 mg oral tablet: 1 tab(s) orally 2 times a day  carvedilol 12.5 mg oral tablet: 1 tab(s) orally 2 times a day  cloNIDine 0.1 mg oral tablet: 1 tab(s) orally 2 times a day  ferrous sulfate 324 mg (65 mg elemental iron) oral tablet: 1 tab(s) orally 2 times a day  lisinopril 10 mg oral tablet: 1 tab(s) orally once a day  Lokelma 5 g oral powder for reconstitution: orally once a day  NovoLIN R 100 units/mL injectable solution: 3 unit(s) injectable 3 times a day before meals  Protonix 40 mg oral delayed release tablet: 1 tab(s) orally once a day  Renal Caps oral capsule: 1 cap(s) orally once a day  Renal Caps oral capsule: 1 cap(s) orally once a day  sevelamer carbonate 800 mg oral tablet: 3 tab(s) orally 3 times a day (with meals)  tamsulosin 0.4 mg oral capsule: 1 cap(s) orally once a day (at bedtime)  ticagrelor 90 mg oral tablet: 1 tab(s) orally every 12 hours   amLODIPine 5 mg oral tablet: 1 tab(s) orally once a day  aspirin 81 mg oral delayed release tablet: 1 tab(s) orally once a day  atorvastatin 80 mg oral tablet: 1 tab(s) orally once a day (at bedtime)  carbidopa-levodopa 25 mg-100 mg oral tablet: 1 tab(s) orally 2 times a day  carvedilol 12.5 mg oral tablet: 1 tab(s) orally 2 times a day  cloNIDine 0.1 mg oral tablet: 1 tab(s) orally 2 times a day  ferrous sulfate 324 mg (65 mg elemental iron) oral tablet: 1 tab(s) orally 2 times a day  lisinopril 10 mg oral tablet: 1 tab(s) orally once a day  Lokelma 5 g oral powder for reconstitution: orally once a day  NovoLIN R 100 units/mL injectable solution: 3 unit(s) injectable 3 times a day before meals  Protonix 40 mg oral delayed release tablet: 1 tab(s) orally once a day  Renal Caps oral capsule: 1 cap(s) orally once a day  Renal Caps oral capsule: 1 cap(s) orally once a day  sevelamer carbonate 800 mg oral tablet: 3 tab(s) orally 3 times a day (with meals)  tamsulosin 0.4 mg oral capsule: 1 cap(s) orally once a day (at bedtime)  ticagrelor 90 mg oral tablet: 1 tab(s) orally every 12 hours   amLODIPine 5 mg oral tablet: 1 tab(s) orally once a day  aspirin 81 mg oral delayed release tablet: 1 tab(s) orally once a day  atorvastatin 80 mg oral tablet: 1 tab(s) orally once a day (at bedtime)  carbidopa-levodopa 25 mg-100 mg oral tablet: 1 tab(s) orally 2 times a day  Cardiac Rehab. 3 times a week for 12 weeks. Dx CAD s/p PCI:   carvedilol 12.5 mg oral tablet: 1 tab(s) orally 2 times a day  cloNIDine 0.1 mg oral tablet: 1 tab(s) orally 2 times a day  ferrous sulfate 324 mg (65 mg elemental iron) oral tablet: 1 tab(s) orally 2 times a day  lisinopril 10 mg oral tablet: 1 tab(s) orally once a day  Lokelma 5 g oral powder for reconstitution: orally once a day  NovoLIN R 100 units/mL injectable solution: 3 unit(s) injectable 3 times a day before meals  Protonix 40 mg oral delayed release tablet: 1 tab(s) orally once a day  Renal Caps oral capsule: 1 cap(s) orally once a day  Renal Caps oral capsule: 1 cap(s) orally once a day  sevelamer carbonate 800 mg oral tablet: 3 tab(s) orally 3 times a day (with meals)  tamsulosin 0.4 mg oral capsule: 1 cap(s) orally once a day (at bedtime)  ticagrelor 90 mg oral tablet: 1 tab(s) orally every 12 hours

## 2021-05-14 NOTE — CONSULT NOTE ADULT - ASSESSMENT
Assessment/Plan:   69 y/o, former smoker, [place] Konger speaking Male poor historian w/ PMHX HTN, HLD, T2DM, CAD s/p multiple PCIs (most recent 3/12/21 w/ SURYA x1 OM1, patent dLCx stent, residual dLAD, access: L groin PC), Hyperphosphatemia, ESRD on HD T/Th/Sat (s/p revision of RUE AV fistula), CVA (2017 - residual R sided weakness), Parkinson's dz, hypothyroidism, BPH, COVID with underlying PNA, who now presents to [facility] for staged PCI. Now s/p procedure. Renal consulted for dialysis management.     ESRD on HD TTS @ [address]  Usual rx 3.5h 2.5L; records pending  HD tomorrow per regular schedule  EDW 134lbs  Euvolaemic, hyperK s/p 10g lokelma; repeat labs pending  BP: UF with HD, continue home meds, norvasc 5, coreg 12.5q12h. clonidine 0.1 BID  Anaemia- epo with HD, on PO iron  BMD: phos at goal    Daily weights, strict I&Os, renally dose meds, renal diet    Thank you for the opportunity to participate in the care of your patient. The nephrology service remains available to assist with any questions or concerns. Please feel free to reach us by paging the on-call nephrology fellow for urgent issues or as below.     Julia Mg M.D.   PGY-4  Pager: [phone]

## 2021-05-15 ENCOUNTER — TRANSCRIPTION ENCOUNTER (OUTPATIENT)
Age: 71
End: 2021-05-15

## 2021-05-15 VITALS — TEMPERATURE: 98 F

## 2021-05-15 LAB
ANION GAP SERPL CALC-SCNC: 18 MMOL/L — HIGH (ref 5–17)
BUN SERPL-MCNC: 54 MG/DL — HIGH (ref 7–23)
CALCIUM SERPL-MCNC: 10.2 MG/DL — SIGNIFICANT CHANGE UP (ref 8.4–10.5)
CHLORIDE SERPL-SCNC: 90 MMOL/L — LOW (ref 96–108)
CO2 SERPL-SCNC: 28 MMOL/L — SIGNIFICANT CHANGE UP (ref 22–31)
COVID-19 SPIKE DOMAIN AB INTERP: POSITIVE
COVID-19 SPIKE DOMAIN ANTIBODY RESULT: >250 U/ML — HIGH
CREAT SERPL-MCNC: 8.82 MG/DL — HIGH (ref 0.5–1.3)
GLUCOSE SERPL-MCNC: 100 MG/DL — HIGH (ref 70–99)
HBV SURFACE AB SER-ACNC: REACTIVE
HCT VFR BLD CALC: 34.3 % — LOW (ref 39–50)
HGB BLD-MCNC: 11.2 G/DL — LOW (ref 13–17)
MAGNESIUM SERPL-MCNC: 2.6 MG/DL — SIGNIFICANT CHANGE UP (ref 1.6–2.6)
MCHC RBC-ENTMCNC: 30.5 PG — SIGNIFICANT CHANGE UP (ref 27–34)
MCHC RBC-ENTMCNC: 32.7 GM/DL — SIGNIFICANT CHANGE UP (ref 32–36)
MCV RBC AUTO: 93.5 FL — SIGNIFICANT CHANGE UP (ref 80–100)
NRBC # BLD: 0 /100 WBCS — SIGNIFICANT CHANGE UP (ref 0–0)
PLATELET # BLD AUTO: 105 K/UL — LOW (ref 150–400)
POTASSIUM SERPL-MCNC: 6 MMOL/L — HIGH (ref 3.5–5.3)
POTASSIUM SERPL-SCNC: 6 MMOL/L — HIGH (ref 3.5–5.3)
RBC # BLD: 3.67 M/UL — LOW (ref 4.2–5.8)
RBC # FLD: 14.1 % — SIGNIFICANT CHANGE UP (ref 10.3–14.5)
SARS-COV-2 IGG+IGM SERPL QL IA: >250 U/ML — HIGH
SARS-COV-2 IGG+IGM SERPL QL IA: POSITIVE
SODIUM SERPL-SCNC: 136 MMOL/L — SIGNIFICANT CHANGE UP (ref 135–145)
WBC # BLD: 6.99 K/UL — SIGNIFICANT CHANGE UP (ref 3.8–10.5)
WBC # FLD AUTO: 6.99 K/UL — SIGNIFICANT CHANGE UP (ref 3.8–10.5)

## 2021-05-15 PROCEDURE — 86706 HEP B SURFACE ANTIBODY: CPT

## 2021-05-15 PROCEDURE — 85027 COMPLETE CBC AUTOMATED: CPT

## 2021-05-15 PROCEDURE — 84100 ASSAY OF PHOSPHORUS: CPT

## 2021-05-15 PROCEDURE — C1874: CPT

## 2021-05-15 PROCEDURE — 80053 COMPREHEN METABOLIC PANEL: CPT

## 2021-05-15 PROCEDURE — 85025 COMPLETE CBC W/AUTO DIFF WBC: CPT

## 2021-05-15 PROCEDURE — 85610 PROTHROMBIN TIME: CPT

## 2021-05-15 PROCEDURE — 83735 ASSAY OF MAGNESIUM: CPT

## 2021-05-15 PROCEDURE — C1725: CPT

## 2021-05-15 PROCEDURE — 80048 BASIC METABOLIC PNL TOTAL CA: CPT

## 2021-05-15 PROCEDURE — 83036 HEMOGLOBIN GLYCOSYLATED A1C: CPT

## 2021-05-15 PROCEDURE — 85730 THROMBOPLASTIN TIME PARTIAL: CPT

## 2021-05-15 PROCEDURE — C1887: CPT

## 2021-05-15 PROCEDURE — C1760: CPT

## 2021-05-15 PROCEDURE — 36415 COLL VENOUS BLD VENIPUNCTURE: CPT

## 2021-05-15 PROCEDURE — 93005 ELECTROCARDIOGRAM TRACING: CPT

## 2021-05-15 PROCEDURE — 90935 HEMODIALYSIS ONE EVALUATION: CPT

## 2021-05-15 PROCEDURE — C1769: CPT

## 2021-05-15 PROCEDURE — 99232 SBSQ HOSP IP/OBS MODERATE 35: CPT

## 2021-05-15 PROCEDURE — 86769 SARS-COV-2 COVID-19 ANTIBODY: CPT

## 2021-05-15 PROCEDURE — 82962 GLUCOSE BLOOD TEST: CPT

## 2021-05-15 PROCEDURE — C1894: CPT

## 2021-05-15 PROCEDURE — 80061 LIPID PANEL: CPT

## 2021-05-15 PROCEDURE — C1753: CPT

## 2021-05-15 RX ORDER — ACETAMINOPHEN 500 MG
650 TABLET ORAL ONCE
Refills: 0 | Status: COMPLETED | OUTPATIENT
Start: 2021-05-15 | End: 2021-05-15

## 2021-05-15 RX ORDER — ASPIRIN/CALCIUM CARB/MAGNESIUM 324 MG
1 TABLET ORAL
Qty: 30 | Refills: 11
Start: 2021-05-15 | End: 2022-05-09

## 2021-05-15 RX ORDER — ASPIRIN/CALCIUM CARB/MAGNESIUM 324 MG
1 TABLET ORAL
Qty: 0 | Refills: 0 | DISCHARGE

## 2021-05-15 RX ORDER — TICAGRELOR 90 MG/1
1 TABLET ORAL
Qty: 60 | Refills: 11
Start: 2021-05-15 | End: 2022-05-09

## 2021-05-15 RX ADMIN — Medication 650 MILLIGRAM(S): at 11:12

## 2021-05-15 RX ADMIN — Medication 81 MILLIGRAM(S): at 12:32

## 2021-05-15 RX ADMIN — CARVEDILOL PHOSPHATE 12.5 MILLIGRAM(S): 80 CAPSULE, EXTENDED RELEASE ORAL at 07:14

## 2021-05-15 RX ADMIN — Medication 0.1 MILLIGRAM(S): at 07:14

## 2021-05-15 RX ADMIN — SODIUM ZIRCONIUM CYCLOSILICATE 10 GRAM(S): 10 POWDER, FOR SUSPENSION ORAL at 12:32

## 2021-05-15 RX ADMIN — Medication 650 MILLIGRAM(S): at 12:30

## 2021-05-15 RX ADMIN — CARBIDOPA AND LEVODOPA 1 TABLET(S): 25; 100 TABLET ORAL at 12:32

## 2021-05-15 RX ADMIN — TICAGRELOR 90 MILLIGRAM(S): 90 TABLET ORAL at 07:14

## 2021-05-15 RX ADMIN — AMLODIPINE BESYLATE 5 MILLIGRAM(S): 2.5 TABLET ORAL at 07:14

## 2021-05-15 NOTE — DISCHARGE NOTE NURSING/CASE MANAGEMENT/SOCIAL WORK - NSTRANSFERDENTURES_GEN_A_NUR
----- Message from Blake Germain MD sent at 9/5/2019  9:08 AM CDT -----  Please let her know that all of her blood work looks good.  Her sugar is perfect at 5.8% A1c.   upper/lower

## 2021-05-15 NOTE — PROGRESS NOTE ADULT - SUBJECTIVE AND OBJECTIVE BOX
Patient is a 70y Male seen and evaluated at bedside during HD. NAD, denies any complaints. Pending dc today.    Meds:    amLODIPine   Tablet 5 daily  aspirin enteric coated 81 daily  atorvastatin 80 at bedtime  carbidopa/levodopa  25/100 1 <User Schedule>  carvedilol 12.5 two times a day  cloNIDine 0.1 two times a day  dextrose 40% Gel 15 Once  dextrose 5%. 1000 <Continuous>  dextrose 5%. 1000 <Continuous>  dextrose 50% Injectable 25 Once  dextrose 50% Injectable 12.5 Once  dextrose 50% Injectable 25 Once  glucagon  Injectable 1 Once  sodium zirconium cyclosilicate 10 daily  tamsulosin 0.4 at bedtime  ticagrelor 90 two times a day      T(C): , Max: 36.7 (05-15-21 @ 06:16)  T(F): , Max: 98 (05-15-21 @ 06:16)  HR: 69 (05-15-21 @ 09:00)  BP: 137/55 (05-15-21 @ 09:00)  BP(mean): --  RR: 16 (05-15-21 @ 09:00)  SpO2: 99% (05-15-21 @ 09:00)  Wt(kg): --    05-14 @ 07:01  -  05-15 @ 07:00  --------------------------------------------------------  IN: 180 mL / OUT: 0 mL / NET: 180 mL      Height (cm): 157.5 (05-14 @ 10:50)  Weight (kg): 60.8 (05-14 @ 10:50)  BMI (kg/m2): 24.5 (05-14 @ 10:50)  BSA (m2): 1.61 (05-14 @ 10:50)    Review of Systems:  RESPIRATORY: No shortness of breath  CARDIOVASCULAR: No Chest pain  MUSCULOSKELETAL: No leg swelling    PHYSICAL EXAM:  GENERAL: Alert, awake, oriented x3 on RA  CHEST/LUNG: Bilateral clear breath sounds, no rales  HEART: Regular rate and rhythm, no murmur  EXTREMITIES: trace pedal oedema  ACCESS: RUE AVF w/bruit and thrill     LABS:                        11.2   6.99  )-----------( 105      ( 15 May 2021 06:45 )             34.3     05-15    136  |  90<L>  |  54<H>  ----------------------------<  100<H>  6.0<H>   |  28  |  8.82<H>    Ca    10.2      15 May 2021 07:22  Phos  3.9     05-14  Mg     2.6     05-15    TPro  8.7<H>  /  Alb  4.5  /  TBili  0.4  /  DBili  x   /  AST  14  /  ALT  9<L>  /  AlkPhos  206<H>  05-14      PT/INR - ( 14 May 2021 09:30 )   PT: 11.8 sec;   INR: 0.98          PTT - ( 14 May 2021 09:30 )  PTT:33.3 sec          RADIOLOGY & ADDITIONAL STUDIES:

## 2021-05-15 NOTE — PROGRESS NOTE ADULT - ASSESSMENT
71 y/o, former smoker, Kyrgyz speaking Male poor historian w/ PMHX HTN, HLD, T2DM, CAD s/p multiple PCIs (most recent 3/12/21 w/ SURYA x1 OM1, patent dLCx stent, residual dLAD, access: L groin PC), Hyperphosphatemia, ESRD on HD T/Th/Sat (s/p revision of RUE AV fistula), CVA (2017 - residual R sided weakness), Parkinson's dz, hypothyroidism, BPH, COVID with underlying PNA, who now presents to [facility] for staged PCI. Now s/p procedure. Renal consulted for dialysis management.     ESRD on HD TTS @ [address]  Usual rx 3.5h 2.5L  HD today per regular schedule  EDW 134lbs  BP: UF with HD, continue home meds, norvasc 5, coreg 12.5q12h. clonidine 0.1 BID  Anaemia- no epo with HD- hgb at goal, on PO iron  BMD: phos at goal    Dialyzer: Optiflux [id number]         QB: 400 mL/min         QD: 500 mL/min      K bath: 2  Goal UF: 2.5L                Duration: 210 min     Patient is tolerating the procedure well. Continue full hemodialysis treatment as prescribed.  Daily weights, strict I&Os, renally dose meds, renal diet

## 2021-05-20 DIAGNOSIS — Z95.5 PRESENCE OF CORONARY ANGIOPLASTY IMPLANT AND GRAFT: ICD-10-CM

## 2021-05-20 DIAGNOSIS — E87.5 HYPERKALEMIA: ICD-10-CM

## 2021-05-20 DIAGNOSIS — I34.0 NONRHEUMATIC MITRAL (VALVE) INSUFFICIENCY: ICD-10-CM

## 2021-05-20 DIAGNOSIS — R07.9 CHEST PAIN, UNSPECIFIED: ICD-10-CM

## 2021-05-20 DIAGNOSIS — I12.0 HYPERTENSIVE CHRONIC KIDNEY DISEASE WITH STAGE 5 CHRONIC KIDNEY DISEASE OR END STAGE RENAL DISEASE: ICD-10-CM

## 2021-05-20 DIAGNOSIS — G20 PARKINSON'S DISEASE: ICD-10-CM

## 2021-05-20 DIAGNOSIS — I25.10 ATHEROSCLEROTIC HEART DISEASE OF NATIVE CORONARY ARTERY WITHOUT ANGINA PECTORIS: ICD-10-CM

## 2021-05-20 DIAGNOSIS — Z86.16 PERSONAL HISTORY OF COVID-19: ICD-10-CM

## 2021-05-20 DIAGNOSIS — I25.118 ATHEROSCLEROTIC HEART DISEASE OF NATIVE CORONARY ARTERY WITH OTHER FORMS OF ANGINA PECTORIS: ICD-10-CM

## 2021-05-20 DIAGNOSIS — Z79.82 LONG TERM (CURRENT) USE OF ASPIRIN: ICD-10-CM

## 2021-05-20 DIAGNOSIS — N40.0 BENIGN PROSTATIC HYPERPLASIA WITHOUT LOWER URINARY TRACT SYMPTOMS: ICD-10-CM

## 2021-05-20 DIAGNOSIS — N18.6 END STAGE RENAL DISEASE: ICD-10-CM

## 2021-05-20 DIAGNOSIS — E03.9 HYPOTHYROIDISM, UNSPECIFIED: ICD-10-CM

## 2021-05-20 DIAGNOSIS — Z99.2 DEPENDENCE ON RENAL DIALYSIS: ICD-10-CM

## 2021-05-20 DIAGNOSIS — Z87.891 PERSONAL HISTORY OF NICOTINE DEPENDENCE: ICD-10-CM

## 2021-05-20 DIAGNOSIS — E78.5 HYPERLIPIDEMIA, UNSPECIFIED: ICD-10-CM

## 2021-05-20 DIAGNOSIS — E11.22 TYPE 2 DIABETES MELLITUS WITH DIABETIC CHRONIC KIDNEY DISEASE: ICD-10-CM

## 2021-05-20 DIAGNOSIS — I69.351 HEMIPLEGIA AND HEMIPARESIS FOLLOWING CEREBRAL INFARCTION AFFECTING RIGHT DOMINANT SIDE: ICD-10-CM

## 2021-05-20 DIAGNOSIS — Z79.4 LONG TERM (CURRENT) USE OF INSULIN: ICD-10-CM

## 2021-06-23 VITALS
TEMPERATURE: 98 F | SYSTOLIC BLOOD PRESSURE: 126 MMHG | OXYGEN SATURATION: 100 % | DIASTOLIC BLOOD PRESSURE: 58 MMHG | HEART RATE: 78 BPM | RESPIRATION RATE: 15 BRPM

## 2021-06-23 NOTE — H&P ADULT - HISTORY OF PRESENT ILLNESS
COVID: N  Pharmacy: [facility] [address], [place] [address] [phone]  Escort:   Cardiologist: Dr. Ramos    71 y/o, former smoker, Amharic speaking Male poor historian w/ PMHX HTN, HLD, T2DM, CAD s/p multiple PCIs (most recent 3/12/21 w/ SURYA x1 OM1, patent dLCx stent, residual dLAD, access: L groin PC), Hyperphosphatemia, ESRD on HD T/Th/Sat (s/p revision of RUE AV fistula), CVA (2017 - residual R sided weakness), Parkinson's dz, hypothyroidism, BPH, COVID with underlying PNA, who now presents to [facility] referred by Cardiologist Dr. Ramos for staged Rota/PCI of known residual dLAD disease. Pt endorses continued intermittent 4/10 substernal CP radiating to the jaw since his last procedure w/ onset at rest and alleviated w/ rest and tylenol. Pt denies any SOB, dizziness, syncope, LE edema, PND/orthopnea, n/v, fever/chills, and blood in the stool. Pt reports good compliance w/ DAPT. In light of patient's risk factors, known residual CAD, and CCS class 4 anginal/anginal-equivalent symptoms, patient is referred to [facility] for staged PCI.     ECHO 2/2/21 which showed mild MR, EF 55%, basal inferolateral, basal anterolateral, mid inferolateral and mid anterolateral segments are hypokinetic.   SKELETON     COVID:  Vaccinated  Pharmacy: [facility] [address], [place] [address] [phone]  Escort:   Cardiologist: Dr. Ramos    71 y/o,  former smoker, Citizen of [place] speaking Male poor historian w/ PMHX HTN, HLD, T2DM, Hyperphosphatemia, ESRD on HD T/Th/Sat (s/p revision of RUE AV fistula), CVA (2017 - residual R sided weakness), Parkinson's dz, hypothyroidism, BPH, COVID with underlying PNA, CAD s/p multiple PCIs (most recent Cath procedures 3/12/21 receiving SURYA x1 OM1 and subsequent Cath 5/14/21 receiving a SURYA to pLAD) who presented to Dr. Ramos follow up.  Since procedure patient with c/o....      In light of patient's risk factors, known CAD, and CCS class __ anginal/anginal-equivalent symptoms, patient is referred to [facility] for brachytherapy    ECHO 2/2/21 which showed mild MR, EF 55%, basal inferolateral, basal anterolateral, mid inferolateral and mid anterolateral segments are hypokinetic.    Cath Hx  - Cardiac Cath 5/14/21: LM moderately calcified, pLAD 70%, CX w/ patent SURYA, mid OM1 50% ISR, s/p successful lithotripsy/SURYA to pLAD 70%.    COVID:  Vaccinated  Pharmacy: [facility] [address], [place] [address] [phone]  Escort:   Cardiologist: Dr. Ramos    69 y/o,  former smoker, Vietnamese speaking Male poor historian w/ PMHX HTN, HLD, T2DM, Hyperphosphatemia, ESRD on HD T/Th/Sat (s/p revision of RUE AV fistula), CVA (2017 - residual R sided weakness), Parkinson's dz, hypothyroidism, BPH, COVID with underlying PNA, CAD s/p multiple PCIs (most recent Cath procedures 3/12/21 receiving SURYA x1 OM1 and subsequent Cath 5/14/21 receiving a SURYA to pLAD w/ mid OM1 50% ISR) who presented to Dr. Ramos for follow up.  Since procedure patient with continued c/o substernal chest pain that occurs w/ rest.  Patient denies any SOB, dizziness, syncope, LE swelling, orthopnea, PND, fevers, chills.  In light of patient's risk factors, known CAD, and CCS class IV anginal, patient is referred to [facility] for brachytherapy    ECHO 2/2/21 which showed mild MR, EF 55%, basal inferolateral, basal anterolateral, mid inferolateral and mid anterolateral segments are hypokinetic.    Cath Hx  - Cardiac Cath 5/14/21: LM moderately calcified, pLAD 70%, CX w/ patent SURYA, mid OM1 50% ISR, s/p successful lithotripsy/SURYA to pLAD 70%.    COVID:  Vaccinated  Pharmacy: [facility] [address], [place] [address] [phone]  Cardiologist: Dr. Ramos    69 y/o,  former smoker, Persian speaking Male poor historian w/ PMHX HTN, HLD, T2DM, Hyperphosphatemia, ESRD on HD T/Th/Sat (s/p revision of RUE AV fistula), CVA (2017 - residual R sided weakness), Parkinson's dz, hypothyroidism, BPH, COVID with underlying PNA, CAD s/p multiple PCIs (most recent Cath procedures 3/12/21 receiving SURYA x1 OM1 and subsequent Cath 5/14/21 receiving a SURYA to pLAD w/ mid OM1 50% ISR) who presented to Dr. Ramos for follow up.  Since procedure patient with continued c/o substernal chest pain that occurs w/ rest.  Patient denies any SOB, dizziness, syncope, LE swelling, orthopnea, PND, fevers, chills.  In light of patient's risk factors, known CAD, and CCS class IV anginal, patient is referred to [facility] for brachytherapy    ECHO 2/2/21 which showed mild MR, EF 55%, basal inferolateral, basal anterolateral, mid inferolateral and mid anterolateral segments are hypokinetic.    Cath Hx  - Cardiac Cath 5/14/21: LM moderately calcified, pLAD 70%, CX w/ patent SURYA, mid OM1 50% ISR, s/p successful lithotripsy/SURYA to pLAD 70%.

## 2021-06-23 NOTE — H&P ADULT - NSICDXPASTSURGICALHX_GEN_ALL_CORE_FT
PAST SURGICAL HISTORY:  AVF (arteriovenous fistula) right arm x with revision, left arm x 1    S/P dialysis catheter insertion Right chest perma cath removed in jan 2020    S/P primary angioplasty with coronary stent     Stented coronary artery 2004-5 at [facility]

## 2021-06-23 NOTE — H&P ADULT - ASSESSMENT
71 y/o,  former smoker, Congolese speaking Male poor historian w/ PMHX HTN, HLD, T2DM, Hyperphosphatemia, ESRD on HD T/Th/Sat (s/p revision of RUE AV fistula), CVA (2017 - residual R sided weakness), Parkinson's dz, hypothyroidism, BPH, COVID with underlying PNA, CAD s/p multiple PCIs (most recent Cath procedures 3/12/21 receiving SURYA x1 OM1 and subsequent Cath 5/14/21 receiving a SURYA to pLAD w/ mid OM1 50% ISR) who presents with continued CCS class IV anginal, and known CAD/ISR and patient is referred to [facility] for brachytherapy    - Patient took ASA 81mg this morning.  Last took Brilinta 90m on 6/23 - given Brilinta 90mg PO x1 prior to procedure  - no fluids ordered given HD    Risks & benefits of procedure and alternative therapy have been explained to the patient including but not limited to: allergic reaction, bleeding w/possible need for blood transfusion, infection, renal and vascular compromise, limb damage, arrhythmia, stroke, vessel dissection/perforation, Myocardial infarction, emergent CABG. Informed consent obtained and in chart.    69 y/o,  former smoker, Honduran speaking Male poor historian w/ PMHX HTN, HLD, T2DM, Hyperphosphatemia, ESRD on HD T/Th/Sat (s/p revision of RUE AV fistula), CVA (2017 - residual R sided weakness), Parkinson's dz, hypothyroidism, BPH, COVID with underlying PNA, CAD s/p multiple PCIs (most recent Cath procedures 3/12/21 receiving SURYA x1 OM1 and subsequent Cath 5/14/21 receiving a SURYA to pLAD w/ mid OM1 50% ISR) who presents with continued CCS class IV anginal, and known CAD/ISR and patient is referred to [facility] for brachytherapy    - Patient took ASA 81mg this morning.  Last took Brilinta 90m on 6/23 - given Brilinta 90mg PO x1 prior to procedure  - no fluids ordered given HD  - Renal consulted for possible need for HD during hospital stay    Risks & benefits of procedure and alternative therapy have been explained to the patient including but not limited to: allergic reaction, bleeding w/possible need for blood transfusion, infection, renal and vascular compromise, limb damage, arrhythmia, stroke, vessel dissection/perforation, Myocardial infarction, emergent CABG. Informed consent obtained and in chart.

## 2021-06-24 ENCOUNTER — INPATIENT (INPATIENT)
Facility: HOSPITAL | Age: 71
LOS: 0 days | Discharge: HOME | DRG: 250 | End: 2021-06-25
Attending: INTERNAL MEDICINE | Admitting: INTERNAL MEDICINE
Payer: MEDICARE

## 2021-06-24 DIAGNOSIS — Z95.5 PRESENCE OF CORONARY ANGIOPLASTY IMPLANT AND GRAFT: Chronic | ICD-10-CM

## 2021-06-24 DIAGNOSIS — Z95.828 PRESENCE OF OTHER VASCULAR IMPLANTS AND GRAFTS: Chronic | ICD-10-CM

## 2021-06-24 DIAGNOSIS — I77.0 ARTERIOVENOUS FISTULA, ACQUIRED: Chronic | ICD-10-CM

## 2021-06-24 LAB
A1C WITH ESTIMATED AVERAGE GLUCOSE RESULT: 7.5 % — HIGH (ref 4–5.6)
ALBUMIN SERPL ELPH-MCNC: 4.9 G/DL — SIGNIFICANT CHANGE UP (ref 3.3–5)
ALP SERPL-CCNC: 272 U/L — HIGH (ref 40–120)
ALT FLD-CCNC: 9 U/L — LOW (ref 10–45)
ANION GAP SERPL CALC-SCNC: 17 MMOL/L — SIGNIFICANT CHANGE UP (ref 5–17)
APTT BLD: 33.2 SEC — SIGNIFICANT CHANGE UP (ref 27.5–35.5)
AST SERPL-CCNC: 17 U/L — SIGNIFICANT CHANGE UP (ref 10–40)
BASOPHILS # BLD AUTO: 0.05 K/UL — SIGNIFICANT CHANGE UP (ref 0–0.2)
BASOPHILS NFR BLD AUTO: 0.6 % — SIGNIFICANT CHANGE UP (ref 0–2)
BILIRUB SERPL-MCNC: 0.5 MG/DL — SIGNIFICANT CHANGE UP (ref 0.2–1.2)
BUN SERPL-MCNC: 18 MG/DL — SIGNIFICANT CHANGE UP (ref 7–23)
CALCIUM SERPL-MCNC: 10.8 MG/DL — HIGH (ref 8.4–10.5)
CHLORIDE SERPL-SCNC: 90 MMOL/L — LOW (ref 96–108)
CHOLEST SERPL-MCNC: 96 MG/DL — SIGNIFICANT CHANGE UP
CK MB CFR SERPL CALC: 1.5 NG/ML — SIGNIFICANT CHANGE UP (ref 0–6.7)
CK SERPL-CCNC: 48 U/L — SIGNIFICANT CHANGE UP (ref 30–200)
CO2 SERPL-SCNC: 28 MMOL/L — SIGNIFICANT CHANGE UP (ref 22–31)
CREAT SERPL-MCNC: 5.48 MG/DL — HIGH (ref 0.5–1.3)
EOSINOPHIL # BLD AUTO: 0.21 K/UL — SIGNIFICANT CHANGE UP (ref 0–0.5)
EOSINOPHIL NFR BLD AUTO: 2.3 % — SIGNIFICANT CHANGE UP (ref 0–6)
ESTIMATED AVERAGE GLUCOSE: 169 MG/DL — HIGH (ref 68–114)
GLUCOSE BLDC GLUCOMTR-MCNC: 128 MG/DL — HIGH (ref 70–99)
GLUCOSE BLDC GLUCOMTR-MCNC: 132 MG/DL — HIGH (ref 70–99)
GLUCOSE BLDC GLUCOMTR-MCNC: 153 MG/DL — HIGH (ref 70–99)
GLUCOSE SERPL-MCNC: 132 MG/DL — HIGH (ref 70–99)
HCT VFR BLD CALC: 41.7 % — SIGNIFICANT CHANGE UP (ref 39–50)
HDLC SERPL-MCNC: 38 MG/DL — LOW
HGB BLD-MCNC: 13.1 G/DL — SIGNIFICANT CHANGE UP (ref 13–17)
IMM GRANULOCYTES NFR BLD AUTO: 0.1 % — SIGNIFICANT CHANGE UP (ref 0–1.5)
INR BLD: 1.08 — SIGNIFICANT CHANGE UP (ref 0.88–1.16)
LIPID PNL WITH DIRECT LDL SERPL: 21 MG/DL — SIGNIFICANT CHANGE UP
LYMPHOCYTES # BLD AUTO: 1.69 K/UL — SIGNIFICANT CHANGE UP (ref 1–3.3)
LYMPHOCYTES # BLD AUTO: 18.9 % — SIGNIFICANT CHANGE UP (ref 13–44)
MCHC RBC-ENTMCNC: 29.9 PG — SIGNIFICANT CHANGE UP (ref 27–34)
MCHC RBC-ENTMCNC: 31.4 GM/DL — LOW (ref 32–36)
MCV RBC AUTO: 95.2 FL — SIGNIFICANT CHANGE UP (ref 80–100)
MONOCYTES # BLD AUTO: 0.77 K/UL — SIGNIFICANT CHANGE UP (ref 0–0.9)
MONOCYTES NFR BLD AUTO: 8.6 % — SIGNIFICANT CHANGE UP (ref 2–14)
NEUTROPHILS # BLD AUTO: 6.23 K/UL — SIGNIFICANT CHANGE UP (ref 1.8–7.4)
NEUTROPHILS NFR BLD AUTO: 69.5 % — SIGNIFICANT CHANGE UP (ref 43–77)
NON HDL CHOLESTEROL: 58 MG/DL — SIGNIFICANT CHANGE UP
NRBC # BLD: 0 /100 WBCS — SIGNIFICANT CHANGE UP (ref 0–0)
PLATELET # BLD AUTO: 195 K/UL — SIGNIFICANT CHANGE UP (ref 150–400)
POTASSIUM SERPL-MCNC: 4.8 MMOL/L — SIGNIFICANT CHANGE UP (ref 3.5–5.3)
POTASSIUM SERPL-SCNC: 4.8 MMOL/L — SIGNIFICANT CHANGE UP (ref 3.5–5.3)
PROT SERPL-MCNC: 9.1 G/DL — HIGH (ref 6–8.3)
PROTHROM AB SERPL-ACNC: 12.9 SEC — SIGNIFICANT CHANGE UP (ref 10.6–13.6)
RBC # BLD: 4.38 M/UL — SIGNIFICANT CHANGE UP (ref 4.2–5.8)
RBC # FLD: 14.8 % — HIGH (ref 10.3–14.5)
SODIUM SERPL-SCNC: 135 MMOL/L — SIGNIFICANT CHANGE UP (ref 135–145)
TRIGL SERPL-MCNC: 186 MG/DL — HIGH
WBC # BLD: 8.96 K/UL — SIGNIFICANT CHANGE UP (ref 3.8–10.5)
WBC # FLD AUTO: 8.96 K/UL — SIGNIFICANT CHANGE UP (ref 3.8–10.5)

## 2021-06-24 PROCEDURE — 93571 IV DOP VEL&/PRESS C FLO 1ST: CPT | Mod: 26,LC

## 2021-06-24 PROCEDURE — 99221 1ST HOSP IP/OBS SF/LOW 40: CPT | Mod: 25

## 2021-06-24 PROCEDURE — 93010 ELECTROCARDIOGRAM REPORT: CPT

## 2021-06-24 PROCEDURE — 92974 CATH PLACE CARDIO BRACHYTX: CPT

## 2021-06-24 PROCEDURE — 77290 THER RAD SIMULAJ FIELD CPLX: CPT | Mod: 26

## 2021-06-24 PROCEDURE — 77470 SPECIAL RADIATION TREATMENT: CPT | Mod: 26

## 2021-06-24 PROCEDURE — 77263 THER RADIOLOGY TX PLNG CPLX: CPT

## 2021-06-24 PROCEDURE — 92924 PRQ TRLUML C ATHRC 1 ART&/BR: CPT | Mod: LC

## 2021-06-24 PROCEDURE — 99152 MOD SED SAME PHYS/QHP 5/>YRS: CPT

## 2021-06-24 PROCEDURE — 77770 HDR RDNCL NTRSTL/ICAV BRCHTX: CPT | Mod: 26

## 2021-06-24 PROCEDURE — 77316 BRACHYTX ISODOSE PLAN SIMPLE: CPT | Mod: 26

## 2021-06-24 RX ORDER — CHLORHEXIDINE GLUCONATE 213 G/1000ML
1 SOLUTION TOPICAL ONCE
Refills: 0 | Status: DISCONTINUED | OUTPATIENT
Start: 2021-06-24 | End: 2021-06-24

## 2021-06-24 RX ORDER — INSULIN LISPRO 100/ML
VIAL (ML) SUBCUTANEOUS
Refills: 0 | Status: DISCONTINUED | OUTPATIENT
Start: 2021-06-24 | End: 2021-06-25

## 2021-06-24 RX ORDER — PANTOPRAZOLE SODIUM 20 MG/1
40 TABLET, DELAYED RELEASE ORAL
Refills: 0 | Status: DISCONTINUED | OUTPATIENT
Start: 2021-06-24 | End: 2021-06-25

## 2021-06-24 RX ORDER — LISINOPRIL 2.5 MG/1
10 TABLET ORAL DAILY
Refills: 0 | Status: DISCONTINUED | OUTPATIENT
Start: 2021-06-25 | End: 2021-06-25

## 2021-06-24 RX ORDER — SEVELAMER CARBONATE 2400 MG/1
800 POWDER, FOR SUSPENSION ORAL EVERY 8 HOURS
Refills: 0 | Status: DISCONTINUED | OUTPATIENT
Start: 2021-06-24 | End: 2021-06-25

## 2021-06-24 RX ORDER — AMLODIPINE BESYLATE 2.5 MG/1
5 TABLET ORAL DAILY
Refills: 0 | Status: DISCONTINUED | OUTPATIENT
Start: 2021-06-24 | End: 2021-06-25

## 2021-06-24 RX ORDER — DEXTROSE 50 % IN WATER 50 %
25 SYRINGE (ML) INTRAVENOUS ONCE
Refills: 0 | Status: DISCONTINUED | OUTPATIENT
Start: 2021-06-24 | End: 2021-06-24

## 2021-06-24 RX ORDER — TICAGRELOR 90 MG/1
90 TABLET ORAL ONCE
Refills: 0 | Status: COMPLETED | OUTPATIENT
Start: 2021-06-24 | End: 2021-06-24

## 2021-06-24 RX ORDER — INSULIN LISPRO 100/ML
VIAL (ML) SUBCUTANEOUS
Refills: 0 | Status: DISCONTINUED | OUTPATIENT
Start: 2021-06-24 | End: 2021-06-24

## 2021-06-24 RX ORDER — DEXTROSE 50 % IN WATER 50 %
15 SYRINGE (ML) INTRAVENOUS ONCE
Refills: 0 | Status: DISCONTINUED | OUTPATIENT
Start: 2021-06-24 | End: 2021-06-25

## 2021-06-24 RX ORDER — ASPIRIN/CALCIUM CARB/MAGNESIUM 324 MG
81 TABLET ORAL DAILY
Refills: 0 | Status: DISCONTINUED | OUTPATIENT
Start: 2021-06-25 | End: 2021-06-25

## 2021-06-24 RX ORDER — SODIUM CHLORIDE 9 MG/ML
1000 INJECTION, SOLUTION INTRAVENOUS
Refills: 0 | Status: DISCONTINUED | OUTPATIENT
Start: 2021-06-24 | End: 2021-06-24

## 2021-06-24 RX ORDER — ATORVASTATIN CALCIUM 80 MG/1
80 TABLET, FILM COATED ORAL AT BEDTIME
Refills: 0 | Status: DISCONTINUED | OUTPATIENT
Start: 2021-06-24 | End: 2021-06-25

## 2021-06-24 RX ORDER — DEXTROSE 50 % IN WATER 50 %
25 SYRINGE (ML) INTRAVENOUS ONCE
Refills: 0 | Status: DISCONTINUED | OUTPATIENT
Start: 2021-06-24 | End: 2021-06-25

## 2021-06-24 RX ORDER — TICAGRELOR 90 MG/1
90 TABLET ORAL
Refills: 0 | Status: DISCONTINUED | OUTPATIENT
Start: 2021-06-24 | End: 2021-06-25

## 2021-06-24 RX ORDER — DEXTROSE 50 % IN WATER 50 %
12.5 SYRINGE (ML) INTRAVENOUS ONCE
Refills: 0 | Status: DISCONTINUED | OUTPATIENT
Start: 2021-06-24 | End: 2021-06-24

## 2021-06-24 RX ORDER — INSULIN LISPRO 100/ML
2 VIAL (ML) SUBCUTANEOUS
Refills: 0 | Status: DISCONTINUED | OUTPATIENT
Start: 2021-06-24 | End: 2021-06-25

## 2021-06-24 RX ORDER — SODIUM CHLORIDE 9 MG/ML
1000 INJECTION, SOLUTION INTRAVENOUS
Refills: 0 | Status: DISCONTINUED | OUTPATIENT
Start: 2021-06-24 | End: 2021-06-25

## 2021-06-24 RX ORDER — DEXTROSE 50 % IN WATER 50 %
15 SYRINGE (ML) INTRAVENOUS ONCE
Refills: 0 | Status: DISCONTINUED | OUTPATIENT
Start: 2021-06-24 | End: 2021-06-24

## 2021-06-24 RX ORDER — DEXTROSE 50 % IN WATER 50 %
12.5 SYRINGE (ML) INTRAVENOUS ONCE
Refills: 0 | Status: DISCONTINUED | OUTPATIENT
Start: 2021-06-24 | End: 2021-06-25

## 2021-06-24 RX ORDER — TAMSULOSIN HYDROCHLORIDE 0.4 MG/1
0.4 CAPSULE ORAL AT BEDTIME
Refills: 0 | Status: DISCONTINUED | OUTPATIENT
Start: 2021-06-24 | End: 2021-06-25

## 2021-06-24 RX ORDER — CINACALCET 30 MG/1
60 TABLET, FILM COATED ORAL DAILY
Refills: 0 | Status: DISCONTINUED | OUTPATIENT
Start: 2021-06-24 | End: 2021-06-25

## 2021-06-24 RX ORDER — FERROUS SULFATE 325(65) MG
325 TABLET ORAL
Refills: 0 | Status: DISCONTINUED | OUTPATIENT
Start: 2021-06-24 | End: 2021-06-25

## 2021-06-24 RX ORDER — GLUCAGON INJECTION, SOLUTION 0.5 MG/.1ML
1 INJECTION, SOLUTION SUBCUTANEOUS ONCE
Refills: 0 | Status: DISCONTINUED | OUTPATIENT
Start: 2021-06-24 | End: 2021-06-25

## 2021-06-24 RX ORDER — CARBIDOPA AND LEVODOPA 25; 100 MG/1; MG/1
1 TABLET ORAL
Qty: 0 | Refills: 0 | DISCHARGE

## 2021-06-24 RX ORDER — CARVEDILOL PHOSPHATE 80 MG/1
12.5 CAPSULE, EXTENDED RELEASE ORAL EVERY 12 HOURS
Refills: 0 | Status: DISCONTINUED | OUTPATIENT
Start: 2021-06-24 | End: 2021-06-25

## 2021-06-24 RX ORDER — ISOSORBIDE MONONITRATE 60 MG/1
30 TABLET, EXTENDED RELEASE ORAL DAILY
Refills: 0 | Status: DISCONTINUED | OUTPATIENT
Start: 2021-06-25 | End: 2021-06-25

## 2021-06-24 RX ADMIN — CARVEDILOL PHOSPHATE 12.5 MILLIGRAM(S): 80 CAPSULE, EXTENDED RELEASE ORAL at 22:40

## 2021-06-24 RX ADMIN — AMLODIPINE BESYLATE 5 MILLIGRAM(S): 2.5 TABLET ORAL at 22:40

## 2021-06-24 RX ADMIN — ATORVASTATIN CALCIUM 80 MILLIGRAM(S): 80 TABLET, FILM COATED ORAL at 22:40

## 2021-06-24 RX ADMIN — TAMSULOSIN HYDROCHLORIDE 0.4 MILLIGRAM(S): 0.4 CAPSULE ORAL at 22:40

## 2021-06-24 RX ADMIN — Medication 2: at 22:39

## 2021-06-24 RX ADMIN — TICAGRELOR 90 MILLIGRAM(S): 90 TABLET ORAL at 16:28

## 2021-06-24 RX ADMIN — Medication 325 MILLIGRAM(S): at 22:40

## 2021-06-25 ENCOUNTER — TRANSCRIPTION ENCOUNTER (OUTPATIENT)
Age: 71
End: 2021-06-25

## 2021-06-25 VITALS
OXYGEN SATURATION: 96 % | DIASTOLIC BLOOD PRESSURE: 69 MMHG | HEART RATE: 71 BPM | SYSTOLIC BLOOD PRESSURE: 163 MMHG | RESPIRATION RATE: 18 BRPM

## 2021-06-25 LAB
ANION GAP SERPL CALC-SCNC: 21 MMOL/L — HIGH (ref 5–17)
BUN SERPL-MCNC: 34 MG/DL — HIGH (ref 7–23)
CALCIUM SERPL-MCNC: 9.9 MG/DL — SIGNIFICANT CHANGE UP (ref 8.4–10.5)
CHLORIDE SERPL-SCNC: 87 MMOL/L — LOW (ref 96–108)
CO2 SERPL-SCNC: 25 MMOL/L — SIGNIFICANT CHANGE UP (ref 22–31)
COVID-19 SPIKE DOMAIN AB INTERP: POSITIVE
COVID-19 SPIKE DOMAIN ANTIBODY RESULT: >250 U/ML — HIGH
CREAT SERPL-MCNC: 7.07 MG/DL — HIGH (ref 0.5–1.3)
GLUCOSE BLDC GLUCOMTR-MCNC: 125 MG/DL — HIGH (ref 70–99)
GLUCOSE BLDC GLUCOMTR-MCNC: 167 MG/DL — HIGH (ref 70–99)
GLUCOSE SERPL-MCNC: 100 MG/DL — HIGH (ref 70–99)
HCT VFR BLD CALC: 37 % — LOW (ref 39–50)
HGB BLD-MCNC: 11.9 G/DL — LOW (ref 13–17)
MAGNESIUM SERPL-MCNC: 2.4 MG/DL — SIGNIFICANT CHANGE UP (ref 1.6–2.6)
MCHC RBC-ENTMCNC: 30.2 PG — SIGNIFICANT CHANGE UP (ref 27–34)
MCHC RBC-ENTMCNC: 32.2 GM/DL — SIGNIFICANT CHANGE UP (ref 32–36)
MCV RBC AUTO: 93.9 FL — SIGNIFICANT CHANGE UP (ref 80–100)
NRBC # BLD: 0 /100 WBCS — SIGNIFICANT CHANGE UP (ref 0–0)
PLATELET # BLD AUTO: 146 K/UL — LOW (ref 150–400)
POTASSIUM SERPL-MCNC: 5.2 MMOL/L — SIGNIFICANT CHANGE UP (ref 3.5–5.3)
POTASSIUM SERPL-SCNC: 5.2 MMOL/L — SIGNIFICANT CHANGE UP (ref 3.5–5.3)
RBC # BLD: 3.94 M/UL — LOW (ref 4.2–5.8)
RBC # FLD: 14.8 % — HIGH (ref 10.3–14.5)
SARS-COV-2 IGG+IGM SERPL QL IA: >250 U/ML — HIGH
SARS-COV-2 IGG+IGM SERPL QL IA: POSITIVE
SODIUM SERPL-SCNC: 133 MMOL/L — LOW (ref 135–145)
WBC # BLD: 7.06 K/UL — SIGNIFICANT CHANGE UP (ref 3.8–10.5)
WBC # FLD AUTO: 7.06 K/UL — SIGNIFICANT CHANGE UP (ref 3.8–10.5)

## 2021-06-25 PROCEDURE — 77770 HDR RDNCL NTRSTL/ICAV BRCHTX: CPT

## 2021-06-25 PROCEDURE — 93005 ELECTROCARDIOGRAM TRACING: CPT

## 2021-06-25 PROCEDURE — 85610 PROTHROMBIN TIME: CPT

## 2021-06-25 PROCEDURE — C1717: CPT

## 2021-06-25 PROCEDURE — 77470 SPECIAL RADIATION TREATMENT: CPT

## 2021-06-25 PROCEDURE — 93010 ELECTROCARDIOGRAM REPORT: CPT

## 2021-06-25 PROCEDURE — 80053 COMPREHEN METABOLIC PANEL: CPT

## 2021-06-25 PROCEDURE — 82553 CREATINE MB FRACTION: CPT

## 2021-06-25 PROCEDURE — C1887: CPT

## 2021-06-25 PROCEDURE — C1725: CPT

## 2021-06-25 PROCEDURE — 77316 BRACHYTX ISODOSE PLAN SIMPLE: CPT

## 2021-06-25 PROCEDURE — 80061 LIPID PANEL: CPT

## 2021-06-25 PROCEDURE — 80048 BASIC METABOLIC PNL TOTAL CA: CPT

## 2021-06-25 PROCEDURE — C1894: CPT

## 2021-06-25 PROCEDURE — C1753: CPT

## 2021-06-25 PROCEDURE — 77370 RADIATION PHYSICS CONSULT: CPT

## 2021-06-25 PROCEDURE — 77290 THER RAD SIMULAJ FIELD CPLX: CPT

## 2021-06-25 PROCEDURE — 82550 ASSAY OF CK (CPK): CPT

## 2021-06-25 PROCEDURE — C1769: CPT

## 2021-06-25 PROCEDURE — 83036 HEMOGLOBIN GLYCOSYLATED A1C: CPT

## 2021-06-25 PROCEDURE — 82962 GLUCOSE BLOOD TEST: CPT

## 2021-06-25 PROCEDURE — 85025 COMPLETE CBC W/AUTO DIFF WBC: CPT

## 2021-06-25 PROCEDURE — 36415 COLL VENOUS BLD VENIPUNCTURE: CPT

## 2021-06-25 PROCEDURE — C1728: CPT

## 2021-06-25 PROCEDURE — C1760: CPT

## 2021-06-25 PROCEDURE — 85027 COMPLETE CBC AUTOMATED: CPT

## 2021-06-25 PROCEDURE — 86769 SARS-COV-2 COVID-19 ANTIBODY: CPT

## 2021-06-25 PROCEDURE — 99223 1ST HOSP IP/OBS HIGH 75: CPT

## 2021-06-25 PROCEDURE — 83735 ASSAY OF MAGNESIUM: CPT

## 2021-06-25 PROCEDURE — 85730 THROMBOPLASTIN TIME PARTIAL: CPT

## 2021-06-25 RX ORDER — TICAGRELOR 90 MG/1
1 TABLET ORAL
Qty: 60 | Refills: 11
Start: 2021-06-25 | End: 2022-06-19

## 2021-06-25 RX ORDER — ISOSORBIDE MONONITRATE 60 MG/1
1 TABLET, EXTENDED RELEASE ORAL
Qty: 30 | Refills: 0
Start: 2021-06-25 | End: 2021-07-24

## 2021-06-25 RX ORDER — LISINOPRIL 2.5 MG/1
1 TABLET ORAL
Qty: 0 | Refills: 0 | DISCHARGE

## 2021-06-25 RX ORDER — ASPIRIN/CALCIUM CARB/MAGNESIUM 324 MG
1 TABLET ORAL
Qty: 30 | Refills: 11
Start: 2021-06-25 | End: 2022-06-19

## 2021-06-25 RX ADMIN — Medication 2: at 12:24

## 2021-06-25 RX ADMIN — AMLODIPINE BESYLATE 5 MILLIGRAM(S): 2.5 TABLET ORAL at 06:20

## 2021-06-25 RX ADMIN — TICAGRELOR 90 MILLIGRAM(S): 90 TABLET ORAL at 06:23

## 2021-06-25 RX ADMIN — CARVEDILOL PHOSPHATE 12.5 MILLIGRAM(S): 80 CAPSULE, EXTENDED RELEASE ORAL at 12:21

## 2021-06-25 RX ADMIN — Medication 0.1 MILLIGRAM(S): at 06:19

## 2021-06-25 RX ADMIN — Medication 325 MILLIGRAM(S): at 06:20

## 2021-06-25 RX ADMIN — Medication 2 UNIT(S): at 08:30

## 2021-06-25 RX ADMIN — PANTOPRAZOLE SODIUM 40 MILLIGRAM(S): 20 TABLET, DELAYED RELEASE ORAL at 06:24

## 2021-06-25 RX ADMIN — Medication 2 UNIT(S): at 12:24

## 2021-06-25 RX ADMIN — ISOSORBIDE MONONITRATE 30 MILLIGRAM(S): 60 TABLET, EXTENDED RELEASE ORAL at 12:23

## 2021-06-25 RX ADMIN — Medication 81 MILLIGRAM(S): at 12:23

## 2021-06-25 RX ADMIN — CINACALCET 60 MILLIGRAM(S): 30 TABLET, FILM COATED ORAL at 12:23

## 2021-06-25 RX ADMIN — SEVELAMER CARBONATE 800 MILLIGRAM(S): 2400 POWDER, FOR SUSPENSION ORAL at 12:26

## 2021-06-25 RX ADMIN — SEVELAMER CARBONATE 800 MILLIGRAM(S): 2400 POWDER, FOR SUSPENSION ORAL at 08:34

## 2021-06-25 NOTE — CONSULT NOTE ADULT - ASSESSMENT
68M PMHx ESRD on HD, multiple myeloma, chronic lymphedema, LLE DVT with IVC filter, c dif s/p fecal transplant, who presented to the [facility] ED after being found hypotensive and hypothermic when he went for dialysis. Consult for hemodialysis     Assessment/Plan:     #ESRD on HD TTS @[place], [place]   usual Rx 3.5h 2L   last hemodialysis 6/24 per schedule   no acute indication for hemodialysis   next hemodialysis 6/26 per schedule as out-patient pending discharge   electrolytes at goal, may dose Lokelma 10g PO once and treat w/next hemodialysis tomorrow out-patient   euvolemic, UF w/HD     Nephrologically stable for discharge with out-patient follow-up.     Thank you for the opportunity to participate in the care of your patient. The nephrology service remains available to assist with any questions or concerns. Please feel free to reach us by paging the on-call nephrology fellow for urgent issues or as below.     Dontae Dutton M.D.   PGY-5, Nephrology Fellow   C: [phone]   P: [phone]

## 2021-06-25 NOTE — CONSULT NOTE ADULT - SUBJECTIVE AND OBJECTIVE BOX
Patient is a 70y old  Male who presents with a chief complaint of Cardiac cath (25 Jun 2021 10:43)      HPI:  70M ESRD on hemodialysis TTS, PMHX HTN, HLD, T2DM, CVA (2017 - residual R sided weakness), Parkinson's dz, hypothyroidism, BPH, COVID with underlying PNA, CAD s/p multiple PCIs presents for cardiac cath, now s/p. Denies current CP SOB fever or abdominal pain. Nephrology consulted for dialysis.     PAST MEDICAL & SURGICAL HISTORY:  Diabetes    Hypertension    Hyperphosphatemia    Coronary artery disease  with stent    Hyperlipidemia    Stroke  2017, residual right sided weakness    History of BPH    Back pain    ESRD on hemodialysis    Parkinsons disease    ESRD (end stage renal disease)    Hypertension    CAD (coronary artery disease)    S/P dialysis catheter insertion  Right chest perma cath removed in jan 2020    AVF (arteriovenous fistula)  right arm x with revision, left arm x 1    Stented coronary artery  2004-5 at [facility]    S/P primary angioplasty with coronary stent          Allergies:  No Known Allergies      Home Medications:   amLODIPine   Tablet 5 milliGRAM(s) Oral daily  aspirin enteric coated 81 milliGRAM(s) Oral daily  atorvastatin 80 milliGRAM(s) Oral at bedtime  carvedilol 12.5 milliGRAM(s) Oral every 12 hours  cinacalcet 60 milliGRAM(s) Oral daily  cloNIDine 0.1 milliGRAM(s) Oral two times a day  dextrose 40% Gel 15 Gram(s) Oral Once  dextrose 5%. 1000 milliLiter(s) IV Continuous <Continuous>  dextrose 5%. 1000 milliLiter(s) IV Continuous <Continuous>  dextrose 50% Injectable 25 Gram(s) IV Push Once  dextrose 50% Injectable 12.5 Gram(s) IV Push Once  dextrose 50% Injectable 25 Gram(s) IV Push Once  ferrous    sulfate 325 milliGRAM(s) Oral two times a day  glucagon  Injectable 1 milliGRAM(s) IntraMuscular Once  insulin lispro (ADMELOG) corrective regimen sliding scale   SubCutaneous Before meals and at bedtime  insulin lispro Injectable (ADMELOG) 2 Unit(s) SubCutaneous three times a day before meals  isosorbide   mononitrate ER Tablet (IMDUR) 30 milliGRAM(s) Oral daily  lisinopril 10 milliGRAM(s) Oral daily  pantoprazole    Tablet 40 milliGRAM(s) Oral before breakfast  sevelamer carbonate 800 milliGRAM(s) Oral every 8 hours  tamsulosin 0.4 milliGRAM(s) Oral at bedtime  ticagrelor 90 milliGRAM(s) Oral two times a day      Hospital Medications:   MEDICATIONS  (STANDING):  amLODIPine   Tablet 5 milliGRAM(s) Oral daily  aspirin enteric coated 81 milliGRAM(s) Oral daily  atorvastatin 80 milliGRAM(s) Oral at bedtime  carvedilol 12.5 milliGRAM(s) Oral every 12 hours  cinacalcet 60 milliGRAM(s) Oral daily  cloNIDine 0.1 milliGRAM(s) Oral two times a day  dextrose 40% Gel 15 Gram(s) Oral Once  dextrose 5%. 1000 milliLiter(s) (50 mL/Hr) IV Continuous <Continuous>  dextrose 5%. 1000 milliLiter(s) (100 mL/Hr) IV Continuous <Continuous>  dextrose 50% Injectable 25 Gram(s) IV Push Once  dextrose 50% Injectable 12.5 Gram(s) IV Push Once  dextrose 50% Injectable 25 Gram(s) IV Push Once  ferrous    sulfate 325 milliGRAM(s) Oral two times a day  glucagon  Injectable 1 milliGRAM(s) IntraMuscular Once  insulin lispro (ADMELOG) corrective regimen sliding scale   SubCutaneous Before meals and at bedtime  insulin lispro Injectable (ADMELOG) 2 Unit(s) SubCutaneous three times a day before meals  isosorbide   mononitrate ER Tablet (IMDUR) 30 milliGRAM(s) Oral daily  lisinopril 10 milliGRAM(s) Oral daily  pantoprazole    Tablet 40 milliGRAM(s) Oral before breakfast  sevelamer carbonate 800 milliGRAM(s) Oral every 8 hours  tamsulosin 0.4 milliGRAM(s) Oral at bedtime  ticagrelor 90 milliGRAM(s) Oral two times a day      SOCIAL HISTORY:  Denies ETOh, Smoking,     Family History:  FAMILY HISTORY:  FH: type 2 diabetes  In father          VITALS:  T(F): 97.6 (06-25-21 @ 09:02), Max: 97.9 (06-25-21 @ 06:09)  HR: 71 (06-25-21 @ 12:22)  BP: 163/69 (06-25-21 @ 12:22)  RR: 18 (06-25-21 @ 12:22)  SpO2: 96% (06-25-21 @ 12:22)  Wt(kg): --    06-24 @ 07:01  -  06-25 @ 07:00  --------------------------------------------------------  IN: 120 mL / OUT: 0 mL / NET: 120 mL      Height (cm): 157.5 (06-25 @ 06:13)  Weight (kg): 61.1 (06-25 @ 06:13)  BMI (kg/m2): 24.6 (06-25 @ 06:13)  BSA (m2): 1.62 (06-25 @ 06:13)  CAPILLARY BLOOD GLUCOSE      POCT Blood Glucose.: 167 mg/dL (25 Jun 2021 11:47)  POCT Blood Glucose.: 125 mg/dL (25 Jun 2021 06:35)  POCT Blood Glucose.: 153 mg/dL (24 Jun 2021 22:11)  POCT Blood Glucose.: 132 mg/dL (24 Jun 2021 19:27)  POCT Blood Glucose.: 128 mg/dL (24 Jun 2021 15:28)      Review of Systems:  CONSTITUTIONAL: No fever   CARDIOVASCULAR: No Chest pain, shortness of breath  GASTROINTESTINAL: No abdominal pain, nausea, vomiting, diarrhea  MUSCULOSKELETAL: No swelling      PHYSICAL EXAM:  GENERAL: Alert, awake, oriented x3 on RA   CHEST/LUNG: Bilateral clear breath sounds  HEART: Regular rate and rhythm, no murmur, no gallops, no rub   ABDOMEN: Soft, nontender, non distended  EXTREMITIES: +trace pedal edema  ACCESS: +LUE AVF w/bruit and thrill     LABS:  06-25    133<L>  |  87<L>  |  34<H>  ----------------------------<  100<H>  5.2   |  25  |  7.07<H>    Ca    9.9      25 Jun 2021 08:09  Mg     2.4     06-25    TPro  9.1<H>  /  Alb  4.9  /  TBili  0.5  /  DBili      /  AST  17  /  ALT  9<L>  /  AlkPhos  272<H>  06-24    Creatinine Trend: 7.07 <--, 5.48 <--                        11.9   7.06  )-----------( 146       25 Jun 2021 08:09 )             37.0     Urine Studies:

## 2021-06-25 NOTE — DISCHARGE NOTE PROVIDER - NSDCCPCAREPLAN_GEN_ALL_CORE_FT
PRINCIPAL DISCHARGE DIAGNOSIS  Diagnosis: CAD (coronary artery disease)  Assessment and Plan of Treatment: You were found to have blockages in the arteries of your heart, also known as Coronary Artery Diseease. You underwent a cardiac angiogram and had an intervention on the distal left circumflex artery. PLEASE CONTINUE ASPIRIN 81MG DAILY AND BRILINTA 90MG TWICE DAILY. DO NOT STOP THESE MEDICATIONS FOR ANY REASON AS THEY ARE KEEPING YOUR STENT OPEN AND PREVENTING A HEART ATTACK. Avoid strenuous activity or heavy lifting for the next five days. Do not take a bath or swim for the next five days; you may shower. For any bleeding or hematoma formation (hardened blood collection under the skin) at the access site of your  please hold pressure and go to the emergency room. Please follow up with Dr. Ramos in 1-2 weeks. For recurrent chest pain, please call your doctor or go to the emergency room.  You are to return for a staged intervention on the ostial left circumflex artery on 7/16/21.      SECONDARY DISCHARGE DIAGNOSES  Diagnosis: HTN (hypertension)  Assessment and Plan of Treatment: Please continue with amlodipine 5mg daily, carvedilol 12.5mg twice daily, clonidine 0.1mg twice daily to control your blood pressure. Please STOP lisinopril 10mg    Diagnosis: HLD (hyperlipidemia)  Assessment and Plan of Treatment: Please continue with atorvastatin 80mg daily    Diagnosis: ESRD on dialysis  Assessment and Plan of Treatment: Please resume dialysis tomorrow 6/26/21

## 2021-06-25 NOTE — DISCHARGE NOTE NURSING/CASE MANAGEMENT/SOCIAL WORK - PATIENT PORTAL LINK FT
You can access the FollowMyHealth Patient Portal offered by [facility] [place] by registering at the following website: [email or web address]. By joining [facility]’s FollowMyHealth portal, you will also be able to view your health information using other applications (apps) compatible with our system.

## 2021-06-25 NOTE — DISCHARGE NOTE PROVIDER - CARE PROVIDER_API CALL
Carlin Ramos (MD)  Cardiovascular Disease; Interventional Cardiology  [address], [address] [place] [address]  Phone: [phone]  Fax: [phone]  Follow Up Time:

## 2021-06-25 NOTE — DISCHARGE NOTE PROVIDER - NSDCMRMEDTOKEN_GEN_ALL_CORE_FT
amLODIPine 5 mg oral tablet: 1 tab(s) orally once a day  aspirin 81 mg oral delayed release tablet: 1 tab(s) orally once a day  atorvastatin 80 mg oral tablet: 1 tab(s) orally once a day (at bedtime)  carvedilol 12.5 mg oral tablet: 1 tab(s) orally 2 times a day  cinacalcet 60 mg oral tablet: 1 tab(s) orally once a day  cloNIDine 0.1 mg oral tablet: 1 tab(s) orally 2 times a day  ferrous sulfate 324 mg (65 mg elemental iron) oral tablet: 1 tab(s) orally 2 times a day  isosorbide mononitrate 30 mg oral tablet, extended release: 1 tab(s) orally once a day  Lokelma 5 g oral powder for reconstitution: orally once a day  nitroglycerin 0.3 mg sublingual tablet: 1 tab(s) sublingual every 5 minutes  NovoLIN R 100 units/mL injectable solution: 3 unit(s) injectable 3 times a day before meals  Protonix 40 mg oral delayed release tablet: 1 tab(s) orally once a day  Renal Caps oral capsule: 1 cap(s) orally once a day  Renal Caps oral capsule: 1 cap(s) orally once a day  sevelamer carbonate 800 mg oral tablet: 3 tab(s) orally 3 times a day (with meals)  tamsulosin 0.4 mg oral capsule: 1 cap(s) orally once a day (at bedtime)  ticagrelor 90 mg oral tablet: 1 tab(s) orally every 12 hours

## 2021-06-25 NOTE — DISCHARGE NOTE PROVIDER - HOSPITAL COURSE
69 y/o,  former smoker, Cape Verdean speaking Male poor historian w/ PMHX HTN, HLD, T2DM, Hyperphosphatemia, ESRD on HD T/Th/Sat (s/p revision of RUE AV fistula), CVA (2017 - residual R sided weakness), Parkinson's dz, hypothyroidism, BPH, COVID with underlying PNA, CAD s/p multiple PCIs (most recent Cath procedures 3/12/21 receiving SURYA x1 OM1 and subsequent Cath 5/14/21 receiving a SURYA to pLAD w/ mid OM1 50% ISR) who presented to Dr. Ramos for follow up.  Since procedure patient with continued c/o substernal chest pain that occurs w/ rest.  Patient denies any SOB, dizziness, syncope, LE swelling, orthopnea, PND, fevers, chills.  In light of patient's risk factors, known CAD, and CCS class IV anginal, patient was referred to [facility] for brachytherapy. Pt now s/p cardiac cath 6/24: IVUS/Atherectomy/Brachy dLCx (90% ISR), ostial LCx 60% (iFR 0.89), pLAD stent patent, mLAD stent patent, ostial LAD 40%. Acess R groin AS. Pt was admitted overnight to [facility] for monitoring and has been seen and examined at bedside this morning. Pt is out of bed and ambulating with no complaints. R groin access stable with no hematoma, no bleed, 1+ distal pulse. Lab values, telemetry, and vital signs reviewed and remained stable. Discharge medication regimen reviewed with patient and Dr. Larios and Dr. Ramos. Pt will continue aspirin 81mg daily and Brilinta 90mg BID, amlodipine 10mg daily, carvedilol 12.5mg BID, clonidine 0.1mg BID, atorvastatin 80mg daily, protonix 40mg daily, imdur 30mg daily. Pt will discontinue lisinopril 10mg daily as potassium trend has been 5-6 on recent labs. All discharge instructions reviewed with patient and medications e-prescribed to pharmacy. Pt is cleared for discharge per Dr. Larios, and will follow up with Dr. Ramos within 1-2 weeks of discharge. He will have planned staged PCI of oLCx on 7/16/21.     Reasons for No Cardiac Rehab Referral Rx (Must select 1 or more options):                Pt provided with cardiac rehab script / information on last admission
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
## 2021-07-07 DIAGNOSIS — E78.5 HYPERLIPIDEMIA, UNSPECIFIED: ICD-10-CM

## 2021-07-07 DIAGNOSIS — Z79.4 LONG TERM (CURRENT) USE OF INSULIN: ICD-10-CM

## 2021-07-07 DIAGNOSIS — Z86.718 PERSONAL HISTORY OF OTHER VENOUS THROMBOSIS AND EMBOLISM: ICD-10-CM

## 2021-07-07 DIAGNOSIS — I69.351 HEMIPLEGIA AND HEMIPARESIS FOLLOWING CEREBRAL INFARCTION AFFECTING RIGHT DOMINANT SIDE: ICD-10-CM

## 2021-07-07 DIAGNOSIS — I25.119 ATHEROSCLEROTIC HEART DISEASE OF NATIVE CORONARY ARTERY WITH UNSPECIFIED ANGINA PECTORIS: ICD-10-CM

## 2021-07-07 DIAGNOSIS — I12.0 HYPERTENSIVE CHRONIC KIDNEY DISEASE WITH STAGE 5 CHRONIC KIDNEY DISEASE OR END STAGE RENAL DISEASE: ICD-10-CM

## 2021-07-07 DIAGNOSIS — E03.9 HYPOTHYROIDISM, UNSPECIFIED: ICD-10-CM

## 2021-07-07 DIAGNOSIS — Z86.19 PERSONAL HISTORY OF OTHER INFECTIOUS AND PARASITIC DISEASES: ICD-10-CM

## 2021-07-07 DIAGNOSIS — I25.84 CORONARY ATHEROSCLEROSIS DUE TO CALCIFIED CORONARY LESION: ICD-10-CM

## 2021-07-07 DIAGNOSIS — E11.22 TYPE 2 DIABETES MELLITUS WITH DIABETIC CHRONIC KIDNEY DISEASE: ICD-10-CM

## 2021-07-07 DIAGNOSIS — Z87.891 PERSONAL HISTORY OF NICOTINE DEPENDENCE: ICD-10-CM
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
## 2021-07-07 DIAGNOSIS — Z79.82 LONG TERM (CURRENT) USE OF ASPIRIN: ICD-10-CM

## 2021-07-07 DIAGNOSIS — I89.0 LYMPHEDEMA, NOT ELSEWHERE CLASSIFIED: ICD-10-CM

## 2021-07-07 DIAGNOSIS — Z95.5 PRESENCE OF CORONARY ANGIOPLASTY IMPLANT AND GRAFT: ICD-10-CM

## 2021-07-07 DIAGNOSIS — Z86.16 PERSONAL HISTORY OF COVID-19: ICD-10-CM

## 2021-07-07 DIAGNOSIS — N40.0 BENIGN PROSTATIC HYPERPLASIA WITHOUT LOWER URINARY TRACT SYMPTOMS: ICD-10-CM

## 2021-07-07 DIAGNOSIS — T82.855A STENOSIS OF CORONARY ARTERY STENT, INITIAL ENCOUNTER: ICD-10-CM

## 2021-07-07 DIAGNOSIS — I95.9 HYPOTENSION, UNSPECIFIED: ICD-10-CM

## 2021-07-07 DIAGNOSIS — Z99.2 DEPENDENCE ON RENAL DIALYSIS: ICD-10-CM

## 2021-07-07 DIAGNOSIS — C90.00 MULTIPLE MYELOMA NOT HAVING ACHIEVED REMISSION: ICD-10-CM

## 2021-07-07 DIAGNOSIS — G20 PARKINSON'S DISEASE: ICD-10-CM

## 2021-07-07 DIAGNOSIS — N18.6 END STAGE RENAL DISEASE: ICD-10-CM

## 2021-10-04 VITALS
DIASTOLIC BLOOD PRESSURE: 62 MMHG | HEART RATE: 60 BPM | RESPIRATION RATE: 18 BRPM | OXYGEN SATURATION: 96 % | SYSTOLIC BLOOD PRESSURE: 137 MMHG | TEMPERATURE: 98 F

## 2021-10-04 RX ORDER — CHLORHEXIDINE GLUCONATE 213 G/1000ML
1 SOLUTION TOPICAL ONCE
Refills: 0 | Status: DISCONTINUED | OUTPATIENT
Start: 2021-10-15 | End: 2021-10-16

## 2021-10-04 NOTE — H&P ADULT - NSHPLABSRESULTS_GEN_ALL_CORE
10.8   6.41  )-----------( 127      ( 15 Oct 2021 11:16 )             34.3   10-15    140  |  94<L>  |  35<H>  ----------------------------<  182<H>  5.7<H>   |  32<H>  |  7.05<H>    Ca    11.1<H>      15 Oct 2021 11:16    TPro  8.3  /  Alb  4.7  /  TBili  0.4  /  DBili  <0.2  /  AST  13  /  ALT  7<L>  /  AlkPhos  251<H>  10-15  PT/INR - ( 15 Oct 2021 11:16 )   PT: 12.7 sec;   INR: 1.06          PTT - ( 15 Oct 2021 11:16 )  PTT:31.4 sec

## 2021-10-04 NOTE — H&P ADULT - NSICDXPASTSURGICALHX_GEN_ALL_CORE_FT
PAST SURGICAL HISTORY:  AVF (arteriovenous fistula) right arm x with revision, left arm x 1    S/P dialysis catheter insertion Right chest perma cath removed in jan 2020    S/P primary angioplasty with coronary stent     Stented coronary artery 2004-5 at [facility]

## 2021-10-04 NOTE — H&P ADULT - ASSESSMENT
71 y/o,  former smoker, Danish speaking Male poor historian with a PMHX HTN, HLD, IDDM, Hyperphosphatemia, ESRD on HD T/Th/Sat (s/p revision of RUE AV fistula—last session 10/14/2021 ­­­­­­), CVA (2017 - residual R sided weakness), Parkinson's dz, hypothyroidism, BPH, Hx of COVID with underlying PNA, known CAD s/p multiple PCIs with most recent cardiac cath 6/24/2021 @ [facility]  s/p IVUS/Atherectomy/Brachy dLCx (90% ISR), with residual ostial LCx 60% (iFR 0.89), patent prox and mid LAD stents presents for staged PCI of oLCx lesion.  In light of patient’s risk factors, known CAD with residual oLCx lesion and CCS Angina Class IV symptoms, patient now presents for staged PCI of oLCx lesion.    *Of note, pt reports his last episode of CP was last night (10/14), which occured at rest and was partially relieved s/p NTG 0.3mg SL x3 and Tylenol x2. Pt reports continued mild chest discomfort this AM.    EKG: NSR @60bpm w/ RBBB  ASA 3			Mallampati class: 2        Anginal Class: 4    -H/H = 10.8/34.3. Pt denies BRBPR, hematuria, hematochezia, melena. Pt reports good compliance w/ home ASA 81mg QD and Brillinta 90mg QD, w/ his last dose of both yesterday (10/14). Pt loaded w/ ASA 81mg x1 and Brillinta 90mg x1  -Cr = 7.05. EF normal. Euvolemic on exam. Pt on HD, renal fellow notified for HD post-cath.  -Of note, K 5.7 pre-cath. Fellow Dr. Liu made aware.    Sedation Plan:   Moderate  Patient Is Suitable Candidate For Sedation?     Yes    Consent obtained w/ assistance of language line solutions ID #[id number] (Bubba) - Danish  Risks & benefits of procedure and alternative therapy have been explained to the patient including but not limited to: allergic reaction, bleeding with possible need for blood transfusion, infection, renal and vascular compromise, limb damage, arrhythmia, stroke, vessel dissection/perforation, myocardial infarction, and emergent CABG. Informed consent obtained at bedside and included in chart.

## 2021-10-04 NOTE — H&P ADULT - HISTORY OF PRESENT ILLNESS
Cardiologist: Dr. Richard WRENID-19 PCR:  Pharmacy:   Escort:     SKELETON: INCOMPLETE     71 y/o,  former smoker, Portuguese speaking Male poor historian with a PMHX HTN, HLD, T2DM, Hyperphosphatemia, ESRD on HD T/Th/Sat (s/p revision of RUE AV fistula—last session______ ­­­­­­), CVA (2017 - residual R sided weakness), Parkinson's dz, hypothyroidism, BPH, Hx of COVID with underlying PNA, known CAD s/p multiple PCIs with most recent cardiac cath 6/24/2021 @ [facility]  s/p IVUS/Atherectomy/Brachy dLCx (90% ISR), with residual ostial LCx 60% (iFR 0.89), patent prox and mid LAD stents presents for staged PCI of oLCx lesion. Since June 2021 PCI, patient reports he has been experiencing chest pain radiating to arm and chin. Patient went to ED 9/28/201 (confirm date and where) due to chest discomfort and reported relief after receiving Ketorolac.     Patient denies any SOB, palpitations, dizziness, fever, chills, PND, orthopnea, N/V, leg edema.   Prior ECHO 2/2/21 which showed mild MR, EF 55%, basal inferolateral, basal anterolateral, mid inferolateral and mid anterolateral segments are hypokinetic.    In light of patient’s risk factors, known CAD with residual oLCx lesion and CCS Angina Class IV symptoms, patient now presents for staged PCI of oLCx lesion.    Cardiologist: Dr. Richard WRENID-19 PCR: [place] 10/15/2021   Pharmacy: [facility]   Escort: Wife with Transportation     confirm medications upon arrival**  69 y/o,  former smoker, Romansh speaking Male poor historian with a PMHX HTN, HLD, T2DM, Hyperphosphatemia, ESRD on HD T/Th/Sat (s/p revision of RUE AV fistula—last session 10/14/2021 ­­­­­­), CVA (2017 - residual R sided weakness), Parkinson's dz, hypothyroidism, BPH, Hx of COVID with underlying PNA, known CAD s/p multiple PCIs with most recent cardiac cath 6/24/2021 @ [facility]  s/p IVUS/Atherectomy/Brachy dLCx (90% ISR), with residual ostial LCx 60% (iFR 0.89), patent prox and mid LAD stents presents for staged PCI of oLCx lesion. Since June 2021 PCI, patient reports he has been experiencing chest pain radiating to arm and chin. Patient went to ED 9/28/201 at [facility] due to chest discomfort and reported relief after receiving Ketorolac. Patient denies any SOB, palpitations, dizziness, fever, chills, PND, orthopnea, N/V, leg edema.  Prior ECHO 2/2/21 which showed mild MR, EF 55%, basal inferolateral, basal anterolateral, mid inferolateral and mid anterolateral segments are hypokinetic.    In light of patient’s risk factors, known CAD with residual oLCx lesion and CCS Angina Class IV symptoms, patient now presents for staged PCI of oLCx lesion.    Cardiologist: Dr. Richard WRENID-19 PCR: [place] 10/15/2021   Pharmacy: [facility]   Escort: Wife with Transportation     *Meds confirmed on arrival.  71 y/o,  former smoker, Solomon Islander speaking Male poor historian with a PMHX HTN, HLD, IDDM, Hyperphosphatemia, ESRD on HD T/Th/Sat (s/p revision of RUE AV fistula—last session 10/14/2021 ­­­­­­), CVA (2017 - residual R sided weakness), Parkinson's dz, hypothyroidism, BPH, Hx of COVID with underlying PNA, known CAD s/p multiple PCIs with most recent cardiac cath 6/24/2021 @ [facility]  s/p IVUS/Atherectomy/Brachy dLCx (90% ISR), with residual ostial LCx 60% (iFR 0.89), patent prox and mid LAD stents presents for staged PCI of oLCx lesion. Since June 2021 PCI, patient reports he has been experiencing chest pain radiating to arm and chin. Patient went to ED 9/28/201 at [facility] due to chest discomfort and reported relief after receiving Ketorolac. Pt denies SOB, RYAN, palpitations, dizziness, LOC, N/V, diaphoresis, orthopnea/PND, and leg swelling. Echo 2/2/21: mild MR, EF 55%, basal inferolateral, basal anterolateral, mid inferolateral, and mid anterolateral segments hypokinetic.  In light of patient’s risk factors, known CAD with residual oLCx lesion and CCS Angina Class IV symptoms, patient now presents for staged PCI of oLCx lesion.

## 2021-10-15 ENCOUNTER — INPATIENT (INPATIENT)
Facility: HOSPITAL | Age: 71
LOS: 0 days | Discharge: HOME | DRG: 246 | End: 2021-10-16
Attending: INTERNAL MEDICINE | Admitting: INTERNAL MEDICINE
Payer: MEDICARE

## 2021-10-15 DIAGNOSIS — Z95.5 PRESENCE OF CORONARY ANGIOPLASTY IMPLANT AND GRAFT: Chronic | ICD-10-CM

## 2021-10-15 DIAGNOSIS — Z95.828 PRESENCE OF OTHER VASCULAR IMPLANTS AND GRAFTS: Chronic | ICD-10-CM

## 2021-10-15 DIAGNOSIS — I77.0 ARTERIOVENOUS FISTULA, ACQUIRED: Chronic | ICD-10-CM

## 2021-10-15 LAB
A1C WITH ESTIMATED AVERAGE GLUCOSE RESULT: 6.6 % — HIGH (ref 4–5.6)
ALBUMIN SERPL ELPH-MCNC: 4.7 G/DL — SIGNIFICANT CHANGE UP (ref 3.3–5)
ALP SERPL-CCNC: 251 U/L — HIGH (ref 40–120)
ALT FLD-CCNC: 7 U/L — LOW (ref 10–45)
ANION GAP SERPL CALC-SCNC: 14 MMOL/L — SIGNIFICANT CHANGE UP (ref 5–17)
APTT BLD: 31.4 SEC — SIGNIFICANT CHANGE UP (ref 27.5–35.5)
AST SERPL-CCNC: 13 U/L — SIGNIFICANT CHANGE UP (ref 10–40)
BASOPHILS # BLD AUTO: 0.04 K/UL — SIGNIFICANT CHANGE UP (ref 0–0.2)
BASOPHILS NFR BLD AUTO: 0.6 % — SIGNIFICANT CHANGE UP (ref 0–2)
BILIRUB DIRECT SERPL-MCNC: <0.2 MG/DL — SIGNIFICANT CHANGE UP (ref 0–0.2)
BILIRUB INDIRECT FLD-MCNC: SIGNIFICANT CHANGE UP MG/DL (ref 0.2–1)
BILIRUB SERPL-MCNC: 0.4 MG/DL — SIGNIFICANT CHANGE UP (ref 0.2–1.2)
BUN SERPL-MCNC: 35 MG/DL — HIGH (ref 7–23)
CALCIUM SERPL-MCNC: 11.1 MG/DL — HIGH (ref 8.4–10.5)
CHLORIDE SERPL-SCNC: 94 MMOL/L — LOW (ref 96–108)
CHOLEST SERPL-MCNC: 118 MG/DL — SIGNIFICANT CHANGE UP
CK SERPL-CCNC: 41 U/L — SIGNIFICANT CHANGE UP (ref 30–200)
CO2 SERPL-SCNC: 32 MMOL/L — HIGH (ref 22–31)
CREAT SERPL-MCNC: 7.05 MG/DL — HIGH (ref 0.5–1.3)
EOSINOPHIL # BLD AUTO: 0.19 K/UL — SIGNIFICANT CHANGE UP (ref 0–0.5)
EOSINOPHIL NFR BLD AUTO: 3 % — SIGNIFICANT CHANGE UP (ref 0–6)
ESTIMATED AVERAGE GLUCOSE: 143 MG/DL — HIGH (ref 68–114)
GLUCOSE BLDC GLUCOMTR-MCNC: 126 MG/DL — HIGH (ref 70–99)
GLUCOSE BLDC GLUCOMTR-MCNC: 253 MG/DL — HIGH (ref 70–99)
GLUCOSE SERPL-MCNC: 182 MG/DL — HIGH (ref 70–99)
HCT VFR BLD CALC: 34.3 % — LOW (ref 39–50)
HDLC SERPL-MCNC: 36 MG/DL — LOW
HGB BLD-MCNC: 10.8 G/DL — LOW (ref 13–17)
IMM GRANULOCYTES NFR BLD AUTO: 0.3 % — SIGNIFICANT CHANGE UP (ref 0–1.5)
INR BLD: 1.06 — SIGNIFICANT CHANGE UP (ref 0.88–1.16)
LIPID PNL WITH DIRECT LDL SERPL: 44 MG/DL — SIGNIFICANT CHANGE UP
LYMPHOCYTES # BLD AUTO: 0.97 K/UL — LOW (ref 1–3.3)
LYMPHOCYTES # BLD AUTO: 15.1 % — SIGNIFICANT CHANGE UP (ref 13–44)
MCHC RBC-ENTMCNC: 29.9 PG — SIGNIFICANT CHANGE UP (ref 27–34)
MCHC RBC-ENTMCNC: 31.5 GM/DL — LOW (ref 32–36)
MCV RBC AUTO: 95 FL — SIGNIFICANT CHANGE UP (ref 80–100)
MONOCYTES # BLD AUTO: 0.53 K/UL — SIGNIFICANT CHANGE UP (ref 0–0.9)
MONOCYTES NFR BLD AUTO: 8.3 % — SIGNIFICANT CHANGE UP (ref 2–14)
NEUTROPHILS # BLD AUTO: 4.66 K/UL — SIGNIFICANT CHANGE UP (ref 1.8–7.4)
NEUTROPHILS NFR BLD AUTO: 72.7 % — SIGNIFICANT CHANGE UP (ref 43–77)
NON HDL CHOLESTEROL: 82 MG/DL — SIGNIFICANT CHANGE UP
NRBC # BLD: 0 /100 WBCS — SIGNIFICANT CHANGE UP (ref 0–0)
PLATELET # BLD AUTO: 127 K/UL — LOW (ref 150–400)
POTASSIUM SERPL-MCNC: 5.7 MMOL/L — HIGH (ref 3.5–5.3)
POTASSIUM SERPL-SCNC: 5.7 MMOL/L — HIGH (ref 3.5–5.3)
PROT SERPL-MCNC: 8.3 G/DL — SIGNIFICANT CHANGE UP (ref 6–8.3)
PROTHROM AB SERPL-ACNC: 12.7 SEC — SIGNIFICANT CHANGE UP (ref 10.6–13.6)
RBC # BLD: 3.61 M/UL — LOW (ref 4.2–5.8)
RBC # FLD: 15.6 % — HIGH (ref 10.3–14.5)
SODIUM SERPL-SCNC: 140 MMOL/L — SIGNIFICANT CHANGE UP (ref 135–145)
TRIGL SERPL-MCNC: 190 MG/DL — HIGH
WBC # BLD: 6.41 K/UL — SIGNIFICANT CHANGE UP (ref 3.8–10.5)
WBC # FLD AUTO: 6.41 K/UL — SIGNIFICANT CHANGE UP (ref 3.8–10.5)

## 2021-10-15 PROCEDURE — 93458 L HRT ARTERY/VENTRICLE ANGIO: CPT | Mod: 26,59

## 2021-10-15 PROCEDURE — 92928 PRQ TCAT PLMT NTRAC ST 1 LES: CPT | Mod: LC

## 2021-10-15 PROCEDURE — 92978 ENDOLUMINL IVUS OCT C 1ST: CPT | Mod: 26,LC

## 2021-10-15 PROCEDURE — 99152 MOD SED SAME PHYS/QHP 5/>YRS: CPT

## 2021-10-15 RX ORDER — NITROGLYCERIN 6.5 MG
1 CAPSULE, EXTENDED RELEASE ORAL
Qty: 0 | Refills: 0 | DISCHARGE

## 2021-10-15 RX ORDER — DEXTROSE 50 % IN WATER 50 %
15 SYRINGE (ML) INTRAVENOUS ONCE
Refills: 0 | Status: DISCONTINUED | OUTPATIENT
Start: 2021-10-15 | End: 2021-10-16

## 2021-10-15 RX ORDER — PANTOPRAZOLE SODIUM 20 MG/1
1 TABLET, DELAYED RELEASE ORAL
Qty: 0 | Refills: 0 | DISCHARGE

## 2021-10-15 RX ORDER — TAMSULOSIN HYDROCHLORIDE 0.4 MG/1
0.4 CAPSULE ORAL DAILY
Refills: 0 | Status: DISCONTINUED | OUTPATIENT
Start: 2021-10-15 | End: 2021-10-16

## 2021-10-15 RX ORDER — DEXTROSE 50 % IN WATER 50 %
12.5 SYRINGE (ML) INTRAVENOUS ONCE
Refills: 0 | Status: DISCONTINUED | OUTPATIENT
Start: 2021-10-15 | End: 2021-10-16

## 2021-10-15 RX ORDER — ATORVASTATIN CALCIUM 80 MG/1
1 TABLET, FILM COATED ORAL
Qty: 0 | Refills: 0 | DISCHARGE

## 2021-10-15 RX ORDER — DEXTROSE 50 % IN WATER 50 %
25 SYRINGE (ML) INTRAVENOUS ONCE
Refills: 0 | Status: DISCONTINUED | OUTPATIENT
Start: 2021-10-15 | End: 2021-10-16

## 2021-10-15 RX ORDER — INFLUENZA VIRUS VACCINE 15; 15; 15; 15 UG/.5ML; UG/.5ML; UG/.5ML; UG/.5ML
0.7 SUSPENSION INTRAMUSCULAR ONCE
Refills: 0 | Status: DISCONTINUED | OUTPATIENT
Start: 2021-10-15 | End: 2021-10-16

## 2021-10-15 RX ORDER — INSULIN HUMAN 100 [IU]/ML
3 INJECTION, SOLUTION SUBCUTANEOUS
Qty: 0 | Refills: 0 | DISCHARGE

## 2021-10-15 RX ORDER — FERROUS SULFATE 325(65) MG
1 TABLET ORAL
Qty: 0 | Refills: 0 | DISCHARGE

## 2021-10-15 RX ORDER — SODIUM CHLORIDE 9 MG/ML
1000 INJECTION, SOLUTION INTRAVENOUS
Refills: 0 | Status: DISCONTINUED | OUTPATIENT
Start: 2021-10-15 | End: 2021-10-16

## 2021-10-15 RX ORDER — INSULIN LISPRO 100/ML
VIAL (ML) SUBCUTANEOUS
Refills: 0 | Status: DISCONTINUED | OUTPATIENT
Start: 2021-10-15 | End: 2021-10-16

## 2021-10-15 RX ORDER — AMLODIPINE BESYLATE 2.5 MG/1
5 TABLET ORAL DAILY
Refills: 0 | Status: DISCONTINUED | OUTPATIENT
Start: 2021-10-15 | End: 2021-10-16

## 2021-10-15 RX ORDER — CINACALCET 30 MG/1
60 TABLET, FILM COATED ORAL DAILY
Refills: 0 | Status: DISCONTINUED | OUTPATIENT
Start: 2021-10-15 | End: 2021-10-16

## 2021-10-15 RX ORDER — PANTOPRAZOLE SODIUM 20 MG/1
40 TABLET, DELAYED RELEASE ORAL DAILY
Refills: 0 | Status: DISCONTINUED | OUTPATIENT
Start: 2021-10-15 | End: 2021-10-16

## 2021-10-15 RX ORDER — INFLUENZA VIRUS VACCINE 15; 15; 15; 15 UG/.5ML; UG/.5ML; UG/.5ML; UG/.5ML
0.5 SUSPENSION INTRAMUSCULAR ONCE
Refills: 0 | Status: DISCONTINUED | OUTPATIENT
Start: 2021-10-15 | End: 2021-10-15

## 2021-10-15 RX ORDER — INSULIN LISPRO 100/ML
VIAL (ML) SUBCUTANEOUS ONCE
Refills: 0 | Status: COMPLETED | OUTPATIENT
Start: 2021-10-15 | End: 2021-10-15

## 2021-10-15 RX ORDER — SODIUM ZIRCONIUM CYCLOSILICATE 10 G/10G
0 POWDER, FOR SUSPENSION ORAL
Qty: 0 | Refills: 0 | DISCHARGE

## 2021-10-15 RX ORDER — ASPIRIN/CALCIUM CARB/MAGNESIUM 324 MG
81 TABLET ORAL DAILY
Refills: 0 | Status: DISCONTINUED | OUTPATIENT
Start: 2021-10-15 | End: 2021-10-16

## 2021-10-15 RX ORDER — SEVELAMER CARBONATE 2400 MG/1
2400 POWDER, FOR SUSPENSION ORAL
Refills: 0 | Status: DISCONTINUED | OUTPATIENT
Start: 2021-10-15 | End: 2021-10-16

## 2021-10-15 RX ORDER — CARVEDILOL PHOSPHATE 80 MG/1
25 CAPSULE, EXTENDED RELEASE ORAL
Refills: 0 | Status: DISCONTINUED | OUTPATIENT
Start: 2021-10-15 | End: 2021-10-16

## 2021-10-15 RX ORDER — ASPIRIN/CALCIUM CARB/MAGNESIUM 324 MG
81 TABLET ORAL ONCE
Refills: 0 | Status: COMPLETED | OUTPATIENT
Start: 2021-10-15 | End: 2021-10-15

## 2021-10-15 RX ORDER — ISOSORBIDE MONONITRATE 60 MG/1
30 TABLET, EXTENDED RELEASE ORAL DAILY
Refills: 0 | Status: DISCONTINUED | OUTPATIENT
Start: 2021-10-15 | End: 2021-10-16

## 2021-10-15 RX ORDER — GLUCAGON INJECTION, SOLUTION 0.5 MG/.1ML
1 INJECTION, SOLUTION SUBCUTANEOUS ONCE
Refills: 0 | Status: DISCONTINUED | OUTPATIENT
Start: 2021-10-15 | End: 2021-10-16

## 2021-10-15 RX ORDER — TICAGRELOR 90 MG/1
90 TABLET ORAL DAILY
Refills: 0 | Status: DISCONTINUED | OUTPATIENT
Start: 2021-10-15 | End: 2021-10-16

## 2021-10-15 RX ORDER — AMLODIPINE BESYLATE 2.5 MG/1
1 TABLET ORAL
Qty: 0 | Refills: 0 | DISCHARGE

## 2021-10-15 RX ORDER — CARVEDILOL PHOSPHATE 80 MG/1
1 CAPSULE, EXTENDED RELEASE ORAL
Qty: 0 | Refills: 0 | DISCHARGE

## 2021-10-15 RX ORDER — TAMSULOSIN HYDROCHLORIDE 0.4 MG/1
1 CAPSULE ORAL
Qty: 0 | Refills: 0 | DISCHARGE

## 2021-10-15 RX ORDER — SODIUM ZIRCONIUM CYCLOSILICATE 10 G/10G
5 POWDER, FOR SUSPENSION ORAL DAILY
Refills: 0 | Status: DISCONTINUED | OUTPATIENT
Start: 2021-10-15 | End: 2021-10-15

## 2021-10-15 RX ORDER — ATORVASTATIN CALCIUM 80 MG/1
80 TABLET, FILM COATED ORAL DAILY
Refills: 0 | Status: DISCONTINUED | OUTPATIENT
Start: 2021-10-15 | End: 2021-10-16

## 2021-10-15 RX ORDER — SEVELAMER CARBONATE 2400 MG/1
3 POWDER, FOR SUSPENSION ORAL
Qty: 0 | Refills: 0 | DISCHARGE

## 2021-10-15 RX ORDER — TICAGRELOR 90 MG/1
90 TABLET ORAL ONCE
Refills: 0 | Status: COMPLETED | OUTPATIENT
Start: 2021-10-15 | End: 2021-10-15

## 2021-10-15 RX ORDER — CINACALCET 30 MG/1
1 TABLET, FILM COATED ORAL
Qty: 0 | Refills: 0 | DISCHARGE

## 2021-10-15 RX ADMIN — Medication 0.1 MILLIGRAM(S): at 21:16

## 2021-10-15 RX ADMIN — TICAGRELOR 90 MILLIGRAM(S): 90 TABLET ORAL at 21:17

## 2021-10-15 RX ADMIN — ATORVASTATIN CALCIUM 80 MILLIGRAM(S): 80 TABLET, FILM COATED ORAL at 21:16

## 2021-10-15 RX ADMIN — CINACALCET 60 MILLIGRAM(S): 30 TABLET, FILM COATED ORAL at 21:16

## 2021-10-15 RX ADMIN — AMLODIPINE BESYLATE 5 MILLIGRAM(S): 2.5 TABLET ORAL at 21:17

## 2021-10-15 RX ADMIN — Medication 6: at 21:49

## 2021-10-15 RX ADMIN — Medication 1 TABLET(S): at 21:16

## 2021-10-15 RX ADMIN — TAMSULOSIN HYDROCHLORIDE 0.4 MILLIGRAM(S): 0.4 CAPSULE ORAL at 21:16

## 2021-10-15 RX ADMIN — Medication 81 MILLIGRAM(S): at 12:12

## 2021-10-15 RX ADMIN — CARVEDILOL PHOSPHATE 25 MILLIGRAM(S): 80 CAPSULE, EXTENDED RELEASE ORAL at 21:17

## 2021-10-15 RX ADMIN — TICAGRELOR 90 MILLIGRAM(S): 90 TABLET ORAL at 12:12

## 2021-10-15 RX ADMIN — ISOSORBIDE MONONITRATE 30 MILLIGRAM(S): 60 TABLET, EXTENDED RELEASE ORAL at 21:16

## 2021-10-15 RX ADMIN — PANTOPRAZOLE SODIUM 40 MILLIGRAM(S): 20 TABLET, DELAYED RELEASE ORAL at 21:17

## 2021-10-15 RX ADMIN — Medication 2: at 13:54

## 2021-10-16 ENCOUNTER — TRANSCRIPTION ENCOUNTER (OUTPATIENT)
Age: 71
End: 2021-10-16

## 2021-10-16 VITALS
OXYGEN SATURATION: 98 % | HEART RATE: 71 BPM | RESPIRATION RATE: 17 BRPM | DIASTOLIC BLOOD PRESSURE: 68 MMHG | SYSTOLIC BLOOD PRESSURE: 155 MMHG

## 2021-10-16 LAB
A1C WITH ESTIMATED AVERAGE GLUCOSE RESULT: 6.8 % — HIGH (ref 4–5.6)
ANION GAP SERPL CALC-SCNC: 15 MMOL/L — SIGNIFICANT CHANGE UP (ref 5–17)
BUN SERPL-MCNC: 44 MG/DL — HIGH (ref 7–23)
CALCIUM SERPL-MCNC: 10 MG/DL — SIGNIFICANT CHANGE UP (ref 8.4–10.5)
CHLORIDE SERPL-SCNC: 92 MMOL/L — LOW (ref 96–108)
CO2 SERPL-SCNC: 26 MMOL/L — SIGNIFICANT CHANGE UP (ref 22–31)
COVID-19 SPIKE DOMAIN AB INTERP: POSITIVE
COVID-19 SPIKE DOMAIN ANTIBODY RESULT: >250 U/ML — HIGH
CREAT SERPL-MCNC: 8.57 MG/DL — HIGH (ref 0.5–1.3)
ESTIMATED AVERAGE GLUCOSE: 148 MG/DL — HIGH (ref 68–114)
GLUCOSE BLDC GLUCOMTR-MCNC: 126 MG/DL — HIGH (ref 70–99)
GLUCOSE BLDC GLUCOMTR-MCNC: 132 MG/DL — HIGH (ref 70–99)
GLUCOSE SERPL-MCNC: 90 MG/DL — SIGNIFICANT CHANGE UP (ref 70–99)
HCT VFR BLD CALC: 29.9 % — LOW (ref 39–50)
HGB BLD-MCNC: 9.6 G/DL — LOW (ref 13–17)
MAGNESIUM SERPL-MCNC: 2.5 MG/DL — SIGNIFICANT CHANGE UP (ref 1.6–2.6)
MCHC RBC-ENTMCNC: 30.3 PG — SIGNIFICANT CHANGE UP (ref 27–34)
MCHC RBC-ENTMCNC: 32.1 GM/DL — SIGNIFICANT CHANGE UP (ref 32–36)
MCV RBC AUTO: 94.3 FL — SIGNIFICANT CHANGE UP (ref 80–100)
NRBC # BLD: 0 /100 WBCS — SIGNIFICANT CHANGE UP (ref 0–0)
PHOSPHATE SERPL-MCNC: 3.4 MG/DL — SIGNIFICANT CHANGE UP (ref 2.5–4.5)
PLATELET # BLD AUTO: 123 K/UL — LOW (ref 150–400)
POTASSIUM SERPL-MCNC: 5.4 MMOL/L — HIGH (ref 3.5–5.3)
POTASSIUM SERPL-SCNC: 5.4 MMOL/L — HIGH (ref 3.5–5.3)
RBC # BLD: 3.17 M/UL — LOW (ref 4.2–5.8)
RBC # FLD: 15.4 % — HIGH (ref 10.3–14.5)
SARS-COV-2 IGG+IGM SERPL QL IA: >250 U/ML — HIGH
SARS-COV-2 IGG+IGM SERPL QL IA: POSITIVE
SODIUM SERPL-SCNC: 133 MMOL/L — LOW (ref 135–145)
WBC # BLD: 6.46 K/UL — SIGNIFICANT CHANGE UP (ref 3.8–10.5)
WBC # FLD AUTO: 6.46 K/UL — SIGNIFICANT CHANGE UP (ref 3.8–10.5)

## 2021-10-16 PROCEDURE — 99222 1ST HOSP IP/OBS MODERATE 55: CPT | Mod: GC

## 2021-10-16 PROCEDURE — 99238 HOSP IP/OBS DSCHRG MGMT 30/<: CPT

## 2021-10-16 RX ORDER — TICAGRELOR 90 MG/1
1 TABLET ORAL
Qty: 0 | Refills: 0 | DISCHARGE

## 2021-10-16 RX ORDER — TICAGRELOR 90 MG/1
1 TABLET ORAL
Qty: 60 | Refills: 11
Start: 2021-10-16 | End: 2022-10-10

## 2021-10-16 RX ORDER — NITROGLYCERIN 6.5 MG
1 CAPSULE, EXTENDED RELEASE ORAL
Qty: 0 | Refills: 0 | DISCHARGE

## 2021-10-16 RX ORDER — ASPIRIN/CALCIUM CARB/MAGNESIUM 324 MG
1 TABLET ORAL
Qty: 30 | Refills: 11
Start: 2021-10-16 | End: 2022-10-10

## 2021-10-16 RX ORDER — ASPIRIN/CALCIUM CARB/MAGNESIUM 324 MG
1 TABLET ORAL
Qty: 0 | Refills: 0 | DISCHARGE

## 2021-10-16 RX ADMIN — Medication 81 MILLIGRAM(S): at 14:03

## 2021-10-16 RX ADMIN — CARVEDILOL PHOSPHATE 25 MILLIGRAM(S): 80 CAPSULE, EXTENDED RELEASE ORAL at 05:52

## 2021-10-16 RX ADMIN — PANTOPRAZOLE SODIUM 40 MILLIGRAM(S): 20 TABLET, DELAYED RELEASE ORAL at 14:05

## 2021-10-16 RX ADMIN — ATORVASTATIN CALCIUM 80 MILLIGRAM(S): 80 TABLET, FILM COATED ORAL at 14:05

## 2021-10-16 RX ADMIN — Medication 0.1 MILLIGRAM(S): at 05:52

## 2021-10-16 RX ADMIN — ISOSORBIDE MONONITRATE 30 MILLIGRAM(S): 60 TABLET, EXTENDED RELEASE ORAL at 14:06

## 2021-10-16 RX ADMIN — TAMSULOSIN HYDROCHLORIDE 0.4 MILLIGRAM(S): 0.4 CAPSULE ORAL at 14:06

## 2021-10-16 RX ADMIN — SEVELAMER CARBONATE 2400 MILLIGRAM(S): 2400 POWDER, FOR SUSPENSION ORAL at 09:48

## 2021-10-16 RX ADMIN — Medication 1 TABLET(S): at 14:03

## 2021-10-16 RX ADMIN — TICAGRELOR 90 MILLIGRAM(S): 90 TABLET ORAL at 14:05

## 2021-10-16 RX ADMIN — AMLODIPINE BESYLATE 5 MILLIGRAM(S): 2.5 TABLET ORAL at 05:52

## 2021-10-16 RX ADMIN — CINACALCET 60 MILLIGRAM(S): 30 TABLET, FILM COATED ORAL at 14:08

## 2021-10-16 NOTE — DISCHARGE NOTE PROVIDER - PROVIDER TOKENS
PROVIDER:[TOKEN:[[id number]]] PROVIDER:[TOKEN:[[id number]]],FREE:[LAST:[Ramos],FIRST:[Carlin],PHONE:[phone]],FAX:[(   )    -],ADDRESS:[[facility]],FOLLOWUP:[1 week]]

## 2021-10-16 NOTE — CONSULT NOTE ADULT - ASSESSMENT
Assessment/Plan:   71 y/o,  former smoker, Afghan speaking Male poor historian with a PMHX HTN, HLD, IDDM, Hyperphosphatemia, ESRD on HD T/Th/Sat (s/p revision of RUE AV fistula—last session 10/14/2021), CVA (2017 - residual R sided weakness), Parkinson's dz, hypothyroidism, BPH, Hx of COVID with underlying PNA, known CAD s/p multiple PCIs with presents for staged PCI on 10/15. Renal consulted for dialysis prior to dc.    ESRD on HD TTS @[facility] [place], [place]   HD today per schedule  Usual rx 3.5h, 2L  BP: mild hypertensive, UF with HD, on norvasc, coreg, clonidine, imdur, flomax  Anaemia- check iron panel and ferritin, epo with HD  BMD: continue cinacalcet and renvela  Access: AVF as above    Dialyzer: Optiflux [id number]  QB: 400 mL/min  QD: 500 mL/min  K bath: 2  Goal UF: 2.5L  Duration: 180 min    Patient is tolerating the procedure well. Continue full hemodialysis treatment as prescribed.  Daily weights, strict I&Os, renally dose meds, renal diet    Thank you for the opportunity to participate in the care of your patient. The nephrology service remains available to assist with any questions or concerns. Please feel free to reach us by paging the on-call nephrology fellow for urgent issues or as below.     Julia Mg M.D.   PGY-5  Pager: [phone]

## 2021-10-16 NOTE — CONSULT NOTE ADULT - SUBJECTIVE AND OBJECTIVE BOX
Patient is a 71y old  Male who presents with a chief complaint of Staged PCI (16 Oct 2021 13:47)      HPI:  69 y/o,  former smoker, [place]or[place] speaking Male poor historian with a PMHX HTN, HLD, IDDM, Hyperphosphatemia, ESRD on HD T/Th/Sat (s/p revision of RUE AV fistula—last session 10/14/2021), CVA (2017 - residual R sided weakness), Parkinson's dz, hypothyroidism, BPH, Hx of COVID with underlying PNA, known CAD s/p multiple PCIs with presents for staged PCI on 10/15. Renal consulted for dialysis prior to dc.    PAST MEDICAL & SURGICAL HISTORY:  Diabetes    Hypertension    Hyperphosphatemia    Coronary artery disease  with stent    Hyperlipidemia    Stroke  2017, residual right sided weakness    History of BPH    Back pain    ESRD on hemodialysis    Parkinsons disease    ESRD (end stage renal disease)    Hypertension    CAD (coronary artery disease)    S/P dialysis catheter insertion  Right chest perma cath removed in jan 2020    AVF (arteriovenous fistula)  right arm x with revision, left arm x 1    Stented coronary artery  2004-5 at [facility]    S/P primary angioplasty with coronary stent        Allergies:  No Known Allergies      Home Medications:   amLODIPine   Tablet 5 milliGRAM(s) Oral daily  aspirin enteric coated 81 milliGRAM(s) Oral daily  atorvastatin 80 milliGRAM(s) Oral daily  carvedilol 25 milliGRAM(s) Oral two times a day  chlorhexidine 4% Liquid 1 Application(s) Topical once  cinacalcet 60 milliGRAM(s) Oral daily  cloNIDine 0.1 milliGRAM(s) Oral two times a day  dextrose 40% Gel 15 Gram(s) Oral Once  dextrose 5%. 1000 milliLiter(s) IV Continuous <Continuous>  dextrose 5%. 1000 milliLiter(s) IV Continuous <Continuous>  dextrose 50% Injectable 25 Gram(s) IV Push Once  dextrose 50% Injectable 12.5 Gram(s) IV Push Once  dextrose 50% Injectable 25 Gram(s) IV Push Once  glucagon  Injectable 1 milliGRAM(s) IntraMuscular Once  influenza  Vaccine (HIGH DOSE) 0.7 milliLiter(s) IntraMuscular once  insulin lispro (ADMELOG) corrective regimen sliding scale   SubCutaneous Before meals and at bedtime  isosorbide   mononitrate ER Tablet (IMDUR) 30 milliGRAM(s) Oral daily  multivitamin 1 Tablet(s) Oral daily  pantoprazole    Tablet 40 milliGRAM(s) Oral daily  sevelamer carbonate 2400 milliGRAM(s) Oral two times a day  tamsulosin 0.4 milliGRAM(s) Oral daily  ticagrelor 90 milliGRAM(s) Oral daily      Hospital Medications:   MEDICATIONS  (STANDING):  amLODIPine   Tablet 5 milliGRAM(s) Oral daily  aspirin enteric coated 81 milliGRAM(s) Oral daily  atorvastatin 80 milliGRAM(s) Oral daily  carvedilol 25 milliGRAM(s) Oral two times a day  chlorhexidine 4% Liquid 1 Application(s) Topical once  cinacalcet 60 milliGRAM(s) Oral daily  cloNIDine 0.1 milliGRAM(s) Oral two times a day  dextrose 40% Gel 15 Gram(s) Oral Once  dextrose 5%. 1000 milliLiter(s) (50 mL/Hr) IV Continuous <Continuous>  dextrose 5%. 1000 milliLiter(s) (100 mL/Hr) IV Continuous <Continuous>  dextrose 50% Injectable 25 Gram(s) IV Push Once  dextrose 50% Injectable 12.5 Gram(s) IV Push Once  dextrose 50% Injectable 25 Gram(s) IV Push Once  glucagon  Injectable 1 milliGRAM(s) IntraMuscular Once  influenza  Vaccine (HIGH DOSE) 0.7 milliLiter(s) IntraMuscular once  insulin lispro (ADMELOG) corrective regimen sliding scale   SubCutaneous Before meals and at bedtime  isosorbide   mononitrate ER Tablet (IMDUR) 30 milliGRAM(s) Oral daily  multivitamin 1 Tablet(s) Oral daily  pantoprazole    Tablet 40 milliGRAM(s) Oral daily  sevelamer carbonate 2400 milliGRAM(s) Oral two times a day  tamsulosin 0.4 milliGRAM(s) Oral daily  ticagrelor 90 milliGRAM(s) Oral daily      Family History:  FAMILY HISTORY:  FH: type 2 diabetes  In father        ROS:  RESPIRATORY: No shortness of breath  CARDIOVASCULAR: No Chest pain  MUSCULOSKELETAL: No leg swelling    VITALS:  T(F): 97.8 (10-16-21 @ 12:35), Max: 97.8 (10-16-21 @ 12:35)  HR: 71 (10-16-21 @ 13:30)  BP: 155/68 (10-16-21 @ 13:30)  RR: 17 (10-16-21 @ 13:30)  SpO2: 98% (10-16-21 @ 13:30)  Wt(kg): --    10-16 @ 07:01  -  10-16 @ 15:56  --------------------------------------------------------  IN: 400 mL / OUT: 2400 mL / NET: -2000 mL      Height (cm): 157.5 (10-15 @ 20:30)  CAPILLARY BLOOD GLUCOSE      POCT Blood Glucose.: 132 mg/dL (16 Oct 2021 13:42)  POCT Blood Glucose.: 126 mg/dL (16 Oct 2021 06:49)  POCT Blood Glucose.: 253 mg/dL (15 Oct 2021 21:24)  POCT Blood Glucose.: 126 mg/dL (15 Oct 2021 16:05)      PHYSICAL EXAM:  GENERAL: Alert, awake, oriented x3 on RA  CHEST/LUNG: Bilateral clear breath sounds, no rales  HEART: Regular rate and rhythm, no murmur  EXTREMITIES:  no pedal oedema  ACCESS: LUE AVF w/bruit and thrill     LABS:  10-16    133<L>  |  92<L>  |  44<H>  ----------------------------<  90  5.4<H>   |  26  |  8.57<H>    Ca    10.0      16 Oct 2021 06:45  Phos  3.4     10-16  Mg     2.5     10-16    TPro  8.3  /  Alb  4.7  /  TBili  0.4  /  DBili  <0.2  /  AST  13  /  ALT  7<L>  /  AlkPhos  251<H>  10-15    Creatinine Trend: 8.57 <--, 7.05 <--                        9.6    6.46  )-----------( 123      ( 16 Oct 2021 06:45 )             29.9       Urine Studies:          10-16-21 @ 07:01  -  10-16-21 @ 15:56  --------------------------------------------------------  IN: 400 mL / OUT: 2400 mL / NET: -2000 mL

## 2021-10-16 NOTE — CONSULT NOTE ADULT - ATTENDING COMMENTS
I:  ESRD. BP elevated. Slight hyperkaelmia.   A: ESRD. Hyperkalemia and uncontrolled HTN.   P: Dialysis today.

## 2021-10-16 NOTE — DISCHARGE NOTE PROVIDER - NSDCMRMEDTOKEN_GEN_ALL_CORE_FT
amLODIPine 5 mg oral tablet: 1 tab(s) orally once a day  Aspirin Enteric Coated 81 mg oral delayed release tablet: 1 tab(s) orally once a day  atorvastatin 80 mg oral tablet: 1 tab(s) orally once a day  Brilinta (ticagrelor) 90 mg oral tablet: 1 tab(s) orally 2 times a day  carvedilol 25 mg oral tablet: 1 tab(s) orally 2 times a day  cinacalcet 60 mg oral tablet: 1 tab(s) orally once a day  cloNIDine 0.1 mg oral tablet: 1 tab(s) orally 2 times a day  isosorbide mononitrate 30 mg oral tablet, extended release: 1 tab(s) orally once a day (in the morning)  lisinopril 10 mg oral tablet: 1 tab(s) orally once a day  Lokelma 5 g oral powder for reconstitution: 5 gram(s) orally once a day  nitroglycerin 0.4 mg sublingual tablet: 1 tab(s) sublingual every 5 minutes, As Needed  NovoLIN 70/30 subcutaneous suspension: 3 unit(s) subcutaneous 3 times a day (before meals)  pantoprazole 40 mg oral delayed release tablet: 1 tab(s) orally once a day  Renal Caps oral capsule: 1 cap(s) orally once a day  sevelamer carbonate 800 mg oral tablet: 3 tab(s) orally 3 times a day (with meals)  tamsulosin 0.4 mg oral capsule: 1 cap(s) orally once a day   amLODIPine 5 mg oral tablet: 1 tab(s) orally once a day  Aspirin Enteric Coated 81 mg oral delayed release tablet: 1 tab(s) orally once a day  atorvastatin 80 mg oral tablet: 1 tab(s) orally once a day  Brilinta (ticagrelor) 90 mg oral tablet: 1 tab(s) orally 2 times a day  cardiac rehab: 3 x per week x 12 weeks  Dx: Coronary Artery Disease s/p PCI    Cardiologist: Dr. Carlin Ramos  [phone]  carvedilol 25 mg oral tablet: 1 tab(s) orally 2 times a day  cinacalcet 60 mg oral tablet: 1 tab(s) orally once a day  cloNIDine 0.1 mg oral tablet: 1 tab(s) orally 2 times a day  isosorbide mononitrate 30 mg oral tablet, extended release: 1 tab(s) orally once a day (in the morning)  lisinopril 10 mg oral tablet: 1 tab(s) orally once a day  Lokelma 5 g oral powder for reconstitution: 5 gram(s) orally once a day  NovoLIN 70/30 subcutaneous suspension: 3 unit(s) subcutaneous 3 times a day (before meals)  pantoprazole 40 mg oral delayed release tablet: 1 tab(s) orally once a day  Renal Caps oral capsule: 1 cap(s) orally once a day  sevelamer carbonate 800 mg oral tablet: 3 tab(s) orally 3 times a day (with meals)  tamsulosin 0.4 mg oral capsule: 1 cap(s) orally once a day

## 2021-10-16 NOTE — DISCHARGE NOTE PROVIDER - HOSPITAL COURSE
69 y/o,  former smoker, Burmese speaking Male poor historian with a PMHX HTN, HLD, IDDM, Hyperphosphatemia, ESRD on HD T/Th/Sat (s/p revision of RUE AV fistula—last session 10/14/2021 ­­­­­­), CVA (2017 - residual R sided weakness), Parkinson's dz, hypothyroidism, BPH, Hx of COVID with underlying PNA, known CAD s/p multiple PCIs with most recent cardiac cath 6/24/2021 @ [facility]  s/p IVUS/Atherectomy/Brachy dLCx (90% ISR), with residual ostial LCx 60% (iFR 0.89), patent prox and mid LAD stents presents for staged PCI of oLCx lesion. Since June 2021 PCI, patient reports he has been experiencing chest pain radiating to arm and chin. Patient went to ED 9/28/201 at [facility] due to chest discomfort and reported relief after receiving Ketorolac. Pt denies SOB, RYAN, palpitations, dizziness, LOC, N/V, diaphoresis, orthopnea/PND, and leg swelling. Echo 2/2/21: mild MR, EF 55%, basal inferolateral, basal anterolateral, mid inferolateral, and mid anterolateral segments hypokinetic.     In light of patient’s risk factors, known CAD with residual oLCx lesion and CCS Angina Class IV symptoms, patient now presents for staged PCI of oLCx lesion. Patient s/p cardiac cath 10/15/2021 SURYA pLCx, RCA  (medically managed), EF 55%, L groin AS. Patient admitted to [facility] for post procedure monitoring. Patient seen and examined this AM and has no complaints. VSS. Patient voided and ambulated without issue. Labs and telemetry reviewed which are unremarkable aside from Hyperkalemia (K+ 5.7 to K+ 5.4 this AM). Patient is s/p HD and is currently on Lokelma at home-discussed with Renal Fellow-Hyperkalemia would be corrected via these two mechanisms; to continue Lisinopril as an outpatient. Labs also revealed Anemia however Anemic on admission Hgb 10.8 down to 9.6 post procedure (within acceptable range for post procedure ). Left Groin Stable No hematoma,  bleed, or bruit. Distal Pulses Intact to baseline. Patient C/P free and HD stable and cleared for discharge by Carlos Couch. Patient has been given appropriate discharge instructions including medication regimen, access site management and follow up. All needed prescriptions have been e-prescribed to patients preferred outpatient pharmacy.      Cardiac Rehab (STEMI/NSTEMI/ACS/Unstable Angina/CHF/Post PCI):            *Education on benefits of Cardiac Rehab provided to patient: Yes         *Referral and Prescription Given for Cardiac Rehab : Yes         *Pt given list of locations & instructed to contact their insurance company to review list of participating providers      Statin Prescribed (STEMI/NSTEMI/ACS/UA &/OR Post PCI this admission):  Yes  DAPT: Prescriptions for Aspirin/Plavix/Brilinta/Effient e-prescribed to patient's pharmacy: Yes

## 2021-10-16 NOTE — DISCHARGE NOTE PROVIDER - NSDCFUADDINST_GEN_ALL_CORE_FT
You underwent a cardiac catheterization and should wait 3 to 5 days before returning to normal activities. Do not drive for 2 days. Consult your doctor before returning to vigorous activity. You may return to work in 3-5 days. The catheter from your left groin was removed.  Please avoid any heavy lifting (no more than 3 to 5 lbs) or strenuous activity for 5 days.      You may shower once the dressing is removed, but avoid baths, hot tubs, or swimming for 5 days to prevent infection. If you notice bleeding from the site, hardening and pain at the site, drainage or redness from the site, coolness/paleness of the left leg, weakness/paralysis of left leg (unable to lift or move) swelling, or fever, please call [phone]

## 2021-10-16 NOTE — DISCHARGE NOTE NURSING/CASE MANAGEMENT/SOCIAL WORK - PATIENT PORTAL LINK FT
You can access the FollowMyHealth Patient Portal offered by [facility] by registering at the following website: [email or web address]. By joining [employer]’s FollowMyHealth portal, you will also be able to view your health information using other applications (apps) compatible with our system.

## 2021-10-16 NOTE — DISCHARGE NOTE PROVIDER - CARE PROVIDERS DIRECT ADDRESSES
[email or web address]

## 2021-10-16 NOTE — DISCHARGE NOTE PROVIDER - CARE PROVIDER_API CALL
Carlin Ramos (MD)  Cardiovascular Disease; Interventional Cardiology  [address], [address] [place] [address]  Phone: [phone]  Fax: [phone]  Follow Up Time:    Carlin Ramos)  Cardiovascular Disease; Interventional Cardiology  [address], [address] [place] [address]  Phone: [phone]  Fax: [phone]  Follow Up Time:     Carlin Ramos  [facility]  Phone: [phone]  Fax: (   )    -  Follow Up Time: 1 week

## 2021-10-20 DIAGNOSIS — Z79.02 LONG TERM (CURRENT) USE OF ANTITHROMBOTICS/ANTIPLATELETS: ICD-10-CM

## 2021-10-20 DIAGNOSIS — I25.84 CORONARY ATHEROSCLEROSIS DUE TO CALCIFIED CORONARY LESION: ICD-10-CM

## 2021-10-20 DIAGNOSIS — Z87.891 PERSONAL HISTORY OF NICOTINE DEPENDENCE: ICD-10-CM

## 2021-10-20 DIAGNOSIS — N18.6 END STAGE RENAL DISEASE: ICD-10-CM

## 2021-10-20 DIAGNOSIS — Z95.5 PRESENCE OF CORONARY ANGIOPLASTY IMPLANT AND GRAFT: ICD-10-CM

## 2021-10-20 DIAGNOSIS — I12.0 HYPERTENSIVE CHRONIC KIDNEY DISEASE WITH STAGE 5 CHRONIC KIDNEY DISEASE OR END STAGE RENAL DISEASE: ICD-10-CM

## 2021-10-20 DIAGNOSIS — Z79.4 LONG TERM (CURRENT) USE OF INSULIN: ICD-10-CM

## 2021-10-20 DIAGNOSIS — I25.10 ATHEROSCLEROTIC HEART DISEASE OF NATIVE CORONARY ARTERY WITHOUT ANGINA PECTORIS: ICD-10-CM

## 2021-10-20 DIAGNOSIS — E87.5 HYPERKALEMIA: ICD-10-CM

## 2021-10-20 DIAGNOSIS — E11.22 TYPE 2 DIABETES MELLITUS WITH DIABETIC CHRONIC KIDNEY DISEASE: ICD-10-CM

## 2021-10-20 DIAGNOSIS — I69.351 HEMIPLEGIA AND HEMIPARESIS FOLLOWING CEREBRAL INFARCTION AFFECTING RIGHT DOMINANT SIDE: ICD-10-CM

## 2021-10-20 DIAGNOSIS — G20 PARKINSON'S DISEASE: ICD-10-CM

## 2021-10-20 DIAGNOSIS — I25.119 ATHEROSCLEROTIC HEART DISEASE OF NATIVE CORONARY ARTERY WITH UNSPECIFIED ANGINA PECTORIS: ICD-10-CM

## 2021-10-20 DIAGNOSIS — E03.9 HYPOTHYROIDISM, UNSPECIFIED: ICD-10-CM

## 2021-10-20 DIAGNOSIS — E78.5 HYPERLIPIDEMIA, UNSPECIFIED: ICD-10-CM

## 2021-10-20 DIAGNOSIS — Z79.82 LONG TERM (CURRENT) USE OF ASPIRIN: ICD-10-CM

## 2021-10-20 DIAGNOSIS — N40.0 BENIGN PROSTATIC HYPERPLASIA WITHOUT LOWER URINARY TRACT SYMPTOMS: ICD-10-CM

## 2021-10-20 DIAGNOSIS — Z86.16 PERSONAL HISTORY OF COVID-19: ICD-10-CM

## 2021-10-20 DIAGNOSIS — Z99.2 DEPENDENCE ON RENAL DIALYSIS: ICD-10-CM

## 2021-10-26 PROCEDURE — C1761: CPT

## 2021-10-26 PROCEDURE — 86769 SARS-COV-2 COVID-19 ANTIBODY: CPT

## 2021-10-26 PROCEDURE — 85025 COMPLETE CBC W/AUTO DIFF WBC: CPT

## 2021-10-26 PROCEDURE — C1753: CPT

## 2021-10-26 PROCEDURE — C1769: CPT

## 2021-10-26 PROCEDURE — 85730 THROMBOPLASTIN TIME PARTIAL: CPT

## 2021-10-26 PROCEDURE — 82248 BILIRUBIN DIRECT: CPT

## 2021-10-26 PROCEDURE — 36415 COLL VENOUS BLD VENIPUNCTURE: CPT

## 2021-10-26 PROCEDURE — 80061 LIPID PANEL: CPT

## 2021-10-26 PROCEDURE — C1894: CPT

## 2021-10-26 PROCEDURE — 80053 COMPREHEN METABOLIC PANEL: CPT

## 2021-10-26 PROCEDURE — C1887: CPT

## 2021-10-26 PROCEDURE — 85610 PROTHROMBIN TIME: CPT

## 2021-10-26 PROCEDURE — C1760: CPT

## 2021-10-26 PROCEDURE — 82962 GLUCOSE BLOOD TEST: CPT

## 2021-10-26 PROCEDURE — 84100 ASSAY OF PHOSPHORUS: CPT

## 2021-10-26 PROCEDURE — 83036 HEMOGLOBIN GLYCOSYLATED A1C: CPT

## 2021-10-26 PROCEDURE — 85027 COMPLETE CBC AUTOMATED: CPT

## 2021-10-26 PROCEDURE — C1725: CPT

## 2021-10-26 PROCEDURE — 82550 ASSAY OF CK (CPK): CPT

## 2021-10-26 PROCEDURE — 90935 HEMODIALYSIS ONE EVALUATION: CPT

## 2021-10-26 PROCEDURE — C1874: CPT

## 2021-10-26 PROCEDURE — 80048 BASIC METABOLIC PNL TOTAL CA: CPT

## 2021-10-26 PROCEDURE — 83735 ASSAY OF MAGNESIUM: CPT
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
## 2021-11-26 NOTE — H&P ADULT - NSTOBACCOSCREENHP_GEN_A_NCS
Patient was in 11/22 and tested positive for covid. This am has noticed some blood tinged mucous when spitting in sink before brushing teeth. Wants to know if she should be concerned.     Please advise No

## 2022-01-05 NOTE — H&P PST ADULT - DOCUMENT STATUS
Patient continuing with her exercises. She will call me when her next surgery is scheduled to do more measurements. Authored by Resident/PA/NP

## 2022-06-19 NOTE — DISCHARGE NOTE PROVIDER - NSCORESITESY/N_GEN_A_CORE_RD
No
Kurt Aguilar (DO)  Orthopaedic Surgery  [address] [place] [address]  Phone: [phone]  Fax: [phone]  Follow Up Time: 1-3 Days

## 2022-12-26 ENCOUNTER — INPATIENT (INPATIENT)
Facility: HOSPITAL | Age: 72
LOS: 1 days | Discharge: HOME | DRG: 250 | End: 2022-12-28
Attending: INTERNAL MEDICINE | Admitting: INTERNAL MEDICINE
Payer: MEDICARE

## 2022-12-26 VITALS
DIASTOLIC BLOOD PRESSURE: 51 MMHG | HEIGHT: 62 IN | OXYGEN SATURATION: 93 % | SYSTOLIC BLOOD PRESSURE: 104 MMHG | HEART RATE: 63 BPM | WEIGHT: 140.21 LBS | RESPIRATION RATE: 17 BRPM

## 2022-12-26 DIAGNOSIS — E11.9 TYPE 2 DIABETES MELLITUS WITHOUT COMPLICATIONS: ICD-10-CM

## 2022-12-26 DIAGNOSIS — Z95.828 PRESENCE OF OTHER VASCULAR IMPLANTS AND GRAFTS: Chronic | ICD-10-CM

## 2022-12-26 DIAGNOSIS — E78.5 HYPERLIPIDEMIA, UNSPECIFIED: ICD-10-CM

## 2022-12-26 DIAGNOSIS — G20 PARKINSON'S DISEASE: ICD-10-CM

## 2022-12-26 DIAGNOSIS — I10 ESSENTIAL (PRIMARY) HYPERTENSION: ICD-10-CM

## 2022-12-26 DIAGNOSIS — Z95.5 PRESENCE OF CORONARY ANGIOPLASTY IMPLANT AND GRAFT: Chronic | ICD-10-CM

## 2022-12-26 DIAGNOSIS — I77.0 ARTERIOVENOUS FISTULA, ACQUIRED: Chronic | ICD-10-CM

## 2022-12-26 DIAGNOSIS — N18.6 END STAGE RENAL DISEASE: ICD-10-CM

## 2022-12-26 DIAGNOSIS — I20.0 UNSTABLE ANGINA: ICD-10-CM

## 2022-12-26 LAB
ALBUMIN SERPL ELPH-MCNC: 4 G/DL — SIGNIFICANT CHANGE UP (ref 3.3–5)
ALP SERPL-CCNC: 333 U/L — HIGH (ref 40–120)
ALT FLD-CCNC: <5 U/L — LOW (ref 10–45)
ANION GAP SERPL CALC-SCNC: 13 MMOL/L — SIGNIFICANT CHANGE UP (ref 5–17)
APTT BLD: 49.4 SEC — HIGH (ref 27.5–35.5)
APTT BLD: 61.6 SEC — HIGH (ref 27.5–35.5)
AST SERPL-CCNC: 10 U/L — SIGNIFICANT CHANGE UP (ref 10–40)
BILIRUB SERPL-MCNC: 0.4 MG/DL — SIGNIFICANT CHANGE UP (ref 0.2–1.2)
BUN SERPL-MCNC: 44 MG/DL — HIGH (ref 7–23)
CALCIUM SERPL-MCNC: 9.6 MG/DL — SIGNIFICANT CHANGE UP (ref 8.4–10.5)
CHLORIDE SERPL-SCNC: 95 MMOL/L — LOW (ref 96–108)
CHOLEST SERPL-MCNC: 101 MG/DL — SIGNIFICANT CHANGE UP
CK MB CFR SERPL CALC: 1.2 NG/ML — SIGNIFICANT CHANGE UP (ref 0–6.7)
CK SERPL-CCNC: 55 U/L — SIGNIFICANT CHANGE UP (ref 30–200)
CO2 SERPL-SCNC: 29 MMOL/L — SIGNIFICANT CHANGE UP (ref 22–31)
CREAT SERPL-MCNC: 9.92 MG/DL — HIGH (ref 0.5–1.3)
EGFR: 5 ML/MIN/1.73M2 — LOW
GLUCOSE BLDC GLUCOMTR-MCNC: 116 MG/DL — HIGH (ref 70–99)
GLUCOSE BLDC GLUCOMTR-MCNC: 154 MG/DL — HIGH (ref 70–99)
GLUCOSE BLDC GLUCOMTR-MCNC: 223 MG/DL — HIGH (ref 70–99)
GLUCOSE SERPL-MCNC: 169 MG/DL — HIGH (ref 70–99)
HCT VFR BLD CALC: 28.2 % — LOW (ref 39–50)
HDLC SERPL-MCNC: 41 MG/DL — SIGNIFICANT CHANGE UP
HGB BLD-MCNC: 9.2 G/DL — LOW (ref 13–17)
INR BLD: 1.06 — SIGNIFICANT CHANGE UP (ref 0.88–1.16)
LIPID PNL WITH DIRECT LDL SERPL: 35 MG/DL — SIGNIFICANT CHANGE UP
MAGNESIUM SERPL-MCNC: 2.3 MG/DL — SIGNIFICANT CHANGE UP (ref 1.6–2.6)
MCHC RBC-ENTMCNC: 31 PG — SIGNIFICANT CHANGE UP (ref 27–34)
MCHC RBC-ENTMCNC: 32.6 GM/DL — SIGNIFICANT CHANGE UP (ref 32–36)
MCV RBC AUTO: 94.9 FL — SIGNIFICANT CHANGE UP (ref 80–100)
NON HDL CHOLESTEROL: 60 MG/DL — SIGNIFICANT CHANGE UP
NRBC # BLD: 0 /100 WBCS — SIGNIFICANT CHANGE UP (ref 0–0)
PLATELET # BLD AUTO: 107 K/UL — LOW (ref 150–400)
POTASSIUM SERPL-MCNC: 5.3 MMOL/L — SIGNIFICANT CHANGE UP (ref 3.5–5.3)
POTASSIUM SERPL-SCNC: 5.3 MMOL/L — SIGNIFICANT CHANGE UP (ref 3.5–5.3)
PROT SERPL-MCNC: 7.4 G/DL — SIGNIFICANT CHANGE UP (ref 6–8.3)
PROTHROM AB SERPL-ACNC: 12.6 SEC — SIGNIFICANT CHANGE UP (ref 10.5–13.4)
RBC # BLD: 2.97 M/UL — LOW (ref 4.2–5.8)
RBC # FLD: 13.9 % — SIGNIFICANT CHANGE UP (ref 10.3–14.5)
SARS-COV-2 RNA SPEC QL NAA+PROBE: SIGNIFICANT CHANGE UP
SODIUM SERPL-SCNC: 137 MMOL/L — SIGNIFICANT CHANGE UP (ref 135–145)
TRIGL SERPL-MCNC: 123 MG/DL — SIGNIFICANT CHANGE UP
TROPONIN T SERPL-MCNC: 0.09 NG/ML — CRITICAL HIGH (ref 0–0.01)
TROPONIN T SERPL-MCNC: 0.1 NG/ML — CRITICAL HIGH (ref 0–0.01)
TSH SERPL-MCNC: 3.04 UIU/ML — SIGNIFICANT CHANGE UP (ref 0.27–4.2)
WBC # BLD: 6.36 K/UL — SIGNIFICANT CHANGE UP (ref 3.8–10.5)
WBC # FLD AUTO: 6.36 K/UL — SIGNIFICANT CHANGE UP (ref 3.8–10.5)

## 2022-12-26 PROCEDURE — 99222 1ST HOSP IP/OBS MODERATE 55: CPT

## 2022-12-26 PROCEDURE — 93010 ELECTROCARDIOGRAM REPORT: CPT

## 2022-12-26 RX ORDER — DEXTROSE 50 % IN WATER 50 %
25 SYRINGE (ML) INTRAVENOUS ONCE
Refills: 0 | Status: DISCONTINUED | OUTPATIENT
Start: 2022-12-26 | End: 2022-12-28

## 2022-12-26 RX ORDER — PANTOPRAZOLE SODIUM 20 MG/1
40 TABLET, DELAYED RELEASE ORAL
Refills: 0 | Status: DISCONTINUED | OUTPATIENT
Start: 2022-12-26 | End: 2022-12-28

## 2022-12-26 RX ORDER — SODIUM CHLORIDE 9 MG/ML
1000 INJECTION, SOLUTION INTRAVENOUS
Refills: 0 | Status: DISCONTINUED | OUTPATIENT
Start: 2022-12-26 | End: 2022-12-28

## 2022-12-26 RX ORDER — ASPIRIN/CALCIUM CARB/MAGNESIUM 324 MG
81 TABLET ORAL DAILY
Refills: 0 | Status: DISCONTINUED | OUTPATIENT
Start: 2022-12-26 | End: 2022-12-28

## 2022-12-26 RX ORDER — SEVELAMER CARBONATE 2400 MG/1
3 POWDER, FOR SUSPENSION ORAL
Qty: 0 | Refills: 0 | DISCHARGE

## 2022-12-26 RX ORDER — ISOSORBIDE MONONITRATE 60 MG/1
30 TABLET, EXTENDED RELEASE ORAL DAILY
Refills: 0 | Status: DISCONTINUED | OUTPATIENT
Start: 2022-12-27 | End: 2022-12-28

## 2022-12-26 RX ORDER — CINACALCET 30 MG/1
30 TABLET, FILM COATED ORAL DAILY
Refills: 0 | Status: DISCONTINUED | OUTPATIENT
Start: 2022-12-26 | End: 2022-12-28

## 2022-12-26 RX ORDER — AMLODIPINE BESYLATE 2.5 MG/1
5 TABLET ORAL DAILY
Refills: 0 | Status: DISCONTINUED | OUTPATIENT
Start: 2022-12-27 | End: 2022-12-28

## 2022-12-26 RX ORDER — CARBIDOPA AND LEVODOPA 25; 100 MG/1; MG/1
1 TABLET ORAL
Qty: 0 | Refills: 0 | DISCHARGE

## 2022-12-26 RX ORDER — CINACALCET 30 MG/1
1 TABLET, FILM COATED ORAL
Qty: 0 | Refills: 0 | DISCHARGE

## 2022-12-26 RX ORDER — CARBIDOPA AND LEVODOPA 25; 100 MG/1; MG/1
1 TABLET ORAL
Refills: 0 | Status: DISCONTINUED | OUTPATIENT
Start: 2022-12-26 | End: 2022-12-28

## 2022-12-26 RX ORDER — FERROUS SULFATE 325(65) MG
1 TABLET ORAL
Qty: 0 | Refills: 0 | DISCHARGE

## 2022-12-26 RX ORDER — SODIUM ZIRCONIUM CYCLOSILICATE 10 G/10G
5 POWDER, FOR SUSPENSION ORAL
Qty: 0 | Refills: 0 | DISCHARGE

## 2022-12-26 RX ORDER — INSULIN LISPRO 100/ML
VIAL (ML) SUBCUTANEOUS
Refills: 0 | Status: DISCONTINUED | OUTPATIENT
Start: 2022-12-26 | End: 2022-12-28

## 2022-12-26 RX ORDER — INFLUENZA VIRUS VACCINE 15; 15; 15; 15 UG/.5ML; UG/.5ML; UG/.5ML; UG/.5ML
0.7 SUSPENSION INTRAMUSCULAR ONCE
Refills: 0 | Status: DISCONTINUED | OUTPATIENT
Start: 2022-12-26 | End: 2022-12-28

## 2022-12-26 RX ORDER — LISINOPRIL 2.5 MG/1
10 TABLET ORAL DAILY
Refills: 0 | Status: DISCONTINUED | OUTPATIENT
Start: 2022-12-26 | End: 2022-12-28

## 2022-12-26 RX ORDER — DEXTROSE 50 % IN WATER 50 %
15 SYRINGE (ML) INTRAVENOUS ONCE
Refills: 0 | Status: DISCONTINUED | OUTPATIENT
Start: 2022-12-26 | End: 2022-12-28

## 2022-12-26 RX ORDER — ATORVASTATIN CALCIUM 80 MG/1
80 TABLET, FILM COATED ORAL AT BEDTIME
Refills: 0 | Status: DISCONTINUED | OUTPATIENT
Start: 2022-12-26 | End: 2022-12-28

## 2022-12-26 RX ORDER — TAMSULOSIN HYDROCHLORIDE 0.4 MG/1
1 CAPSULE ORAL
Qty: 0 | Refills: 0 | DISCHARGE

## 2022-12-26 RX ORDER — CARVEDILOL PHOSPHATE 80 MG/1
25 CAPSULE, EXTENDED RELEASE ORAL EVERY 12 HOURS
Refills: 0 | Status: DISCONTINUED | OUTPATIENT
Start: 2022-12-26 | End: 2022-12-28

## 2022-12-26 RX ORDER — GLUCAGON INJECTION, SOLUTION 0.5 MG/.1ML
1 INJECTION, SOLUTION SUBCUTANEOUS ONCE
Refills: 0 | Status: DISCONTINUED | OUTPATIENT
Start: 2022-12-26 | End: 2022-12-28

## 2022-12-26 RX ORDER — DEXTROSE 50 % IN WATER 50 %
12.5 SYRINGE (ML) INTRAVENOUS ONCE
Refills: 0 | Status: DISCONTINUED | OUTPATIENT
Start: 2022-12-26 | End: 2022-12-28

## 2022-12-26 RX ORDER — FERROUS SULFATE 325(65) MG
325 TABLET ORAL
Refills: 0 | Status: DISCONTINUED | OUTPATIENT
Start: 2022-12-26 | End: 2022-12-28

## 2022-12-26 RX ORDER — SEVELAMER CARBONATE 2400 MG/1
800 POWDER, FOR SUSPENSION ORAL
Refills: 0 | Status: DISCONTINUED | OUTPATIENT
Start: 2022-12-26 | End: 2022-12-28

## 2022-12-26 RX ORDER — TAMSULOSIN HYDROCHLORIDE 0.4 MG/1
0.4 CAPSULE ORAL AT BEDTIME
Refills: 0 | Status: DISCONTINUED | OUTPATIENT
Start: 2022-12-26 | End: 2022-12-28

## 2022-12-26 RX ORDER — INSULIN NPH HUM/REG INSULIN HM 70-30/ML
3 VIAL (ML) SUBCUTANEOUS
Qty: 0 | Refills: 0 | DISCHARGE

## 2022-12-26 RX ORDER — HEPARIN SODIUM 5000 [USP'U]/ML
INJECTION INTRAVENOUS; SUBCUTANEOUS
Qty: 25000 | Refills: 0 | Status: DISCONTINUED | OUTPATIENT
Start: 2022-12-26 | End: 2022-12-27

## 2022-12-26 RX ADMIN — HEPARIN SODIUM 750 UNIT(S)/HR: 5000 INJECTION INTRAVENOUS; SUBCUTANEOUS at 20:28

## 2022-12-26 RX ADMIN — Medication 4: at 17:26

## 2022-12-26 RX ADMIN — ATORVASTATIN CALCIUM 80 MILLIGRAM(S): 80 TABLET, FILM COATED ORAL at 22:20

## 2022-12-26 RX ADMIN — CARVEDILOL PHOSPHATE 25 MILLIGRAM(S): 80 CAPSULE, EXTENDED RELEASE ORAL at 17:13

## 2022-12-26 RX ADMIN — SEVELAMER CARBONATE 800 MILLIGRAM(S): 2400 POWDER, FOR SUSPENSION ORAL at 17:13

## 2022-12-26 RX ADMIN — Medication 81 MILLIGRAM(S): at 17:13

## 2022-12-26 RX ADMIN — TAMSULOSIN HYDROCHLORIDE 0.4 MILLIGRAM(S): 0.4 CAPSULE ORAL at 22:20

## 2022-12-26 RX ADMIN — HEPARIN SODIUM 750 UNIT(S)/HR: 5000 INJECTION INTRAVENOUS; SUBCUTANEOUS at 17:20

## 2022-12-26 RX ADMIN — CINACALCET 30 MILLIGRAM(S): 30 TABLET, FILM COATED ORAL at 17:13

## 2022-12-26 RX ADMIN — CARBIDOPA AND LEVODOPA 1 TABLET(S): 25; 100 TABLET ORAL at 17:13

## 2022-12-26 RX ADMIN — Medication 0.1 MILLIGRAM(S): at 17:13

## 2022-12-26 RX ADMIN — Medication 325 MILLIGRAM(S): at 17:13

## 2022-12-26 NOTE — H&P ADULT - NSICDXPASTSURGICALHX_GEN_ALL_CORE_FT
PAST SURGICAL HISTORY:  AVF (arteriovenous fistula) right arm x with revision, left arm x 1    S/P dialysis catheter insertion Right chest perma cath removed in jan 2020    S/P primary angioplasty with coronary stent     Stented coronary artery 2004-5 at [facility]

## 2022-12-26 NOTE — H&P ADULT - PROBLEM SELECTOR PLAN 5
f/u A1C  -Home meds:   -CONT with MISS    F: none  E: Replete if K+ <4 and Mg <2  N: NPO after midnight  Dispo: cardiac cath in AM with Dr. Britton    Case discussed with Dr. Wyatt f/u A1C  -Home meds:   -CONT with MISS

## 2022-12-26 NOTE — H&P ADULT - PROBLEM SELECTOR PLAN 4
h/o ESRD (HD Tues/Thurs/Sat, last HD Sat 12/24/2022 at [facility])  -Renal fellow made aware  -Home meds: Cinacalcet 60mg daily, Sevelamer Carbonate 800mg TID  -CONT Cinacalcet 600mg  -CONT Sevelamer Carbonate 800mg    #Anemia  -H/H stable   -CONT with Ferrous sulfate 325mg q12hrs

## 2022-12-26 NOTE — H&P ADULT - NSHPADDITIONALINFOADULT_GEN_ALL_CORE
Attending Attestation:  I was physically present for the key portions of the evaluation and management (E/M) service provided.  I agree with the above history, physical, and plan which I have reviewed with the following edits/addendum:    72M Chinese-speaking male w HTN, HLP, CAD PCI (SURYA LCx 10/2021), DMT2, ESRD (HD t-t-s, last 12/24) parkinson p/w resting L chest pain w nausea, [place] [facility] [place] [facility]A and [facility] 12/23/22 (Dr Britton) found to have MV disease including LM. Transferred to [facility] for CTS consultation for CABG vs PCI. Pt chest pain free during initial evaluation. Labs notable for hgb 9.2, platelet ct 107. Normal INR. ECG NSR incomp RBBB w abnormal repol and ST deps inferiorly. Trop 0.10. LDL 35. On exam, comfortable, euvolemic. RRR normal s1/s2, clear lungs, no edema, non-focal.   #Unstable angina, multivessel CAD - hep gtt, ASA. holding plavix/brilinta in case of surgery  #Thrombocytopenia - continue to monitor, has been low since 2021 (100-150K/uL)  #Anemia - check Fe studies  #ESRD - consult nephro for HD  #HTN - continue home meds    Ivan Wyatt MD  Cardiology    50 minutes spent on total encounter; more than 50% of the visit was spent counseling and/or coordinating care by the attending physician.

## 2022-12-26 NOTE — H&P ADULT - NSHPLABSRESULTS_GEN_ALL_CORE
9.2    6.36  )-----------( 107      ( 26 Dec 2022 11:41 )             28.2       12-26    137  |  95<L>  |  44<H>  ----------------------------<  169<H>  5.3   |  29  |  9.92<H>    Ca    9.6      26 Dec 2022 11:41  Mg     2.3     12-26    TPro  7.4  /  Alb  4.0  /  TBili  0.4  /  DBili  x   /  AST  10  /  ALT  <5<L>  /  AlkPhos  333<H>  12-26      PT/INR - ( 26 Dec 2022 11:41 )   PT: 12.6 sec;   INR: 1.06          PTT - ( 26 Dec 2022 11:41 )  PTT:49.4 sec    CARDIAC MARKERS ( 26 Dec 2022 11:45 )  x     / 0.10 ng/mL / x     / x     / x                EKG: NSR, RBBB, TWI II/III/aVF

## 2022-12-26 NOTE — H&P ADULT - PROBLEM SELECTOR PLAN 2
p/w   -Home meds: Lisinopril 10mg daily, Imdur 30mg daily, carvedilol 25mg BID, Amlodipine 5mg daily, Clonidine 0.1mg BID  -CONT Lisinopril 10mg  -CONT Cravedilol 25mg   -CONT Amlodipine 5mg   -CONT Clonidine 0.1mg

## 2022-12-26 NOTE — H&P ADULT - PROBLEM SELECTOR PLAN 1
Initially presented to [facility] for worsen left sided CP, trop neg x 2 (per MD note), s/p cardiac cath (pending cath report) found to have multiple vessel disease and transfer to [facility] for PCI vs CABG  -Currently CP free, Troponin 0.10, EKG NSR, RBBB, TWI II/III/aVF  --F/u Trop @ 6pm   -Cardiac cath 10/15/2021: s/p SURYA pLCx, LM minor irregularities, pLAD patent stent, mLAD diffuse lesion, D1 minor irregularities. dLcx 40% diffuse ISR, pRCA 60%, mRCA 100%  L to R collateral  -Echo (per MD note) EF 55%  -F/u with [facility] again regarding to cath report and echo report   -NPO after MN for cardiac cath   --Continue Aspirin 81mg daily and Atorvastatin 80mg qhs  --HOLD Plavix for possible CABG  --Consent obtained in chart  -Home meds: Aspirin 81mg daily, Plavix 75mg daily, Imdur 30mg daily  -CONT Imdur

## 2022-12-26 NOTE — H&P ADULT - HISTORY OF PRESENT ILLNESS
73 yo Cymro speaking male with PMHx of HTN, HLD, CAD s/p stent (SURYA pLCx at 10/2021 at [facility]), DM. ESRD (HD Tues/Thurs/Sat), parkinson disease presented to [facility] 12/22/2022 c/o sharp left sided CP while he was lying in bed the evening of 12/21/2022 lasting the entire night, and relieved with sublingual nitroglycerin. Associated with nausea and palpitations. Stated he has been experiencing intermitted chest pain for the past 3 years, which had been similar in onset, quality, duration, and response to nitroglycerin. However, this time it was more severe?  He was admitted and underwent ECHO (per MD note) EF 55% and was taken to the cardiac cath (pending report) and was found to have multivessel disease. Decision was made to transfer pt to [facility] for possible PCI vs CT surgery.   73 yo Taiwanese speaking male with PMHx of HTN, HLD, CAD s/p stent (SURYA pLCx at 10/2021 at [facility]), DM. ESRD (HD Tues/Thurs/Sat, last HD Sat 12/24/2022 @ [facility] parkinson disease presented to [facility] 12/22/2022 c/o sharp left sided CP while he was lying in bed the evening of 12/21/2022 lasting the entire night, and relieved with sublingual nitroglycerin. Associated with nausea and palpitations. Stated he has been experiencing intermitted chest pain for the past 3 years, which had been similar in onset, quality, duration, and response to nitroglycerin. However, this time it was more severe which prompt him to go to the ED. He was admitted and underwent ECHO (per MD note) EF 55% and was taken to the cardiac cath (pending report) and was found to have multivessel disease. Decision was made to transfer pt to [facility] for possible PCI vs CT surgery.      Upon arrival to [facility]: 104/51,  73 yo Citizen of [place] speaking male with PMHx of HTN, HLD, CAD s/p stent (SURYA pLCx at 10/2021 at [facility]), DM. ESRD (HD Tues/Thurs/Sat, last HD Sat 12/24/2022 [place] [facility] parkinson disease presented to [facility] 12/22/2022 c/o sharp left sided CP while he was lying in bed the evening of 12/21/2022 lasting the entire night, and relieved with sublingual nitroglycerin. Associated with nausea and palpitations. Stated he has been experiencing intermitted chest pain for the past 3 years, which had been similar in onset, quality, duration, and response to nitroglycerin. However, this time it was more severe which prompt him to go to the ED. He was admitted and underwent ECHO (per MD note) EF 55% and was taken to the cardiac cath (pending report) and was found to have multivessel disease. Decision was made to transfer pt to [facility] for possible PCI vs CT surgery.      Upon arrival to [facility]: 104/51, 63, 17, 93% RA, 96.9F. Lab significant for H/H 9.2/28.2 (9.0/27.8 12/26 at [facility] Plt 107 (92 12/26 at [facility]), BUN/Cr 44/9.92 (45/9.3 12/26 at [facility]), Alk 333, Trop 0.10, EKG NSR, RBBB, TWI II/III/aVF.      71 yo Prydeinig speaking male with PMHx of HTN, HLD, CAD s/p stent (SURYA pLCx at 10/2021 at [facility]), DM. ESRD (HD Tues/Thurs/Sat, last HD Sat 12/24/2022 @ [facility]), parkinson's disease presented to [facility] 12/22/2022 c/o sharp left sided CP while he was lying in bed the evening of 12/21/2022 lasting the entire night, and relieved with sublingual nitroglycerin. Associated with nausea and palpitations. Stated he has been experiencing intermitted chest pain for the past 3 years, which had been similar in onset, quality, duration, and response to nitroglycerin. However, this time it was more severe which prompt him to go to the ED. He was admitted and underwent ECHO (per MD note) EF 55% and underwent dx cardiac cath 12/23/2022 (pending report) found to have multivessel disease. Decision was made to transfer pt to [facility] for possible PCI vs CT surgery.      Upon arrival to [facility]: 104/51, 63, 17, 93% RA, 96.9F. Lab significant for H/H 9.2/28.2 (9.0/27.8 12/26 at [facility]), Plt 107 (92 12/26 at [facility]), BUN/Cr 44/9.92 (45/9.3 12/26 at [facility]), Alk 333, Trop 0.10, EKG NSR, RBBB, TWI II/III/aVF.

## 2022-12-26 NOTE — H&P ADULT - PROBLEM SELECTOR PLAN 6
p/w LUE tremors  -Home meds: Carbidopa-Levodopa 25mg-100mg BID  -CONT with Cardbidopa-Levodopa      F: none  E: Replete if K+ <4 and Mg <2  N: NPO after midnight  Dispo: cardiac cath in AM with Dr. Britton    Case discussed with Dr. Wyatt

## 2022-12-26 NOTE — H&P ADULT - ASSESSMENT
73 yo Omani speaking male with PMHx of HTN, HLD, CAD s/p stent (SURYA pLCx at 10/2021 at [facility]), DM. ESRD (HD Tues/Thurs/Sat, last HD Sat 12[place]24/2022 [place] [facility] [place] parkinson's disease presented to [facility] 12/22/2022 c/o sharp left sided CP while he was lying in bed the evening of 12/21/2022 lasting the entire night, and relieved with sublingual nitroglycerin. Associated with nausea and palpitations. Stated he has been experiencing intermitted chest pain for the past 3 years, which had been similar in onset, quality, duration, and response to nitroglycerin. However, this time it was more severe which prompt him to go to the ED. He was admitted and underwent ECHO (per MD note) EF 55% and underwent dx cardiac cath 12/23/2022 (pending report) found to have multivessel disease. Decision was made to transfer pt to [facility] for possible PCI vs CT surgery. In light of pt's risk factors and abnormal cardiac cath 12/23/2022 pt is now referred to cardiac cath with possible intervention if clinical indicated.     ASA III				Mallampati class: III      Sedation Plan:   [  ] None   [x  ] Moderate   [  ]  Deep    [  ]  General Anesthesia   Patient Is Suitable Candidate For Sedation?     [ x ] Yes   [  ] No   [  ] Not Applicable   Cath Order Entered: [x  ] Yes  DAPT LOAD Ordered: [  ] Yes  [x] No load 2/2 takes Aspirin 81mg and Plavix 75mg PO, will continue Aspirin 81mg and hold Plavix 75mg PO for possible CABG and can load on table if needed. H/H 9.2/28.2 and denies any bleeding  Pre-Cath fluids Ordered: [  ] Yes  [ x ] Not indicated 2/2 pt is ESRD (HD Tues/Thurs/Sat, last HD sat 12/24/2022 at [facility] [place]) plan for HD Tuesday, renal fellow made aware  Consent obtained with language line ID # [id number]    Risks & benefits of procedure and sedation and risks and benefits for the alternative therapy have been explained to the patient and/or HCP in layman’s terms including but not limited to: allergic reaction, bleeding, infection, arrhythmia, respiratory compromise, renal and vascular compromise, limb damage, MI, CVA, emergent CABG/Vascular Surgery and death. Informed consent obtained and in chart.

## 2022-12-27 LAB
A1C WITH ESTIMATED AVERAGE GLUCOSE RESULT: 6.8 % — HIGH (ref 4–5.6)
ANION GAP SERPL CALC-SCNC: 14 MMOL/L — SIGNIFICANT CHANGE UP (ref 5–17)
APTT BLD: 91.8 SEC — HIGH (ref 27.5–35.5)
BUN SERPL-MCNC: 54 MG/DL — HIGH (ref 7–23)
CALCIUM SERPL-MCNC: 9.1 MG/DL — SIGNIFICANT CHANGE UP (ref 8.4–10.5)
CHLORIDE SERPL-SCNC: 94 MMOL/L — LOW (ref 96–108)
CK MB CFR SERPL CALC: 1.1 NG/ML — SIGNIFICANT CHANGE UP (ref 0–6.7)
CK SERPL-CCNC: 48 U/L — SIGNIFICANT CHANGE UP (ref 30–200)
CO2 SERPL-SCNC: 27 MMOL/L — SIGNIFICANT CHANGE UP (ref 22–31)
CREAT SERPL-MCNC: 11.18 MG/DL — HIGH (ref 0.5–1.3)
EGFR: 4 ML/MIN/1.73M2 — LOW
ESTIMATED AVERAGE GLUCOSE: 148 MG/DL — HIGH (ref 68–114)
FERRITIN SERPL-MCNC: 1626 NG/ML — HIGH (ref 30–400)
FOLATE SERPL-MCNC: 12.4 NG/ML — SIGNIFICANT CHANGE UP
GLUCOSE BLDC GLUCOMTR-MCNC: 135 MG/DL — HIGH (ref 70–99)
GLUCOSE BLDC GLUCOMTR-MCNC: 135 MG/DL — HIGH (ref 70–99)
GLUCOSE BLDC GLUCOMTR-MCNC: 150 MG/DL — HIGH (ref 70–99)
GLUCOSE BLDC GLUCOMTR-MCNC: 154 MG/DL — HIGH (ref 70–99)
GLUCOSE BLDC GLUCOMTR-MCNC: 175 MG/DL — HIGH (ref 70–99)
GLUCOSE BLDC GLUCOMTR-MCNC: 211 MG/DL — HIGH (ref 70–99)
GLUCOSE BLDC GLUCOMTR-MCNC: 30 MG/DL — CRITICAL LOW (ref 70–99)
GLUCOSE BLDC GLUCOMTR-MCNC: 73 MG/DL — SIGNIFICANT CHANGE UP (ref 70–99)
GLUCOSE SERPL-MCNC: 124 MG/DL — HIGH (ref 70–99)
HBV SURFACE AB SER-ACNC: REACTIVE
HBV SURFACE AG SER-ACNC: SIGNIFICANT CHANGE UP
HCT VFR BLD CALC: 28.3 % — LOW (ref 39–50)
HGB BLD-MCNC: 9 G/DL — LOW (ref 13–17)
IRON SATN MFR SERPL: 42 % — SIGNIFICANT CHANGE UP (ref 16–55)
IRON SATN MFR SERPL: 42 % — SIGNIFICANT CHANGE UP (ref 16–55)
IRON SATN MFR SERPL: 68 UG/DL — SIGNIFICANT CHANGE UP (ref 45–165)
IRON SATN MFR SERPL: 69 UG/DL — SIGNIFICANT CHANGE UP (ref 45–165)
ISTAT ACTK (ACTIVATED CLOTTING TIME KAOLIN): 149 SEC — HIGH (ref 74–137)
ISTAT ACTK (ACTIVATED CLOTTING TIME KAOLIN): 269 SEC — HIGH (ref 74–137)
MAGNESIUM SERPL-MCNC: 2.4 MG/DL — SIGNIFICANT CHANGE UP (ref 1.6–2.6)
MCHC RBC-ENTMCNC: 30.1 PG — SIGNIFICANT CHANGE UP (ref 27–34)
MCHC RBC-ENTMCNC: 31.8 GM/DL — LOW (ref 32–36)
MCV RBC AUTO: 94.6 FL — SIGNIFICANT CHANGE UP (ref 80–100)
NRBC # BLD: 0 /100 WBCS — SIGNIFICANT CHANGE UP (ref 0–0)
PLATELET # BLD AUTO: 100 K/UL — LOW (ref 150–400)
POTASSIUM SERPL-MCNC: 5.6 MMOL/L — HIGH (ref 3.5–5.3)
POTASSIUM SERPL-SCNC: 5.6 MMOL/L — HIGH (ref 3.5–5.3)
RBC # BLD: 2.99 M/UL — LOW (ref 4.2–5.8)
RBC # FLD: 14.2 % — SIGNIFICANT CHANGE UP (ref 10.3–14.5)
SODIUM SERPL-SCNC: 135 MMOL/L — SIGNIFICANT CHANGE UP (ref 135–145)
TIBC SERPL-MCNC: 160 UG/DL — LOW (ref 220–430)
TIBC SERPL-MCNC: 166 UG/DL — LOW (ref 220–430)
TROPONIN T SERPL-MCNC: 0.09 NG/ML — CRITICAL HIGH (ref 0–0.01)
UIBC SERPL-MCNC: 92 UG/DL — LOW (ref 110–370)
UIBC SERPL-MCNC: 97 UG/DL — LOW (ref 110–370)
WBC # BLD: 4.88 K/UL — SIGNIFICANT CHANGE UP (ref 3.8–10.5)
WBC # FLD AUTO: 4.88 K/UL — SIGNIFICANT CHANGE UP (ref 3.8–10.5)

## 2022-12-27 PROCEDURE — 90935 HEMODIALYSIS ONE EVALUATION: CPT

## 2022-12-27 PROCEDURE — 99233 SBSQ HOSP IP/OBS HIGH 50: CPT

## 2022-12-27 PROCEDURE — 93010 ELECTROCARDIOGRAM REPORT: CPT

## 2022-12-27 PROCEDURE — 93571 IV DOP VEL&/PRESS C FLO 1ST: CPT | Mod: 26,LD,52

## 2022-12-27 PROCEDURE — 93458 L HRT ARTERY/VENTRICLE ANGIO: CPT | Mod: 26,59

## 2022-12-27 PROCEDURE — 92920 PRQ TRLUML C ANGIOP 1ART&/BR: CPT | Mod: LC

## 2022-12-27 PROCEDURE — 92978 ENDOLUMINL IVUS OCT C 1ST: CPT | Mod: 26,LC

## 2022-12-27 RX ORDER — DEXTROSE 10 % IN WATER 10 %
250 INTRAVENOUS SOLUTION INTRAVENOUS ONCE
Refills: 0 | Status: DISCONTINUED | OUTPATIENT
Start: 2022-12-27 | End: 2022-12-28

## 2022-12-27 RX ORDER — SODIUM CHLORIDE 9 MG/ML
250 INJECTION, SOLUTION INTRAVENOUS
Refills: 0 | Status: DISCONTINUED | OUTPATIENT
Start: 2022-12-27 | End: 2022-12-27

## 2022-12-27 RX ORDER — INSULIN HUMAN 100 [IU]/ML
10 INJECTION, SOLUTION SUBCUTANEOUS ONCE
Refills: 0 | Status: COMPLETED | OUTPATIENT
Start: 2022-12-27 | End: 2022-12-27

## 2022-12-27 RX ORDER — NITROGLYCERIN 6.5 MG
1 CAPSULE, EXTENDED RELEASE ORAL ONCE
Refills: 0 | Status: COMPLETED | OUTPATIENT
Start: 2022-12-27 | End: 2022-12-27

## 2022-12-27 RX ORDER — SODIUM CHLORIDE 9 MG/ML
500 INJECTION INTRAMUSCULAR; INTRAVENOUS; SUBCUTANEOUS
Refills: 0 | Status: DISCONTINUED | OUTPATIENT
Start: 2022-12-27 | End: 2022-12-27

## 2022-12-27 RX ORDER — CLOPIDOGREL BISULFATE 75 MG/1
75 TABLET, FILM COATED ORAL DAILY
Refills: 0 | Status: DISCONTINUED | OUTPATIENT
Start: 2022-12-28 | End: 2022-12-28

## 2022-12-27 RX ORDER — DEXTROSE 50 % IN WATER 50 %
50 SYRINGE (ML) INTRAVENOUS ONCE
Refills: 0 | Status: DISCONTINUED | OUTPATIENT
Start: 2022-12-27 | End: 2022-12-27

## 2022-12-27 RX ORDER — CLOPIDOGREL BISULFATE 75 MG/1
300 TABLET, FILM COATED ORAL ONCE
Refills: 0 | Status: COMPLETED | OUTPATIENT
Start: 2022-12-27 | End: 2022-12-27

## 2022-12-27 RX ORDER — SODIUM ZIRCONIUM CYCLOSILICATE 10 G/10G
5 POWDER, FOR SUSPENSION ORAL ONCE
Refills: 0 | Status: DISCONTINUED | OUTPATIENT
Start: 2022-12-27 | End: 2022-12-27

## 2022-12-27 RX ADMIN — ATORVASTATIN CALCIUM 80 MILLIGRAM(S): 80 TABLET, FILM COATED ORAL at 21:24

## 2022-12-27 RX ADMIN — CLOPIDOGREL BISULFATE 300 MILLIGRAM(S): 75 TABLET, FILM COATED ORAL at 07:10

## 2022-12-27 RX ADMIN — HEPARIN SODIUM 650 UNIT(S)/HR: 5000 INJECTION INTRAVENOUS; SUBCUTANEOUS at 08:13

## 2022-12-27 RX ADMIN — CINACALCET 30 MILLIGRAM(S): 30 TABLET, FILM COATED ORAL at 12:39

## 2022-12-27 RX ADMIN — TAMSULOSIN HYDROCHLORIDE 0.4 MILLIGRAM(S): 0.4 CAPSULE ORAL at 21:25

## 2022-12-27 RX ADMIN — AMLODIPINE BESYLATE 5 MILLIGRAM(S): 2.5 TABLET ORAL at 06:56

## 2022-12-27 RX ADMIN — ISOSORBIDE MONONITRATE 30 MILLIGRAM(S): 60 TABLET, EXTENDED RELEASE ORAL at 16:59

## 2022-12-27 RX ADMIN — HEPARIN SODIUM 750 UNIT(S)/HR: 5000 INJECTION INTRAVENOUS; SUBCUTANEOUS at 00:22

## 2022-12-27 RX ADMIN — Medication 0.1 MILLIGRAM(S): at 06:57

## 2022-12-27 RX ADMIN — Medication 325 MILLIGRAM(S): at 06:56

## 2022-12-27 RX ADMIN — SEVELAMER CARBONATE 800 MILLIGRAM(S): 2400 POWDER, FOR SUSPENSION ORAL at 16:59

## 2022-12-27 RX ADMIN — LISINOPRIL 10 MILLIGRAM(S): 2.5 TABLET ORAL at 06:57

## 2022-12-27 RX ADMIN — CARBIDOPA AND LEVODOPA 1 TABLET(S): 25; 100 TABLET ORAL at 06:56

## 2022-12-27 RX ADMIN — Medication 325 MILLIGRAM(S): at 18:05

## 2022-12-27 RX ADMIN — SODIUM CHLORIDE 75 MILLILITER(S): 9 INJECTION INTRAMUSCULAR; INTRAVENOUS; SUBCUTANEOUS at 07:20

## 2022-12-27 RX ADMIN — CARBIDOPA AND LEVODOPA 1 TABLET(S): 25; 100 TABLET ORAL at 18:05

## 2022-12-27 RX ADMIN — CARVEDILOL PHOSPHATE 25 MILLIGRAM(S): 80 CAPSULE, EXTENDED RELEASE ORAL at 18:05

## 2022-12-27 RX ADMIN — Medication 1 INCH(S): at 06:51

## 2022-12-27 RX ADMIN — Medication 81 MILLIGRAM(S): at 07:28

## 2022-12-27 RX ADMIN — Medication 2: at 06:57

## 2022-12-27 RX ADMIN — CARVEDILOL PHOSPHATE 25 MILLIGRAM(S): 80 CAPSULE, EXTENDED RELEASE ORAL at 06:56

## 2022-12-27 RX ADMIN — HEPARIN SODIUM 0 UNIT(S)/HR: 5000 INJECTION INTRAVENOUS; SUBCUTANEOUS at 07:43

## 2022-12-27 RX ADMIN — Medication 0.1 MILLIGRAM(S): at 18:05

## 2022-12-27 RX ADMIN — SEVELAMER CARBONATE 800 MILLIGRAM(S): 2400 POWDER, FOR SUSPENSION ORAL at 12:39

## 2022-12-27 NOTE — PROGRESS NOTE ADULT - NSPROGADDITIONALINFOA_GEN_ALL_CORE
Attending Attestation:  I was physically present for the key portions of the evaluation and management (E/M) service provided.  I agree with the above history, physical, and plan which I have reviewed with the following edits/addendum:    72M Citizen of [place]-speaking male w HTN, HLP, CAD PCI (SURYA LCx 10/2021), DMT2, ESRD (HD t-t-s, last 12/24) parkinson p/w resting L chest pain w nausea, palps to [facility] admitted for UA and [facility] 12/23/22 (Dr Britton) found to have MV disease including LM. Transferred to [facility] for CTS consultation for CABG vs PCI. Pt chest pain free during initial evaluation. Labs notable for hgb 9.2, platelet ct 107. Normal INR. ECG NSR incomp RBBB w abnormal repol and ST deps inferiorly. Trop 0.10. LDL 35. On exam, comfortable, euvolemic. RRR normal s1/s2, clear lungs, no edema, non-focal.   #Unstable angina, multivessel CAD   - s/p PCI of LCx by Dr Britton. now on DAPT. Plan for LIMA-LAD in 2-4 weeks w Dr Hernandez.   - monitor overnight. HD post cath on tele floor.  #Thrombocytopenia - continue to monitor, has been low since 2021 (100-150K/uL)  #Anemia - check Fe studies  #ESRD - consult nephro for HD  #HTN - continue home meds    Ivan Wyatt MD  Cardiology    35 minutes spent on total encounter; more than 50% of the visit was spent counseling and/or coordinating care by the attending physician.

## 2022-12-27 NOTE — CONSULT NOTE ADULT - ASSESSMENT
Assesment:  72y Male        Plan:  Problem 1:      Problem 2:      Problem 3:      Problem 4:    I have reviewed clinical labs tests and reports, radiology tests and reports, as well as old patient medical records, and discussed with the refering physician.     Assesment:  71 YO Armenian-speaking Male, past smoker, with PMHx of HTN, HLD, CAD s/p multiple SURYA stents (pLAD 5/14/21, OM1 3/21/21, pLCx on 10/2021at [facility]), DMII, ESRD (HD Tues/Thurs/Sat, last HD today 12/27/22), parkinson's disease who originally presented to [facility] 12/22/2022 c/o sharp left sided chest pain at rest overnight, relieved with sublingual nitroglycerin and asociated with nausea and palpitations. Pt was admitted and underwent ECHO (per transfer note) which revealed EF 55% and underwent dx cardiac cath 12/23/2022 found to have multivessel disease. Patient transferred to [facility] for under the cardiology team for possible PCI. On 12/27/22 patient underwent PCI which revealed PTCA oLCX 90% ISR, PTCA dLCX 90% ISR w/ subtotal small LCX distally; oLAD 80% ISR with + IFR (0.80), mLAD 70-80%. CTS surgery consulted for evaluation for possible MIDCAB.    Plan:  Problem 1: CAD s/p multiple SURYA (2021) and PCI 12/27/22  -Discussed with Dr. Hernandez  -S/p cardiac cath 12/27/22: PTCA oLCX 90% ISR, PTCA dLCX 90% ISR w/ subtotal small LCX distally; oLAD 80% ISR with + IFR (0.80), mLAD 70-80%  -Given PCI today, will recommend patient follow-up outpatient with Dr. Hernandez ([phone]) in 2-3 weeks for evaluation for MIDCAB  -Please optimize patient further from a medical standpoint  -CTS will continue to follow    Problem 2: HTN  -Cont Coreg and Norvasc   -Care as per primary team    Problem 3: HLD  -Cont Lipitor 80mg PO QD  -Care as per primary team    Problem 4: ESRD  -HD (Tues/Thurs/Sat)- presently receiving HD  -Nephro following  -Care as per primary team    I have reviewed clinical labs tests and reports, radiology tests and reports, as well as old patient medical records, and discussed with the refering physician.

## 2022-12-27 NOTE — PROGRESS NOTE ADULT - SUBJECTIVE AND OBJECTIVE BOX
Patient seen on HD tolerating procedure well. Patient does not offer any complaints and is hemodynamically stable.  Continue HD as prescribed.   Hemodialysis Treatment.:     Schedule: Once, Modality: Hemodialysis, Access: Arteriovenous Fistula    Dialyzer: Optiflux [id number], Time: 180 Min    Blood Flow: 400 mL/Min , Dialysate Flow: 500 mL/Min, Dialysate Temp: 36.5, Tubinmm (Adult)    Target Fluid Removal: 1 Liters    Dialysate Electrolytes (mEq/L): Potassium 2, Calcium 2.5, Sodium 138, Bicarbonate 35         Vitals   T(F): 98.4  HR: 76  BP: 133/60  RR: 18  SpO2: 95%            PHYSICAL EXAM:  GENERAL: Alert, awake, NAD laying in bed tolerating HD   HEENT: MICHELINE, EOMI, neck supple, no JVP  CHEST/LUNG: Bilateral clear breath sounds  HEART: Regular rate and rhythm, no murmur, no gallops, no rub   ABDOMEN: Soft, nontender, non distended  : No flank or supra pubic tenderness.  EXTREMITIES: no pedal edema  Neurology: AAOx3, no focal neurological deficit  SKIN: No rash or skin lesion   ACCESS: RUE  AVF w/bruit and thrill 
CARDIOLOGY NP PROGRESS NOTE    Subjective: Received pt awake in bed in NAD. States CP improved since nitro paste applied. Denies SOB, dizziness/diaphoresis, n/v, palpitations.  Remainder ROS otherwise negative.    Overnight Events: CP 8/10 a/w EKG changes fellow made aware and Nitro paste applied for which CP now 2/10     TELEMETRY: SR HR 60s      VITAL SIGNS:  T(C): 36.9 (12-27-22 @ 06:13), Max: 36.9 (12-27-22 @ 06:13)  HR: 76 (12-27-22 @ 07:35) (58 - 76)  BP: 133/60 (12-27-22 @ 07:35) (103/51 - 133/60)  RR: 18 (12-27-22 @ 07:35) (17 - 18)  SpO2: 95% (12-27-22 @ 07:35) (93% - 98%)  Wt(kg): --    I&O's Summary    26 Dec 2022 07:01  -  27 Dec 2022 07:00  --------------------------------------------------------  IN: 427.5 mL / OUT: 0 mL / NET: 427.5 mL    27 Dec 2022 07:01  -  27 Dec 2022 10:03  --------------------------------------------------------  IN: 81.5 mL / OUT: 0 mL / NET: 81.5 mL          PHYSICAL EXAM:    General: A/ox 3, No acute Distress. Urdu Speaking   Neck: Supple, NO JVD  Cardiac: S1 S2, No M/R/G  Pulmonary: CTAB, Breathing unlabored, No Rhonchi/Rales/Wheezing  Abdomen: Soft, Non -tender, +BS x 4 quads  Extremities: No Rashes, No edema  Neuro: A/o x 3, No focal deficits          LABS:                          9.0    4.88  )-----------( 100      ( 27 Dec 2022 05:30 )             28.3                              12-27    135  |  94<L>  |  54<H>  ----------------------------<  124<H>  5.6<H>   |  27  |  11.18<H>    Ca    9.1      27 Dec 2022 05:30  Mg     2.4     12-27    TPro  7.4  /  Alb  4.0  /  TBili  0.4  /  DBili  x   /  AST  10  /  ALT  <5<L>  /  AlkPhos  333<H>  12-26    LIVER FUNCTIONS - ( 26 Dec 2022 11:41 )  Alb: 4.0 g/dL / Pro: 7.4 g/dL / ALK PHOS: 333 U/L / ALT: <5 U/L / AST: 10 U/L / GGT: x                                 PT/INR - ( 26 Dec 2022 11:41 )   PT: 12.6 sec;   INR: 1.06          PTT - ( 27 Dec 2022 05:53 )  PTT:91.8 sec  CAPILLARY BLOOD GLUCOSE      POCT Blood Glucose.: 175 mg/dL (27 Dec 2022 09:53)  POCT Blood Glucose.: 154 mg/dL (27 Dec 2022 06:38)  POCT Blood Glucose.: 116 mg/dL (26 Dec 2022 22:03)  POCT Blood Glucose.: 223 mg/dL (26 Dec 2022 17:20)  POCT Blood Glucose.: 154 mg/dL (26 Dec 2022 12:51)    CARDIAC MARKERS ( 27 Dec 2022 07:15 )  x     / 0.09 ng/mL / 48 U/L / x     / 1.1 ng/mL  CARDIAC MARKERS ( 26 Dec 2022 18:46 )  x     / 0.09 ng/mL / 55 U/L / x     / 1.2 ng/mL  CARDIAC MARKERS ( 26 Dec 2022 11:45 )  x     / 0.10 ng/mL / x     / x     / x              Allergies:  No Known Allergies    MEDICATIONS  (STANDING):  amLODIPine   Tablet 5 milliGRAM(s) Oral daily  aspirin  chewable 81 milliGRAM(s) Oral daily  atorvastatin 80 milliGRAM(s) Oral at bedtime  carbidopa/levodopa  25/100 1 Tablet(s) Oral <User Schedule>  carvedilol 25 milliGRAM(s) Oral every 12 hours  cinacalcet 30 milliGRAM(s) Oral daily  cloNIDine 0.1 milliGRAM(s) Oral two times a day  dextrose 10% + sodium chloride 0.45%. 250 milliLiter(s) (100 mL/Hr) IV Continuous <Continuous>  dextrose 5%. 1000 milliLiter(s) (100 mL/Hr) IV Continuous <Continuous>  dextrose 5%. 1000 milliLiter(s) (50 mL/Hr) IV Continuous <Continuous>  dextrose 50% Injectable 25 Gram(s) IV Push once  dextrose 50% Injectable 12.5 Gram(s) IV Push once  dextrose 50% Injectable 25 Gram(s) IV Push once  dextrose 50% Injectable 50 milliLiter(s) IV Push once  ferrous    sulfate 325 milliGRAM(s) Oral two times a day  glucagon  Injectable 1 milliGRAM(s) IntraMuscular once  heparin  Infusion.  Unit(s)/Hr (7.5 mL/Hr) IV Continuous <Continuous>  influenza  Vaccine (HIGH DOSE) 0.7 milliLiter(s) IntraMuscular once  insulin lispro (ADMELOG) corrective regimen sliding scale   SubCutaneous three times a day before meals  insulin regular  human recombinant 10 Unit(s) IV Push once  isosorbide   mononitrate ER Tablet (IMDUR) 30 milliGRAM(s) Oral daily  lisinopril 10 milliGRAM(s) Oral daily  pantoprazole    Tablet 40 milliGRAM(s) Oral before breakfast  sevelamer carbonate 800 milliGRAM(s) Oral three times a day with meals  sodium chloride 0.9%. 500 milliLiter(s) (75 mL/Hr) IV Continuous <Continuous>  sodium zirconium cyclosilicate 5 Gram(s) Oral once  tamsulosin 0.4 milliGRAM(s) Oral at bedtime    MEDICATIONS  (PRN):  dextrose Oral Gel 15 Gram(s) Oral once PRN Blood Glucose LESS THAN 70 milliGRAM(s)/deciliter        DIAGNOSTIC TESTS:

## 2022-12-27 NOTE — PROGRESS NOTE ADULT - PROBLEM SELECTOR PLAN 1
P/W CP to [facility]. S/P cath with multi-vessel disease.  EKG NSR, RBBB, TWI II/III/aVF  - Overnight developed CP a/w EKG changes, improved s/p Nitro paste. Trop plateaued 0.1   - Cardiac cath 10/15/2021: s/p SURYA pLCx, LM minor irregularities, pLAD patent stent, mLAD diffuse lesion, D1 minor irregularities. dLcx 40% diffuse ISR, pRCA 60%, mRCA 100%  L to R collateral  - plan for PCI today. S/P Plavix 300mg and IVF started   - pending ECHO   - c/w IV Hep gtt, ASA 81mg, Coreg 25mg BID, Lisinopril 10mg, Imdur 30mg and Atorvastatin 80mg

## 2022-12-27 NOTE — PROGRESS NOTE ADULT - ASSESSMENT
71 y/o [place]i Speaking Male, PMHx HTN, HLD, CAD s/p stent (SURYA pLCx at 10/2021 at [facility]), DM. ESRD (HD Tues/Thurs/Sat, last HD Sat 12/24@ [facility] [place] parkinson's disease initially presented to [facility] [place] 12/22 w/CP for which is now s/p cath 12/23 w/multi-vessel CAD and was transferred to [facility] tele 12/26 w/unstable angina and  planned PCI today (12/27) 71 y/o Urdu Speaking Male, PMHx HTN, HLD, CAD s/p stent (SURYA pLCx at 10/2021 at [facility]), DM. ESRD (HD Tues/Thurs/Sat, last HD Sat 12[place]24[place] [facility] [place] parkinson's disease initially presented to [facility] [place] 12/22 w/CP for which is now s/p cath 12/23 w/multi-vessel CAD and was transferred to [facility] tele 12/26 w/unstable angina and planned PCI today (12/27)

## 2022-12-27 NOTE — CONSULT NOTE ADULT - SUBJECTIVE AND OBJECTIVE BOX
INCOMPLETE    Surgeon: Dr. Hernandez    Requesting Physician: Dr. Wyatt    HISTORY OF PRESENT ILLNESS:   71 YO Uzbek-speaking Male, past smoker, with PMHx of HTN, HLD, CAD s/p multiple SURYA stents (pLAD 5/14/21, OM1 3/21/21, pLCx on 10/2021at [facility]), DMII, ESRD (HD Tues/Thurs/Sat, [place] [facility] [place] parkinson's disease who originally presented to [facility] 12/22/2022 c/o sharp left sided chest pain at rest overnight, relieved with sublingual nitroglycerin and asociated with nausea and palpitations. Pt was admitted and underwent ECHO (per transfer note) which revealed EF 55% and underwent dx cardiac cath 12/23/2022 found to have multivessel disease. Patient transferred to [facility] for under the cardiology team for possible PCI. On 12/27/22 patient underwent PCI which revealed oLCx 90% ISR, dLCX 90% ISR, pLAD 80% ISR, mLAD 70-80%. CTS surgery consulted for evaluation for possible MIDCAB. At present, patient denies fever, chills, chest pain, SOB, palpitations, N/V/D, hx of MI, hx of stroke, hx of previous surgeries (other than PCI).    ID#[id number] (Radha) utilized to obtain history.    PAST MEDICAL & SURGICAL HISTORY:  Diabetes    Hypertension    Hyperphosphatemia    Coronary artery disease  with stent    Hyperlipidemia    Stroke  2017, residual right sided weakness    History of BPH    Back pain    ESRD on hemodialysis    Parkinsons disease    ESRD (end stage renal disease)    Hypertension    CAD (coronary artery disease)    S/P dialysis catheter insertion  Right chest perma cath removed in jan 2020    AVF (arteriovenous fistula)  right arm x with revision, left arm x 1    Stented coronary artery  2004-5 at [facility]    S/P primary angioplasty with coronary stent    MEDICATIONS  (STANDING):  amLODIPine   Tablet 5 milliGRAM(s) Oral daily  aspirin  chewable 81 milliGRAM(s) Oral daily  atorvastatin 80 milliGRAM(s) Oral at bedtime  carbidopa/levodopa  25/100 1 Tablet(s) Oral <User Schedule>  carvedilol 25 milliGRAM(s) Oral every 12 hours  cinacalcet 30 milliGRAM(s) Oral daily  cloNIDine 0.1 milliGRAM(s) Oral two times a day  dextrose 10% Bolus 250 milliLiter(s) IV Bolus once  dextrose 5%. 1000 milliLiter(s) (100 mL/Hr) IV Continuous <Continuous>  dextrose 5%. 1000 milliLiter(s) (50 mL/Hr) IV Continuous <Continuous>  dextrose 50% Injectable 25 Gram(s) IV Push once  dextrose 50% Injectable 12.5 Gram(s) IV Push once  dextrose 50% Injectable 25 Gram(s) IV Push once  ferrous    sulfate 325 milliGRAM(s) Oral two times a day  glucagon  Injectable 1 milliGRAM(s) IntraMuscular once  influenza  Vaccine (HIGH DOSE) 0.7 milliLiter(s) IntraMuscular once  insulin lispro (ADMELOG) corrective regimen sliding scale   SubCutaneous three times a day before meals  insulin regular  human recombinant 10 Unit(s) IV Push once  isosorbide   mononitrate ER Tablet (IMDUR) 30 milliGRAM(s) Oral daily  lisinopril 10 milliGRAM(s) Oral daily  pantoprazole    Tablet 40 milliGRAM(s) Oral before breakfast  sevelamer carbonate 800 milliGRAM(s) Oral three times a day with meals  tamsulosin 0.4 milliGRAM(s) Oral at bedtime    MEDICATIONS  (PRN):  dextrose Oral Gel 15 Gram(s) Oral once PRN Blood Glucose LESS THAN 70 milliGRAM(s)/deciliter    Allergies    No Known Allergies    Intolerances    SOCIAL HISTORY:  Smoker:  YES- quit 30 years ago  ETOH use:  Denies  Ilicit Drug use: Denies  Occupation:  Assisted device use: Cane and walker  Live with: Wife and daughter    FAMILY HISTORY:  FH: type 2 diabetes  In father    Review of Systems:  CONSTITUTIONAL: Denies fevers / chills, sweats, fatigue, weight loss, weight gain                                       NEURO:  Denies parathesias, seizures, syncope, confusion                                                                                  EYES:  Denies blurry vision, discharge, pain, loss of vision                                                                                    ENMT:  Denies difficulty hearing, vertigo, dysphagia, epistaxis, recent dental work                                       CV:  Denies chest pain, palpitations, RYAN, orthopnea                                                                                           RESPIRATORY:  Denies wWheezing, SOB, cough / sputum, hemoptysis                                                               GI:  Denies nausea, vomiting, diarrhea, constipation, melena                                                                          : Denies hematuria, dysuria, urgency, incontinence                                                                                          MUSKULOSKELETAL:  Denies arthritis, joint swelling, muscle weakness                                                             SKIN/BREAST:  Denies rash, itching, hair loss, masses                                                                                              PSYCH:  Denies depression, anxiety, suicidal ideation                                                                                                HEME/LYMPH:  Denies bruises easily, enlarged lymph nodes, tender lymph nodes                                          ENDOCRINE:  Denies cold intolerance, heat intolerance, polydipsia                                                                      Vital Signs Last 24 Hrs  T(C): 36.9 (27 Dec 2022 14:19), Max: 37.1 (27 Dec 2022 13:10)  T(F): 98.4 (27 Dec 2022 14:19), Max: 98.8 (27 Dec 2022 13:10)  HR: 64 (27 Dec 2022 13:50) (58 - 76)  BP: 125/53 (27 Dec 2022 13:50) (103/51 - 133/60)  BP(mean): 87 (27 Dec 2022 07:35) (74 - 89)  RR: 17 (27 Dec 2022 13:50) (17 - 19)  SpO2: 96% (27 Dec 2022 13:50) (95% - 98%)    Parameters below as of 27 Dec 2022 13:50  Patient On (Oxygen Delivery Method): room air    PHYSICAL EXAM:  GENERAL: NAD, lying in bed comfortably  HEAD:  Atraumatic, Normocephalic  EYES: EOMI, PERRLA, conjunctiva and sclera clear  ENT: Moist mucous membranes  NECK: Supple, No JVD  CHEST/LUNG: Clear to auscultation bilaterally; No rales, rhonchi, wheezing, or rubs. Unlabored respirations  HEART: Regular rate and rhythm; No murmurs, rubs, or gallops  ABDOMEN: Bowel sounds present; Soft, Nontender, Nondistended. No hepatomegally  EXTREMITIES:  2+ Peripheral Pulses, brisk capillary refill. No clubbing, cyanosis, or edema  NERVOUS SYSTEM:  Alert & Oriented X3, speech clear. No deficits                  LABS:                        9.0    4.88  )-----------( 100      ( 27 Dec 2022 05:30 )             28.3     12-27    135  |  94<L>  |  54<H>  ----------------------------<  124<H>  5.6<H>   |  27  |  11.18<H>    Ca    9.1      27 Dec 2022 05:30  Mg     2.4     12-27    TPro  7.4  /  Alb  4.0  /  TBili  0.4  /  DBili  x   /  AST  10  /  ALT  <5<L>  /  AlkPhos  333<H>  12-26    PT/INR - ( 26 Dec 2022 11:41 )   PT: 12.6 sec;   INR: 1.06       PTT - ( 27 Dec 2022 05:53 )  PTT:91.8 sec    CARDIAC MARKERS ( 27 Dec 2022 07:15 )  x     / 0.09 ng/mL / 48 U/L / x     / 1.1 ng/mL  CARDIAC MARKERS ( 26 Dec 2022 18:46 )  x     / 0.09 ng/mL / 55 U/L / x     / 1.2 ng/mL  CARDIAC MARKERS ( 26 Dec 2022 11:45 )  x     / 0.10 ng/mL / x     / x     / x        RADIOLOGY & ADDITIONAL STUDIES:  CAROTID U/S:    CXR:    CT Scan:    EKG:    TTE / MUKESH:    Cardiac Cath: Surgeon: Dr. Hernandez    Requesting Physician: Dr. Wyatt    HISTORY OF PRESENT ILLNESS:   73 YO Mozambican-speaking Male, past smoker, with PMHx of HTN, HLD, CAD s/p multiple SURYA stents (pLAD 5/14/21, OM1 3/21/21, pLCx on 10/2021at [facility]), DMII, ESRD (HD Tues/Thurs/Sat, last HD today 12/27/22), parkinson's disease who originally presented to [facility] 12/22/2022 c/o sharp left sided chest pain at rest overnight, relieved with sublingual nitroglycerin and asociated with nausea and palpitations. Pt was admitted and underwent ECHO (per transfer note) which revealed EF 55% and underwent dx cardiac cath 12/23/2022 found to have multivessel disease. Patient transferred to [facility] for under the cardiology team for possible PCI. On 12/27/22 patient underwent PCI which revealed PTCA oLCX 90% ISR, PTCA dLCX 90% ISR w/ subtotal small LCX distally; oLAD 80% ISR with + IFR (0.80), mLAD 70-80%. CTS surgery consulted for evaluation for possible MIDCAB. At present, patient denies fever, chills, chest pain, SOB, palpitations, N/V/D, hx of MI, hx of stroke, hx of previous surgeries (other than PCI).    ID#[id number] (Radha) utilized to obtain history.    PAST MEDICAL & SURGICAL HISTORY:  Diabetes    Hypertension    Hyperphosphatemia    Coronary artery disease  with stent    Hyperlipidemia    Stroke  2017, residual right sided weakness    History of BPH    Back pain    ESRD on hemodialysis    Parkinsons disease    ESRD (end stage renal disease)    Hypertension    CAD (coronary artery disease)    S/P dialysis catheter insertion  Right chest perma cath removed in jan 2020    AVF (arteriovenous fistula)  right arm x with revision, left arm x 1    Stented coronary artery  2004-5 at [facility]    S/P primary angioplasty with coronary stent    MEDICATIONS  (STANDING):  amLODIPine   Tablet 5 milliGRAM(s) Oral daily  aspirin  chewable 81 milliGRAM(s) Oral daily  atorvastatin 80 milliGRAM(s) Oral at bedtime  carbidopa/levodopa  25/100 1 Tablet(s) Oral <User Schedule>  carvedilol 25 milliGRAM(s) Oral every 12 hours  cinacalcet 30 milliGRAM(s) Oral daily  cloNIDine 0.1 milliGRAM(s) Oral two times a day  dextrose 10% Bolus 250 milliLiter(s) IV Bolus once  dextrose 5%. 1000 milliLiter(s) (100 mL/Hr) IV Continuous <Continuous>  dextrose 5%. 1000 milliLiter(s) (50 mL/Hr) IV Continuous <Continuous>  dextrose 50% Injectable 25 Gram(s) IV Push once  dextrose 50% Injectable 12.5 Gram(s) IV Push once  dextrose 50% Injectable 25 Gram(s) IV Push once  ferrous    sulfate 325 milliGRAM(s) Oral two times a day  glucagon  Injectable 1 milliGRAM(s) IntraMuscular once  influenza  Vaccine (HIGH DOSE) 0.7 milliLiter(s) IntraMuscular once  insulin lispro (ADMELOG) corrective regimen sliding scale   SubCutaneous three times a day before meals  insulin regular  human recombinant 10 Unit(s) IV Push once  isosorbide   mononitrate ER Tablet (IMDUR) 30 milliGRAM(s) Oral daily  lisinopril 10 milliGRAM(s) Oral daily  pantoprazole    Tablet 40 milliGRAM(s) Oral before breakfast  sevelamer carbonate 800 milliGRAM(s) Oral three times a day with meals  tamsulosin 0.4 milliGRAM(s) Oral at bedtime    MEDICATIONS  (PRN):  dextrose Oral Gel 15 Gram(s) Oral once PRN Blood Glucose LESS THAN 70 milliGRAM(s)/deciliter    Allergies    No Known Allergies    Intolerances    SOCIAL HISTORY:  Smoker:  YES- quit 30 years ago  ETOH use:  Denies  Ilicit Drug use: Denies  Occupation:  Assisted device use: Cane and walker  Live with: Wife and daughter    FAMILY HISTORY:  FH: type 2 diabetes  In father    Review of Systems:  CONSTITUTIONAL: Denies fevers / chills, sweats, fatigue, weight loss, weight gain                                       NEURO:  Denies parathesias, seizures, syncope, confusion                                                                                  EYES:  Denies blurry vision, discharge, pain, loss of vision                                                                                    ENMT:  Denies difficulty hearing, vertigo, dysphagia, epistaxis, recent dental work                                       CV:  Denies chest pain, palpitations, RYAN, orthopnea                                                                                           RESPIRATORY:  Denies wWheezing, SOB, cough / sputum, hemoptysis                                                               GI:  Denies nausea, vomiting, diarrhea, constipation, melena                                                                          : Denies hematuria, dysuria, urgency, incontinence                                                                                          MUSKULOSKELETAL:  Denies arthritis, joint swelling, muscle weakness                                                             SKIN/BREAST:  Denies rash, itching, hair loss, masses                                                                                              PSYCH:  Denies depression, anxiety, suicidal ideation                                                                                                HEME/LYMPH:  Denies bruises easily, enlarged lymph nodes, tender lymph nodes                                          ENDOCRINE:  Denies cold intolerance, heat intolerance, polydipsia                                                                      Vital Signs Last 24 Hrs  T(C): 36.9 (27 Dec 2022 14:19), Max: 37.1 (27 Dec 2022 13:10)  T(F): 98.4 (27 Dec 2022 14:19), Max: 98.8 (27 Dec 2022 13:10)  HR: 64 (27 Dec 2022 13:50) (58 - 76)  BP: 125/53 (27 Dec 2022 13:50) (103/51 - 133/60)  BP(mean): 87 (27 Dec 2022 07:35) (74 - 89)  RR: 17 (27 Dec 2022 13:50) (17 - 19)  SpO2: 96% (27 Dec 2022 13:50) (95% - 98%)    Parameters below as of 27 Dec 2022 13:50  Patient On (Oxygen Delivery Method): room air    PHYSICAL EXAM:  GENERAL: NAD, lying in bed comfortably undergoing HD  HEAD:  Atraumatic, Normocephalic  EYES: EOMI, PERRLA, conjunctiva and sclera clear  ENT: Moist mucous membranes  NECK: Supple, No JVD  CHEST/LUNG: CTAB; No rales, rhonchi, wheezing, or rubs. Unlabored respirations  HEART: Regular rate and rhythm; No murmurs, rubs, or gallops  ABDOMEN: Bowel sounds present; Soft, Nontender, Nondistended. No hepatomegally  EXTREMITIES: RUE AV fistula present, well-healed scar noted on LUE from previous fistula. 2+ Peripheral Pulses, brisk capillary refill. No clubbing, cyanosis, or edema  GROIN: L groin dressing  NERVOUS SYSTEM:  Alert & Oriented X3, speech clear. No deficits                  LABS:                        9.0    4.88  )-----------( 100      ( 27 Dec 2022 05:30 )             28.3     12-27    135  |  94<L>  |  54<H>  ----------------------------<  124<H>  5.6<H>   |  27  |  11.18<H>    Ca    9.1      27 Dec 2022 05:30  Mg     2.4     12-27    TPro  7.4  /  Alb  4.0  /  TBili  0.4  /  DBili  x   /  AST  10  /  ALT  <5<L>  /  AlkPhos  333<H>  12-26    PT/INR - ( 26 Dec 2022 11:41 )   PT: 12.6 sec;   INR: 1.06       PTT - ( 27 Dec 2022 05:53 )  PTT:91.8 sec    CARDIAC MARKERS ( 27 Dec 2022 07:15 )  x     / 0.09 ng/mL / 48 U/L / x     / 1.1 ng/mL  CARDIAC MARKERS ( 26 Dec 2022 18:46 )  x     / 0.09 ng/mL / 55 U/L / x     / 1.2 ng/mL  CARDIAC MARKERS ( 26 Dec 2022 11:45 )  x     / 0.10 ng/mL / x     / x     / x        RADIOLOGY & ADDITIONAL STUDIES:  Cardiac Cath: PTCA oLCX 90% ISR, PTCA dLCX 90% ISR w/ subtotal small LCX distally; oLAD 80% ISR with + IFR (0.80), mLAD 70-80%

## 2022-12-27 NOTE — CONSULT NOTE ADULT - ASSESSMENT
71 yo male with PMHx of  ESRD (HD Tues/Th/Sat, last HD Sat 2022 @ [facility] [place] c/o  left sided CP while he was lying in bed the evening of underwent dx cardiac cath 2022  found to have multivessel disease. Transferred to [facility] for Cath today. Nephrology consulted for HD.         Dialysis Management   Electrolytes noted  EDW: TBD   HD today as per schedule with 1L UF if pt able tolerate  Hemodialysis Treatment.:     Schedule: Once, Modality: Hemodialysis, Access: Arteriovenous Fistula    Dialyzer: Optiflux [id number], Time: 180 Min    Blood Flow: 400 mL/Min , Dialysate Flow: 500 mL/Min, Dialysate Temp: 36.5, Tubinmm (Adult)    Target Fluid Removal: 1 Liters    Dialysate Electrolytes (mEq/L): Potassium 2, Calcium 2.5, Sodium 138, Bicarbonate 35   Daily BMP  Renally dose medications        71 yo male with PMHx of  ESRD (HD Tues/Thurs/Sat, [place] [facility] [place]/o  left sided CP while he was lying in bed the evening of underwent dx cardiac cath 2022  found to have multivessel disease. Transferred to [facility] for Cath today. Nephrology consulted for HD.         Dialysis Management   Electrolytes noted  Access: RUE AVF functional   EDW: TBD   HD today as per schedule with 1L UF if pt able tolerate  Hemodialysis Treatment.:     Schedule: Once, Modality: Hemodialysis, Access: Arteriovenous Fistula    Dialyzer: Optiflux [id number], Time: 180 Min    Blood Flow: 400 mL/Min , Dialysate Flow: 500 mL/Min, Dialysate Temp: 36.5, Tubinmm (Adult)    Target Fluid Removal: 1 Liters    Dialysate Electrolytes (mEq/L): Potassium 2, Calcium 2.5, Sodium 138, Bicarbonate 35   Daily BMP  Renally dose medications

## 2022-12-27 NOTE — CONSULT NOTE ADULT - NS ATTEND AMEND GEN_ALL_CORE FT
severe isr of lad and cx, pci of OM( no real target for cabg) , for MIDCAB in 2-3 weeks , will see in office in 2 weeks

## 2022-12-27 NOTE — PROGRESS NOTE ADULT - PROBLEM SELECTOR PLAN 3
- SBPs 100s-130s  - c/w Amlodipine 5mg, Coreg 25mg BID, Clonidine 0.1mg BID, Lisinopril 10mg, and Imdur 30mg

## 2022-12-27 NOTE — PROGRESS NOTE ADULT - PROBLEM SELECTOR PLAN 6
p/w LUE tremors  -Home meds: Carbidopa-Levodopa 25mg-100mg BID  -CONT with Cardbidopa-Levodopa      F: none  E: Replete if K+ <4 and Mg <2  N: NPO after midnight  Dispo: cardiac cath in AM with Dr. Britton    Case discussed with Dr. Wyatt HX Parkinson's w/tremors  - c/w Carbidopa/Levodopa    F: pre-cath IVF  N: NPO pending cath   E: Replete lytes PRN K<4, Mg<2  P: DVT PPX: IV Hep gtt   C: FULL CODE  Dispo: pending cath

## 2022-12-27 NOTE — PROGRESS NOTE ADULT - PROBLEM SELECTOR PLAN 2
h/o ESRD (HD Tues/Thurs/Sat, last HD Sat 12/24/2022 at [facility] [place])  - Renal following, appreciate recs. Plan for HD today s/p cath   - c/w Cinacalet 30mg and Renagel 800mg TID     #Anemia  -H/H stable   -CONT with Ferrous sulfate 325mg q12hrs h/o ESRD (HD Tues/Thurs/Sat, last HD Sat 12/24/2022 at [facility] [place])  - Renal following, appreciate recs. Plan for HD today s/p cath   - c/w Cinacalcet 30mg and Renagel 800mg TID     #Anemia  - Iron studies c/w Acute [hase reac  -H/H stable   -CONT with Ferrous sulfate 325mg q12hrs h/o ESRD (HD Tues/Thurs/Sat, [place] [facility] [place]  - Renal following, appreciate recs. 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

## 2022-12-27 NOTE — CONSULT NOTE ADULT - SUBJECTIVE AND OBJECTIVE BOX
HPI:  73 yo Kazakh speaking male with PMHx of HTN, HLD, CAD s/p stent (SURYA pLCx at 10/2021 at [facility]), DM. ESRD (HD Tues/Thurs/Sat, [place] [facility] [place] parkinson's disease presented to [facility] 12/22/2022 c/o sharp left sided CP while he was lying in bed the evening of underwent dx cardiac cath 12/23/2022  found to have multivessel disease. Decision was made to transfer pt to [facility] for Cath today. Nephrology consulted for HD.       PAST MEDICAL & SURGICAL HISTORY:  Diabetes      Hypertension      Hyperphosphatemia      Coronary artery disease  with stent      Hyperlipidemia      Stroke  2017, residual right sided weakness      History of BPH      Back pain      ESRD on hemodialysis      Parkinsons disease      ESRD (end stage renal disease)      Hypertension      CAD (coronary artery disease)      S/P dialysis catheter insertion  Right chest perma cath removed in jan 2020      AVF (arteriovenous fistula)  right arm x with revision, left arm x 1      Stented coronary artery  2004-5 at [facility]      S/P primary angioplasty with coronary stent            Allergies:  No Known Allergies      Home Medications:   amLODIPine 5 mg oral tablet: 1 tab(s) orally once a day  Aspirin Enteric Coated 81 mg oral delayed release tablet: 1 tab(s) orally once a day  atorvastatin 80 mg oral tablet: 1 tab(s) orally once a day  carbidopa-levodopa 25 mg-100 mg oral tablet: 1 tab(s) orally 2 times a day  carvedilol 25 mg oral tablet: 1 tab(s) orally 2 times a day  cinacalcet 60 mg oral tablet: 1 tab(s) orally once a day  cloNIDine 0.1 mg oral tablet: 1 tab(s) orally 2 times a day  ferrous sulfate 324 mg (65 mg elemental iron) oral delayed release tablet: 1 tab(s) orally 2 times a day  isosorbide mononitrate 30 mg oral tablet, extended release: 1 tab(s) orally once a day (in the morning)  lisinopril 10 mg oral tablet: 1 tab(s) orally once a day  pantoprazole 40 mg oral delayed release tablet: 1 tab(s) orally once a day  sevelamer carbonate 800 mg oral tablet: 3 tab(s) orally 3 times a day (with meals)  tamsulosin 0.4 mg oral capsule: 1 cap(s) orally once a day      Hospital Medications:   MEDICATIONS  (STANDING):  amLODIPine   Tablet 5 milliGRAM(s) Oral daily  aspirin  chewable 81 milliGRAM(s) Oral daily  atorvastatin 80 milliGRAM(s) Oral at bedtime  carbidopa/levodopa  25/100 1 Tablet(s) Oral <User Schedule>  carvedilol 25 milliGRAM(s) Oral every 12 hours  cinacalcet 30 milliGRAM(s) Oral daily  cloNIDine 0.1 milliGRAM(s) Oral two times a day  dextrose 10% Bolus 250 milliLiter(s) IV Bolus once  dextrose 5%. 1000 milliLiter(s) (100 mL/Hr) IV Continuous <Continuous>  dextrose 5%. 1000 milliLiter(s) (50 mL/Hr) IV Continuous <Continuous>  dextrose 50% Injectable 25 Gram(s) IV Push once  dextrose 50% Injectable 12.5 Gram(s) IV Push once  dextrose 50% Injectable 25 Gram(s) IV Push once  ferrous    sulfate 325 milliGRAM(s) Oral two times a day  glucagon  Injectable 1 milliGRAM(s) IntraMuscular once  influenza  Vaccine (HIGH DOSE) 0.7 milliLiter(s) IntraMuscular once  insulin lispro (ADMELOG) corrective regimen sliding scale   SubCutaneous three times a day before meals  insulin regular  human recombinant 10 Unit(s) IV Push once  isosorbide   mononitrate ER Tablet (IMDUR) 30 milliGRAM(s) Oral daily  lisinopril 10 milliGRAM(s) Oral daily  pantoprazole    Tablet 40 milliGRAM(s) Oral before breakfast  sevelamer carbonate 800 milliGRAM(s) Oral three times a day with meals  tamsulosin 0.4 milliGRAM(s) Oral at bedtime      SOCIAL HISTORY:  Denies ETOh, Smoking,     Family History:  FAMILY HISTORY:  FH: type 2 diabetes  In father          VITALS:  T(F): 98.4 (12-27-22 @ 06:13), Max: 98.4 (12-27-22 @ 06:13)  HR: 76 (12-27-22 @ 07:35)  BP: 133/60 (12-27-22 @ 07:35)  RR: 18 (12-27-22 @ 07:35)  SpO2: 95% (12-27-22 @ 07:35)  Wt(kg): --    12-26 @ 07:01  -  12-27 @ 07:00  --------------------------------------------------------  IN: 427.5 mL / OUT: 0 mL / NET: 427.5 mL    12-27 @ 07:01  - 12-27 @ 12:26  --------------------------------------------------------  IN: 81.5 mL / OUT: 0 mL / NET: 81.5 mL        CAPILLARY BLOOD GLUCOSE      POCT Blood Glucose.: 135 mg/dL (27 Dec 2022 12:00)  POCT Blood Glucose.: 135 mg/dL (27 Dec 2022 11:23)  POCT Blood Glucose.: 73 mg/dL (27 Dec 2022 10:50)  POCT Blood Glucose.: 30 mg/dL (27 Dec 2022 10:41)  POCT Blood Glucose.: 175 mg/dL (27 Dec 2022 09:53)  POCT Blood Glucose.: 154 mg/dL (27 Dec 2022 06:38)  POCT Blood Glucose.: 116 mg/dL (26 Dec 2022 22:03)  POCT Blood Glucose.: 223 mg/dL (26 Dec 2022 17:20)  POCT Blood Glucose.: 154 mg/dL (26 Dec 2022 12:51)      Review of Systems:  CONSTITUTIONAL: No fever   RESPIRATORY: No shortness of breath, cough  CARDIOVASCULAR: No Chest pain, shortness of breath  GASTROINTESTINAL: No abdominal pain, nausea, vomiting, diarrhea  GENITOURINARY: No urinary frequency, gross hematuria, dysuria  NEUROLOGICAL: No headache, weakness  SKIN: No rash or skin lesion  MUSCULOSKELETAL: No swelling  Psych: Denies suicidal or homicidal ideation    PHYSICAL EXAM:  GENERAL: Alert, awake, oriented x3 on RA   HEENT: MICHELINE, EOMI, neck supple, no JVP  CHEST/LUNG: Bilateral clear breath sounds  HEART: Regular rate and rhythm, no murmur, no gallops, no rub   ABDOMEN: Soft, nontender, non distended  : No flank or supra pubic tenderness.  EXTREMITIES: no pedal edema  Neurology: AAOx3, no asterixis   SKIN: No rash or skin lesion   ACCESS: LORETTA matta/bruit and thrill     LABS:  12-27    135  |  94<L>  |  54<H>  ----------------------------<  124<H>  5.6<H>   |  27  |  11.18<H>    Ca    9.1      27 Dec 2022 05:30  Mg     2.4     12-27    TPro  7.4  /  Alb  4.0  /  TBili  0.4  /  DBili      /  AST  10  /  ALT  <5<L>  /  AlkPhos  333<H>  12-26    Creatinine Trend: 11.18 <--, 9.92 <--                        9.0    4.88  )-----------( 100      ( 27 Dec 2022 05:30 )             28.3     Urine Studies:           HPI:  71 yo Mongolian speaking male with PMHx of HTN, HLD, CAD s/p stent (SURYA pLCx at 10/2021 at [facility]), DM. ESRD (HD Tues/Thurs/Sat, [place] [facility] [place] parkinson's disease presented to [facility] 12/22/2022 c/o sharp left sided CP while he was lying in bed the evening of underwent dx cardiac cath 12/23/2022  found to have multivessel disease. Decision was made to transfer pt to [facility] for Cath today. Nephrology consulted for HD.       PAST MEDICAL & SURGICAL HISTORY:  Diabetes      Hypertension      Hyperphosphatemia      Coronary artery disease  with stent      Hyperlipidemia      Stroke  2017, residual right sided weakness      History of BPH      Back pain      ESRD on hemodialysis      Parkinsons disease      ESRD (end stage renal disease)      Hypertension      CAD (coronary artery disease)      S/P dialysis catheter insertion  Right chest perma cath removed in jan 2020      AVF (arteriovenous fistula)  right arm x with revision, left arm x 1      Stented coronary artery  2004-5 at [facility]      S/P primary angioplasty with coronary stent            Allergies:  No Known Allergies      Home Medications:   amLODIPine 5 mg oral tablet: 1 tab(s) orally once a day  Aspirin Enteric Coated 81 mg oral delayed release tablet: 1 tab(s) orally once a day  atorvastatin 80 mg oral tablet: 1 tab(s) orally once a day  carbidopa-levodopa 25 mg-100 mg oral tablet: 1 tab(s) orally 2 times a day  carvedilol 25 mg oral tablet: 1 tab(s) orally 2 times a day  cinacalcet 60 mg oral tablet: 1 tab(s) orally once a day  cloNIDine 0.1 mg oral tablet: 1 tab(s) orally 2 times a day  ferrous sulfate 324 mg (65 mg elemental iron) oral delayed release tablet: 1 tab(s) orally 2 times a day  isosorbide mononitrate 30 mg oral tablet, extended release: 1 tab(s) orally once a day (in the morning)  lisinopril 10 mg oral tablet: 1 tab(s) orally once a day  pantoprazole 40 mg oral delayed release tablet: 1 tab(s) orally once a day  sevelamer carbonate 800 mg oral tablet: 3 tab(s) orally 3 times a day (with meals)  tamsulosin 0.4 mg oral capsule: 1 cap(s) orally once a day      Hospital Medications:   MEDICATIONS  (STANDING):  amLODIPine   Tablet 5 milliGRAM(s) Oral daily  aspirin  chewable 81 milliGRAM(s) Oral daily  atorvastatin 80 milliGRAM(s) Oral at bedtime  carbidopa/levodopa  25/100 1 Tablet(s) Oral <User Schedule>  carvedilol 25 milliGRAM(s) Oral every 12 hours  cinacalcet 30 milliGRAM(s) Oral daily  cloNIDine 0.1 milliGRAM(s) Oral two times a day  dextrose 10% Bolus 250 milliLiter(s) IV Bolus once  dextrose 5%. 1000 milliLiter(s) (100 mL/Hr) IV Continuous <Continuous>  dextrose 5%. 1000 milliLiter(s) (50 mL/Hr) IV Continuous <Continuous>  dextrose 50% Injectable 25 Gram(s) IV Push once  dextrose 50% Injectable 12.5 Gram(s) IV Push once  dextrose 50% Injectable 25 Gram(s) IV Push once  ferrous    sulfate 325 milliGRAM(s) Oral two times a day  glucagon  Injectable 1 milliGRAM(s) IntraMuscular once  influenza  Vaccine (HIGH DOSE) 0.7 milliLiter(s) IntraMuscular once  insulin lispro (ADMELOG) corrective regimen sliding scale   SubCutaneous three times a day before meals  insulin regular  human recombinant 10 Unit(s) IV Push once  isosorbide   mononitrate ER Tablet (IMDUR) 30 milliGRAM(s) Oral daily  lisinopril 10 milliGRAM(s) Oral daily  pantoprazole    Tablet 40 milliGRAM(s) Oral before breakfast  sevelamer carbonate 800 milliGRAM(s) Oral three times a day with meals  tamsulosin 0.4 milliGRAM(s) Oral at bedtime      SOCIAL HISTORY:  Denies ETOh, Smoking,     Family History:  FAMILY HISTORY:  FH: type 2 diabetes  In father          VITALS:  T(F): 98.4 (12-27-22 @ 06:13), Max: 98.4 (12-27-22 @ 06:13)  HR: 76 (12-27-22 @ 07:35)  BP: 133/60 (12-27-22 @ 07:35)  RR: 18 (12-27-22 @ 07:35)  SpO2: 95% (12-27-22 @ 07:35)  Wt(kg): --    12-26 @ 07:01  -  12-27 @ 07:00  --------------------------------------------------------  IN: 427.5 mL / OUT: 0 mL / NET: 427.5 mL    12-27 @ 07:01  - 12-27 @ 12:26  --------------------------------------------------------  IN: 81.5 mL / OUT: 0 mL / NET: 81.5 mL        CAPILLARY BLOOD GLUCOSE      POCT Blood Glucose.: 135 mg/dL (27 Dec 2022 12:00)  POCT Blood Glucose.: 135 mg/dL (27 Dec 2022 11:23)  POCT Blood Glucose.: 73 mg/dL (27 Dec 2022 10:50)  POCT Blood Glucose.: 30 mg/dL (27 Dec 2022 10:41)  POCT Blood Glucose.: 175 mg/dL (27 Dec 2022 09:53)  POCT Blood Glucose.: 154 mg/dL (27 Dec 2022 06:38)  POCT Blood Glucose.: 116 mg/dL (26 Dec 2022 22:03)  POCT Blood Glucose.: 223 mg/dL (26 Dec 2022 17:20)  POCT Blood Glucose.: 154 mg/dL (26 Dec 2022 12:51)      Review of Systems:  CONSTITUTIONAL: No fever   RESPIRATORY: No shortness of breath, cough  CARDIOVASCULAR: No Chest pain, shortness of breath  GASTROINTESTINAL: No abdominal pain, nausea, vomiting, diarrhea  GENITOURINARY: No urinary frequency, gross hematuria, dysuria  NEUROLOGICAL: No headache, weakness  SKIN: No rash or skin lesion  MUSCULOSKELETAL: No swelling  Psych: Denies suicidal or homicidal ideation    PHYSICAL EXAM:  GENERAL: Alert, awake, oriented x3 on RA   HEENT: MICHELINE, EOMI, neck supple, no JVP  CHEST/LUNG: Bilateral clear breath sounds  HEART: Regular rate and rhythm, no murmur, no gallops, no rub   ABDOMEN: Soft, nontender, non distended  : No flank or supra pubic tenderness.  EXTREMITIES: no pedal edema  Neurology: AAOx3, no asterixis   SKIN: No rash or skin lesion   ACCESS: BHAVESH matta/bruit and thrill     LABS:  12-27    135  |  94<L>  |  54<H>  ----------------------------<  124<H>  5.6<H>   |  27  |  11.18<H>    Ca    9.1      27 Dec 2022 05:30  Mg     2.4     12-27    TPro  7.4  /  Alb  4.0  /  TBili  0.4  /  DBili      /  AST  10  /  ALT  <5<L>  /  AlkPhos  333<H>  12-26    Creatinine Trend: 11.18 <--, 9.92 <--                        9.0    4.88  )-----------( 100      ( 27 Dec 2022 05:30 )             28.3     Urine Studies:

## 2022-12-28 ENCOUNTER — TRANSCRIPTION ENCOUNTER (OUTPATIENT)
Age: 72
End: 2022-12-28

## 2022-12-28 VITALS
HEART RATE: 65 BPM | OXYGEN SATURATION: 97 % | DIASTOLIC BLOOD PRESSURE: 54 MMHG | SYSTOLIC BLOOD PRESSURE: 134 MMHG | RESPIRATION RATE: 17 BRPM

## 2022-12-28 LAB
ALBUMIN SERPL ELPH-MCNC: 3.9 G/DL — SIGNIFICANT CHANGE UP (ref 3.3–5)
ALP SERPL-CCNC: 323 U/L — HIGH (ref 40–120)
ALT FLD-CCNC: <5 U/L — LOW (ref 10–45)
ANION GAP SERPL CALC-SCNC: 10 MMOL/L — SIGNIFICANT CHANGE UP (ref 5–17)
AST SERPL-CCNC: 14 U/L — SIGNIFICANT CHANGE UP (ref 10–40)
BILIRUB SERPL-MCNC: 0.3 MG/DL — SIGNIFICANT CHANGE UP (ref 0.2–1.2)
BUN SERPL-MCNC: 32 MG/DL — HIGH (ref 7–23)
CALCIUM SERPL-MCNC: 9.9 MG/DL — SIGNIFICANT CHANGE UP (ref 8.4–10.5)
CHLORIDE SERPL-SCNC: 95 MMOL/L — LOW (ref 96–108)
CO2 SERPL-SCNC: 33 MMOL/L — HIGH (ref 22–31)
CREAT SERPL-MCNC: 7.77 MG/DL — HIGH (ref 0.5–1.3)
EGFR: 7 ML/MIN/1.73M2 — LOW
GLUCOSE BLDC GLUCOMTR-MCNC: 205 MG/DL — HIGH (ref 70–99)
GLUCOSE BLDC GLUCOMTR-MCNC: 207 MG/DL — HIGH (ref 70–99)
GLUCOSE SERPL-MCNC: 148 MG/DL — HIGH (ref 70–99)
HCT VFR BLD CALC: 28.3 % — LOW (ref 39–50)
HGB BLD-MCNC: 9 G/DL — LOW (ref 13–17)
MAGNESIUM SERPL-MCNC: 2.2 MG/DL — SIGNIFICANT CHANGE UP (ref 1.6–2.6)
MCHC RBC-ENTMCNC: 30.7 PG — SIGNIFICANT CHANGE UP (ref 27–34)
MCHC RBC-ENTMCNC: 31.8 GM/DL — LOW (ref 32–36)
MCV RBC AUTO: 96.6 FL — SIGNIFICANT CHANGE UP (ref 80–100)
NRBC # BLD: 0 /100 WBCS — SIGNIFICANT CHANGE UP (ref 0–0)
PLATELET # BLD AUTO: 110 K/UL — LOW (ref 150–400)
POTASSIUM SERPL-MCNC: 4.7 MMOL/L — SIGNIFICANT CHANGE UP (ref 3.5–5.3)
POTASSIUM SERPL-SCNC: 4.7 MMOL/L — SIGNIFICANT CHANGE UP (ref 3.5–5.3)
PROT SERPL-MCNC: 7.3 G/DL — SIGNIFICANT CHANGE UP (ref 6–8.3)
RBC # BLD: 2.93 M/UL — LOW (ref 4.2–5.8)
RBC # FLD: 14 % — SIGNIFICANT CHANGE UP (ref 10.3–14.5)
SODIUM SERPL-SCNC: 138 MMOL/L — SIGNIFICANT CHANGE UP (ref 135–145)
WBC # BLD: 5.15 K/UL — SIGNIFICANT CHANGE UP (ref 3.8–10.5)
WBC # FLD AUTO: 5.15 K/UL — SIGNIFICANT CHANGE UP (ref 3.8–10.5)

## 2022-12-28 PROCEDURE — 82550 ASSAY OF CK (CPK): CPT

## 2022-12-28 PROCEDURE — C1760: CPT

## 2022-12-28 PROCEDURE — U0005: CPT

## 2022-12-28 PROCEDURE — C1894: CPT

## 2022-12-28 PROCEDURE — 83550 IRON BINDING TEST: CPT

## 2022-12-28 PROCEDURE — 82728 ASSAY OF FERRITIN: CPT

## 2022-12-28 PROCEDURE — 84443 ASSAY THYROID STIM HORMONE: CPT

## 2022-12-28 PROCEDURE — 85610 PROTHROMBIN TIME: CPT

## 2022-12-28 PROCEDURE — 80053 COMPREHEN METABOLIC PANEL: CPT

## 2022-12-28 PROCEDURE — 82553 CREATINE MB FRACTION: CPT

## 2022-12-28 PROCEDURE — 83036 HEMOGLOBIN GLYCOSYLATED A1C: CPT

## 2022-12-28 PROCEDURE — 36415 COLL VENOUS BLD VENIPUNCTURE: CPT

## 2022-12-28 PROCEDURE — C1769: CPT

## 2022-12-28 PROCEDURE — 93005 ELECTROCARDIOGRAM TRACING: CPT

## 2022-12-28 PROCEDURE — 85027 COMPLETE CBC AUTOMATED: CPT

## 2022-12-28 PROCEDURE — 93306 TTE W/DOPPLER COMPLETE: CPT

## 2022-12-28 PROCEDURE — 80061 LIPID PANEL: CPT

## 2022-12-28 PROCEDURE — 83735 ASSAY OF MAGNESIUM: CPT

## 2022-12-28 PROCEDURE — 82962 GLUCOSE BLOOD TEST: CPT

## 2022-12-28 PROCEDURE — 85730 THROMBOPLASTIN TIME PARTIAL: CPT

## 2022-12-28 PROCEDURE — 90935 HEMODIALYSIS ONE EVALUATION: CPT

## 2022-12-28 PROCEDURE — U0003: CPT

## 2022-12-28 PROCEDURE — C1887: CPT

## 2022-12-28 PROCEDURE — C1753: CPT

## 2022-12-28 PROCEDURE — 83540 ASSAY OF IRON: CPT

## 2022-12-28 PROCEDURE — 86706 HEP B SURFACE ANTIBODY: CPT

## 2022-12-28 PROCEDURE — 93306 TTE W/DOPPLER COMPLETE: CPT | Mod: 26

## 2022-12-28 PROCEDURE — 82746 ASSAY OF FOLIC ACID SERUM: CPT

## 2022-12-28 PROCEDURE — 99239 HOSP IP/OBS DSCHRG MGMT >30: CPT

## 2022-12-28 PROCEDURE — 80048 BASIC METABOLIC PNL TOTAL CA: CPT

## 2022-12-28 PROCEDURE — 87340 HEPATITIS B SURFACE AG IA: CPT

## 2022-12-28 PROCEDURE — 85347 COAGULATION TIME ACTIVATED: CPT

## 2022-12-28 PROCEDURE — 84484 ASSAY OF TROPONIN QUANT: CPT

## 2022-12-28 PROCEDURE — C1725: CPT

## 2022-12-28 RX ORDER — ATORVASTATIN CALCIUM 80 MG/1
1 TABLET, FILM COATED ORAL
Qty: 0 | Refills: 0 | DISCHARGE

## 2022-12-28 RX ORDER — CARVEDILOL PHOSPHATE 80 MG/1
1 CAPSULE, EXTENDED RELEASE ORAL
Qty: 60 | Refills: 0
Start: 2022-12-28 | End: 2023-01-26

## 2022-12-28 RX ORDER — ISOSORBIDE MONONITRATE 60 MG/1
1 TABLET, EXTENDED RELEASE ORAL
Qty: 30 | Refills: 0
Start: 2022-12-28 | End: 2023-01-26

## 2022-12-28 RX ORDER — LISINOPRIL 2.5 MG/1
1 TABLET ORAL
Qty: 0 | Refills: 0 | DISCHARGE

## 2022-12-28 RX ORDER — LISINOPRIL 2.5 MG/1
1 TABLET ORAL
Qty: 30 | Refills: 0
Start: 2022-12-28 | End: 2023-01-26

## 2022-12-28 RX ORDER — CLOPIDOGREL BISULFATE 75 MG/1
1 TABLET, FILM COATED ORAL
Qty: 30 | Refills: 8
Start: 2022-12-28 | End: 2023-09-23

## 2022-12-28 RX ORDER — ASPIRIN/CALCIUM CARB/MAGNESIUM 324 MG
1 TABLET ORAL
Qty: 30 | Refills: 11
Start: 2022-12-28 | End: 2023-12-22

## 2022-12-28 RX ORDER — CARVEDILOL PHOSPHATE 80 MG/1
1 CAPSULE, EXTENDED RELEASE ORAL
Qty: 0 | Refills: 0 | DISCHARGE

## 2022-12-28 RX ORDER — ISOSORBIDE MONONITRATE 60 MG/1
1 TABLET, EXTENDED RELEASE ORAL
Qty: 0 | Refills: 0 | DISCHARGE

## 2022-12-28 RX ORDER — AMLODIPINE BESYLATE 2.5 MG/1
1 TABLET ORAL
Qty: 0 | Refills: 0 | DISCHARGE

## 2022-12-28 RX ORDER — ATORVASTATIN CALCIUM 80 MG/1
1 TABLET, FILM COATED ORAL
Qty: 30 | Refills: 5
Start: 2022-12-28 | End: 2023-06-25

## 2022-12-28 RX ORDER — AMLODIPINE BESYLATE 2.5 MG/1
1 TABLET ORAL
Qty: 30 | Refills: 0
Start: 2022-12-28 | End: 2023-01-26

## 2022-12-28 RX ADMIN — Medication 81 MILLIGRAM(S): at 11:49

## 2022-12-28 RX ADMIN — ISOSORBIDE MONONITRATE 30 MILLIGRAM(S): 60 TABLET, EXTENDED RELEASE ORAL at 11:50

## 2022-12-28 RX ADMIN — AMLODIPINE BESYLATE 5 MILLIGRAM(S): 2.5 TABLET ORAL at 05:46

## 2022-12-28 RX ADMIN — CARBIDOPA AND LEVODOPA 1 TABLET(S): 25; 100 TABLET ORAL at 05:45

## 2022-12-28 RX ADMIN — PANTOPRAZOLE SODIUM 40 MILLIGRAM(S): 20 TABLET, DELAYED RELEASE ORAL at 06:19

## 2022-12-28 RX ADMIN — Medication 4: at 12:12

## 2022-12-28 RX ADMIN — Medication 325 MILLIGRAM(S): at 05:46

## 2022-12-28 RX ADMIN — CLOPIDOGREL BISULFATE 75 MILLIGRAM(S): 75 TABLET, FILM COATED ORAL at 11:50

## 2022-12-28 RX ADMIN — Medication 0.1 MILLIGRAM(S): at 05:46

## 2022-12-28 RX ADMIN — SEVELAMER CARBONATE 800 MILLIGRAM(S): 2400 POWDER, FOR SUSPENSION ORAL at 11:50

## 2022-12-28 RX ADMIN — CINACALCET 30 MILLIGRAM(S): 30 TABLET, FILM COATED ORAL at 11:49

## 2022-12-28 RX ADMIN — Medication 4: at 07:27

## 2022-12-28 RX ADMIN — LISINOPRIL 10 MILLIGRAM(S): 2.5 TABLET ORAL at 05:46

## 2022-12-28 RX ADMIN — CARVEDILOL PHOSPHATE 25 MILLIGRAM(S): 80 CAPSULE, EXTENDED RELEASE ORAL at 05:46

## 2022-12-28 NOTE — DISCHARGE NOTE PROVIDER - CARE PROVIDERS DIRECT ADDRESSES
,DirectAddress_Unknown,[email or web address]

## 2022-12-28 NOTE — DISCHARGE NOTE PROVIDER - NSDCCPCAREPLAN_GEN_ALL_CORE_FT
PRINCIPAL DISCHARGE DIAGNOSIS  Diagnosis: Unstable angina  Assessment and Plan of Treatment: - You have a known history of coronary artery disease in which there is damage caused by a lack of blood flow through the coronary arteries. The arteries become narrowed by fatty deposits called plaque which limit the blood flow to the heart for which you had stents placed in the past to open the arteries and increase the blood flow.  You came to the hospital with chest pain; Blood work was done and an EKG was performed and you DID NOT have a heart attack. You underwent a cardiac cath and had 2 BALLOONS placed to open the arteries and improve the blood flow to the heart for which your chest pain has improved. You have residual disease in the left anterior descending artery. You are to call and schedule an appointment with Dr. Hernandez within 2-3 weeks of discharge from the hospital AND call to schedule an appointment to follow up with Dr. Britton within one week of discharge from the hospital.   - NEVER MISS A DOSE OF ASPIRIN OR PLAVIX. IF YOU DO, YOU ARE AT RISK OF YOUR STENTS CLOSING AND HAVING A HEART ATTACK. DO NOT STOP THESE TWO MEDICATIONS UNLESS INSTRUCTED TO DO SO BY YOUR CARDIOLOGIST.   - Do NOT drive or operate hazardous machinery for 24 hours. Limit your physical activity for 24-48 hours. Do NOT engage in sports, heavy work or heavy lifting for 72 hours.   - You MAY shower BUT no TUB BATHS, HOT TUBS OR SWIMMING FOR 5 DAYS  - Your procedure was done through your left groin. If you observe flank bleeding from the puncture site, it is an emergency. Please put direct pressure on the site and go directly to the ER. Bleeding under the skin may also occur and a small "black and blue" may be expected. If the area appears to be expanding or swelling around the puncture site, apply manual compression and go immediately to the nearest ER. If your foot/leg becomes cool or blue and/or you are unable to move it, this must be treated as an emergency, go directly to the nearest ER. Look for signs of infection in the groin: fever, red streaking of the leg, obvious pus formation a      SECONDARY DISCHARGE DIAGNOSES  Diagnosis: ESRD on dialysis  Assessment and Plan of Treatment: - You have a known history of end stage renal disease. End-stage renal disease, also called end-stage kidney disease, occurs when chronic kidney disease — the gradual loss of kidney function — reaches an advanced state. In end-stage renal disease, your kidneys are no longer able to work as they should to meet your body's needs. Your kidneys filter wastes and excess fluids from your blood, which are then excreted in your urine. When your kidneys lose their filtering capabilities, dangerous levels of fluid, electrolytes and wastes can build up in your body.With end-stage renal disease, you need dialysis. Please continue your dialysis as scheduled on Tuesdays, Thursdays and Saturdays.

## 2022-12-28 NOTE — DISCHARGE NOTE NURSING/CASE MANAGEMENT/SOCIAL WORK - PATIENT PORTAL LINK FT
You can access the FollowMyHealth Patient Portal offered by [facility] by registering at the following website: [email or web address]. By joining [facility]’s FollowMyHealth portal, you will also be able to view your health information using other applications (apps) compatible with our system.

## 2022-12-28 NOTE — DISCHARGE NOTE PROVIDER - NSDCFUADDINST_GEN_ALL_CORE_FT
- Aspirin and Plavix can put you at increased risk of bleeding; please avoid NSAIDS (such as Motrin, Advil, Ibuprofen, Naproxen, or Aleve, as these medications may further your risk of bleeding

## 2022-12-28 NOTE — DISCHARGE NOTE PROVIDER - NSDCFUADDAPPT_GEN_ALL_CORE_FT
1. Please call and schedule an appointment to follow up with Dr. Britton within one week of discharge from the hospital     2. Please call and schedule an appointment to follow up with Dr. Hernandez within 2-3 weeks of discharge from the hospital

## 2022-12-28 NOTE — DISCHARGE NOTE PROVIDER - NSDCMRMEDTOKEN_GEN_ALL_CORE_FT
amLODIPine 5 mg oral tablet: 1 tab(s) orally once a day  Aspirin Enteric Coated 81 mg oral delayed release tablet: 1 tab(s) orally once a day  atorvastatin 80 mg oral tablet: 1 tab(s) orally once a day  carbidopa-levodopa 25 mg-100 mg oral tablet: 1 tab(s) orally 2 times a day  carvedilol 25 mg oral tablet: 1 tab(s) orally 2 times a day  cinacalcet 60 mg oral tablet: 1 tab(s) orally once a day  cloNIDine 0.1 mg oral tablet: 1 tab(s) orally 2 times a day  ferrous sulfate 324 mg (65 mg elemental iron) oral delayed release tablet: 1 tab(s) orally 2 times a day  isosorbide mononitrate 30 mg oral tablet, extended release: 1 tab(s) orally once a day (in the morning)  lisinopril 10 mg oral tablet: 1 tab(s) orally once a day  pantoprazole 40 mg oral delayed release tablet: 1 tab(s) orally once a day  sevelamer carbonate 800 mg oral tablet: 3 tab(s) orally 3 times a day (with meals)  tamsulosin 0.4 mg oral capsule: 1 cap(s) orally once a day   amLODIPine 5 mg oral tablet: 1 tab(s) orally once a day  Aspirin Enteric Coated 81 mg oral delayed release tablet: 1 tab(s) orally once a day  atorvastatin 80 mg oral tablet: 1 tab(s) orally once a day  carbidopa-levodopa 25 mg-100 mg oral tablet: 1 tab(s) orally 2 times a day  carvedilol 25 mg oral tablet: 1 tab(s) orally 2 times a day  cinacalcet 60 mg oral tablet: 1 tab(s) orally once a day  cloNIDine 0.1 mg oral tablet: 1 tab(s) orally 2 times a day  clopidogrel 75 mg oral tablet: 1 tab(s) orally once a day  ferrous sulfate 324 mg (65 mg elemental iron) oral delayed release tablet: 1 tab(s) orally 2 times a day  isosorbide mononitrate 30 mg oral tablet, extended release: 1 tab(s) orally once a day (in the morning)  lisinopril 10 mg oral tablet: 1 tab(s) orally once a day  pantoprazole 40 mg oral delayed release tablet: 1 tab(s) orally once a day  sevelamer carbonate 800 mg oral tablet: 3 tab(s) orally 3 times a day (with meals)  tamsulosin 0.4 mg oral capsule: 1 cap(s) orally once a day

## 2022-12-28 NOTE — DISCHARGE NOTE NURSING/CASE MANAGEMENT/SOCIAL WORK - NSDCPEPTCAREGIVEDUMATLIST _GEN_ALL_CORE
Problem: Breathing Pattern Ineffective  Goal: Air exchange is effective, demonstrated by Sp02 sat of greater then or = 92% (or as ordered)  10/24/2021 0235 by Elsa Ziegler RN  Outcome: Outcome Not Met, Continue to Monitor  Pt is on 4L of oxygen during the day and is on a C-pap at night with oxygen bled in    Problem: Activity Intolerance  Goal: # Functional status is maintained or returned to baseline  Outcome: Outcome Met, Continue evaluating goal progress toward completion      Diabetes

## 2022-12-28 NOTE — DISCHARGE NOTE PROVIDER - CARE PROVIDER_API CALL
Michael Britton  [address] [place] [address]  Phone: [phone]  Phone: [phone]  Fax: (   )    -  Follow Up Time: 1 week    Mark Hernandez)  Cardiovascular Surgery  [address] [place] [address]  Phone: [phone]  Fax: [phone]  Follow Up Time: 2 weeks

## 2022-12-28 NOTE — DISCHARGE NOTE PROVIDER - HOSPITAL COURSE
*INCOMPLETE*      73 y/o Lebanese SpeakingM, PMHx  HTN, HLD, CAD s/p stent (SURYA pLCx at 10/2021 at [facility]), DM. ESRD (HD Tues/Thurs/Sat), parkinson's disease [place] [facility] for which he was admitted and underwent cath on 12/23 for which he was found to have multivessel disease and transferred to [facility] 12/26 for planned PCI.  When on 5U, overnight 12/26 developed CP 8/10 a/w EKG changes for which nitro paste was applied and CP went down to 2/10. He therefore underwent a cardiac cath on 12/27:  PTCA oLCX 90% ISR, PTCA dLCX 90% ISR w/ subtotal small LCX distally. oLAD 80% ISR with + IFR (0.80), mLAD 70-80%. Of note, at the start of the case noted to be in cardiogenic shock for which he received Levo 0.1 mcg/kg and 250 cc NS bolus x1 intra procedure. /60's L groin PC w/ manual compression. S/P Cath he underwent HD at bedside as it was his scheduled day. CT Surgery Dr. Hernandez consulted for possible LIMA-LAD. Given PCI 12/27 , patient will follow up outpatient with Dr. Hernandez in 2-3 weeks for evaluation of MIDCAB. ECHO performed s/f        *INCOMPLETE*      73 y/o [place] SpeakingM, PMHx  HTN, HLD, CAD s/p stent (SURYA pLCx at 10/2021 at [facility]), DM. ESRD (HD Tues/Thurs/Sat), parkinson's disease [place] [facility] for which he was admitted and underwent cath on 12/23 for which he was found to have multivessel disease and transferred to [facility] 12/26 for planned PCI.  When on 5U, overnight 12/26 developed CP 8/10 a/w EKG changes for which nitro paste was applied and CP went down to 2/10. He therefore underwent a cardiac cath on 12/27:  PTCA oLCX 90% ISR, PTCA dLCX 90% ISR w/ subtotal small LCX distally. oLAD 80% ISR with + IFR (0.80), mLAD 70-80%. Of note, at the start of the case noted to be in cardiogenic shock for which he received Levo 0.1 mcg/kg and 250 cc NS bolus x1 intra procedure. /60's L groin PC w/ manual compression. S/P Cath he underwent HD at bedside as it was his scheduled day. CT Surgery Dr. Hernandez consulted for possible LIMA-LAD. Given PCI 12/27 , patient will follow up outpatient with Dr. Hernandez in 2-3 weeks for evaluation of MIDCAB. ECHO performed s/f       Pt seen and examined at bedside this morning. Pt comfortable, denies any CP, SOB, dizziness, or palpitations. VSS, left groin access sites stable, no bleeding or hematoma noted, pulse 2 + b/l. AM labs stable. Patient has been given appropriate discharge instructions including medication regimen, access site management and follow up. Prescriptions have been e-prescribed to patient's preferred pharmacy. Follow up with Dr. Britton within one week of discharge and Dr. Hernandez within 2-3 weeks of discharge.              Cardiac Rehab (STEMI/NSTEMI/ACS/Unstable Angina/CHF/post-PCI):            *Education on benefits of Cardiac Rehab provided to patient: No         *Referral and Prescription Given for Cardiac Rehab : No.  Still needs revascularization 73 y/o Malay SpeakingM, PMHx  HTN, HLD, CAD s/p stent (SURYA pLCx at 10/2021 at [facility]), DM. ESRD (HD Tues/Thurs/Sat), parkinson's disease initially presented to [facility] 12/22 w/CP for which he was admitted and underwent cath on 12/23 for which he was found to have multivessel disease and transferred to [facility] 12/26 for planned PCI.  When on 5U, overnight 12/26 developed CP 8/10 a/w EKG changes for which nitro paste was applied and CP went down to 2/10. He therefore underwent a cardiac cath on 12/27:  PTCA oLCX 90% ISR, PTCA dLCX 90% ISR w/ subtotal small LCX distally. oLAD 80% ISR with + IFR (0.80), mLAD 70-80%. Of note, at the start of the case noted to be in cardiogenic shock for which he received Levo 0.1 mcg/kg and 250 cc NS bolus x1 intra procedure. /60's L groin PC w/ manual compression. S/P Cath he underwent HD at bedside as it was his scheduled day. CT Surgery Dr. Hernandez consulted for possible LIMA-LAD. Given PCI 12/27 , patient will follow up outpatient with Dr. Hernandez in 2-3 weeks for evaluation of MIDCAB. ECHO performed s/f     Pt seen and examined at bedside this morning. Pt comfortable, denies any CP, SOB, dizziness, or palpitations. VSS, left groin access sites stable, no bleeding or hematoma noted, pulse 2 + b/l. AM labs stable. Patient has been given appropriate discharge instructions including medication regimen, access site management and follow up. Prescriptions have been e-prescribed to patient's preferred pharmacy. Follow up with Dr. Britton within one week of discharge and Dr. Hernandez within 2-3 weeks of discharge.   YANET ID#[id number] USED             Cardiac Rehab (STEMI/NSTEMI/ACS/Unstable Angina/CHF/post-PCI):            *Education on benefits of Cardiac Rehab provided to patient: No         *Referral and Prescription Given for Cardiac Rehab : No.  Still needs revascularization       DC Meds:  Amlodipine 5mg  ASA 81mg  Atorvastatin 80mg  Coreg 25mg BID  Clonidine 0.1mg BID   Plavix 75mg  Isosorbide Mononitrate 30mg  Lisinopril 10mg   71 y/o Yoruba SpeakingM, PMHx  HTN, HLD, CAD s/p stent (SURYA pLCx at 10/2021 at [facility]), DM. ESRD (HD Tues/Thurs/Sat), parkinson's disease initially presented to [facility] 12/22 w/CP for which he was admitted and underwent cath on 12/23 for which he was found to have multivessel disease and transferred to [facility] 12/26 for planned PCI.  When on 5U, overnight 12/26 developed CP 8/10 a/w EKG changes for which nitro paste was applied and CP went down to 2/10. He therefore underwent a cardiac cath on 12/27:  PTCA oLCX 90% ISR, PTCA dLCX 90% ISR w/ subtotal small LCX distally. oLAD 80% ISR with + IFR (0.80), mLAD 70-80%. Of note, at the start of the case noted to be in cardiogenic shock for which he received Levo 0.1 mcg/kg and 250 cc NS bolus x1 intra procedure. /60's L groin PC w/ manual compression. S/P Cath he underwent HD at bedside as it was his scheduled day. CT Surgery Dr. Hernandez consulted for possible LIMA-LAD. Given PCI 12/27 , patient will follow up outpatient with Dr. Hernandez in 2-3 weeks for evaluation of MIDCAB. ECHO performed s/f     Pt seen and examined at bedside this morning. Pt comfortable, denies any CP, SOB, dizziness, or palpitations. VSS, left groin access sites stable, no bleeding or hematoma noted, pulse 2 + b/l. AM labs stable. Patient has been given appropriate discharge instructions including medication regimen, access site management and follow up. Prescriptions have been e-prescribed to patient's preferred pharmacy. Follow up with Dr. Britton within one week of discharge and Dr. Hernandez within 2-3 weeks of discharge.   YANET ID #[id number] USED and patient verbalized understanding.              Cardiac Rehab (STEMI/NSTEMI/ACS/Unstable Angina/CHF/post-PCI):            *Education on benefits of Cardiac Rehab provided to patient: No         *Referral and Prescription Given for Cardiac Rehab : No.  Still needs revascularization       DC Meds:  Amlodipine 5mg  ASA 81mg  Atorvastatin 80mg  Coreg 25mg BID  Clonidine 0.1mg BID   Plavix 75mg  Isosorbide Mononitrate 30mg  Lisinopril 10mg   73 y/o Arabic SpeakingM, PMHx  HTN, HLD, CAD s/p stent (SURYA pLCx at 10/2021 at [facility]), DM. ESRD (HD Tues/Thurs/Sat), parkinson's disease initially presented to [facility] [place] 12/22 w/CP for which he was admitted and underwent cath on 12/23 for which he was found to have multivessel disease and transferred to [facility] 12/26 for planned PCI.  When on 5U, overnight 12/26 developed CP 8/10 a/w EKG changes for which nitro paste was applied and CP went down to 2/10. He therefore underwent a cardiac cath on 12/27:  PTCA oLCX 90% ISR, PTCA dLCX 90% ISR w/ subtotal small LCX distally. oLAD 80% ISR with + IFR (0.80), mLAD 70-80%. Of note, at the start of the case noted to be in cardiogenic shock for which he received Levo 0.1 mcg/kg and 250 cc NS bolus x1 intra procedure. /60's L groin PC w/ manual compression. S/P Cath he underwent HD at bedside as it was his scheduled day. CT Surgery Dr. Hernandez consulted for possible LIMA-LAD. Given PCI 12/27 , patient will follow up outpatient with Dr. Hernandez in 2-3 weeks for evaluation of MIDCAB. ECHO performed s/f EF 54%, severely dilated LA, aortic sclerosis without significant stenosis and no pericardial effusion.     Pt seen and examined at bedside this morning. Pt comfortable, denies any CP, SOB, dizziness, or palpitations. VSS, left groin access sites stable, no bleeding or hematoma noted, pulse 2 + b/l. AM labs stable. Patient has been given appropriate discharge instructions including medication regimen, access site management and follow up. Prescriptions have been e-prescribed to patient's preferred pharmacy. Follow up with Dr. Britton within one week of discharge and Dr. Hernandez within 2-3 weeks of discharge.   YANET ID #[id number] USED and patient verbalized understanding.              Cardiac Rehab (STEMI/NSTEMI/ACS/Unstable Angina/CHF/post-PCI):            *Education on benefits of Cardiac Rehab provided to patient: No         *Referral and Prescription Given for Cardiac Rehab : No.  Still needs revascularization       DC Meds:  Amlodipine 5mg  ASA 81mg  Atorvastatin 80mg  Coreg 25mg BID  Clonidine 0.1mg BID   Plavix 75mg  Isosorbide Mononitrate 30mg  Lisinopril 10mg

## 2022-12-28 NOTE — DISCHARGE NOTE PROVIDER - PROVIDER TOKENS
FREE:[LAST:[Mangla],FIRST:[Michael],PHONE:[phone]],FAX:[(   )    -],ADDRESS:[[address] [place] [address]  Phone: [phone]],FOLLOWUP:[1 week]],PROVIDER:[TOKEN:[[id number]],FOLLOWUP:[2 weeks]]

## 2023-01-04 PROBLEM — Z00.00 ENCOUNTER FOR PREVENTIVE HEALTH EXAMINATION: Status: ACTIVE | Noted: 2023-01-04

## 2023-01-09 DIAGNOSIS — I69.351 HEMIPLEGIA AND HEMIPARESIS FOLLOWING CEREBRAL INFARCTION AFFECTING RIGHT DOMINANT SIDE: ICD-10-CM

## 2023-01-09 DIAGNOSIS — I12.0 HYPERTENSIVE CHRONIC KIDNEY DISEASE WITH STAGE 5 CHRONIC KIDNEY DISEASE OR END STAGE RENAL DISEASE: ICD-10-CM

## 2023-01-09 DIAGNOSIS — I70.0 ATHEROSCLEROSIS OF AORTA: ICD-10-CM

## 2023-01-09 DIAGNOSIS — N18.6 END STAGE RENAL DISEASE: ICD-10-CM

## 2023-01-09 DIAGNOSIS — R57.0 CARDIOGENIC SHOCK: ICD-10-CM

## 2023-01-09 DIAGNOSIS — I45.10 UNSPECIFIED RIGHT BUNDLE-BRANCH BLOCK: ICD-10-CM

## 2023-01-09 DIAGNOSIS — D63.8 ANEMIA IN OTHER CHRONIC DISEASES CLASSIFIED ELSEWHERE: ICD-10-CM

## 2023-01-09 DIAGNOSIS — N40.0 BENIGN PROSTATIC HYPERPLASIA WITHOUT LOWER URINARY TRACT SYMPTOMS: ICD-10-CM

## 2023-01-09 DIAGNOSIS — D69.6 THROMBOCYTOPENIA, UNSPECIFIED: ICD-10-CM

## 2023-01-09 DIAGNOSIS — I25.110 ATHEROSCLEROTIC HEART DISEASE OF NATIVE CORONARY ARTERY WITH UNSTABLE ANGINA PECTORIS: ICD-10-CM

## 2023-01-09 DIAGNOSIS — G20 PARKINSON'S DISEASE: ICD-10-CM

## 2023-01-09 DIAGNOSIS — E11.22 TYPE 2 DIABETES MELLITUS WITH DIABETIC CHRONIC KIDNEY DISEASE: ICD-10-CM

## 2023-01-09 DIAGNOSIS — E78.5 HYPERLIPIDEMIA, UNSPECIFIED: ICD-10-CM

## 2023-01-09 DIAGNOSIS — T82.855A STENOSIS OF CORONARY ARTERY STENT, INITIAL ENCOUNTER: ICD-10-CM

## 2023-01-09 DIAGNOSIS — Z79.82 LONG TERM (CURRENT) USE OF ASPIRIN: ICD-10-CM
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
## 2023-01-09 DIAGNOSIS — Z87.891 PERSONAL HISTORY OF NICOTINE DEPENDENCE: ICD-10-CM

## 2023-01-09 DIAGNOSIS — Z99.2 DEPENDENCE ON RENAL DIALYSIS: ICD-10-CM

## 2023-01-09 DIAGNOSIS — Z95.5 PRESENCE OF CORONARY ANGIOPLASTY IMPLANT AND GRAFT: ICD-10-CM

## 2023-01-10 DIAGNOSIS — Z86.73 PERSONAL HISTORY OF TRANSIENT ISCHEMIC ATTACK (TIA), AND CEREBRAL INFARCTION W/OUT RESIDUAL DEFICITS: ICD-10-CM

## 2023-01-10 DIAGNOSIS — Z83.3 FAMILY HISTORY OF DIABETES MELLITUS: ICD-10-CM

## 2023-01-10 DIAGNOSIS — Z95.5 PRESENCE OF CORONARY ANGIOPLASTY IMPLANT AND GRAFT: ICD-10-CM

## 2023-01-10 DIAGNOSIS — Z87.891 PERSONAL HISTORY OF NICOTINE DEPENDENCE: ICD-10-CM

## 2023-01-10 DIAGNOSIS — Z86.39 PERSONAL HISTORY OF OTHER ENDOCRINE, NUTRITIONAL AND METABOLIC DISEASE: ICD-10-CM

## 2023-01-10 PROBLEM — Z87.438 HISTORY OF BPH: Status: RESOLVED | Noted: 2023-01-10 | Resolved: 2023-01-10

## 2023-01-10 RX ORDER — LISINOPRIL 10 MG/1
10 TABLET ORAL DAILY
Qty: 1 | Refills: 3 | Status: COMPLETED | COMMUNITY
End: 2023-01-10

## 2023-01-11 ENCOUNTER — APPOINTMENT (OUTPATIENT)
Dept: CARDIOTHORACIC SURGERY | Facility: CLINIC | Age: 73
End: 2023-01-11
Payer: MEDICARE

## 2023-01-11 VITALS
BODY MASS INDEX: 25.03 KG/M2 | WEIGHT: 136 LBS | HEIGHT: 62 IN | OXYGEN SATURATION: 98 % | HEART RATE: 59 BPM | RESPIRATION RATE: 17 BRPM | DIASTOLIC BLOOD PRESSURE: 66 MMHG | TEMPERATURE: 97 F | SYSTOLIC BLOOD PRESSURE: 166 MMHG

## 2023-01-11 DIAGNOSIS — Z87.438 PERSONAL HISTORY OF OTHER DISEASES OF MALE GENITAL ORGANS: ICD-10-CM

## 2023-01-11 PROCEDURE — 99205 OFFICE O/P NEW HI 60 MIN: CPT

## 2023-01-11 NOTE — REVIEW OF SYSTEMS
[Feeling Tired] : feeling tired [Eyesight Problems] : eyesight problems [Chest Pain] : chest pain [SOB on Exertion] : shortness of breath during exertion [Negative] : Heme/Lymph

## 2023-01-12 NOTE — PHYSICAL EXAM
[Sclera] : the sclera and conjunctiva were normal [PERRL With Normal Accommodation] : pupils were equal in size, round, and reactive to light [Extraocular Movements] : extraocular movements were intact [Neck Appearance] : the appearance of the neck was normal [Neck Cervical Mass (___cm)] : no neck mass was observed [Jugular Venous Distention Increased] : there was no jugular-venous distention [Thyroid Diffuse Enlargement] : the thyroid was not enlarged [Thyroid Nodule] : there were no palpable thyroid nodules [Auscultation Breath Sounds / Voice Sounds] : lungs were clear to auscultation bilaterally [Heart Rate And Rhythm] : heart rate was normal and rhythm regular [Heart Sounds] : normal S1 and S2 [Heart Sounds Gallop] : no gallops [Murmurs] : no murmurs [Heart Sounds Pericardial Friction Rub] : no pericardial rub [Examination Of The Chest] : the chest was normal in appearance [Chest Visual Inspection Thoracic Asymmetry] : no chest asymmetry [Diminished Respiratory Excursion] : normal chest expansion [2+] : left 2+ [Bowel Sounds] : normal bowel sounds [Abdomen Soft] : soft [Abdomen Tenderness] : non-tender [Abdomen Mass (___ Cm)] : no abdominal mass palpated [No CVA Tenderness] : no ~M costovertebral angle tenderness [No Spinal Tenderness] : no spinal tenderness [Skin Color & Pigmentation] : normal skin color and pigmentation [Skin Turgor] : normal skin turgor [] : no rash [Cranial Nerves] : cranial nerves 2-12 were intact [Oriented To Time, Place, And Person] : oriented to person, place, and time [Impaired Insight] : insight and judgment were intact [Affect] : the affect was normal [FreeTextEntry1] : right leg weaknes, walks w/cane

## 2023-01-12 NOTE — CONSULT LETTER
[Dear  ___] : Dear  [unfilled], [Please see my note below.] : Please see my note below. [Sincerely,] : Sincerely, [FreeTextEntry2] : Carlin Ramos MD [FreeTextEntry1] : Please find attached our consultation on your patient, JOSE FERNÁNDEZ \par We take a multidisciplinary team approach to patient care and consider you, the referring physician, an extension of our team. We will maintain an open line of communication with you throughout your patient's treatment course.  \par It is our commitment to provide your patient with the highest quality of advanced therapeutic options. We thank you for allowing us to participate in the care of your patient.\par Please do not hesitate to contact our team with any questions or concerns at [phone].\par  [FreeTextEntry3] : Mark Hernandez MD, FRCS\par \par [facility] \par Department of Cardiothoracic  Surgery \par Director of Robotic Cardiac Surgery\par Professor of Cardiovascular & Thoracic Surgery\par [employer] \par \par FLORA MolinaP\par

## 2023-01-12 NOTE — END OF VISIT
[Time Spent: ___ minutes] : I have spent [unfilled] minutes of time on the encounter. [FreeTextEntry3] : I, GEOVANNA CHOUDHURY , am scribing for and in the presence of SATISH AGUILAR the following sections: History of present illness, past Medical/family/surgical/family/social history, review of systems, vital signs, physical exam and disposition.\par I personally performed the services described in the documentation, reviewed the documentation recorded by the scribe in my presence and it accurately and completely records my words and actions.\par

## 2023-01-12 NOTE — DATA REVIEWED
[FreeTextEntry1] : 12/26/22 EKG: NSR, RBBB, 61 bpm\par \par 12/28/22 TTE:\par 1. Normal left ventricular size and systolic function, EF 54%\par 2. Grade II left ventricular diastolic dysfunction.\par 3. Normal right ventricular size and systolic function.\par 4. Severely dilated left atrium.\par 5. Aortic sclerosis without significant stenosis.\par 6. No pericardial effusion.\par 7. No prior echo is available for comparison.\par \par 12/27/22 Cardiac cath: s/p PTCA-> oLCX 90% ISR, PTCA-> dLCX 90% ISR w/ subtotal small LCX distally; oLAD 80% ISR with + IFR (0.80), mLAD 70-80%.

## 2023-01-12 NOTE — ASSESSMENT
[FreeTextEntry1] : 72 year old male presents with PMH of HTN, HLD, DM, ESRD on HD, Left CVA,  Parkinsons disease CAD w/ISR s/p recent PTCA of LCX with residual LAD stenosis/ISR and RCA .  Dr. Hernandez reviewed the cardiac cath imaging and reports with the patient and  his wife and discussed the case with Dr. Ramos.  Dr. Hernandez performed the STS risk calculator and quoted a 4% operative mortality and complication risk and discussed the STS risk factors with the patient including but not limited to death, heart attack, bleeding, stroke, kidney problems and infection. He also discussed the various approaches, minimally invasive versus sternotomy. Dr. Hernandez feels the patient will benefit and is a candidate for  off pump CABG x 2. All questions were addressed and patient agrees to proceed with surgery.\par \par Plan: \par home covid test on Friday, 1/27\par admit on Monday, 1/30 for PST and heart failure management\par HD on Tuesday, 1/31\par non-contrast head and chest CT scan\par Labs, pa/lat xray, EKG, carotid duplex, PFTS \par Surgery on Wednesday, 2/1\par

## 2023-01-12 NOTE — REASON FOR VISIT
[Post Hospitalization] : a post hospitalization visit [Spouse] : spouse [Other: ______] : provided by DEREK [Source: ______] : History obtained from [unfilled] [Interpreters_IDNumber] : [id number] [Interpreters_FullName] : Rafael [TWNoteComboBox1] : [place]

## 2023-01-30 ENCOUNTER — INPATIENT (INPATIENT)
Facility: HOSPITAL | Age: 73
LOS: 7 days | Discharge: HOME HEALTH | DRG: 235 | End: 2023-02-07
Attending: THORACIC SURGERY (CARDIOTHORACIC VASCULAR SURGERY) | Admitting: THORACIC SURGERY (CARDIOTHORACIC VASCULAR SURGERY)
Payer: MEDICARE

## 2023-01-30 VITALS
DIASTOLIC BLOOD PRESSURE: 74 MMHG | OXYGEN SATURATION: 97 % | SYSTOLIC BLOOD PRESSURE: 175 MMHG | RESPIRATION RATE: 18 BRPM | HEART RATE: 66 BPM | HEIGHT: 62 IN | WEIGHT: 138.89 LBS

## 2023-01-30 DIAGNOSIS — Z95.5 PRESENCE OF CORONARY ANGIOPLASTY IMPLANT AND GRAFT: Chronic | ICD-10-CM

## 2023-01-30 DIAGNOSIS — I77.0 ARTERIOVENOUS FISTULA, ACQUIRED: Chronic | ICD-10-CM

## 2023-01-30 DIAGNOSIS — Z95.828 PRESENCE OF OTHER VASCULAR IMPLANTS AND GRAFTS: Chronic | ICD-10-CM

## 2023-01-30 LAB
A1C WITH ESTIMATED AVERAGE GLUCOSE RESULT: 6.1 % — HIGH (ref 4–5.6)
ALBUMIN SERPL ELPH-MCNC: 4.3 G/DL — SIGNIFICANT CHANGE UP (ref 3.3–5)
ALP SERPL-CCNC: 412 U/L — HIGH (ref 40–120)
ALT FLD-CCNC: <5 U/L — LOW (ref 10–45)
ANION GAP SERPL CALC-SCNC: 13 MMOL/L — SIGNIFICANT CHANGE UP (ref 5–17)
APTT BLD: 32.1 SEC — SIGNIFICANT CHANGE UP (ref 27.5–35.5)
AST SERPL-CCNC: 19 U/L — SIGNIFICANT CHANGE UP (ref 10–40)
BASOPHILS # BLD AUTO: 0.03 K/UL — SIGNIFICANT CHANGE UP (ref 0–0.2)
BASOPHILS NFR BLD AUTO: 0.5 % — SIGNIFICANT CHANGE UP (ref 0–2)
BILIRUB SERPL-MCNC: 0.4 MG/DL — SIGNIFICANT CHANGE UP (ref 0.2–1.2)
BLD GP AB SCN SERPL QL: NEGATIVE — SIGNIFICANT CHANGE UP
BLD GP AB SCN SERPL QL: NEGATIVE — SIGNIFICANT CHANGE UP
BUN SERPL-MCNC: 50 MG/DL — HIGH (ref 7–23)
CALCIUM SERPL-MCNC: 9.4 MG/DL — SIGNIFICANT CHANGE UP (ref 8.4–10.5)
CHLORIDE SERPL-SCNC: 97 MMOL/L — SIGNIFICANT CHANGE UP (ref 96–108)
CHOLEST SERPL-MCNC: 97 MG/DL — SIGNIFICANT CHANGE UP
CK MB CFR SERPL CALC: 2 NG/ML — SIGNIFICANT CHANGE UP (ref 0–6.7)
CK SERPL-CCNC: 68 U/L — SIGNIFICANT CHANGE UP (ref 30–200)
CO2 SERPL-SCNC: 27 MMOL/L — SIGNIFICANT CHANGE UP (ref 22–31)
CREAT SERPL-MCNC: 7.88 MG/DL — HIGH (ref 0.5–1.3)
EGFR: 7 ML/MIN/1.73M2 — LOW
EOSINOPHIL # BLD AUTO: 0.17 K/UL — SIGNIFICANT CHANGE UP (ref 0–0.5)
EOSINOPHIL NFR BLD AUTO: 2.9 % — SIGNIFICANT CHANGE UP (ref 0–6)
ESTIMATED AVERAGE GLUCOSE: 128 MG/DL — HIGH (ref 68–114)
GLUCOSE BLDC GLUCOMTR-MCNC: 152 MG/DL — HIGH (ref 70–99)
GLUCOSE BLDC GLUCOMTR-MCNC: 214 MG/DL — HIGH (ref 70–99)
GLUCOSE SERPL-MCNC: 117 MG/DL — HIGH (ref 70–99)
HCT VFR BLD CALC: 30.5 % — LOW (ref 39–50)
HDLC SERPL-MCNC: 41 MG/DL — SIGNIFICANT CHANGE UP
HGB BLD-MCNC: 9.5 G/DL — LOW (ref 13–17)
IMM GRANULOCYTES NFR BLD AUTO: 0.2 % — SIGNIFICANT CHANGE UP (ref 0–0.9)
INR BLD: 1.05 — SIGNIFICANT CHANGE UP (ref 0.88–1.16)
LIPID PNL WITH DIRECT LDL SERPL: 38 MG/DL — SIGNIFICANT CHANGE UP
LYMPHOCYTES # BLD AUTO: 1.24 K/UL — SIGNIFICANT CHANGE UP (ref 1–3.3)
LYMPHOCYTES # BLD AUTO: 20.8 % — SIGNIFICANT CHANGE UP (ref 13–44)
MCHC RBC-ENTMCNC: 30.4 PG — SIGNIFICANT CHANGE UP (ref 27–34)
MCHC RBC-ENTMCNC: 31.1 GM/DL — LOW (ref 32–36)
MCV RBC AUTO: 97.8 FL — SIGNIFICANT CHANGE UP (ref 80–100)
MONOCYTES # BLD AUTO: 0.51 K/UL — SIGNIFICANT CHANGE UP (ref 0–0.9)
MONOCYTES NFR BLD AUTO: 8.6 % — SIGNIFICANT CHANGE UP (ref 2–14)
NEUTROPHILS # BLD AUTO: 3.99 K/UL — SIGNIFICANT CHANGE UP (ref 1.8–7.4)
NEUTROPHILS NFR BLD AUTO: 67 % — SIGNIFICANT CHANGE UP (ref 43–77)
NON HDL CHOLESTEROL: 56 MG/DL — SIGNIFICANT CHANGE UP
NRBC # BLD: 0 /100 WBCS — SIGNIFICANT CHANGE UP (ref 0–0)
NT-PROBNP SERPL-SCNC: HIGH PG/ML (ref 0–300)
PLATELET # BLD AUTO: 117 K/UL — LOW (ref 150–400)
POTASSIUM SERPL-MCNC: 5.3 MMOL/L — SIGNIFICANT CHANGE UP (ref 3.5–5.3)
POTASSIUM SERPL-SCNC: 5.3 MMOL/L — SIGNIFICANT CHANGE UP (ref 3.5–5.3)
PROT SERPL-MCNC: 7.8 G/DL — SIGNIFICANT CHANGE UP (ref 6–8.3)
PROTHROM AB SERPL-ACNC: 12.5 SEC — SIGNIFICANT CHANGE UP (ref 10.5–13.4)
RBC # BLD: 3.12 M/UL — LOW (ref 4.2–5.8)
RBC # FLD: 14.9 % — HIGH (ref 10.3–14.5)
RH IG SCN BLD-IMP: POSITIVE — SIGNIFICANT CHANGE UP
RH IG SCN BLD-IMP: POSITIVE — SIGNIFICANT CHANGE UP
SODIUM SERPL-SCNC: 137 MMOL/L — SIGNIFICANT CHANGE UP (ref 135–145)
TRIGL SERPL-MCNC: 90 MG/DL — SIGNIFICANT CHANGE UP
TROPONIN T SERPL-MCNC: 0.09 NG/ML — CRITICAL HIGH (ref 0–0.01)
TSH SERPL-MCNC: 0.9 UIU/ML — SIGNIFICANT CHANGE UP (ref 0.27–4.2)
WBC # BLD: 5.95 K/UL — SIGNIFICANT CHANGE UP (ref 3.8–10.5)
WBC # FLD AUTO: 5.95 K/UL — SIGNIFICANT CHANGE UP (ref 3.8–10.5)

## 2023-01-30 PROCEDURE — 93880 EXTRACRANIAL BILAT STUDY: CPT | Mod: 26

## 2023-01-30 PROCEDURE — 71046 X-RAY EXAM CHEST 2 VIEWS: CPT | Mod: 26

## 2023-01-30 PROCEDURE — 70450 CT HEAD/BRAIN W/O DYE: CPT | Mod: 26

## 2023-01-30 PROCEDURE — 93010 ELECTROCARDIOGRAM REPORT: CPT

## 2023-01-30 PROCEDURE — 71250 CT THORAX DX C-: CPT | Mod: 26

## 2023-01-30 RX ORDER — PANTOPRAZOLE SODIUM 20 MG/1
40 TABLET, DELAYED RELEASE ORAL
Refills: 0 | Status: DISCONTINUED | OUTPATIENT
Start: 2023-01-30 | End: 2023-02-01

## 2023-01-30 RX ORDER — FERROUS SULFATE 325(65) MG
325 TABLET ORAL
Refills: 0 | Status: DISCONTINUED | OUTPATIENT
Start: 2023-01-30 | End: 2023-02-01

## 2023-01-30 RX ORDER — ISOSORBIDE MONONITRATE 60 MG/1
30 TABLET, EXTENDED RELEASE ORAL DAILY
Refills: 0 | Status: DISCONTINUED | OUTPATIENT
Start: 2023-01-30 | End: 2023-02-01

## 2023-01-30 RX ORDER — SEVELAMER CARBONATE 2400 MG/1
2400 POWDER, FOR SUSPENSION ORAL
Refills: 0 | Status: DISCONTINUED | OUTPATIENT
Start: 2023-01-30 | End: 2023-02-01

## 2023-01-30 RX ORDER — DEXTROSE 50 % IN WATER 50 %
25 SYRINGE (ML) INTRAVENOUS ONCE
Refills: 0 | Status: DISCONTINUED | OUTPATIENT
Start: 2023-01-30 | End: 2023-02-01

## 2023-01-30 RX ORDER — CARVEDILOL PHOSPHATE 80 MG/1
25 CAPSULE, EXTENDED RELEASE ORAL EVERY 12 HOURS
Refills: 0 | Status: DISCONTINUED | OUTPATIENT
Start: 2023-01-30 | End: 2023-02-01

## 2023-01-30 RX ORDER — SODIUM CHLORIDE 9 MG/ML
1000 INJECTION, SOLUTION INTRAVENOUS
Refills: 0 | Status: DISCONTINUED | OUTPATIENT
Start: 2023-01-30 | End: 2023-02-01

## 2023-01-30 RX ORDER — ACETAMINOPHEN 500 MG
650 TABLET ORAL EVERY 6 HOURS
Refills: 0 | Status: DISCONTINUED | OUTPATIENT
Start: 2023-01-30 | End: 2023-02-01

## 2023-01-30 RX ORDER — TAMSULOSIN HYDROCHLORIDE 0.4 MG/1
0.4 CAPSULE ORAL AT BEDTIME
Refills: 0 | Status: DISCONTINUED | OUTPATIENT
Start: 2023-01-30 | End: 2023-02-01

## 2023-01-30 RX ORDER — INSULIN LISPRO 100/ML
3 VIAL (ML) SUBCUTANEOUS
Refills: 0 | Status: DISCONTINUED | OUTPATIENT
Start: 2023-01-30 | End: 2023-02-01

## 2023-01-30 RX ORDER — AMLODIPINE BESYLATE 2.5 MG/1
5 TABLET ORAL DAILY
Refills: 0 | Status: DISCONTINUED | OUTPATIENT
Start: 2023-01-30 | End: 2023-02-01

## 2023-01-30 RX ORDER — HEPARIN SODIUM 5000 [USP'U]/ML
5000 INJECTION INTRAVENOUS; SUBCUTANEOUS EVERY 8 HOURS
Refills: 0 | Status: DISCONTINUED | OUTPATIENT
Start: 2023-01-30 | End: 2023-02-01

## 2023-01-30 RX ORDER — GLUCAGON INJECTION, SOLUTION 0.5 MG/.1ML
1 INJECTION, SOLUTION SUBCUTANEOUS ONCE
Refills: 0 | Status: DISCONTINUED | OUTPATIENT
Start: 2023-01-30 | End: 2023-02-01

## 2023-01-30 RX ORDER — INSULIN GLARGINE 100 [IU]/ML
10 INJECTION, SOLUTION SUBCUTANEOUS AT BEDTIME
Refills: 0 | Status: DISCONTINUED | OUTPATIENT
Start: 2023-01-30 | End: 2023-02-01

## 2023-01-30 RX ORDER — CARBIDOPA AND LEVODOPA 25; 100 MG/1; MG/1
1 TABLET ORAL DAILY
Refills: 0 | Status: DISCONTINUED | OUTPATIENT
Start: 2023-01-30 | End: 2023-02-01

## 2023-01-30 RX ORDER — SODIUM CHLORIDE 9 MG/ML
3 INJECTION INTRAMUSCULAR; INTRAVENOUS; SUBCUTANEOUS EVERY 8 HOURS
Refills: 0 | Status: DISCONTINUED | OUTPATIENT
Start: 2023-01-30 | End: 2023-02-01

## 2023-01-30 RX ORDER — ASPIRIN/CALCIUM CARB/MAGNESIUM 324 MG
81 TABLET ORAL DAILY
Refills: 0 | Status: DISCONTINUED | OUTPATIENT
Start: 2023-01-30 | End: 2023-02-01

## 2023-01-30 RX ORDER — DEXTROSE 50 % IN WATER 50 %
15 SYRINGE (ML) INTRAVENOUS ONCE
Refills: 0 | Status: DISCONTINUED | OUTPATIENT
Start: 2023-01-30 | End: 2023-02-01

## 2023-01-30 RX ORDER — ATORVASTATIN CALCIUM 80 MG/1
80 TABLET, FILM COATED ORAL AT BEDTIME
Refills: 0 | Status: DISCONTINUED | OUTPATIENT
Start: 2023-01-30 | End: 2023-02-01

## 2023-01-30 RX ORDER — CINACALCET 30 MG/1
60 TABLET, FILM COATED ORAL DAILY
Refills: 0 | Status: DISCONTINUED | OUTPATIENT
Start: 2023-01-30 | End: 2023-02-01

## 2023-01-30 RX ORDER — DEXTROSE 50 % IN WATER 50 %
12.5 SYRINGE (ML) INTRAVENOUS ONCE
Refills: 0 | Status: DISCONTINUED | OUTPATIENT
Start: 2023-01-30 | End: 2023-02-01

## 2023-01-30 RX ORDER — INSULIN LISPRO 100/ML
VIAL (ML) SUBCUTANEOUS
Refills: 0 | Status: DISCONTINUED | OUTPATIENT
Start: 2023-01-30 | End: 2023-02-01

## 2023-01-30 RX ORDER — POLYETHYLENE GLYCOL 3350 17 G/17G
17 POWDER, FOR SOLUTION ORAL DAILY
Refills: 0 | Status: DISCONTINUED | OUTPATIENT
Start: 2023-01-30 | End: 2023-02-01

## 2023-01-30 RX ADMIN — CARBIDOPA AND LEVODOPA 1 TABLET(S): 25; 100 TABLET ORAL at 16:13

## 2023-01-30 RX ADMIN — TAMSULOSIN HYDROCHLORIDE 0.4 MILLIGRAM(S): 0.4 CAPSULE ORAL at 21:50

## 2023-01-30 RX ADMIN — CARVEDILOL PHOSPHATE 25 MILLIGRAM(S): 80 CAPSULE, EXTENDED RELEASE ORAL at 18:52

## 2023-01-30 RX ADMIN — HEPARIN SODIUM 5000 UNIT(S): 5000 INJECTION INTRAVENOUS; SUBCUTANEOUS at 22:13

## 2023-01-30 RX ADMIN — Medication 3 UNIT(S): at 19:36

## 2023-01-30 RX ADMIN — INSULIN GLARGINE 10 UNIT(S): 100 INJECTION, SOLUTION SUBCUTANEOUS at 21:55

## 2023-01-30 RX ADMIN — ATORVASTATIN CALCIUM 80 MILLIGRAM(S): 80 TABLET, FILM COATED ORAL at 22:14

## 2023-01-30 RX ADMIN — Medication 4: at 22:14

## 2023-01-30 RX ADMIN — Medication 81 MILLIGRAM(S): at 16:12

## 2023-01-30 RX ADMIN — AMLODIPINE BESYLATE 5 MILLIGRAM(S): 2.5 TABLET ORAL at 16:13

## 2023-01-30 RX ADMIN — Medication 325 MILLIGRAM(S): at 18:51

## 2023-01-30 RX ADMIN — PANTOPRAZOLE SODIUM 40 MILLIGRAM(S): 20 TABLET, DELAYED RELEASE ORAL at 16:13

## 2023-01-30 RX ADMIN — Medication 0.1 MILLIGRAM(S): at 18:52

## 2023-01-30 NOTE — CONSULT NOTE ADULT - SUBJECTIVE AND OBJECTIVE BOX
HPI:  72M German speaking, ESRD (TTS), HTN, HLD, parkinson's disease who [place] [facility] [place] with Dr. Hernandez. Last HD on 1/28    Nephrology consulted for dialysis.     PAST MEDICAL & SURGICAL HISTORY:  Diabetes      Hypertension      Hyperphosphatemia      Coronary artery disease  with stent      Hyperlipidemia      Stroke  2017, residual right sided weakness      History of BPH      Back pain      ESRD on hemodialysis      Parkinsons disease      ESRD (end stage renal disease)      Hypertension      CAD (coronary artery disease)      S/P dialysis catheter insertion  Right chest perma cath removed in jan 2020      AVF (arteriovenous fistula)  right arm x with revision, left arm x 1      Stented coronary artery  2004-5 at [facility]      S/P primary angioplasty with coronary stent            Allergies:  No Known Allergies      Home Medications:   amLODIPine 5 mg oral tablet: 1 tab(s) orally once a day  Aspirin Enteric Coated 81 mg oral delayed release tablet: 1 tab(s) orally once a day  atorvastatin 80 mg oral tablet: 1 tab(s) orally once a day  carbidopa-levodopa 25 mg-100 mg oral tablet: 1 tab(s) orally 2 times a day  carvedilol 25 mg oral tablet: 1 tab(s) orally 2 times a day  cinacalcet 60 mg oral tablet: 1 tab(s) orally once a day  cloNIDine 0.1 mg oral tablet: 1 tab(s) orally 2 times a day  clopidogrel 75 mg oral tablet: 1 tab(s) orally once a day  ferrous sulfate 324 mg (65 mg elemental iron) oral delayed release tablet: 1 tab(s) orally 2 times a day  isosorbide mononitrate 30 mg oral tablet, extended release: 1 tab(s) orally once a day (in the morning)  lisinopril 10 mg oral tablet: 1 tab(s) orally once a day  pantoprazole 40 mg oral delayed release tablet: 1 tab(s) orally once a day  sevelamer carbonate 800 mg oral tablet: 3 tab(s) orally 3 times a day (with meals)  tamsulosin 0.4 mg oral capsule: 1 cap(s) orally once a day      Hospital Medications:   MEDICATIONS  (STANDING):  amLODIPine   Tablet 5 milliGRAM(s) Oral daily  aspirin enteric coated 81 milliGRAM(s) Oral daily  atorvastatin 80 milliGRAM(s) Oral at bedtime  carbidopa/levodopa  25/100 1 Tablet(s) Oral daily  carvedilol 25 milliGRAM(s) Oral every 12 hours  cinacalcet 60 milliGRAM(s) Oral daily  cloNIDine 0.1 milliGRAM(s) Oral two times a day  dextrose 5%. 1000 milliLiter(s) (50 mL/Hr) IV Continuous <Continuous>  dextrose 5%. 1000 milliLiter(s) (100 mL/Hr) IV Continuous <Continuous>  dextrose 50% Injectable 25 Gram(s) IV Push once  dextrose 50% Injectable 12.5 Gram(s) IV Push once  dextrose 50% Injectable 25 Gram(s) IV Push once  ferrous    sulfate 325 milliGRAM(s) Oral two times a day  glucagon  Injectable 1 milliGRAM(s) IntraMuscular once  heparin   Injectable 5000 Unit(s) SubCutaneous every 8 hours  insulin glargine Injectable (LANTUS) 10 Unit(s) SubCutaneous at bedtime  insulin lispro (ADMELOG) corrective regimen sliding scale   SubCutaneous three times a day before meals  insulin lispro Injectable (ADMELOG) 3 Unit(s) SubCutaneous three times a day before meals  isosorbide   mononitrate ER Tablet (IMDUR) 30 milliGRAM(s) Oral daily  pantoprazole    Tablet 40 milliGRAM(s) Oral before breakfast  polyethylene glycol 3350 17 Gram(s) Oral daily  sevelamer carbonate 2400 milliGRAM(s) Oral three times a day with meals  sodium chloride 0.9% lock flush 3 milliLiter(s) IV Push every 8 hours  tamsulosin 0.4 milliGRAM(s) Oral at bedtime      SOCIAL HISTORY:  Denies ETOh, Smoking,     Family History:  FAMILY HISTORY:  FH: type 2 diabetes  In father          VITALS:  T(F): 97.7 (01-30-23 @ 13:54), Max: 97.7 (01-30-23 @ 13:54)  HR: 66 (01-30-23 @ 13:30)  BP: 175/74 (01-30-23 @ 13:30)  RR: 18 (01-30-23 @ 13:30)  SpO2: --  Wt(kg): --    Height (cm): 157.5 (01-30 @ 13:30)  Weight (kg): 63.049 (01-30 @ 13:30)  BMI (kg/m2): 25.4 (01-30 @ 13:30)  BSA (m2): 1.64 (01-30 @ 13:30)  CAPILLARY BLOOD GLUCOSE    Review of Systems:  ROS negative except as per HPI    PHYSICAL EXAM:  GENERAL: Alert, awake, oriented x3 on RA   HEENT: MICHELINE, EOMI, neck supple, no JVP  CHEST/LUNG: Bilateral clear breath sounds  HEART: Regular rate and rhythm, no murmur, no gallops, no rub   ABDOMEN: Soft, nontender, non distended  : No flank or supra pubic tenderness.  EXTREMITIES: no pedal edema  Neurology: AAOx3, no asterixis   SKIN: No rash or skin lesion   ACCESS: RUE  AVF w/bruit and thrill     LABS:        Creatinine Trend:     Urine Studies:           HPI:  72M Maltese speaking, ESRD (TTS), HTN, HLD, parkinson's disease who [place] [facility] [place] Dr. Hernandez. Last HD on 1/28. Gets HD at [facility] on [address], states his treatment time is 3 hours and he is not aware of what his dry weight is. He is going to go to the OR on 2/1. Denies any SOB or chest pain. Nephrology consulted for dialysis.     PAST MEDICAL & SURGICAL HISTORY:  Diabetes      Hypertension      Hyperphosphatemia      Coronary artery disease  with stent      Hyperlipidemia      Stroke  2017, residual right sided weakness      History of BPH      Back pain      ESRD on hemodialysis      Parkinsons disease      ESRD (end stage renal disease)      Hypertension      CAD (coronary artery disease)      S/P dialysis catheter insertion  Right chest perma cath removed in jan 2020      AVF (arteriovenous fistula)  right arm x with revision, left arm x 1      Stented coronary artery  2004-5 at [facility]      S/P primary angioplasty with coronary stent      Allergies:  No Known Allergies      Home Medications:   amLODIPine 5 mg oral tablet: 1 tab(s) orally once a day  Aspirin Enteric Coated 81 mg oral delayed release tablet: 1 tab(s) orally once a day  atorvastatin 80 mg oral tablet: 1 tab(s) orally once a day  carbidopa-levodopa 25 mg-100 mg oral tablet: 1 tab(s) orally 2 times a day  carvedilol 25 mg oral tablet: 1 tab(s) orally 2 times a day  cinacalcet 60 mg oral tablet: 1 tab(s) orally once a day  cloNIDine 0.1 mg oral tablet: 1 tab(s) orally 2 times a day  clopidogrel 75 mg oral tablet: 1 tab(s) orally once a day  ferrous sulfate 324 mg (65 mg elemental iron) oral delayed release tablet: 1 tab(s) orally 2 times a day  isosorbide mononitrate 30 mg oral tablet, extended release: 1 tab(s) orally once a day (in the morning)  lisinopril 10 mg oral tablet: 1 tab(s) orally once a day  pantoprazole 40 mg oral delayed release tablet: 1 tab(s) orally once a day  sevelamer carbonate 800 mg oral tablet: 3 tab(s) orally 3 times a day (with meals)  tamsulosin 0.4 mg oral capsule: 1 cap(s) orally once a day      Hospital Medications:   MEDICATIONS  (STANDING):  amLODIPine   Tablet 5 milliGRAM(s) Oral daily  aspirin enteric coated 81 milliGRAM(s) Oral daily  atorvastatin 80 milliGRAM(s) Oral at bedtime  carbidopa/levodopa  25/100 1 Tablet(s) Oral daily  carvedilol 25 milliGRAM(s) Oral every 12 hours  cinacalcet 60 milliGRAM(s) Oral daily  cloNIDine 0.1 milliGRAM(s) Oral two times a day  dextrose 5%. 1000 milliLiter(s) (50 mL/Hr) IV Continuous <Continuous>  dextrose 5%. 1000 milliLiter(s) (100 mL/Hr) IV Continuous <Continuous>  dextrose 50% Injectable 25 Gram(s) IV Push once  dextrose 50% Injectable 12.5 Gram(s) IV Push once  dextrose 50% Injectable 25 Gram(s) IV Push once  ferrous    sulfate 325 milliGRAM(s) Oral two times a day  glucagon  Injectable 1 milliGRAM(s) IntraMuscular once  heparin   Injectable 5000 Unit(s) SubCutaneous every 8 hours  insulin glargine Injectable (LANTUS) 10 Unit(s) SubCutaneous at bedtime  insulin lispro (ADMELOG) corrective regimen sliding scale   SubCutaneous three times a day before meals  insulin lispro Injectable (ADMELOG) 3 Unit(s) SubCutaneous three times a day before meals  isosorbide   mononitrate ER Tablet (IMDUR) 30 milliGRAM(s) Oral daily  pantoprazole    Tablet 40 milliGRAM(s) Oral before breakfast  polyethylene glycol 3350 17 Gram(s) Oral daily  sevelamer carbonate 2400 milliGRAM(s) Oral three times a day with meals  sodium chloride 0.9% lock flush 3 milliLiter(s) IV Push every 8 hours  tamsulosin 0.4 milliGRAM(s) Oral at bedtime      SOCIAL HISTORY:  Denies ETOh, Smoking,     Family History:  FAMILY HISTORY:  FH: type 2 diabetes  In father          VITALS:  T(F): 97.7 (01-30-23 @ 13:54), Max: 97.7 (01-30-23 @ 13:54)  HR: 66 (01-30-23 @ 13:30)  BP: 175/74 (01-30-23 @ 13:30)  RR: 18 (01-30-23 @ 13:30)  SpO2: --  Wt(kg): --    Height (cm): 157.5 (01-30 @ 13:30)  Weight (kg): 63.049 (01-30 @ 13:30)  BMI (kg/m2): 25.4 (01-30 @ 13:30)  BSA (m2): 1.64 (01-30 @ 13:30)  CAPILLARY BLOOD GLUCOSE    Review of Systems:  ROS negative except as per HPI    PHYSICAL EXAM:  GENERAL: Alert, awake, oriented x3 on RA   HEENT: MICHELINE, EOMI, neck supple, no JVP  CHEST/LUNG: Bilateral clear breath sounds  HEART: Regular rate and rhythm, no murmur, no gallops, no rub   ABDOMEN: Soft, nontender, non distended  : No flank or supra pubic tenderness.  EXTREMITIES: no pedal edema  Neurology: AAOx3, no asterixis   SKIN: No rash or skin lesion   ACCESS: RUE  AVF w/bruit and thrill     LABS:        Creatinine Trend:     Urine Studies:

## 2023-01-30 NOTE — H&P ADULT - ASSESSMENT
Patient is a 71 y/o Taiwanese-speaking Male, former smoker, with PMHx of HTN, HLD, CAD s/p multiple SURYA stents (pLAD 5/14/21, OM1 3/21/21, pLCx on 10/2021at [facility]), DMII, ESRD (HD Tues/Thurs/Sat), CVA (approx. 2 years ago w/ right sided weakness) and  parkinson's disease who originally presented to [facility] 12/22/2022 c/o sharp left sided chest pain at rest overnight, relieved with sublingual nitroglycerin. At that time, he had an ECHO which revealed EF 55% and underwent dx cardiac cath 12/23/2022 found to have multivessel disease. Patient transferred to [facility] for under the cardiology team for possible PCI. On 12/27/22 patient underwent PCI which revealed PTCA oLCX 90% ISR, PTCA dLCX 90% ISR w/ subtotal small LCX distally; oLAD 80% ISR with + IFR (0.80), mLAD 70-80% & CTS was consulted & the patient was evaluated by Dr. Hernandez in the outpatient setting. Today, 1/30/23 patient presents for pre-surgical testing prior to CABG with Dr. Hernandez on 2/1/23.    Plan:    Neurovascular:   - Hx of CVA  - Hx of Parkinsons  - Continue Sinemet   -No delirium.   -Pain regimen, PRN's: Tylenol 650mg q6hr PRN     Cardiovascular: CAD (hx of PCI to pLAD and pLCx and PTCA to oLCX and dLCX)  - Pre- surgical testing underway   - Continue ASA 81, Lipitor 80mg, Imdur 30mg, Carvedilol 25mg q12hr  - Hx HTN: Continue Amlodipine 5mg  -Hemodynamically stable. HR controlled.  -Continue to monitor HR, BP, telemetry      Respiratory: Stable  -Oxygenating well on RA   -Encourage coughing and use of IS 10x / hr while awake.  -CXR: Pending    GI:  Stable  -PPX: Protonix  -PO Diet: Renal/CC  -Bowel regimen: Miralax    Renal / : ESRD on HD (TRS)  - Plan for HD tomorrow   - Continue Sevelamer & Cinacalcet  -Monitor renal function.  -Monitor I/O's.  -BUN/Cr: Pending  - hx bph  - flomax 0.4 mg qHS    Endocrine:  DM2  -A1c: 6.8  - Lantus 10 qHS, humalog 3 units pre-meal + ISS  -TSH: Pending     Hematologic:  -H/H: pending  -Coagulation Panel: pending     ID:  -Temperature: afebrile  -WBC: pending  -Observe for SIRS/Sepsis Syndrome.    Prophylaxis:  -DVT prophylaxis with 5000 SubQ Heparin q8h  -Continue with SCD's    Disposition:  OR 2/1  Patient is a 71 y/o Somali-speaking Male, former smoker, with PMHx of HTN, HLD, CAD s/p multiple SURYA stents (pLAD 5/14/21, OM1 3/21/21, pLCx on 10/2021at [facility]), DMII, ESRD (HD Tues/Thurs/Sat), CVA (approx. 2 years ago w/ right sided weakness) and  parkinson's disease who originally presented to [facility] 12/22/2022 c/o sharp left sided chest pain at rest overnight, relieved with sublingual nitroglycerin. At that time, he had an ECHO which revealed EF 55% and underwent dx cardiac cath 12/23/2022 found to have multivessel disease. Patient transferred to [facility] for under the cardiology team for possible PCI. On 12/27/22 patient underwent PCI which revealed PTCA oLCX 90% ISR, PTCA dLCX 90% ISR w/ subtotal small LCX distally; oLAD 80% ISR with + IFR (0.80), mLAD 70-80% & CTS was consulted & the patient was evaluated by Dr. Hernandez in the outpatient setting. Today, 1/30/23 patient presents for pre-surgical testing prior to CABG with Dr. Hernandez on 2/1/23.    Plan:    Neurovascular:   - Hx of CVA  - Hx of Parkinson's  - Continue Sinemet   -No delirium.   -Pain regimen, PRN's: Tylenol 650mg q6hr PRN     Cardiovascular: CAD (hx of PCI to pLAD and pLCx and PTCA to oLCX and dLCX)  - Pre- surgical testing underway   - Continue ASA 81, Lipitor 80mg, Imdur 30mg, Carvedilol 25mg q12hr  - Hx HTN: Continue Amlodipine 5mg  -Hemodynamically stable. HR controlled.  -Continue to monitor HR, BP, telemetry      Respiratory: Stable  -Oxygenating well on RA   -Encourage coughing and use of IS 10x / hr while awake.  -CXR: Pending    GI:  Stable  -PPX: Protonix  -PO Diet: Renal/CC  -Bowel regimen: Miralax    Renal / : ESRD on HD (TRS)  - Plan for HD tomorrow   - Continue Sevelamer & Cinacalcet  -Monitor renal function.  -Monitor I/O's.  -BUN/Cr: Pending  - hx bph  - flomax 0.4 mg qHS    Endocrine:  DM2  -A1c: 6.8  - Lantus 10 qHS, humalog 3 units pre-meal + ISS  -TSH: Pending     Hematologic:  -H/H: pending  -Coagulation Panel: pending     ID:  -Temperature: afebrile  -WBC: pending  -Observe for SIRS/Sepsis Syndrome.    Prophylaxis:  -DVT prophylaxis with 5000 SubQ Heparin q8h  -Continue with SCD's    Disposition:  OR 2/1

## 2023-01-30 NOTE — H&P ADULT - NSHPPHYSICALEXAM_GEN_ALL_CORE
Appearance: No acute distress.  Neurologic: + Left arm tremor. AAOx3, no AMS or focal deficits.  Responds appropriately to verbal and physical stimuli; exhibits purposeful movement in all extremities.   HEENT:   MMM, PERRLA, EOMI	b/l  Neck: Supple  Cardiovascular: RRR, S1 S2. No m/r/g.  Respiratory: No acute respiratory distress. CTA b/l, no w/r/r.   Gastrointestinal:  Soft, non-tender, non-distended, + BS.	  Skin: No rashes. No ecchymoses. No cyanosis.  Extremities: Exhibits normal range of motion, no clubbing, cyanosis or edema.  Vascular: Peripheral pulses palpable 2+ bilaterally. Right arm AV fistula + thrill + bruit. Left arm AV fistula not usable per patient.

## 2023-01-30 NOTE — PATIENT PROFILE ADULT - FUNCTIONAL ASSESSMENT - BASIC MOBILITY 1.
I reviewed the H&P, I examined the patient, and there are no changes in the patient's condition.    
3 = A little assistance

## 2023-01-30 NOTE — H&P ADULT - NSHPLABSRESULTS_GEN_ALL_CORE
Vital Signs  T(C): 36.5 (30 Jan 2023 13:54), Max: 36.5 (30 Jan 2023 13:54)  T(F): 97.7 (30 Jan 2023 13:54), Max: 97.7 (30 Jan 2023 13:54)  HR: 66 (30 Jan 2023 13:30) (66 - 66)  BP: 175/74 (30 Jan 2023 13:30) (175/74 - 175/74)  BP(mean): 107 (30 Jan 2023 13:30) (107 - 107)  RR: 18 (30 Jan 2023 13:30) (18 - 18)    Parameters below as of 30 Jan 2023 13:30  Patient On (Oxygen Delivery Method): room air        < from: TTE Echo Complete w/o Contrast w/ Doppler (12.28.22 @ 13:15) >    CONCLUSIONS:     1. Normal left ventricular size and systolic function.   2. Grade II left ventricular diastolic dysfunction.   3. Normal right ventricular size and systolic function.   4. Severely dilated left atrium.   5. Aortic sclerosis without significant stenosis.   6. No pericardial effusion.   7. No prior echo is available for comparison.    < end of copied text >    < from: 12 Lead ECG (12.27.22 @ 12:10) >    Diagnosis Line Normal sinus rhythm  Right bundle branch block  T wave abnormality, consider inferior ischemia  Abnormal ECG    < end of copied text >

## 2023-01-30 NOTE — CONSULT NOTE ADULT - ASSESSMENT
72M Ukrainian speaking, ESRD (TTS), HTN, HLD, parkinson's disease who was [place] from [facility] [place] with Dr. Hernandez.    Assessment/Plan:   #ESRD on HD TTS @ [facility] [address]  usual Rx 3h, last EDW TBD  HD on 1/31  electrolytes at goal   Dry weight TBD    #HTN   Restart home meds  -C/w Coreg 25mg BID  -C/w clonidine 0.1mg BID  -C/w Amlodipine 5mg Qday  -C/w Imdur 30mg Qday  -C/w lisinopril 10mg Qday  -UF with HD    #access   RUE AVF functional     #anemia  Hb at goal   Please obtain iron studies and ferritin  epo w/ HD  transfusion as per primary team     #renal bone disease   Ca ~9.9  Trend phos daily  -C/w cinacalcet 60mg Qday  -C/w sevelamer 2400mg TID w/ meals    Thank you for the opportunity to participate in the care of your patient. The nephrology service remains available to assist with any questions or concerns. Please feel free to reach us by paging the on-call nephrology fellow for urgent issues or as below.     Bruno Bowman D.O.  PGY-5, Nephrology Fellow    P: [phone]

## 2023-01-30 NOTE — PATIENT PROFILE ADULT - FALL HARM RISK - HARM RISK INTERVENTIONS

## 2023-01-30 NOTE — H&P ADULT - NSHPREVIEWOFSYSTEMS_GEN_ALL_CORE
Review of Systems  CONSTITUTIONAL:  Denies Fevers / chills, sweats, fatigue, weight loss, weight gain                                      NEURO:  Denies parathesias, seizures, syncope, confusion                                                                                EYES:  Denies Blurry vision, discharge, pain, loss of vision                                                                                    ENMT:  Denies Difficulty hearing, vertigo, dysphagia, epistaxis, recent dental work                                       CV:  Denies Chest pain, palpitations, RYAN, orthopnea                                                                                          RESPIRATORY:  Denies Wheezing, SOB, cough / sputum, hemoptysis                                                                GI:  Denies Nausea, vomiting, diarrhea, constipation, melena, difficulty swallowing                                               : Denies Hematuria, dysuria, urgency, incontinence                                                                                         MUSCULOSKELETAL:  Denies arthritis, joint swelling, muscle weakness                                                             SKIN/BREAST:  Denies rash, itching, haiwr loss, masses                                                                                            PSYCH:  Denies depresion, anxiety, suicidal ideation                                                                                               HEME/LYMPH:  Denies bruises easily, enlarged lymph nodes, tender lymph nodes                                        ENDOCRINE:  Denies cold intolerance, heat intolerance, polydipsia

## 2023-01-30 NOTE — H&P ADULT - HISTORY OF PRESENT ILLNESS
Patient is a 71 y/o South Korean speaking male with PMHx of HTN, HLD, CAD s/p stent (SURAY pLCx at 10/2021 at [facility]), DM. ESRD (HD Tues/Thurs/Sat, last HD Sat 12/24/2022 @ [facility] [place]), parkinson's disease presented to [facility] 12/22/2022 c/o sharp left sided CP while he was lying in bed the evening of 12/21/2022 lasting the entire night, and relieved with sublingual nitroglycerin. Associated with nausea and palpitations. Stated he has been experiencing intermitted chest pain for the past 3 years, which had been similar in onset, quality, duration, and response to nitroglycerin. However, this time it was more severe which prompt him to go to the ED. He was admitted and underwent ECHO (per MD note) EF 55% and underwent dx cardiac cath 12/23/2022 (pending report) found to have multivessel disease. Decision was made to transfer pt to [facility] for possible PCI vs CT surgery. Patient is a 71 y/o Marshallese-speaking Male, former smoker, with PMHx of HTN, HLD, CAD s/p multiple SURYA stents (pLAD 5/14/21, OM1 3/21/21, pLCx on 10/2021at [facility]), DMII, ESRD (HD Tues/Thurs/Sat) &  parkinson's disease who originally presented to [facility] 12/22/2022 c/o sharp left sided chest pain at rest overnight, relieved with sublingual nitroglycerin. At that time, he had an ECHO which revealed EF 55% and underwent dx cardiac cath 12/23/2022 found to have multivessel disease. Patient transferred to [facility] for under the cardiology team for possible PCI. On 12/27/22 patient underwent PCI which revealed PTCA oLCX 90% ISR, PTCA dLCX 90% ISR w/ subtotal small LCX distally; oLAD 80% ISR with + IFR (0.80), mLAD 70-80% & CTS was consulted & the patient was evaluated by Dr. Hernandez in the outpatient setting. Today, 1/30/23 patient presents for pre-surgical testing prior to CABG with Dr. Hernandez on 2/1/23. Today he feels well, denies chest pain, shortness of breath, n/v, fever/chills.  Patient is a 73 y/o Samoan-speaking Male, former smoker, with PMHx of HTN, HLD, CAD s/p multiple SURYA stents (pLAD 5/14/21, OM1 3/21/21, pLCx on 10/2021at [facility]), DMII, ESRD (HD Tues/Thurs/Sat) &  parkinson's disease who originally presented to [facility] 12/22/2022 c/o sharp left sided chest pain at rest overnight, relieved with sublingual nitroglycerin. At that time, he had an ECHO which revealed EF 55% and underwent dx cardiac cath 12/23/2022 found to have multivessel disease. Patient transferred to [facility] for under the cardiology team for possible PCI. On 12/27/22 patient underwent PCI which revealed PTCA oLCX 90% ISR, PTCA dLCX 90% ISR w/ subtotal small LCX distally; oLAD 80% ISR with + IFR (0.80), mLAD 70-80% & CTS was consulted & the patient was evaluated by Dr. Hernandez in the outpatient setting. Today, 1/30/23 patient presents for pre-surgical testing prior to CABG with Dr. Hernandez on 2/1/23. Today he feels well, denies chest pain, shortness of breath, RYAN, n/v, fever/chills, LE swelling, recent sick contacts. Patient is a 73 y/o Eritrean-speaking Male, former smoker, with PMHx of HTN, HLD, CAD s/p multiple SURYA stents (pLAD 5/14/21, OM1 3/21/21, pLCx on 10/2021at [facility]), DMII, ESRD (HD Tues/Thurs/Sat), CVA (approx. 2 years ago w/ right sided weakness) &  parkinson's disease who originally presented to [facility] 12/22/2022 c/o sharp left sided chest pain at rest overnight, relieved with sublingual nitroglycerin. At that time, he had an ECHO which revealed EF 55% and underwent dx cardiac cath 12/23/2022 found to have multivessel disease. Patient transferred to [facility] for under the cardiology team for possible PCI. On 12/27/22 patient underwent PCI which revealed PTCA oLCX 90% ISR, PTCA dLCX 90% ISR w/ subtotal small LCX distally; oLAD 80% ISR with + IFR (0.80), mLAD 70-80% & CTS was consulted & the patient was evaluated by Dr. Hernandez in the outpatient setting. Today, 1/30/23 patient presents for pre-surgical testing prior to CABG with Dr. Hernandez on 2/1/23. Today he feels well, denies chest pain, shortness of breath, RYAN, n/v, fever/chills, LE swelling, recent sick contacts.

## 2023-01-30 NOTE — H&P ADULT - NSHPLANGTRANSLATORFT_GEN_A_CORE
I met with Tricia Gray and her , Brigette Whiteside, for a Survivorship Clinic visit to provide a survivorship care plan (SCP) and information related to post-treatment care. She has a diagnosis of Stage IA (pT1b, pN0, cM0, G2, ER+, MD+, HER2-) IDC of left breast.  On 1/6/2022, Dr. Aysha Santos performed a left lumpectomy with sentinel node biopsy with positive margin noted. On 2/8/2022 a re-excision lumpectomy was done showing focal ADH without evidence of malignancy. Oncotype Dx recurrence score was 38 and adjuvant chemotherapy was recommended by Dr. Michael Anthony. She received 4 cycles of adjuvant Cyclophosphamide with Docetaxel that completed on 4/29/2022. She received radiation therapy to the left breast with Dr. Merlene King. This completed on 6/13/2022. Dr. Franchesca Toussaint initiated Goserelin for ovarian suppression on 5/27/2022 and then Anastrozole on 6/25/2022. Tricia Gray is considering having a bilateral salpingo-oophorectomy (BSO) and has an appointment next week with Dr. Ruddy Rodriguez (See Oncology Treatment Summary SCP for details). Current condition/issues: Tricia Gray appears to be doing well post-treatment. She is taking Anastrozole daily and tolerating well. The hot flashes have reduced. The joint aches are more noticeable with inactivity, especially in the AM, but resolve with movement. She does note vaginal dryness. She denies post-surgical left arm issues with good ROM and no swelling. She notes hyperpigmented skin at RT site, otherwise no complaints. She reports having good support from family and friends during treatment. She had seen the [address] [place] [address] Road counselor for a couple sessions, but stopped. She continues to exercise with regular walking and eats a plant-based diet. Baseline bone density was normal and most of her exercise includes walking. She also takes supplemental Vitamin D3 1000IU/day.      Survivorship Care Plan and letter: She was provided a hard copy of the SCP and a letter that outlined the purpose of the visit and plan. We reviewed all elements of both documents. She is aware that a letter and SCP will be sent to her PCP, Dr. Arthur Gonzalez. Reviewed Cancer Surveillance (office visits, imaging, bone density) and that Dr. Laurie White will oversee this care with Dr. Thea Aguilar. Fabi Mark will be due for post-treatment bilateral mammogram in late Oct/Nov and will call to schedule. She will schedule to see Dr. Nancy Lea following the mammogram.  She will see Dr. Elis Arnold on 10/14 and will continue to get monthly Goserelin per Dr. Elis Arnold (next 9/16, 10/14). She plans to see Dr. Isabel Stroud next week to discuss the BSO surgery. She will see Dr. Rachel Fong in 6/2023. Next bone density will be due in 6/2024. Reviewed Schedule of other clinic visits with Primary Care and specialists: Dr. Courtney Case will continue to manage all general health care recommended for age and gender including cancer screening tests. She is aware she is due for colon screening and will consider after she completes BSO surgery. She is up-to-date with cervical screening. She was encouraged to continue to see specialists at usual intervals. Reviewed concerning symptoms that she should report to any Provider. Reviewed possible late and long-term effects related to the treatment that was received. We reviewed care of the surgical arm and management of hormone related side effects, including vaginal dryness with product options. Reviewed common cancer survivor issues and resources available. She has had counseling and may consider utilizing the psychological resources at [facility]. Various survivorship resources were provided to use going forward as needed (see below). Reviewed Lifestyle/behaviors that can affect ongoing health, including the risk for the cancer coming back or developing another cancer.   We reviewed importance of physical activity, including aerobic exercise, strength training and weight bearing exercise. She walks on a regular basis and did this throughout treatment. She typically gets 8000 steps per day. She will start strength training with small weights. She has been eating plant based foods. Current BMI is 25. We reviewed skin care and sun safety. Gio Mejias received a take-home folder that included the following survivorship resources:   -SCP and patient letter  -Breast Survivorship Guide   -[facility] - nutrition and exercise classes, mind/body programs, education, support groups  -[facility] in [facility]- education, support groups, special programs, nutrition and exercise classes, movement classes, mind/body programs, survivorship programs, individual counseling  -[employer] handout-nutrition, physical activity  -ACS Cancer Screening Guidelines  -[facility] Breast Support Group  -Websites: [address] for your Life, [facility] Breast Cancer, [facility] for [address] [place] [address] were given the opportunity to ask questions. They had a few questions and verbalized understanding of the information we discussed today. Both appear to have a good understanding of the planned follow-up care and healthy living. My total time spent caring for the patient on the day of the encounter: 70 minutes (10 minutes reviewing chart, 50 minutes of face to face counseling regarding survivorship education, review of care plan and additional resources and 10 minutes of documentation). She was encouraged to call with any further questions.    MARK Blackwell Coco

## 2023-01-31 ENCOUNTER — TRANSCRIPTION ENCOUNTER (OUTPATIENT)
Age: 73
End: 2023-01-31

## 2023-01-31 LAB
ANION GAP SERPL CALC-SCNC: 15 MMOL/L — SIGNIFICANT CHANGE UP (ref 5–17)
APTT BLD: 33.6 SEC — SIGNIFICANT CHANGE UP (ref 27.5–35.5)
BUN SERPL-MCNC: 51 MG/DL — HIGH (ref 7–23)
CALCIUM SERPL-MCNC: 8.5 MG/DL — SIGNIFICANT CHANGE UP (ref 8.4–10.5)
CHLORIDE SERPL-SCNC: 94 MMOL/L — LOW (ref 96–108)
CO2 SERPL-SCNC: 25 MMOL/L — SIGNIFICANT CHANGE UP (ref 22–31)
CREAT SERPL-MCNC: 7.59 MG/DL — HIGH (ref 0.5–1.3)
EGFR: 7 ML/MIN/1.73M2 — LOW
FERRITIN SERPL-MCNC: 1144 NG/ML — HIGH (ref 30–400)
GLUCOSE BLDC GLUCOMTR-MCNC: 100 MG/DL — HIGH (ref 70–99)
GLUCOSE BLDC GLUCOMTR-MCNC: 103 MG/DL — HIGH (ref 70–99)
GLUCOSE BLDC GLUCOMTR-MCNC: 175 MG/DL — HIGH (ref 70–99)
GLUCOSE BLDC GLUCOMTR-MCNC: 62 MG/DL — LOW (ref 70–99)
GLUCOSE BLDC GLUCOMTR-MCNC: 78 MG/DL — SIGNIFICANT CHANGE UP (ref 70–99)
GLUCOSE BLDC GLUCOMTR-MCNC: 84 MG/DL — SIGNIFICANT CHANGE UP (ref 70–99)
GLUCOSE SERPL-MCNC: 134 MG/DL — HIGH (ref 70–99)
HCT VFR BLD CALC: 28.7 % — LOW (ref 39–50)
HGB BLD-MCNC: 9.1 G/DL — LOW (ref 13–17)
INR BLD: 1.06 — SIGNIFICANT CHANGE UP (ref 0.88–1.16)
IRON SATN MFR SERPL: 32 % — SIGNIFICANT CHANGE UP (ref 16–55)
IRON SATN MFR SERPL: 50 UG/DL — SIGNIFICANT CHANGE UP (ref 45–165)
MAGNESIUM SERPL-MCNC: 2.2 MG/DL — SIGNIFICANT CHANGE UP (ref 1.6–2.6)
MCHC RBC-ENTMCNC: 30 PG — SIGNIFICANT CHANGE UP (ref 27–34)
MCHC RBC-ENTMCNC: 31.7 GM/DL — LOW (ref 32–36)
MCV RBC AUTO: 94.7 FL — SIGNIFICANT CHANGE UP (ref 80–100)
NRBC # BLD: 0 /100 WBCS — SIGNIFICANT CHANGE UP (ref 0–0)
PHOSPHATE SERPL-MCNC: 3.1 MG/DL — SIGNIFICANT CHANGE UP (ref 2.5–4.5)
PLATELET # BLD AUTO: 115 K/UL — LOW (ref 150–400)
POTASSIUM SERPL-MCNC: 4.9 MMOL/L — SIGNIFICANT CHANGE UP (ref 3.5–5.3)
POTASSIUM SERPL-SCNC: 4.9 MMOL/L — SIGNIFICANT CHANGE UP (ref 3.5–5.3)
PROTHROM AB SERPL-ACNC: 12.6 SEC — SIGNIFICANT CHANGE UP (ref 10.5–13.4)
RBC # BLD: 3.03 M/UL — LOW (ref 4.2–5.8)
RBC # FLD: 14.7 % — HIGH (ref 10.3–14.5)
SODIUM SERPL-SCNC: 134 MMOL/L — LOW (ref 135–145)
TIBC SERPL-MCNC: 156 UG/DL — LOW (ref 220–430)
UIBC SERPL-MCNC: 106 UG/DL — LOW (ref 110–370)
WBC # BLD: 5.17 K/UL — SIGNIFICANT CHANGE UP (ref 3.8–10.5)
WBC # FLD AUTO: 5.17 K/UL — SIGNIFICANT CHANGE UP (ref 3.8–10.5)

## 2023-01-31 PROCEDURE — 99233 SBSQ HOSP IP/OBS HIGH 50: CPT

## 2023-01-31 PROCEDURE — 93306 TTE W/DOPPLER COMPLETE: CPT | Mod: 26

## 2023-01-31 RX ORDER — ERYTHROPOIETIN 10000 [IU]/ML
6000 INJECTION, SOLUTION INTRAVENOUS; SUBCUTANEOUS ONCE
Refills: 0 | Status: COMPLETED | OUTPATIENT
Start: 2023-01-31 | End: 2023-01-31

## 2023-01-31 RX ORDER — CHLORHEXIDINE GLUCONATE 213 G/1000ML
1 SOLUTION TOPICAL ONCE
Refills: 0 | Status: COMPLETED | OUTPATIENT
Start: 2023-01-31 | End: 2023-01-31

## 2023-01-31 RX ORDER — CHLORHEXIDINE GLUCONATE 213 G/1000ML
12.5 SOLUTION TOPICAL ONCE
Refills: 0 | Status: DISCONTINUED | OUTPATIENT
Start: 2023-01-31 | End: 2023-02-01

## 2023-01-31 RX ADMIN — Medication 0.1 MILLIGRAM(S): at 05:42

## 2023-01-31 RX ADMIN — CHLORHEXIDINE GLUCONATE 1 APPLICATION(S): 213 SOLUTION TOPICAL at 22:25

## 2023-01-31 RX ADMIN — SEVELAMER CARBONATE 2400 MILLIGRAM(S): 2400 POWDER, FOR SUSPENSION ORAL at 17:16

## 2023-01-31 RX ADMIN — AMLODIPINE BESYLATE 5 MILLIGRAM(S): 2.5 TABLET ORAL at 05:42

## 2023-01-31 RX ADMIN — Medication 3 UNIT(S): at 17:16

## 2023-01-31 RX ADMIN — Medication 325 MILLIGRAM(S): at 17:16

## 2023-01-31 RX ADMIN — CINACALCET 60 MILLIGRAM(S): 30 TABLET, FILM COATED ORAL at 13:52

## 2023-01-31 RX ADMIN — Medication 325 MILLIGRAM(S): at 05:42

## 2023-01-31 RX ADMIN — SODIUM CHLORIDE 3 MILLILITER(S): 9 INJECTION INTRAMUSCULAR; INTRAVENOUS; SUBCUTANEOUS at 22:05

## 2023-01-31 RX ADMIN — ISOSORBIDE MONONITRATE 30 MILLIGRAM(S): 60 TABLET, EXTENDED RELEASE ORAL at 13:52

## 2023-01-31 RX ADMIN — HEPARIN SODIUM 5000 UNIT(S): 5000 INJECTION INTRAVENOUS; SUBCUTANEOUS at 22:16

## 2023-01-31 RX ADMIN — CHLORHEXIDINE GLUCONATE 1 APPLICATION(S): 213 SOLUTION TOPICAL at 20:34

## 2023-01-31 RX ADMIN — CARVEDILOL PHOSPHATE 25 MILLIGRAM(S): 80 CAPSULE, EXTENDED RELEASE ORAL at 05:42

## 2023-01-31 RX ADMIN — CARBIDOPA AND LEVODOPA 1 TABLET(S): 25; 100 TABLET ORAL at 13:53

## 2023-01-31 RX ADMIN — ATORVASTATIN CALCIUM 80 MILLIGRAM(S): 80 TABLET, FILM COATED ORAL at 22:16

## 2023-01-31 RX ADMIN — SEVELAMER CARBONATE 2400 MILLIGRAM(S): 2400 POWDER, FOR SUSPENSION ORAL at 09:36

## 2023-01-31 RX ADMIN — TAMSULOSIN HYDROCHLORIDE 0.4 MILLIGRAM(S): 0.4 CAPSULE ORAL at 22:16

## 2023-01-31 RX ADMIN — Medication 0.1 MILLIGRAM(S): at 17:16

## 2023-01-31 RX ADMIN — HEPARIN SODIUM 5000 UNIT(S): 5000 INJECTION INTRAVENOUS; SUBCUTANEOUS at 05:42

## 2023-01-31 RX ADMIN — HEPARIN SODIUM 5000 UNIT(S): 5000 INJECTION INTRAVENOUS; SUBCUTANEOUS at 13:52

## 2023-01-31 RX ADMIN — SEVELAMER CARBONATE 2400 MILLIGRAM(S): 2400 POWDER, FOR SUSPENSION ORAL at 13:52

## 2023-01-31 RX ADMIN — Medication 2: at 17:15

## 2023-01-31 RX ADMIN — ERYTHROPOIETIN 6000 UNIT(S): 10000 INJECTION, SOLUTION INTRAVENOUS; SUBCUTANEOUS at 11:15

## 2023-01-31 RX ADMIN — SODIUM CHLORIDE 3 MILLILITER(S): 9 INJECTION INTRAMUSCULAR; INTRAVENOUS; SUBCUTANEOUS at 13:52

## 2023-01-31 RX ADMIN — CARVEDILOL PHOSPHATE 25 MILLIGRAM(S): 80 CAPSULE, EXTENDED RELEASE ORAL at 17:16

## 2023-01-31 RX ADMIN — PANTOPRAZOLE SODIUM 40 MILLIGRAM(S): 20 TABLET, DELAYED RELEASE ORAL at 08:00

## 2023-01-31 RX ADMIN — Medication 81 MILLIGRAM(S): at 13:52

## 2023-01-31 NOTE — PROGRESS NOTE ADULT - SUBJECTIVE AND OBJECTIVE BOX
Planned Date of Surgery:  2/1/23                                                                                                                Surgeon: Dr. Hernandez     Procedure: CABG    HPI:  Patient is a 71 y/o Mohawk-speaking Male, former smoker, with PMHx of HTN, HLD, CAD s/p multiple SURYA stents (pLAD 5/14/21, OM1 3/21/21, pLCx on 10/2021at [facility]), DMII, ESRD (HD Tues/Thurs/Sat), CVA (approx. 2 years ago w/ right sided weakness) and  parkinson's disease who originally presented to [facility] 12/22/2022 c/o sharp left sided chest pain at rest overnight, relieved with sublingual nitroglycerin. At that time, he had an ECHO which revealed EF 55% and underwent dx cardiac cath 12/23/2022 found to have multivessel disease. Patient transferred to [facility] for under the cardiology team for possible PCI. On 12/27/22 patient underwent PCI which revealed PTCA oLCX 90% ISR, PTCA dLCX 90% ISR w/ subtotal small LCX distally; oLAD 80% ISR with + IFR (0.80), mLAD 70-80% & CTS was consulted & the patient was evaluated by Dr. Hernandez in the outpatient setting. Patient is undergoing pre-surgical testing for CABG on 2/1/23.    PAST MEDICAL & SURGICAL HISTORY:  - Diabetes  - Hypertension  - Hyperphosphatemia  - Coronary artery disease with stent  - Hyperlipidemia  - Stroke (2017, residual right sided weakness)  - History of BPH  - Back pain  - ESRD on hemodialysis  - Parkinson's disease  - ESRD (end stage renal disease)  - Hypertension  - CAD (coronary artery disease)  - S/P dialysis catheter insertion  - Right chest perma cath removed in jan 2020  - AVF (arteriovenous fistula) right arm x with revision, left arm x 1  - Stented coronary artery, 2004-5 at [facility], S/P primary angioplasty with coronary stent        No Known Allergies      MEDICATIONS  (STANDING):  amLODIPine   Tablet 5 milliGRAM(s) Oral daily  aspirin enteric coated 81 milliGRAM(s) Oral daily  atorvastatin 80 milliGRAM(s) Oral at bedtime  carbidopa/levodopa  25/100 1 Tablet(s) Oral daily  carvedilol 25 milliGRAM(s) Oral every 12 hours  cinacalcet 60 milliGRAM(s) Oral daily  cloNIDine 0.1 milliGRAM(s) Oral two times a day  dextrose 5%. 1000 milliLiter(s) (50 mL/Hr) IV Continuous <Continuous>  dextrose 5%. 1000 milliLiter(s) (100 mL/Hr) IV Continuous <Continuous>  dextrose 50% Injectable 25 Gram(s) IV Push once  dextrose 50% Injectable 12.5 Gram(s) IV Push once  dextrose 50% Injectable 25 Gram(s) IV Push once  ferrous    sulfate 325 milliGRAM(s) Oral two times a day  glucagon  Injectable 1 milliGRAM(s) IntraMuscular once  heparin   Injectable 5000 Unit(s) SubCutaneous every 8 hours  insulin glargine Injectable (LANTUS) 10 Unit(s) SubCutaneous at bedtime  insulin lispro (ADMELOG) corrective regimen sliding scale   SubCutaneous three times a day before meals  insulin lispro Injectable (ADMELOG) 3 Unit(s) SubCutaneous three times a day before meals  isosorbide   mononitrate ER Tablet (IMDUR) 30 milliGRAM(s) Oral daily  pantoprazole    Tablet 40 milliGRAM(s) Oral before breakfast  polyethylene glycol 3350 17 Gram(s) Oral daily  sevelamer carbonate 2400 milliGRAM(s) Oral three times a day with meals  sodium chloride 0.9% lock flush 3 milliLiter(s) IV Push every 8 hours  tamsulosin 0.4 milliGRAM(s) Oral at bedtime    MEDICATIONS  (PRN):  acetaminophen     Tablet .. 650 milliGRAM(s) Oral every 6 hours PRN Mild Pain (1 - 3)  dextrose Oral Gel 15 Gram(s) Oral once PRN Blood Glucose LESS THAN 70 milliGRAM(s)/deciliter      On Beta Blocker? YES     Labs:                        9.1    5.17  )-----------( 115      ( 31 Jan 2023 09:45 )             28.7     01-31    134<L>  |  94<L>  |  51<H>  ----------------------------<  134<H>  4.9   |  25  |  7.59<H>    Ca    8.5      31 Jan 2023 09:45  Phos  3.1     01-31  Mg     2.2     01-31    TPro  7.8  /  Alb  4.3  /  TBili  0.4  /  DBili  x   /  AST  19  /  ALT  <5<L>  /  AlkPhos  412<H>  01-30    PT/INR - ( 31 Jan 2023 09:45 )   PT: 12.6 sec;   INR: 1.06          PTT - ( 31 Jan 2023 09:45 )  PTT:33.6 sec    ABO Interpretation: O (01-30-23 @ 17:01)      CARDIAC MARKERS ( 30 Jan 2023 16:10 )  x     / 0.09 ng/mL / 68 U/L / x     / 2.0 ng/mL          Hgb A1C: 6.1    EKG: < from: 12 Lead ECG (12.27.22 @ 12:10) >  Diagnosis Line Normal sinus rhythm  Right bundle branch block  T wave abnormality, consider inferior ischemia  Abnormal ECG    < end of copied text >      CXR:  < from: Xray Chest 2 Views PA/Lat (01.30.23 @ 18:07) >  IMPRESSION:  Clear lungs without consolidations, effusions, or infiltrates.  Right-sided subclavian vascular stent.    < end of copied text >      CT Scans: < from: CT Chest No Cont (01.30.23 @ 17:50) >  IMPRESSION:  1. Probable minimal interstitial pulmonary edema and chronic changes in   lungs.    < end of copied text >  < from: CT Head No Cont (01.30.23 @ 17:49) >  IMPRESSION:  No acute intracranial pathology.    < end of copied text >      Cath Report: 12/27/22: PTCA oLCX 90% ISR, PTCA dLCX 90% ISR w/ subtotal small LCX distally; oLAD 80% ISR with + IFR (0.80), mLAD 70-80%    Echo: < from: TTE Echo Complete w/ Contrast w/ Doppler (01.31.23 @ 08:38) >  CONCLUSIONS:     1. Normal left ventricular size and systolic function.   2. Mild symmetric left ventricular hypertrophy.   3.Grade II left ventricular diastolic dysfunction.   4. Normal right ventricular size and systolic function.   5. Mildly dilated left atrium.   6. Mild mitral regurgitation.   7. Aortic sclerosis without significant stenosis.   8. Pulmonary artery systolic pressure is 34 mmHg.   9. No pericardial effusion.    < end of copied text >      PFT's: Complete     Carotid Duplex:  < from: US Duplex Carotid Arteries Complete, Bilateral (01.30.23 @ 17:58) >  IMPRESSION: No significant hemodynamic stenosis of either carotid artery.    < end of copied text >      Consult in Chart?  YES   Consent in Chart? YES   Pre-op Orders Placed? YES  Blood Prodeucts Ordered? YES   NPO ordered? YES

## 2023-01-31 NOTE — PROGRESS NOTE ADULT - ASSESSMENT
Patient is a 73 y/o Chinese-speaking Male, former smoker, with PMHx of HTN, HLD, CAD s/p multiple SURYA stents (pLAD 5/14/21, OM1 3/21/21, pLCx on 10/2021at [facility]), DMII, ESRD (HD Tues/Thurs/Sat), CVA (approx. 2 years ago w/ right sided weakness) and  parkinson's disease who originally presented to [facility] 12/22/2022 c/o sharp left sided chest pain at rest overnight, relieved with sublingual nitroglycerin. At that time, he had an ECHO which revealed EF 55% and underwent dx cardiac cath 12/23/2022 found to have multivessel disease. Patient transferred to [facility] for under the cardiology team for possible PCI. On 12/27/22 patient underwent PCI which revealed PTCA oLCX 90% ISR, PTCA dLCX 90% ISR w/ subtotal small LCX distally; oLAD 80% ISR with + IFR (0.80), mLAD 70-80% & CTS was consulted & the patient was evaluated by Dr. Hernandez in the outpatient setting. On 1/30/23 patient presented for pre-surgical testing prior to CABG with Dr. Hernandez on 2/1/23. Today, stable, undergoing PST.    Plan:    Neurovascular:   - Hx of CVA  - Hx of Parkinson's  - Continue Sinemet   -No delirium.   -Pain regimen, PRN's: Tylenol 650mg q6hr PRN     Cardiovascular: CAD (hx of PCI to pLAD and pLCx and PTCA to oLCX and dLCX)  - Pre- surgical testing underway   - TTE today   - Continue ASA 81, Lipitor 80mg, Imdur 30mg, Carvedilol 25mg q12hr  - Hx HTN: Continue Amlodipine 5mg  -Hemodynamically stable. HR controlled.  -Continue to monitor HR, BP, telemetry      Respiratory: Stable  -Oxygenating well on RA   -Encourage coughing and use of IS 10x / hr while awake.  -CXR: Pending    GI:  Stable  -PPX: Protonix  -PO Diet: Renal/CC  -Bowel regimen: Miralax    Renal / : ESRD on HD (TRS)  - HD today  - Renal following  - Continue Sevelamer & Cinacalcet  -Monitor renal function.  -Monitor I/O's.  -BUN/Cr: 51 & 7.59  - hx bph  - flomax 0.4 mg qHS    Endocrine:  DM2  -A1c: 6.1- Lantus 10 qHS, humalog 3 units pre-meal + ISS  -TSH: .903    Hematologic: Anemia  - Ferritin, serum iron, TIBC pending   -H/H: pending 9.1 & 28.7  -Coagulation Panel: PT 12.6 aptt 33.6 INR 1.06    ID:  -Temperature: afebrile  -WBC: 5.17  -Observe for SIRS/Sepsis Syndrome.    Prophylaxis:  -DVT prophylaxis with 5000 SubQ Heparin q8h  -Continue with SCD's    Disposition:  OR 2/1

## 2023-01-31 NOTE — PROGRESS NOTE ADULT - ASSESSMENT
72M Turkmen speaking, ESRD (TTS), HTN, HLD, parkinson's disease who was transferred from [facility] for CABG evaluation with Dr. Hernandez.    Assessment/Plan:   #ESRD on HD TTS @ [facility]  usual Rx 3h, last EDW TBD  HD today as per schedule  electrolytes at goal   Dry weight TBD    #HTN   Restart home meds  -C/w Coreg 25mg BID  -C/w clonidine 0.1mg BID  -C/w Amlodipine 5mg Qday  -C/w Imdur 30mg Qday  -UF with HD    #access   RUE AVF functional     #anemia  Hb at goal   Please obtain iron studies and ferritin  epo w/ HD  transfusion as per primary team     #renal bone disease   Check BMP  Trend phos daily  -C/w cinacalcet 60mg Qday  -C/w sevelamer 2400mg TID w/ meals    Thank you for the opportunity to participate in the care of your patient. The nephrology service remains available to assist with any questions or concerns. Please feel free to reach us by paging the on-call nephrology fellow for urgent issues or as below.     Bruno Bowman D.O.  PGY-5, Nephrology Fellow    P: [phone]

## 2023-01-31 NOTE — PROGRESS NOTE ADULT - SUBJECTIVE AND OBJECTIVE BOX
--------------------------------------------------------------------------------  Chief Complaint: ESRD/Ongoing hemodialysis requirement    24 hour events/subjective:    Seen this AM, doing well. HD today as per schedule to optimize prior to OR on     PAST HISTORY  --------------------------------------------------------------------------------  No significant changes to PMH, PSH, FHx, SHx, unless otherwise noted    ALLERGIES & MEDICATIONS  --------------------------------------------------------------------------------  Allergies    No Known Allergies    Intolerances      Standing Inpatient Medications  amLODIPine   Tablet 5 milliGRAM(s) Oral daily  aspirin enteric coated 81 milliGRAM(s) Oral daily  atorvastatin 80 milliGRAM(s) Oral at bedtime  carbidopa/levodopa  25/100 1 Tablet(s) Oral daily  carvedilol 25 milliGRAM(s) Oral every 12 hours  cinacalcet 60 milliGRAM(s) Oral daily  cloNIDine 0.1 milliGRAM(s) Oral two times a day  dextrose 5%. 1000 milliLiter(s) IV Continuous <Continuous>  dextrose 5%. 1000 milliLiter(s) IV Continuous <Continuous>  dextrose 50% Injectable 25 Gram(s) IV Push once  dextrose 50% Injectable 12.5 Gram(s) IV Push once  dextrose 50% Injectable 25 Gram(s) IV Push once  epoetin olu-epbx (RETACRIT) Injectable 6000 Unit(s) IV Push once  ferrous    sulfate 325 milliGRAM(s) Oral two times a day  glucagon  Injectable 1 milliGRAM(s) IntraMuscular once  heparin   Injectable 5000 Unit(s) SubCutaneous every 8 hours  insulin glargine Injectable (LANTUS) 10 Unit(s) SubCutaneous at bedtime  insulin lispro (ADMELOG) corrective regimen sliding scale   SubCutaneous three times a day before meals  insulin lispro Injectable (ADMELOG) 3 Unit(s) SubCutaneous three times a day before meals  isosorbide   mononitrate ER Tablet (IMDUR) 30 milliGRAM(s) Oral daily  pantoprazole    Tablet 40 milliGRAM(s) Oral before breakfast  polyethylene glycol 3350 17 Gram(s) Oral daily  sevelamer carbonate 2400 milliGRAM(s) Oral three times a day with meals  sodium chloride 0.9% lock flush 3 milliLiter(s) IV Push every 8 hours  tamsulosin 0.4 milliGRAM(s) Oral at bedtime    PRN Inpatient Medications  acetaminophen     Tablet .. 650 milliGRAM(s) Oral every 6 hours PRN  dextrose Oral Gel 15 Gram(s) Oral once PRN      REVIEW OF SYSTEMS  --------------------------------------------------------------------------------  All other systems were reviewed and are negative, except as noted.    VITALS/PHYSICAL EXAM  --------------------------------------------------------------------------------  T(C): 35.6 (23 @ 09:25), Max: 36.5 (23 @ 13:54)  HR: 68 (23 @ 09:25) (57 - 69)  BP: 142/64 (23 @ 09:25) (140/62 - 175/74)  RR: 18 (23 @ 09:25) (16 - 18)  SpO2: 95% (23 @ 09:25) (95% - 100%)  Wt(kg): --  Drug Dosing Weight  Height (cm): 157.5 (2023 13:30)  Weight (kg): 63.049 (2023 13:30)  BMI (kg/m2): 25.4 (2023 13:30)  BSA (m2): 1.64 (2023 13:30)  Height (cm): 157.5 (23 @ 13:30)  Weight (kg): 63.049 (23 @ 13:30)  BMI (kg/m2): 25.4 (23 @ 13:30)  BSA (m2): 1.64 (23 @ 13:30)      23 @ 07:01  -  23 @ 09:59  --------------------------------------------------------  IN: 180 mL / OUT: 0 mL / NET: 180 mL    PHYSICAL EXAM:  GENERAL: Alert, awake, oriented x3 on RA   HEENT: MICHELINE, EOMI, neck supple, no JVP  CHEST/LUNG: Bilateral clear breath sounds  HEART: Regular rate and rhythm, no murmur, no gallops, no rub   ABDOMEN: Soft, nontender, non distended  : No flank or supra pubic tenderness.  EXTREMITIES: no pedal edema  Neurology: AAOx3, no asterixis   SKIN: No rash or skin lesion   ACCESS: BHAVESH matta/edith and jay     LABS/STUDIES  --------------------------------------------------------------------------------              9.5    5.95  >-----------<  117      [23 @ 16:10]              30.5     137  |  97  |  50  ----------------------------<  117      [23 @ 16:10]  5.3   |  27  |  7.88        Ca     9.4     [23 16:10]    TPro  7.8  /  Alb  4.3  /  TBili  0.4  /  DBili  x   /  AST  19  /  ALT  <5  /  AlkPhos  412  [23 16:10]    PT/INR: PT 12.5 , INR 1.05       [23 16:10]  PTT: 32.1       [23 16:10]    Troponin 0.09      [23 16:10]  CK 68      [23 16:10]    Iron 68, TIBC 160, %sat 42      [22 @ 05:30]  Ferritin 1626      [22 05:30]  HbA1c 5.9      [10-25-19 @ 09:45]  TSH 0.903      [23 16:10]  Lipid: chol 97, TG 90, HDL 41, LDL --      [23 16:10]        RADIOLOGY:  --------------------------------------------------------------------------------------    Hemoglobin: 9.5 g/dL (23 @ 16:10)    Albumin, Serum: 4.3 g/dL (23 @ 16:10)  Albumin, Serum: 3.9 g/dL (22 @ 05:30)    T(C): 35.6 (23 @ 09:25), Max: 36.5 (23 @ 13:54)  HR: 68 (23 09:25) (57 - 69)  BP: 142/64 (23 @ 09:25) (140/62 - 175/74)  RR: 18 (23 @ 09:25) (16 - 18)  SpO2: 95% (23 @ 09:25) (95% - 100%)  cinacalcet 60 milliGRAM(s) Oral daily, 23 @ 14:50, Routine  epoetin olu-epbx (RETACRIT) Injectable 6000 Unit(s) IV Push once, 23 @ 07:39, Routine, Stop order after: 1 Doses  sevelamer carbonate 2400 milliGRAM(s) Oral three times a day with meals, 23 @ 14:53, Routine      Hemodialysis Treatment.:     Schedule: Once, Modality: Hemodialysis, Access: Arteriovenous Fistula    Dialyzer: Optiflux [id number], Time: 180 Min    Blood Flow: 400 mL/Min , Dialysate Flow: 500 mL/Min, Dialysate Temp: 36.5, Tubinmm (Adult)    Target Fluid Removal: 2 Liters    Dialysate Electrolytes (mEq/L): Potassium 2, Calcium 2.5, Sodium 138, Bicarbonate 35 (23 @ 07:39) [Active]   --------------------------------------------------------------------------------  Chief Complaint: ESRD/Ongoing hemodialysis requirement    24 hour events/subjective:    Seen this AM, doing well. HD today as per schedule to optimize prior to OR on     PAST HISTORY  --------------------------------------------------------------------------------  No significant changes to PMH, PSH, FHx, SHx, unless otherwise noted    ALLERGIES & MEDICATIONS  --------------------------------------------------------------------------------  Allergies    No Known Allergies    Intolerances      Standing Inpatient Medications  amLODIPine   Tablet 5 milliGRAM(s) Oral daily  aspirin enteric coated 81 milliGRAM(s) Oral daily  atorvastatin 80 milliGRAM(s) Oral at bedtime  carbidopa/levodopa  25/100 1 Tablet(s) Oral daily  carvedilol 25 milliGRAM(s) Oral every 12 hours  cinacalcet 60 milliGRAM(s) Oral daily  cloNIDine 0.1 milliGRAM(s) Oral two times a day  dextrose 5%. 1000 milliLiter(s) IV Continuous <Continuous>  dextrose 5%. 1000 milliLiter(s) IV Continuous <Continuous>  dextrose 50% Injectable 25 Gram(s) IV Push once  dextrose 50% Injectable 12.5 Gram(s) IV Push once  dextrose 50% Injectable 25 Gram(s) IV Push once  epoetin olu-epbx (RETACRIT) Injectable 6000 Unit(s) IV Push once  ferrous    sulfate 325 milliGRAM(s) Oral two times a day  glucagon  Injectable 1 milliGRAM(s) IntraMuscular once  heparin   Injectable 5000 Unit(s) SubCutaneous every 8 hours  insulin glargine Injectable (LANTUS) 10 Unit(s) SubCutaneous at bedtime  insulin lispro (ADMELOG) corrective regimen sliding scale   SubCutaneous three times a day before meals  insulin lispro Injectable (ADMELOG) 3 Unit(s) SubCutaneous three times a day before meals  isosorbide   mononitrate ER Tablet (IMDUR) 30 milliGRAM(s) Oral daily  pantoprazole    Tablet 40 milliGRAM(s) Oral before breakfast  polyethylene glycol 3350 17 Gram(s) Oral daily  sevelamer carbonate 2400 milliGRAM(s) Oral three times a day with meals  sodium chloride 0.9% lock flush 3 milliLiter(s) IV Push every 8 hours  tamsulosin 0.4 milliGRAM(s) Oral at bedtime    PRN Inpatient Medications  acetaminophen     Tablet .. 650 milliGRAM(s) Oral every 6 hours PRN  dextrose Oral Gel 15 Gram(s) Oral once PRN      REVIEW OF SYSTEMS  --------------------------------------------------------------------------------  All other systems were reviewed and are negative, except as noted.    VITALS/PHYSICAL EXAM  --------------------------------------------------------------------------------  T(C): 35.6 (23 @ 09:25), Max: 36.5 (23 @ 13:54)  HR: 68 (23 @ 09:25) (57 - 69)  BP: 142/64 (23 @ 09:25) (140/62 - 175/74)  RR: 18 (23 @ 09:25) (16 - 18)  SpO2: 95% (23 @ 09:25) (95% - 100%)  Wt(kg): --  Drug Dosing Weight  Height (cm): 157.5 (2023 13:30)  Weight (kg): 63.049 (2023 13:30)  BMI (kg/m2): 25.4 (2023 13:30)  BSA (m2): 1.64 (2023 13:30)  Height (cm): 157.5 (23 @ 13:30)  Weight (kg): 63.049 (23 @ 13:30)  BMI (kg/m2): 25.4 (23 @ 13:30)  BSA (m2): 1.64 (23 @ 13:30)      23 @ 07:01  -  23 @ 09:59  --------------------------------------------------------  IN: 180 mL / OUT: 0 mL / NET: 180 mL    PHYSICAL EXAM:  GENERAL: Alert, awake, oriented x3 on RA   HEENT: MICHELINE, EOMI, neck supple, no JVP  CHEST/LUNG: Bilateral clear breath sounds  HEART: Regular rate and rhythm, no murmur, no gallops, no rub   ABDOMEN: Soft, nontender, non distended  : No flank or supra pubic tenderness.  EXTREMITIES: no pedal edema  Neurology: AAOx3, no asterixis   SKIN: No rash or skin lesion   ACCESS: BHAVESH matta/edith and jay     LABS/STUDIES  --------------------------------------------------------------------------------              9.5    5.95  >-----------<  117      [23 @ 16:10]              30.5     137  |  97  |  50  ----------------------------<  117      [23 @ 16:10]  5.3   |  27  |  7.88        Ca     9.4     [23 16:10]    TPro  7.8  /  Alb  4.3  /  TBili  0.4  /  DBili  x   /  AST  19  /  ALT  <5  /  AlkPhos  412  [23 16:10]    PT/INR: PT 12.5 , INR 1.05       [23 16:10]  PTT: 32.1       [23 16:10]    Troponin 0.09      [23 16:10]  CK 68      [23 16:10]    Iron 68, TIBC 160, %sat 42      [22 @ 05:30]  Ferritin 1626      [22 05:30]  HbA1c 5.9      [10-25-19 @ 09:45]  TSH 0.903      [23 16:10]  Lipid: chol 97, TG 90, HDL 41, LDL --      [23 16:10]        RADIOLOGY:  --------------------------------------------------------------------------------------    Hemoglobin: 9.5 g/dL (23 @ 16:10)    Albumin, Serum: 4.3 g/dL (23 @ 16:10)  Albumin, Serum: 3.9 g/dL (22 @ 05:30)    T(C): 35.6 (23 @ 09:25), Max: 36.5 (23 @ 13:54)  HR: 68 (23 09:25) (57 - 69)  BP: 142/64 (23 @ 09:25) (140/62 - 175/74)  RR: 18 (23 @ 09:25) (16 - 18)  SpO2: 95% (23 @ 09:25) (95% - 100%)  cinacalcet 60 milliGRAM(s) Oral daily, 23 @ 14:50, Routine  epoetin olu-epbx (RETACRIT) Injectable 6000 Unit(s) IV Push once, 23 @ 07:39, Routine, Stop order after: 1 Doses  sevelamer carbonate 2400 milliGRAM(s) Oral three times a day with meals, 23 @ 14:53, Routine      Hemodialysis Treatment.:     Schedule: Once, Modality: Hemodialysis, Access: Arteriovenous Fistula    Dialyzer: Optiflux [id number], Time: 180 Min    Blood Flow: 400 mL/Min , Dialysate Flow: 500 mL/Min, Dialysate Temp: 36.5, Tubinmm (Adult)    Target Fluid Removal: 2 Liters    Dialysate Electrolytes (mEq/L): Potassium 2, Calcium 2.5, Sodium 138, Bicarbonate 35 (23 @ 07:39) [Active]    Seen on HD c/w tx outlined as above

## 2023-02-01 ENCOUNTER — APPOINTMENT (OUTPATIENT)
Dept: CARDIOTHORACIC SURGERY | Facility: HOSPITAL | Age: 73
End: 2023-02-01

## 2023-02-01 ENCOUNTER — TRANSCRIPTION ENCOUNTER (OUTPATIENT)
Age: 73
End: 2023-02-01

## 2023-02-01 LAB
ALBUMIN SERPL ELPH-MCNC: 2.5 G/DL — LOW (ref 3.3–5)
ALBUMIN SERPL ELPH-MCNC: 2.9 G/DL — LOW (ref 3.3–5)
ALP SERPL-CCNC: 255 U/L — HIGH (ref 40–120)
ALP SERPL-CCNC: 261 U/L — HIGH (ref 40–120)
ALT FLD-CCNC: <5 U/L — LOW (ref 10–45)
ALT FLD-CCNC: <5 U/L — LOW (ref 10–45)
ANION GAP SERPL CALC-SCNC: 12 MMOL/L — SIGNIFICANT CHANGE UP (ref 5–17)
ANION GAP SERPL CALC-SCNC: 16 MMOL/L — SIGNIFICANT CHANGE UP (ref 5–17)
APTT BLD: 30.5 SEC — SIGNIFICANT CHANGE UP (ref 27.5–35.5)
APTT BLD: 31.5 SEC — SIGNIFICANT CHANGE UP (ref 27.5–35.5)
AST SERPL-CCNC: 14 U/L — SIGNIFICANT CHANGE UP (ref 10–40)
AST SERPL-CCNC: 20 U/L — SIGNIFICANT CHANGE UP (ref 10–40)
BASE EXCESS BLDA CALC-SCNC: -0.7 MMOL/L — SIGNIFICANT CHANGE UP (ref -2–3)
BASE EXCESS BLDA CALC-SCNC: 0.5 MMOL/L — SIGNIFICANT CHANGE UP (ref -2–3)
BASE EXCESS BLDA CALC-SCNC: 3.2 MMOL/L — HIGH (ref -2–3)
BASE EXCESS BLDA CALC-SCNC: 4.7 MMOL/L — HIGH (ref -2–3)
BASE EXCESS BLDA CALC-SCNC: 6.4 MMOL/L — HIGH (ref -2–3)
BASOPHILS # BLD AUTO: 0.01 K/UL — SIGNIFICANT CHANGE UP (ref 0–0.2)
BASOPHILS NFR BLD AUTO: 0.1 % — SIGNIFICANT CHANGE UP (ref 0–2)
BILIRUB SERPL-MCNC: 0.3 MG/DL — SIGNIFICANT CHANGE UP (ref 0.2–1.2)
BILIRUB SERPL-MCNC: 0.4 MG/DL — SIGNIFICANT CHANGE UP (ref 0.2–1.2)
BUN SERPL-MCNC: 26 MG/DL — HIGH (ref 7–23)
BUN SERPL-MCNC: 38 MG/DL — HIGH (ref 7–23)
CA-I BLDA-SCNC: 1 MMOL/L — LOW (ref 1.15–1.33)
CA-I BLDA-SCNC: 1.03 MMOL/L — LOW (ref 1.15–1.33)
CA-I BLDA-SCNC: 1.06 MMOL/L — LOW (ref 1.15–1.33)
CA-I BLDA-SCNC: 1.07 MMOL/L — LOW (ref 1.15–1.33)
CA-I BLDA-SCNC: 1.21 MMOL/L — SIGNIFICANT CHANGE UP (ref 1.15–1.33)
CALCIUM SERPL-MCNC: 7.1 MG/DL — LOW (ref 8.4–10.5)
CALCIUM SERPL-MCNC: 7.6 MG/DL — LOW (ref 8.4–10.5)
CHLORIDE SERPL-SCNC: 100 MMOL/L — SIGNIFICANT CHANGE UP (ref 96–108)
CHLORIDE SERPL-SCNC: 106 MMOL/L — SIGNIFICANT CHANGE UP (ref 96–108)
CO2 BLDA-SCNC: 24 MMOL/L — SIGNIFICANT CHANGE UP (ref 19–24)
CO2 BLDA-SCNC: 25 MMOL/L — HIGH (ref 19–24)
CO2 BLDA-SCNC: 28 MMOL/L — HIGH (ref 19–24)
CO2 BLDA-SCNC: 28 MMOL/L — HIGH (ref 19–24)
CO2 BLDA-SCNC: 32 MMOL/L — HIGH (ref 19–24)
CO2 SERPL-SCNC: 20 MMOL/L — LOW (ref 22–31)
CO2 SERPL-SCNC: 23 MMOL/L — SIGNIFICANT CHANGE UP (ref 22–31)
COHGB MFR BLDA: 0.3 % — SIGNIFICANT CHANGE UP
COHGB MFR BLDA: 0.4 % — SIGNIFICANT CHANGE UP
COHGB MFR BLDA: 0.9 % — SIGNIFICANT CHANGE UP
COHGB MFR BLDA: 1 % — SIGNIFICANT CHANGE UP
COHGB MFR BLDA: 1 % — SIGNIFICANT CHANGE UP
CREAT SERPL-MCNC: 4.06 MG/DL — HIGH (ref 0.5–1.3)
CREAT SERPL-MCNC: 6.45 MG/DL — HIGH (ref 0.5–1.3)
EGFR: 15 ML/MIN/1.73M2 — LOW
EGFR: 9 ML/MIN/1.73M2 — LOW
EOSINOPHIL # BLD AUTO: 0.03 K/UL — SIGNIFICANT CHANGE UP (ref 0–0.5)
EOSINOPHIL NFR BLD AUTO: 0.3 % — SIGNIFICANT CHANGE UP (ref 0–6)
GAS PNL BLDA: SIGNIFICANT CHANGE UP
GLUCOSE BLDA-MCNC: 131 MG/DL — HIGH (ref 70–99)
GLUCOSE BLDA-MCNC: 137 MG/DL — HIGH (ref 70–99)
GLUCOSE BLDA-MCNC: 152 MG/DL — HIGH (ref 70–99)
GLUCOSE BLDA-MCNC: 84 MG/DL — SIGNIFICANT CHANGE UP (ref 70–99)
GLUCOSE BLDA-MCNC: 89 MG/DL — SIGNIFICANT CHANGE UP (ref 70–99)
GLUCOSE BLDC GLUCOMTR-MCNC: 106 MG/DL — HIGH (ref 70–99)
GLUCOSE BLDC GLUCOMTR-MCNC: 128 MG/DL — HIGH (ref 70–99)
GLUCOSE BLDC GLUCOMTR-MCNC: 82 MG/DL — SIGNIFICANT CHANGE UP (ref 70–99)
GLUCOSE SERPL-MCNC: 130 MG/DL — HIGH (ref 70–99)
GLUCOSE SERPL-MCNC: 139 MG/DL — HIGH (ref 70–99)
HCO3 BLDA-SCNC: 23 MMOL/L — SIGNIFICANT CHANGE UP (ref 21–28)
HCO3 BLDA-SCNC: 24 MMOL/L — SIGNIFICANT CHANGE UP (ref 21–28)
HCO3 BLDA-SCNC: 26 MMOL/L — SIGNIFICANT CHANGE UP (ref 21–28)
HCO3 BLDA-SCNC: 27 MMOL/L — SIGNIFICANT CHANGE UP (ref 21–28)
HCO3 BLDA-SCNC: 30 MMOL/L — HIGH (ref 21–28)
HCT VFR BLD CALC: 23.3 % — LOW (ref 39–50)
HCT VFR BLD CALC: 24.5 % — LOW (ref 39–50)
HGB BLD-MCNC: 7.5 G/DL — LOW (ref 13–17)
HGB BLD-MCNC: 8.3 G/DL — LOW (ref 13–17)
HGB BLDA-MCNC: 8 G/DL — LOW (ref 12.6–17.4)
HGB BLDA-MCNC: 8.4 G/DL — LOW (ref 12.6–17.4)
HGB BLDA-MCNC: 8.5 G/DL — LOW (ref 12.6–17.4)
HGB BLDA-MCNC: 8.5 G/DL — LOW (ref 12.6–17.4)
HGB BLDA-MCNC: 9.4 G/DL — LOW (ref 12.6–17.4)
IMM GRANULOCYTES NFR BLD AUTO: 0.4 % — SIGNIFICANT CHANGE UP (ref 0–0.9)
INR BLD: 1.42 — HIGH (ref 0.88–1.16)
INR BLD: 1.42 — HIGH (ref 0.88–1.16)
LYMPHOCYTES # BLD AUTO: 1.18 K/UL — SIGNIFICANT CHANGE UP (ref 1–3.3)
LYMPHOCYTES # BLD AUTO: 10 % — LOW (ref 13–44)
MAGNESIUM SERPL-MCNC: 1.7 MG/DL — SIGNIFICANT CHANGE UP (ref 1.6–2.6)
MAGNESIUM SERPL-MCNC: 1.8 MG/DL — SIGNIFICANT CHANGE UP (ref 1.6–2.6)
MCHC RBC-ENTMCNC: 30.6 PG — SIGNIFICANT CHANGE UP (ref 27–34)
MCHC RBC-ENTMCNC: 30.9 PG — SIGNIFICANT CHANGE UP (ref 27–34)
MCHC RBC-ENTMCNC: 32.2 GM/DL — SIGNIFICANT CHANGE UP (ref 32–36)
MCHC RBC-ENTMCNC: 33.9 GM/DL — SIGNIFICANT CHANGE UP (ref 32–36)
MCV RBC AUTO: 90.4 FL — SIGNIFICANT CHANGE UP (ref 80–100)
MCV RBC AUTO: 95.9 FL — SIGNIFICANT CHANGE UP (ref 80–100)
METHGB MFR BLDA: 0.5 % — SIGNIFICANT CHANGE UP
METHGB MFR BLDA: 0.6 % — SIGNIFICANT CHANGE UP
METHGB MFR BLDA: 0.8 % — SIGNIFICANT CHANGE UP
METHGB MFR BLDA: 0.9 % — SIGNIFICANT CHANGE UP
METHGB MFR BLDA: 0.9 % — SIGNIFICANT CHANGE UP
MONOCYTES # BLD AUTO: 0.87 K/UL — SIGNIFICANT CHANGE UP (ref 0–0.9)
MONOCYTES NFR BLD AUTO: 7.4 % — SIGNIFICANT CHANGE UP (ref 2–14)
NEUTROPHILS # BLD AUTO: 9.66 K/UL — HIGH (ref 1.8–7.4)
NEUTROPHILS NFR BLD AUTO: 81.8 % — HIGH (ref 43–77)
NRBC # BLD: 0 /100 WBCS — SIGNIFICANT CHANGE UP (ref 0–0)
NRBC # BLD: 0 /100 WBCS — SIGNIFICANT CHANGE UP (ref 0–0)
OXYHGB MFR BLDA: 96.8 % — HIGH (ref 90–95)
OXYHGB MFR BLDA: 97.2 % — HIGH (ref 90–95)
OXYHGB MFR BLDA: 97.3 % — HIGH (ref 90–95)
OXYHGB MFR BLDA: 97.4 % — HIGH (ref 90–95)
OXYHGB MFR BLDA: 97.7 % — HIGH (ref 90–95)
PCO2 BLDA: 32 MMHG — LOW (ref 35–48)
PCO2 BLDA: 32 MMHG — LOW (ref 35–48)
PCO2 BLDA: 33 MMHG — LOW (ref 35–48)
PCO2 BLDA: 34 MMHG — LOW (ref 35–48)
PCO2 BLDA: 41 MMHG — SIGNIFICANT CHANGE UP (ref 35–48)
PH BLDA: 7.46 — HIGH (ref 7.35–7.45)
PH BLDA: 7.47 — HIGH (ref 7.35–7.45)
PH BLDA: 7.48 — HIGH (ref 7.35–7.45)
PH BLDA: 7.5 — HIGH (ref 7.35–7.45)
PH BLDA: 7.54 — HIGH (ref 7.35–7.45)
PHOSPHATE SERPL-MCNC: 2.7 MG/DL — SIGNIFICANT CHANGE UP (ref 2.5–4.5)
PHOSPHATE SERPL-MCNC: 3.3 MG/DL — SIGNIFICANT CHANGE UP (ref 2.5–4.5)
PLATELET # BLD AUTO: 102 K/UL — LOW (ref 150–400)
PLATELET # BLD AUTO: 70 K/UL — LOW (ref 150–400)
PO2 BLDA: 401 MMHG — HIGH (ref 83–108)
PO2 BLDA: 410 MMHG — HIGH (ref 83–108)
PO2 BLDA: 471 MMHG — HIGH (ref 83–108)
PO2 BLDA: 488 MMHG — HIGH (ref 83–108)
PO2 BLDA: 508 MMHG — HIGH (ref 83–108)
POTASSIUM BLDA-SCNC: 4.7 MMOL/L — SIGNIFICANT CHANGE UP (ref 3.5–5.1)
POTASSIUM BLDA-SCNC: 5.7 MMOL/L — HIGH (ref 3.5–5.1)
POTASSIUM BLDA-SCNC: 5.7 MMOL/L — HIGH (ref 3.5–5.1)
POTASSIUM BLDA-SCNC: 5.8 MMOL/L — HIGH (ref 3.5–5.1)
POTASSIUM BLDA-SCNC: 6.1 MMOL/L — HIGH (ref 3.5–5.1)
POTASSIUM SERPL-MCNC: 4.5 MMOL/L — SIGNIFICANT CHANGE UP (ref 3.5–5.3)
POTASSIUM SERPL-MCNC: 5.5 MMOL/L — HIGH (ref 3.5–5.3)
POTASSIUM SERPL-SCNC: 4.5 MMOL/L — SIGNIFICANT CHANGE UP (ref 3.5–5.3)
POTASSIUM SERPL-SCNC: 5.5 MMOL/L — HIGH (ref 3.5–5.3)
PROT SERPL-MCNC: 4.4 G/DL — LOW (ref 6–8.3)
PROT SERPL-MCNC: 5.2 G/DL — LOW (ref 6–8.3)
PROTHROM AB SERPL-ACNC: 16.9 SEC — HIGH (ref 10.5–13.4)
PROTHROM AB SERPL-ACNC: 17 SEC — HIGH (ref 10.5–13.4)
RBC # BLD: 2.43 M/UL — LOW (ref 4.2–5.8)
RBC # BLD: 2.71 M/UL — LOW (ref 4.2–5.8)
RBC # FLD: 14.3 % — SIGNIFICANT CHANGE UP (ref 10.3–14.5)
RBC # FLD: 14.9 % — HIGH (ref 10.3–14.5)
SAO2 % BLDA: 98.1 % — HIGH (ref 94–98)
SAO2 % BLDA: 98.4 % — HIGH (ref 94–98)
SAO2 % BLDA: 98.9 % — HIGH (ref 94–98)
SAO2 % BLDA: 99.1 % — HIGH (ref 94–98)
SAO2 % BLDA: 99.1 % — HIGH (ref 94–98)
SODIUM BLDA-SCNC: 133 MMOL/L — LOW (ref 136–145)
SODIUM BLDA-SCNC: 135 MMOL/L — LOW (ref 136–145)
SODIUM BLDA-SCNC: 135 MMOL/L — LOW (ref 136–145)
SODIUM SERPL-SCNC: 138 MMOL/L — SIGNIFICANT CHANGE UP (ref 135–145)
SODIUM SERPL-SCNC: 139 MMOL/L — SIGNIFICANT CHANGE UP (ref 135–145)
WBC # BLD: 11.8 K/UL — HIGH (ref 3.8–10.5)
WBC # BLD: 7.83 K/UL — SIGNIFICANT CHANGE UP (ref 3.8–10.5)
WBC # FLD AUTO: 11.8 K/UL — HIGH (ref 3.8–10.5)
WBC # FLD AUTO: 7.83 K/UL — SIGNIFICANT CHANGE UP (ref 3.8–10.5)

## 2023-02-01 PROCEDURE — 33517 CABG ARTERY-VEIN SINGLE: CPT

## 2023-02-01 PROCEDURE — 99291 CRITICAL CARE FIRST HOUR: CPT

## 2023-02-01 PROCEDURE — 99292 CRITICAL CARE ADDL 30 MIN: CPT

## 2023-02-01 PROCEDURE — 33533 CABG ARTERIAL SINGLE: CPT

## 2023-02-01 PROCEDURE — 71045 X-RAY EXAM CHEST 1 VIEW: CPT | Mod: 26

## 2023-02-01 PROCEDURE — 76998 US GUIDE INTRAOP: CPT | Mod: 26,59

## 2023-02-01 DEVICE — KIT CVC 3LUM SPECTRUM 9FR: Type: IMPLANTABLE DEVICE | Status: FUNCTIONAL

## 2023-02-01 DEVICE — SHEATH INTRODUCER TERUMO PINNACLE PERIPHERAL 4FR X 10CM X 0.035" MINI WIRE: Type: IMPLANTABLE DEVICE | Status: FUNCTIONAL

## 2023-02-01 DEVICE — PACING WIRE STREAMLINE BIPOLAR MYOCARDIAL: Type: IMPLANTABLE DEVICE | Status: FUNCTIONAL

## 2023-02-01 DEVICE — HEARTSTRING III PROXIMAL SEAL SYSTEM: Type: IMPLANTABLE DEVICE | Status: FUNCTIONAL

## 2023-02-01 DEVICE — SHUNT FLO-THRU INTRALUMINAL 2MM X 18MM: Type: IMPLANTABLE DEVICE | Status: FUNCTIONAL

## 2023-02-01 DEVICE — INTRO MICROPUNC 4FRX10CM SS: Type: IMPLANTABLE DEVICE | Status: FUNCTIONAL

## 2023-02-01 DEVICE — SHUNT FLO-THRU INTRALUMINAL1.5MM X 18MM: Type: IMPLANTABLE DEVICE | Status: FUNCTIONAL

## 2023-02-01 DEVICE — SHUNT FLO-THRU INTRALUMINAL1.75MM X 18MM: Type: IMPLANTABLE DEVICE | Status: FUNCTIONAL

## 2023-02-01 DEVICE — CHEST DRAIN THORACIC PVC 28FR STRAIGHT: Type: IMPLANTABLE DEVICE | Status: FUNCTIONAL

## 2023-02-01 DEVICE — CANNULA VESSEL 3MM BEVELED TIP RADIOPAQUE: Type: IMPLANTABLE DEVICE | Status: FUNCTIONAL

## 2023-02-01 RX ORDER — FENTANYL CITRATE 50 UG/ML
25 INJECTION INTRAVENOUS ONCE
Refills: 0 | Status: DISCONTINUED | OUTPATIENT
Start: 2023-02-01 | End: 2023-02-01

## 2023-02-01 RX ORDER — PANTOPRAZOLE SODIUM 20 MG/1
40 TABLET, DELAYED RELEASE ORAL DAILY
Refills: 0 | Status: DISCONTINUED | OUTPATIENT
Start: 2023-02-01 | End: 2023-02-02

## 2023-02-01 RX ORDER — CEFAZOLIN SODIUM 1 G
1000 VIAL (EA) INJECTION ONCE
Refills: 0 | Status: COMPLETED | OUTPATIENT
Start: 2023-02-01 | End: 2023-02-01

## 2023-02-01 RX ORDER — CEFAZOLIN SODIUM 1 G
VIAL (EA) INJECTION
Refills: 0 | Status: DISCONTINUED | OUTPATIENT
Start: 2023-02-01 | End: 2023-02-01

## 2023-02-01 RX ORDER — SODIUM CHLORIDE 9 MG/ML
1000 INJECTION INTRAMUSCULAR; INTRAVENOUS; SUBCUTANEOUS
Refills: 0 | Status: DISCONTINUED | OUTPATIENT
Start: 2023-02-01 | End: 2023-02-07

## 2023-02-01 RX ORDER — TAMSULOSIN HYDROCHLORIDE 0.4 MG/1
0.4 CAPSULE ORAL AT BEDTIME
Refills: 0 | Status: DISCONTINUED | OUTPATIENT
Start: 2023-02-01 | End: 2023-02-07

## 2023-02-01 RX ORDER — CLOPIDOGREL BISULFATE 75 MG/1
75 TABLET, FILM COATED ORAL DAILY
Refills: 0 | Status: DISCONTINUED | OUTPATIENT
Start: 2023-02-01 | End: 2023-02-07

## 2023-02-01 RX ORDER — PROPOFOL 10 MG/ML
10 INJECTION, EMULSION INTRAVENOUS
Qty: 1000 | Refills: 0 | Status: DISCONTINUED | OUTPATIENT
Start: 2023-02-01 | End: 2023-02-02

## 2023-02-01 RX ORDER — ATORVASTATIN CALCIUM 80 MG/1
80 TABLET, FILM COATED ORAL AT BEDTIME
Refills: 0 | Status: DISCONTINUED | OUTPATIENT
Start: 2023-02-01 | End: 2023-02-07

## 2023-02-01 RX ORDER — VASOPRESSIN 20 [USP'U]/ML
0 INJECTION INTRAVENOUS
Qty: 40 | Refills: 0 | Status: DISCONTINUED | OUTPATIENT
Start: 2023-02-01 | End: 2023-02-03

## 2023-02-01 RX ORDER — NOREPINEPHRINE BITARTRATE/D5W 8 MG/250ML
0.05 PLASTIC BAG, INJECTION (ML) INTRAVENOUS
Qty: 8 | Refills: 0 | Status: DISCONTINUED | OUTPATIENT
Start: 2023-02-01 | End: 2023-02-03

## 2023-02-01 RX ORDER — CARBIDOPA AND LEVODOPA 25; 100 MG/1; MG/1
1 TABLET ORAL
Refills: 0 | Status: DISCONTINUED | OUTPATIENT
Start: 2023-02-01 | End: 2023-02-07

## 2023-02-01 RX ORDER — CALCIUM GLUCONATE 100 MG/ML
2 VIAL (ML) INTRAVENOUS ONCE
Refills: 0 | Status: COMPLETED | OUTPATIENT
Start: 2023-02-01 | End: 2023-02-01

## 2023-02-01 RX ORDER — SEVELAMER CARBONATE 2400 MG/1
800 POWDER, FOR SUSPENSION ORAL EVERY 8 HOURS
Refills: 0 | Status: DISCONTINUED | OUTPATIENT
Start: 2023-02-01 | End: 2023-02-03

## 2023-02-01 RX ORDER — DEXMEDETOMIDINE HYDROCHLORIDE IN 0.9% SODIUM CHLORIDE 4 UG/ML
0.2 INJECTION INTRAVENOUS
Qty: 200 | Refills: 0 | Status: DISCONTINUED | OUTPATIENT
Start: 2023-02-01 | End: 2023-02-02

## 2023-02-01 RX ORDER — ASPIRIN/CALCIUM CARB/MAGNESIUM 324 MG
81 TABLET ORAL DAILY
Refills: 0 | Status: DISCONTINUED | OUTPATIENT
Start: 2023-02-01 | End: 2023-02-07

## 2023-02-01 RX ORDER — CHLORHEXIDINE GLUCONATE 213 G/1000ML
5 SOLUTION TOPICAL
Refills: 0 | Status: DISCONTINUED | OUTPATIENT
Start: 2023-02-01 | End: 2023-02-02

## 2023-02-01 RX ORDER — DEXTROSE 50 % IN WATER 50 %
25 SYRINGE (ML) INTRAVENOUS
Refills: 0 | Status: DISCONTINUED | OUTPATIENT
Start: 2023-02-01 | End: 2023-02-07

## 2023-02-01 RX ORDER — ALBUMIN HUMAN 25 %
250 VIAL (ML) INTRAVENOUS ONCE
Refills: 0 | Status: COMPLETED | OUTPATIENT
Start: 2023-02-01 | End: 2023-02-01

## 2023-02-01 RX ORDER — CHLORHEXIDINE GLUCONATE 213 G/1000ML
1 SOLUTION TOPICAL DAILY
Refills: 0 | Status: DISCONTINUED | OUTPATIENT
Start: 2023-02-01 | End: 2023-02-07

## 2023-02-01 RX ORDER — DEXTROSE 50 % IN WATER 50 %
50 SYRINGE (ML) INTRAVENOUS
Refills: 0 | Status: DISCONTINUED | OUTPATIENT
Start: 2023-02-01 | End: 2023-02-07

## 2023-02-01 RX ORDER — HEPARIN SODIUM 5000 [USP'U]/ML
5000 INJECTION INTRAVENOUS; SUBCUTANEOUS EVERY 8 HOURS
Refills: 0 | Status: DISCONTINUED | OUTPATIENT
Start: 2023-02-01 | End: 2023-02-07

## 2023-02-01 RX ORDER — INSULIN HUMAN 100 [IU]/ML
1 INJECTION, SOLUTION SUBCUTANEOUS
Qty: 50 | Refills: 0 | Status: DISCONTINUED | OUTPATIENT
Start: 2023-02-01 | End: 2023-02-02

## 2023-02-01 RX ORDER — CEFAZOLIN SODIUM 1 G
1000 VIAL (EA) INJECTION EVERY 24 HOURS
Refills: 0 | Status: COMPLETED | OUTPATIENT
Start: 2023-02-02 | End: 2023-02-04

## 2023-02-01 RX ADMIN — FENTANYL CITRATE 25 MICROGRAM(S): 50 INJECTION INTRAVENOUS at 18:45

## 2023-02-01 RX ADMIN — CARVEDILOL PHOSPHATE 25 MILLIGRAM(S): 80 CAPSULE, EXTENDED RELEASE ORAL at 05:22

## 2023-02-01 RX ADMIN — SODIUM CHLORIDE 10 MILLILITER(S): 9 INJECTION INTRAMUSCULAR; INTRAVENOUS; SUBCUTANEOUS at 19:23

## 2023-02-01 RX ADMIN — CHLORHEXIDINE GLUCONATE 5 MILLILITER(S): 213 SOLUTION TOPICAL at 18:52

## 2023-02-01 RX ADMIN — DEXMEDETOMIDINE HYDROCHLORIDE IN 0.9% SODIUM CHLORIDE 3.15 MICROGRAM(S)/KG/HR: 4 INJECTION INTRAVENOUS at 22:30

## 2023-02-01 RX ADMIN — AMLODIPINE BESYLATE 5 MILLIGRAM(S): 2.5 TABLET ORAL at 05:22

## 2023-02-01 RX ADMIN — HEPARIN SODIUM 5000 UNIT(S): 5000 INJECTION INTRAVENOUS; SUBCUTANEOUS at 21:11

## 2023-02-01 RX ADMIN — ATORVASTATIN CALCIUM 80 MILLIGRAM(S): 80 TABLET, FILM COATED ORAL at 21:11

## 2023-02-01 RX ADMIN — Medication 125 MILLILITER(S): at 17:20

## 2023-02-01 RX ADMIN — PANTOPRAZOLE SODIUM 40 MILLIGRAM(S): 20 TABLET, DELAYED RELEASE ORAL at 21:11

## 2023-02-01 RX ADMIN — HEPARIN SODIUM 5000 UNIT(S): 5000 INJECTION INTRAVENOUS; SUBCUTANEOUS at 05:23

## 2023-02-01 RX ADMIN — Medication 81 MILLIGRAM(S): at 21:11

## 2023-02-01 RX ADMIN — Medication 325 MILLIGRAM(S): at 05:22

## 2023-02-01 RX ADMIN — Medication 100 MILLIGRAM(S): at 21:06

## 2023-02-01 RX ADMIN — Medication 0.1 MILLIGRAM(S): at 05:22

## 2023-02-01 RX ADMIN — CLOPIDOGREL BISULFATE 75 MILLIGRAM(S): 75 TABLET, FILM COATED ORAL at 21:11

## 2023-02-01 RX ADMIN — Medication 200 GRAM(S): at 16:31

## 2023-02-01 RX ADMIN — FENTANYL CITRATE 25 MICROGRAM(S): 50 INJECTION INTRAVENOUS at 23:26

## 2023-02-01 RX ADMIN — FENTANYL CITRATE 25 MICROGRAM(S): 50 INJECTION INTRAVENOUS at 18:11

## 2023-02-01 RX ADMIN — VASOPRESSIN 0.45 UNIT(S)/MIN: 20 INJECTION INTRAVENOUS at 19:11

## 2023-02-01 NOTE — DIETITIAN INITIAL EVALUATION ADULT - ADD RECOMMEND
1. As medically feasible, advance to DASH/TLC CTSCHO Diet 2. Encourage pt to meet nutritional needs as able 3. RD to obtain subjective information and provide diet ed as able 4. Pain and bowel regimen per team 5. Will assess/honor preferences as able 6. Monitor electrolytes; replete PRN

## 2023-02-01 NOTE — BRIEF OPERATIVE NOTE - OPERATION/FINDINGS
Device is working normally. 15 mode switching episodes (all <5 seconds, <1% of total time).  Normal sensing and capture of RA and RV leads; stable impedances. Battery longevity is 7.2-9.5 years.  No changes are made today.    FU in 6 months for next PM check with me.    He will see Dr. NOMAN López only as needed.    Collaborating MD: Honorio   OPCAB x 2 ( LIMA - LAD, SVG - PDA) EF 55-60%    Proximal with Heart String

## 2023-02-01 NOTE — PROGRESS NOTE ADULT - SUBJECTIVE AND OBJECTIVE BOX
Patient is a 72y Male seen and evaluated at bedside. No acute distress, does not offer any complaints. VSS, euvolemic, with stable electrolytes. Tolerated HD yesterday with 2L UF, plan for CABG today.       Meds:    chlorhexidine 4% Liquid 1 once      T(C): , Max: 36.6 (02-01-23 @ 08:57)  T(F): , Max: 97.8 (02-01-23 @ 08:57)  HR: 67 (02-01-23 @ 08:35)  BP: 143/61 (02-01-23 @ 08:35)  BP(mean): 88 (02-01-23 @ 08:35)  RR: 19 (02-01-23 @ 08:35)  SpO2: 96% (02-01-23 @ 08:35)  Wt(kg): --    01-31 @ 07:01  -  02-01 @ 07:00  --------------------------------------------------------  IN: 1120 mL / OUT: 2400 mL / NET: -1280 mL      Height (cm): 157.5 (02-01 @ 08:35)  Weight (kg): 63 (02-01 @ 08:35)  BMI (kg/m2): 25.4 (02-01 @ 08:35)  BSA (m2): 1.64 (02-01 @ 08:35)    Review of Systems:  ROS negative except as per HPI      PHYSICAL EXAM:  GENERAL: Alert, awake, oriented x3 on RA   HEENT: MICHELINE, EOMI, neck supple, no JVP  CHEST/LUNG: Bilateral clear breath sounds  HEART: Regular rate and rhythm, no murmur, no gallops, no rub   ABDOMEN: Soft, nontender, non distended  : No flank or supra pubic tenderness.  EXTREMITIES: no pedal edema  Neurology: AAOx3, no asterixis   SKIN: No rash or skin lesion   ACCESS: RUE  AVF w/bruit and thrill       LABS:                        9.1    5.17  )-----------( 115      ( 31 Jan 2023 09:45 )             28.7     01-31    134<L>  |  94<L>  |  51<H>  ----------------------------<  134<H>  4.9   |  25  |  7.59<H>    Ca    8.5      31 Jan 2023 09:45  Phos  3.1     01-31  Mg     2.2     01-31    TPro  7.8  /  Alb  4.3  /  TBili  0.4  /  DBili  x   /  AST  19  /  ALT  <5<L>  /  AlkPhos  412<H>  01-30      PT/INR - ( 31 Jan 2023 09:45 )   PT: 12.6 sec;   INR: 1.06          PTT - ( 31 Jan 2023 09:45 )  PTT:33.6 sec          RADIOLOGY & ADDITIONAL STUDIES:

## 2023-02-01 NOTE — PROGRESS NOTE ADULT - SUBJECTIVE AND OBJECTIVE BOX
Patient was seen and evaluated on dialysis.     Dialyzer: Optiflux [id number]  QB: 400 mL/min  QD: 500 mL/min  K bath: 2  Goal UF: 0L  Duration: 180 min    Patient is tolerating the procedure well.   Continue full hemodialysis treatment as prescribed.

## 2023-02-01 NOTE — CHART NOTE - NSCHARTNOTEFT_GEN_A_CORE
Paged from [facility], s/p CABG, patient hyperkalemic and acidotic, will also require blood transfusion. Will need HD today.

## 2023-02-01 NOTE — DIETITIAN INITIAL EVALUATION ADULT - PERTINENT MEDS FT
MEDICATIONS  (STANDING):  chlorhexidine 4% Liquid 1 Application(s) Topical once    MEDICATIONS  (PRN):

## 2023-02-01 NOTE — DIETITIAN INITIAL EVALUATION ADULT - OTHER CALCULATIONS
Based on Standards of Care pt within % IBW thus actual body weight used for all calculations. Needs adjusted for advanced age and post op. Fluids per team. Meet lower end of protein needs pending renal function improvement.

## 2023-02-01 NOTE — DIETITIAN INITIAL EVALUATION ADULT - PERTINENT LABORATORY DATA
01-31    134<L>  |  94<L>  |  51<H>  ----------------------------<  134<H>  4.9   |  25  |  7.59<H>    Ca    8.5      31 Jan 2023 09:45  Phos  3.1     01-31  Mg     2.2     01-31    TPro  7.8  /  Alb  4.3  /  TBili  0.4  /  DBili  x   /  AST  19  /  ALT  <5<L>  /  AlkPhos  412<H>  01-30  POCT Blood Glucose.: 82 mg/dL (02-01-23 @ 05:53)  A1C with Estimated Average Glucose Result: 6.1 % (01-30-23 @ 16:10)  A1C with Estimated Average Glucose Result: 6.8 % (12-27-22 @ 05:30)

## 2023-02-01 NOTE — PROGRESS NOTE ADULT - ASSESSMENT
72M Welsh speaking, ESRD (TTS), HTN, HLD, parkinson's disease who was transferred from [facility] for CABG, tolerated HD yesterday with 2L UF     Assessment/Plan:   #ESRD on HD TTS @ [facility] [address]  usual Rx 3h, last EDW TBD  Plan for HD 2/2, volume status and electrolytes acceptable   Dry weight TBD    #HTN   Restart home meds  -C/w Coreg 25mg BID  -C/w clonidine 0.1mg BID  -C/w Amlodipine 5mg Qday  -C/w Imdur 30mg Qday  -C/w lisinopril 10mg Qday  -UF with HD    #access   RUE AVF functional     #anemia  Hb at goal   Please obtain iron studies and ferritin  epo w/ HD  transfusion as per primary team     #renal bone disease   Ca ~8.5  Phos: 3.1  -C/w cinacalcet 60mg Qday  -Hold phos binders

## 2023-02-01 NOTE — PROGRESS NOTE ADULT - ASSESSMENT
72 M w/PMHx of HTN, CAD s/p multiple SURYA stents, type II DM, ESRD HD TTS, CVA w/ right sided weakness &  parkinson's disease admitted for unstable angina and multi vCAD for surgical mgmt.  S/p   OPCAB x 2 ( LIMA - LAD, SVG - PDA)   EF 55-60%  2/1   Received intubated on Vaso and Levo  Mild post op hyperkalemia and acidosis s/p HD with 500 ccUF  A/p  SAT nonfocal-did not tolerate CPAP yet on precedex--> SBT in few hours  Labile hemodynamics, off vaso on low dose levo to maintain MAPs ~ 65-70   Good PFR/ Fio2 40%, vent setting adjusted for resp alkalosis   HCT responded to blood transfusion PlT ~ 70 baseline 110--> low output from drains will monitor for now   ESRD/ Dialyzed for clearance post op hyperkalemia and acidosis improved will monitor lytes and bicarb/consider additional sessions of clearance with/without UF as needed Renal on board appreciate recs/ no need for phos binders at this time/ Hypocalcemia repleted   Home sinemet and flomax resumed   DAPT/Statin   Ppx: Sq hep/PPI/HOB 30  bilateral axillary/subclavian and brachiocephalic stents present/A-line and TLC in right groin     Minimal vent setting and pulm congestion on xray--> will proceed with CPAP once awake  For extubation in a few hours     ATTENDING: I have personally and independently provided 45 Min of critical care services.

## 2023-02-01 NOTE — PROGRESS NOTE ADULT - SUBJECTIVE AND OBJECTIVE BOX
INTERVAL COURSE  POD # 0   S/p OPCAB x 2 ( LIMA - LAD, SVG - PDA)  EF 55-60%  Received intubated on Levo and vaso   ESRD/Dialyzed for post op K 5.5 and mild acidosis   1 upRBC post op for HCT 23%   Did not tolerate CPAP yet       VITALS  Vital Signs Last 24 Hrs  T(C): 36.2 (02 Feb 2023 01:03), Max: 36.6 (01 Feb 2023 08:57)  T(F): 97.2 (02 Feb 2023 01:03), Max: 97.8 (01 Feb 2023 08:57)  HR: 60 (02 Feb 2023 00:06) (57 - 79)  BP: 143/61 (01 Feb 2023 08:35) (143/61 - 144/61)  BP(mean): 88 (01 Feb 2023 08:35) (88 - 89)  RR: 20 (02 Feb 2023 00:06) (14 - 20)  SpO2: 100% (02 Feb 2023 00:06) (94% - 100%)    Parameters below as of 02 Feb 2023 01:00  Patient On (Oxygen Delivery Method): ventilator    O2 Concentration (%): 40    I&O's Summary    31 Jan 2023 07:01  -  01 Feb 2023 07:00  --------------------------------------------------------  IN: 1120 mL / OUT: 2400 mL / NET: -1280 mL    01 Feb 2023 07:01  -  02 Feb 2023 01:46  --------------------------------------------------------  IN: 1417.9 mL / OUT: 930 mL / NET: 487.9 mL        CAPILLARY BLOOD GLUCOSE      POCT Blood Glucose.: 128 mg/dL (01 Feb 2023 22:28)  POCT Blood Glucose.: 106 mg/dL (01 Feb 2023 18:38)  POCT Blood Glucose.: 82 mg/dL (01 Feb 2023 05:53)      PHYSICAL EXAM  General: lightly sedated on precedex   Respiratory: CTA B/L; no wheezes/crackles  Cardiovascular: Regular rhythm/rate  Gastrointestinal: Soft; NTND  Extremities: WWP; no edema- right upper arm AVF with good thrill  Neurological: Nonfocal   Right groin A-line and TLC c/d/i     MEDICATIONS  (STANDING):  aspirin enteric coated 81 milliGRAM(s) Oral daily  atorvastatin 80 milliGRAM(s) Oral at bedtime  calcium gluconate IVPB 2 Gram(s) IV Intermittent once  carbidopa/levodopa  25/100 1 Tablet(s) Oral <User Schedule>  ceFAZolin   IVPB 1000 milliGRAM(s) IV Intermittent every 24 hours  chlorhexidine 0.12% Liquid 5 milliLiter(s) Oral Mucosa two times a day  chlorhexidine 2% Cloths 1 Application(s) Topical daily  chlorhexidine 4% Liquid 1 Application(s) Topical once  clopidogrel Tablet 75 milliGRAM(s) Oral daily  dexMEDEtomidine Infusion 0.2 MICROgram(s)/kG/Hr (3.15 mL/Hr) IV Continuous <Continuous>  dextrose 50% Injectable 50 milliLiter(s) IV Push every 15 minutes  dextrose 50% Injectable 25 milliLiter(s) IV Push every 15 minutes  fentaNYL    Injectable 25 MICROGram(s) IV Push once  heparin   Injectable 5000 Unit(s) SubCutaneous every 8 hours  insulin regular Infusion 1 Unit(s)/Hr (1 mL/Hr) IV Continuous <Continuous>  norepinephrine Infusion 0.05 MICROgram(s)/kG/Min (5.91 mL/Hr) IV Continuous <Continuous>  pantoprazole  Injectable 40 milliGRAM(s) IV Push daily  propofol Infusion 10 MICROgram(s)/kG/Min (3.78 mL/Hr) IV Continuous <Continuous>  sevelamer carbonate 800 milliGRAM(s) Oral every 8 hours  sodium chloride 0.9%. 1000 milliLiter(s) (10 mL/Hr) IV Continuous <Continuous>  tamsulosin 0.4 milliGRAM(s) Oral at bedtime  vasopressin Infusion 0.003 Unit(s)/Min (0.45 mL/Hr) IV Continuous <Continuous>    MEDICATIONS  (PRN):      LABS                        8.3    7.83  )-----------( 70       ( 01 Feb 2023 22:19 )             24.5     02-01    139  |  100  |  26<H>  ----------------------------<  139<H>  4.5   |  23  |  4.06<H>    Ca    7.6<L>      01 Feb 2023 22:19  Phos  3.3     02-01  Mg     1.7     02-01    TPro  5.2<L>  /  Alb  2.9<L>  /  TBili  0.4  /  DBili  x   /  AST  20  /  ALT  <5<L>  /  AlkPhos  255<H>  02-01    LIVER FUNCTIONS - ( 01 Feb 2023 22:19 )  Alb: 2.9 g/dL / Pro: 5.2 g/dL / ALK PHOS: 255 U/L / ALT: <5 U/L / AST: 20 U/L / GGT: x           PT/INR - ( 01 Feb 2023 22:19 )   PT: 16.9 sec;   INR: 1.42          PTT - ( 01 Feb 2023 22:19 )  PTT:30.5 sec    IMAGING/EKG/ETC  Reviewed.

## 2023-02-01 NOTE — PRE-ANESTHESIA EVALUATION ADULT - NSANTHADDINFOFT_GEN_ALL_CORE
H&P Adult [Charted Location: [address] 919 02] [Authored: 30-Jan-2023 13:21]- for Visit: [id number], Complete, Not Revised, Signed in Full, Available to Patient    History and Physical:   Source of Information	Chart(s), Patient  Outpatient Providers	Dr. Yoselin Britton     Language:  · Patient/Family of Limited English Proficiency	Yes  · Language	Nigerian  · Please document name/ID of person providing translation, or document patient refusal	Coco       Patient Identity:  · Birth Sex	Male  · Patient's Sexual Orientation	Heterosexual  · Patient's Gender Identity	Male  · Patient's Preferred Pronoun	Him/He     History of Present Illness:  Reason for Admission: CAD  History of Present Illness:   Patient is a 71 y/o Nigerian-speaking Male, former smoker, with PMHx of HTN, HLD, CAD s/p multiple SURYA stents (pLAD 5/14/21, OM1 3/21/21, pLCx on 10/2021at [facility]), DMII, ESRD (HD Tues/Thurs/Sat), CVA (approx. 2 years ago w/ right sided weakness) &  parkinson's disease who originally presented to [facility] 12/22/2022 c/o sharp left sided chest pain at rest overnight, relieved with sublingual nitroglycerin. At that time, he had an ECHO which revealed EF 55% and underwent dx cardiac cath 12/23/2022 found to have multivessel disease. Patient transferred to [facility] for under the cardiology team for possible PCI. On 12/27/22 patient underwent PCI which revealed PTCA oLCX 90% ISR, PTCA dLCX 90% ISR w/ subtotal small LCX distally; oLAD 80% ISR with + IFR (0.80), mLAD 70-80% & CTS was consulted & the patient was evaluated by Dr. Hernandez in the outpatient setting. Today, 1/30/23 patient presents for pre-surgical testing prior to CABG with Dr. Hernandez on 2/1/23. Today he feels well, denies chest pain, shortness of breath, RYAN, n/v, fever/chills, LE swelling, recent sick contacts.     Review of Systems:  Review of Systems: Review of Systems  CONSTITUTIONAL:  Denies Fevers / chills, sweats, fatigue, weight loss, weight gain                                      NEURO:  Denies parathesias, seizures, syncope, confusion                                                                                EYES:  Denies Blurry vision, discharge, pain, loss of vision                                                                                    ENMT:  Denies Difficulty hearing, vertigo, dysphagia, epistaxis, recent dental work                                       CV:  Denies Chest pain, palpitations, RYAN, orthopnea                                                                                          RESPIRATORY:  Denies Wheezing, SOB, cough / sputum, hemoptysis                                                                GI:  Denies Nausea, vomiting, diarrhea, constipation, melena, difficulty swallowing                                               : Denies Hematuria, dysuria, urgency, incontinence                                                                                         MUSCULOSKELETAL:  Denies arthritis, joint swelling, muscle weakness                                                             SKIN/BREAST:  Denies rash, itching, haiwr loss, masses                                                                                            PSYCH:  Denies depresion, anxiety, suicidal ideation                                                                                               HEME/LYMPH:  Denies bruises easily, enlarged lymph nodes, tender lymph nodes                                        ENDOCRINE:  Denies cold intolerance, heat intolerance, polydipsia      Allergies and Intolerances:        Allergies:  	No Known Allergies:     Home Medications:   * Patient Currently Takes Medications as of 28-Dec-2022 11:28 documented in Structured Notes  · 	amLODIPine 5 mg oral tablet: 1 tab(s) orally once a day  · 	carvedilol 25 mg oral tablet: 1 tab(s) orally 2 times a day  · 	clopidogrel 75 mg oral tablet: 1 tab(s) orally once a day  · 	Aspirin Enteric Coated 81 mg oral delayed release tablet: 1 tab(s) orally once a day  · 	atorvastatin 80 mg oral tablet: 1 tab(s) orally once a day  · 	isosorbide mononitrate 30 mg oral tablet, extended release: 1 tab(s) orally once a day (in the morning)  · 	cloNIDine 0.1 mg oral tablet: 1 tab(s) orally 2 times a day  · 	lisinopril 10 mg oral tablet: 1 tab(s) orally once a day  · 	carbidopa-levodopa 25 mg-100 mg oral tablet: 1 tab(s) orally 2 times a day  · 	ferrous sulfate 324 mg (65 mg elemental iron) oral delayed release tablet: 1 tab(s) orally 2 times a day  · 	sevelamer carbonate 800 mg oral tablet: 3 tab(s) orally 3 times a day (with meals)  · 	tamsulosin 0.4 mg oral capsule: 1 cap(s) orally once a day  · 	pantoprazole 40 mg oral delayed release tablet: 1 tab(s) orally once a day  · 	cinacalcet 60 mg oral tablet: 1 tab(s) orally once a day    .    Patient History:    Social History:  · Substance use	No  · Social History (marital status, living situation, occupation, and sexual history)	Denies current etoh/tobacco/illicit drug use.  Former smoker, 3 cigarettes/day x 5 years.   .     Tobacco Screening:  · Core Measure Site	No  · Has the patient used tobacco in the past 30 days?	No    Risk Assessment:    Present on Admission:  Deep Venous Thrombosis	no  Pulmonary Embolus	no  Urinary Catheter	no  Central Venous Catheter/PICC Line	no  Surgical Site Incision	no  Pressure Ulcer(s)	no     HIV Screening:  · In accordance with [place] State law, we offer every patient who comes to our ED an HIV test. Would you like to be tested today?	Opt out    Physical Exam:   Physical Exam: Appearance: No acute distress.  Neurologic: + Left arm tremor. AAOx3, no AMS or focal deficits.  Responds appropriately to verbal and physical stimuli; exhibits purposeful movement in all extremities.   HEENT:   MMM, PERRLA, EOMI	b/l  Neck: Supple  Cardiovascular: RRR, S1 S2. No m/r/g.  Respiratory: No acute respiratory distress. CTA b/l, no w/r/r.   Gastrointestinal:  Soft, non-tender, non-distended, + BS.	  Skin: No rashes. No ecchymoses. No cyanosis.  Extremities: Exhibits normal range of motion, no clubbing, cyanosis or edema.  Vascular: Peripheral pulses palpable 2+ bilaterally. Right arm AV fistula + thrill + bruit. Left arm AV fistula not usable per patient.       Labs and Results:  Labs, Radiology, Cardiology, and Other Results: Vital Signs  T(C): 36.5 (30 Jan 2023 13:54), Max: 36.5 (30 Jan 2023 13:54)  T(F): 97.7 (30 Jan 2023 13:54), Max: 97.7 (30 Jan 2023 13:54)  HR: 66 (30 Jan 2023 13:30) (66 - 66)  BP: 175/74 (30 Jan 2023 13:30) (175/74 - 175/74)  BP(mean): 107 (30 Jan 2023 13:30) (107 - 107)  RR: 18 (30 Jan 2023 13:30) (18 - 18)    Parameters below as of 30 Jan 2023 13:30  Patient On (Oxygen Delivery Method): room air        < from: TTE Echo Complete w/o Contrast w/ Doppler (12.28.22 @ 13:15) >    CONCLUSIONS:     1. Normal left ventricular size and systolic function.   2. Grade II left ventricular diastolic dysfunction.   3. Normal right ventricular size and systolic function.   4. Severely dilated left atrium.   5. Aortic sclerosis without significant stenosis.   6. No pericardial effusion.   7. No prior echo is available for comparison.    < end of copied text >    < from: 12 Lead ECG (12.27.22 @ 12:10) >    Diagnosis Line Normal sinus rhythm  Right bundle branch block  T wave abnormality, consider inferior ischemia  Abnormal ECG    < end of copied text >    Assessment and Plan:   · Completed VTE Risk Assessment(s)	Medical Assessment Completed on: 30-Jan-2023 15:02  · Completed VTE Risk Assessment(s)	Refer to the Assessment tab to view/cancel completed assessment.     Assessment:  · Assessment	  Patient is a 71 y/o Nigerian-speaking Male, former smoker, with PMHx of HTN, HLD, CAD s/p multiple SURYA stents (pLAD 5/14/21, OM1 3/21/21, pLCx on 10/2021at [facility]), DMII, ESRD (HD Tues/Thurs/Sat), CVA (approx. 2 years ago w/ right sided weakness) and  parkinson's disease who originally presented to [facility] 12/22/2022 c/o sharp left sided chest pain at rest overnight, relieved with sublingual nitroglycerin. At that time, he had an ECHO which revealed EF 55% and underwent dx cardiac cath 12/23/2022 found to have multivessel disease. Patient transferred to [facility] for under the cardiology team for possible PCI. On 12/27/22 patient underwent PCI which revealed PTCA oLCX 90% ISR, PTCA dLCX 90% ISR w/ subtotal small LCX distally; oLAD 80% ISR with + IFR (0.80), mLAD 70-80% & CTS was consulted & the patient was evaluated by Dr. Hernandez in the outpatient setting. Today, 1/30/23 patient presents for pre-surgical testing prior to CABG with Dr. Hernandez on 2/1/23.    Plan:    Neurovascular:   - Hx of CVA  - Hx of Parkinson's  - Continue Sinemet   -No delirium.   -Pain regimen, PRN's: Tylenol 650mg q6hr PRN     Cardiovascular: CAD (hx of PCI to pLAD and pLCx and PTCA to oLCX and dLCX)  - Pre- surgical testing underway   - Continue ASA 81, Lipitor 80mg, Imdur 30mg, Carvedilol 25mg q12hr  - Hx HTN: Continue Amlodipine 5mg  -Hemodynamically stable. HR controlled.  -Continue to monitor HR, BP, telemetry      Respiratory: Stable  -Oxygenating well on RA   -Encourage coughing and use of IS 10x / hr while awake.  -CXR: Pending    GI:  Stable  -PPX: Protonix  -PO Diet: Renal/CC  -Bowel regimen: Miralax    Renal / : ESRD on HD (TRS)  - Plan for HD tomorrow   - Continue Sevelamer & Cinacalcet  -Monitor renal function.  -Monitor I/O's.  -BUN/Cr: Pending  - hx bph  - flomax 0.4 mg qHS    Endocrine:  DM2  -A1c: 6.8  - Lantus 10 qHS, humalog 3 units pre-meal + ISS  -TSH: Pending     Hematologic:  -H/H: pending  -Coagulation Panel: pending     ID:  -Temperature: afebrile  -WBC: pending  -Observe for SIRS/Sepsis Syndrome.    Prophylaxis:  -DVT prophylaxis with 5000 SubQ Heparin q8h  -Continue with SCD's    Disposition:  OR 2/1       Electronic Signatures:  Mark Hernandez)   (Signed 31-Jan-2023 06:41)  	Co-Signer: History and Physical, History of Present Illness, Allergies/Medications, Patient History  Stacie Abdullahi (NP)   (Signed 30-Jan-2023 16:10)  	Authored: History and Physical, Allergies/Medications, Patient History, Risk Assessment, Physical Exam, Labs and Results, History of Present Illness, Assessment and Plan    Last Updated: 31-Jan-2023 06:41 by Mark Hernandez)

## 2023-02-01 NOTE — DIETITIAN INITIAL EVALUATION ADULT - OTHER INFO
73 y/o Malay-speaking Male, former smoker, with PMHx of HTN, HLD, CAD s/p multiple SURYA stents (pLAD 5/14/21, OM1 3/21/21, pLCx on 10/2021at [facility]), DMII, ESRD (HD Tues/Thurs/Sat), CVA (approx. 2 years ago w/ right sided weakness) and  parkinson's disease who originally presented to [facility] 12/22/2022 c/o sharp left sided chest pain at rest overnight, relieved with sublingual nitroglycerin. At that time, he had an ECHO which revealed EF 55% and underwent dx cardiac cath 12/23/2022 found to have multivessel disease. Patient transferred to [facility] for under the cardiology team for possible PCI. On 12/27/22 patient underwent PCI which revealed PTCA oLCX 90% ISR, PTCA dLCX 90% ISR w/ subtotal small LCX distally; oLAD 80% ISR with + IFR (0.80), mLAD 70-80% & CTS was consulted & the patient was evaluated by Dr. Hernandez in the outpatient setting. On 1/30/23 patient presented for pre-surgical testing prior to CABG with Dr. Hernandez on 2/1/23. Today, stable, undergoing PST.    Attempted to visit pt at bedside, however, pt out of room in OR thus information obtained via EMR. No n/v/d/c documented at this time. Last documented bowel movement 1/31. NKFA documented. Unable to obtain diet recall PTA. Dosing weight 138 pounds. B/L ankle edema 1+. No pressure ulcers documented at this time. Luigi score=20. Labs reviewed 2/1; pt hyponatremic, noted w/ BUN/Cr. Fingersticks 1/31-2/1:  mg/dL; insulin regimen ordered. Unable to perform nutrition focused physical exam at this time. Based on ASPEN guidelines, pt does not meet criteria for malnutrition at this time. Pt currently NPO. RD to follow up. See nutrition recommendations below.

## 2023-02-02 LAB
ALBUMIN SERPL ELPH-MCNC: 3.1 G/DL — LOW (ref 3.3–5)
ALBUMIN SERPL ELPH-MCNC: 3.9 G/DL — SIGNIFICANT CHANGE UP (ref 3.3–5)
ALBUMIN SERPL ELPH-MCNC: 4.1 G/DL — SIGNIFICANT CHANGE UP (ref 3.3–5)
ALP SERPL-CCNC: 226 U/L — HIGH (ref 40–120)
ALP SERPL-CCNC: 227 U/L — HIGH (ref 40–120)
ALP SERPL-CCNC: 250 U/L — HIGH (ref 40–120)
ALT FLD-CCNC: <5 U/L — LOW (ref 10–45)
ANION GAP SERPL CALC-SCNC: 20 MMOL/L — HIGH (ref 5–17)
ANION GAP SERPL CALC-SCNC: 22 MMOL/L — HIGH (ref 5–17)
ANION GAP SERPL CALC-SCNC: 23 MMOL/L — HIGH (ref 5–17)
APTT BLD: 30.9 SEC — SIGNIFICANT CHANGE UP (ref 27.5–35.5)
APTT BLD: 31.8 SEC — SIGNIFICANT CHANGE UP (ref 27.5–35.5)
APTT BLD: 33.4 SEC — SIGNIFICANT CHANGE UP (ref 27.5–35.5)
AST SERPL-CCNC: 22 U/L — SIGNIFICANT CHANGE UP (ref 10–40)
AST SERPL-CCNC: 23 U/L — SIGNIFICANT CHANGE UP (ref 10–40)
AST SERPL-CCNC: 24 U/L — SIGNIFICANT CHANGE UP (ref 10–40)
BILIRUB SERPL-MCNC: 0.3 MG/DL — SIGNIFICANT CHANGE UP (ref 0.2–1.2)
BILIRUB SERPL-MCNC: 0.4 MG/DL — SIGNIFICANT CHANGE UP (ref 0.2–1.2)
BILIRUB SERPL-MCNC: 0.5 MG/DL — SIGNIFICANT CHANGE UP (ref 0.2–1.2)
BUN SERPL-MCNC: 18 MG/DL — SIGNIFICANT CHANGE UP (ref 7–23)
BUN SERPL-MCNC: 31 MG/DL — HIGH (ref 7–23)
BUN SERPL-MCNC: 35 MG/DL — HIGH (ref 7–23)
CALCIUM SERPL-MCNC: 7.6 MG/DL — LOW (ref 8.4–10.5)
CALCIUM SERPL-MCNC: 8.7 MG/DL — SIGNIFICANT CHANGE UP (ref 8.4–10.5)
CALCIUM SERPL-MCNC: 9.2 MG/DL — SIGNIFICANT CHANGE UP (ref 8.4–10.5)
CHLORIDE SERPL-SCNC: 94 MMOL/L — LOW (ref 96–108)
CHLORIDE SERPL-SCNC: 96 MMOL/L — SIGNIFICANT CHANGE UP (ref 96–108)
CHLORIDE SERPL-SCNC: 98 MMOL/L — SIGNIFICANT CHANGE UP (ref 96–108)
CO2 SERPL-SCNC: 19 MMOL/L — LOW (ref 22–31)
CO2 SERPL-SCNC: 19 MMOL/L — LOW (ref 22–31)
CO2 SERPL-SCNC: 22 MMOL/L — SIGNIFICANT CHANGE UP (ref 22–31)
CREAT SERPL-MCNC: 2.82 MG/DL — HIGH (ref 0.5–1.3)
CREAT SERPL-MCNC: 4.79 MG/DL — HIGH (ref 0.5–1.3)
CREAT SERPL-MCNC: 5.19 MG/DL — HIGH (ref 0.5–1.3)
EGFR: 11 ML/MIN/1.73M2 — LOW
EGFR: 12 ML/MIN/1.73M2 — LOW
EGFR: 23 ML/MIN/1.73M2 — LOW
GAS PNL BLDA: SIGNIFICANT CHANGE UP
GLUCOSE BLDC GLUCOMTR-MCNC: 147 MG/DL — HIGH (ref 70–99)
GLUCOSE BLDC GLUCOMTR-MCNC: 173 MG/DL — HIGH (ref 70–99)
GLUCOSE SERPL-MCNC: 168 MG/DL — HIGH (ref 70–99)
GLUCOSE SERPL-MCNC: 178 MG/DL — HIGH (ref 70–99)
GLUCOSE SERPL-MCNC: 196 MG/DL — HIGH (ref 70–99)
HCT VFR BLD CALC: 22.7 % — LOW (ref 39–50)
HCT VFR BLD CALC: 25.5 % — LOW (ref 39–50)
HCT VFR BLD CALC: 30.2 % — LOW (ref 39–50)
HGB BLD-MCNC: 10.2 G/DL — LOW (ref 13–17)
HGB BLD-MCNC: 7.5 G/DL — LOW (ref 13–17)
HGB BLD-MCNC: 8.4 G/DL — LOW (ref 13–17)
INR BLD: 1.27 — HIGH (ref 0.88–1.16)
INR BLD: 1.29 — HIGH (ref 0.88–1.16)
INR BLD: 1.42 — HIGH (ref 0.88–1.16)
LACTATE SERPL-SCNC: 1.8 MMOL/L — SIGNIFICANT CHANGE UP (ref 0.5–2)
MAGNESIUM SERPL-MCNC: 2 MG/DL — SIGNIFICANT CHANGE UP (ref 1.6–2.6)
MAGNESIUM SERPL-MCNC: 2.1 MG/DL — SIGNIFICANT CHANGE UP (ref 1.6–2.6)
MAGNESIUM SERPL-MCNC: 2.2 MG/DL — SIGNIFICANT CHANGE UP (ref 1.6–2.6)
MCHC RBC-ENTMCNC: 30.2 PG — SIGNIFICANT CHANGE UP (ref 27–34)
MCHC RBC-ENTMCNC: 30.3 PG — SIGNIFICANT CHANGE UP (ref 27–34)
MCHC RBC-ENTMCNC: 30.7 PG — SIGNIFICANT CHANGE UP (ref 27–34)
MCHC RBC-ENTMCNC: 32.9 GM/DL — SIGNIFICANT CHANGE UP (ref 32–36)
MCHC RBC-ENTMCNC: 33 GM/DL — SIGNIFICANT CHANGE UP (ref 32–36)
MCHC RBC-ENTMCNC: 33.8 GM/DL — SIGNIFICANT CHANGE UP (ref 32–36)
MCV RBC AUTO: 91 FL — SIGNIFICANT CHANGE UP (ref 80–100)
MCV RBC AUTO: 91.5 FL — SIGNIFICANT CHANGE UP (ref 80–100)
MCV RBC AUTO: 92.1 FL — SIGNIFICANT CHANGE UP (ref 80–100)
NRBC # BLD: 0 /100 WBCS — SIGNIFICANT CHANGE UP (ref 0–0)
PHOSPHATE SERPL-MCNC: 3.8 MG/DL — SIGNIFICANT CHANGE UP (ref 2.5–4.5)
PHOSPHATE SERPL-MCNC: 4.4 MG/DL — SIGNIFICANT CHANGE UP (ref 2.5–4.5)
PHOSPHATE SERPL-MCNC: 4.9 MG/DL — HIGH (ref 2.5–4.5)
PLATELET # BLD AUTO: 75 K/UL — LOW (ref 150–400)
PLATELET # BLD AUTO: 91 K/UL — LOW (ref 150–400)
PLATELET # BLD AUTO: 91 K/UL — LOW (ref 150–400)
POTASSIUM SERPL-MCNC: 4.1 MMOL/L — SIGNIFICANT CHANGE UP (ref 3.5–5.3)
POTASSIUM SERPL-MCNC: 4.5 MMOL/L — SIGNIFICANT CHANGE UP (ref 3.5–5.3)
POTASSIUM SERPL-MCNC: 4.8 MMOL/L — SIGNIFICANT CHANGE UP (ref 3.5–5.3)
POTASSIUM SERPL-SCNC: 4.1 MMOL/L — SIGNIFICANT CHANGE UP (ref 3.5–5.3)
POTASSIUM SERPL-SCNC: 4.5 MMOL/L — SIGNIFICANT CHANGE UP (ref 3.5–5.3)
POTASSIUM SERPL-SCNC: 4.8 MMOL/L — SIGNIFICANT CHANGE UP (ref 3.5–5.3)
PROT SERPL-MCNC: 5.3 G/DL — LOW (ref 6–8.3)
PROT SERPL-MCNC: 5.9 G/DL — LOW (ref 6–8.3)
PROT SERPL-MCNC: 6.1 G/DL — SIGNIFICANT CHANGE UP (ref 6–8.3)
PROTHROM AB SERPL-ACNC: 15.2 SEC — HIGH (ref 10.5–13.4)
PROTHROM AB SERPL-ACNC: 15.4 SEC — HIGH (ref 10.5–13.4)
PROTHROM AB SERPL-ACNC: 16.9 SEC — HIGH (ref 10.5–13.4)
RBC # BLD: 2.48 M/UL — LOW (ref 4.2–5.8)
RBC # BLD: 2.77 M/UL — LOW (ref 4.2–5.8)
RBC # BLD: 3.32 M/UL — LOW (ref 4.2–5.8)
RBC # FLD: 15.5 % — HIGH (ref 10.3–14.5)
RBC # FLD: 15.6 % — HIGH (ref 10.3–14.5)
RBC # FLD: 15.9 % — HIGH (ref 10.3–14.5)
SODIUM SERPL-SCNC: 136 MMOL/L — SIGNIFICANT CHANGE UP (ref 135–145)
SODIUM SERPL-SCNC: 138 MMOL/L — SIGNIFICANT CHANGE UP (ref 135–145)
SODIUM SERPL-SCNC: 139 MMOL/L — SIGNIFICANT CHANGE UP (ref 135–145)
WBC # BLD: 8.61 K/UL — SIGNIFICANT CHANGE UP (ref 3.8–10.5)
WBC # BLD: 8.76 K/UL — SIGNIFICANT CHANGE UP (ref 3.8–10.5)
WBC # BLD: 9 K/UL — SIGNIFICANT CHANGE UP (ref 3.8–10.5)
WBC # FLD AUTO: 8.61 K/UL — SIGNIFICANT CHANGE UP (ref 3.8–10.5)
WBC # FLD AUTO: 8.76 K/UL — SIGNIFICANT CHANGE UP (ref 3.8–10.5)
WBC # FLD AUTO: 9 K/UL — SIGNIFICANT CHANGE UP (ref 3.8–10.5)

## 2023-02-02 PROCEDURE — 99292 CRITICAL CARE ADDL 30 MIN: CPT

## 2023-02-02 PROCEDURE — 71045 X-RAY EXAM CHEST 1 VIEW: CPT | Mod: 26

## 2023-02-02 PROCEDURE — 90937 HEMODIALYSIS REPEATED EVAL: CPT

## 2023-02-02 PROCEDURE — 93010 ELECTROCARDIOGRAM REPORT: CPT

## 2023-02-02 PROCEDURE — 99291 CRITICAL CARE FIRST HOUR: CPT

## 2023-02-02 RX ORDER — PANTOPRAZOLE SODIUM 20 MG/1
40 TABLET, DELAYED RELEASE ORAL
Refills: 0 | Status: DISCONTINUED | OUTPATIENT
Start: 2023-02-02 | End: 2023-02-07

## 2023-02-02 RX ORDER — SODIUM CHLORIDE 9 MG/ML
1000 INJECTION, SOLUTION INTRAVENOUS
Refills: 0 | Status: DISCONTINUED | OUTPATIENT
Start: 2023-02-02 | End: 2023-02-07

## 2023-02-02 RX ORDER — CALCIUM GLUCONATE 100 MG/ML
2 VIAL (ML) INTRAVENOUS ONCE
Refills: 0 | Status: COMPLETED | OUTPATIENT
Start: 2023-02-02 | End: 2023-02-02

## 2023-02-02 RX ORDER — INSULIN LISPRO 100/ML
VIAL (ML) SUBCUTANEOUS
Refills: 0 | Status: DISCONTINUED | OUTPATIENT
Start: 2023-02-02 | End: 2023-02-07

## 2023-02-02 RX ORDER — AMIODARONE HYDROCHLORIDE 400 MG/1
400 TABLET ORAL EVERY 8 HOURS
Refills: 0 | Status: DISCONTINUED | OUTPATIENT
Start: 2023-02-02 | End: 2023-02-07

## 2023-02-02 RX ORDER — FENTANYL CITRATE 50 UG/ML
25 INJECTION INTRAVENOUS ONCE
Refills: 0 | Status: DISCONTINUED | OUTPATIENT
Start: 2023-02-02 | End: 2023-02-02

## 2023-02-02 RX ORDER — MIDODRINE HYDROCHLORIDE 2.5 MG/1
10 TABLET ORAL EVERY 8 HOURS
Refills: 0 | Status: DISCONTINUED | OUTPATIENT
Start: 2023-02-02 | End: 2023-02-03

## 2023-02-02 RX ORDER — AMIODARONE HYDROCHLORIDE 400 MG/1
150 TABLET ORAL ONCE
Refills: 0 | Status: COMPLETED | OUTPATIENT
Start: 2023-02-02 | End: 2023-02-02

## 2023-02-02 RX ORDER — ALBUMIN HUMAN 25 %
50 VIAL (ML) INTRAVENOUS
Refills: 0 | Status: COMPLETED | OUTPATIENT
Start: 2023-02-02 | End: 2023-02-02

## 2023-02-02 RX ORDER — DEXTROSE 50 % IN WATER 50 %
15 SYRINGE (ML) INTRAVENOUS ONCE
Refills: 0 | Status: DISCONTINUED | OUTPATIENT
Start: 2023-02-02 | End: 2023-02-07

## 2023-02-02 RX ORDER — ERYTHROPOIETIN 10000 [IU]/ML
6000 INJECTION, SOLUTION INTRAVENOUS; SUBCUTANEOUS ONCE
Refills: 0 | Status: COMPLETED | OUTPATIENT
Start: 2023-02-02 | End: 2023-02-02

## 2023-02-02 RX ORDER — MAGNESIUM SULFATE 500 MG/ML
1 VIAL (ML) INJECTION ONCE
Refills: 0 | Status: COMPLETED | OUTPATIENT
Start: 2023-02-02 | End: 2023-02-02

## 2023-02-02 RX ORDER — SODIUM BICARBONATE 1 MEQ/ML
50 SYRINGE (ML) INTRAVENOUS ONCE
Refills: 0 | Status: COMPLETED | OUTPATIENT
Start: 2023-02-02 | End: 2023-02-02

## 2023-02-02 RX ORDER — POLYETHYLENE GLYCOL 3350 17 G/17G
17 POWDER, FOR SOLUTION ORAL DAILY
Refills: 0 | Status: DISCONTINUED | OUTPATIENT
Start: 2023-02-02 | End: 2023-02-07

## 2023-02-02 RX ORDER — GLUCAGON INJECTION, SOLUTION 0.5 MG/.1ML
1 INJECTION, SOLUTION SUBCUTANEOUS ONCE
Refills: 0 | Status: DISCONTINUED | OUTPATIENT
Start: 2023-02-02 | End: 2023-02-07

## 2023-02-02 RX ORDER — SENNA PLUS 8.6 MG/1
2 TABLET ORAL AT BEDTIME
Refills: 0 | Status: DISCONTINUED | OUTPATIENT
Start: 2023-02-02 | End: 2023-02-07

## 2023-02-02 RX ADMIN — HEPARIN SODIUM 5000 UNIT(S): 5000 INJECTION INTRAVENOUS; SUBCUTANEOUS at 22:34

## 2023-02-02 RX ADMIN — AMIODARONE HYDROCHLORIDE 133.33 MILLIGRAM(S): 400 TABLET ORAL at 11:44

## 2023-02-02 RX ADMIN — Medication 50 MILLIEQUIVALENT(S): at 10:41

## 2023-02-02 RX ADMIN — AMIODARONE HYDROCHLORIDE 400 MILLIGRAM(S): 400 TABLET ORAL at 15:23

## 2023-02-02 RX ADMIN — HEPARIN SODIUM 5000 UNIT(S): 5000 INJECTION INTRAVENOUS; SUBCUTANEOUS at 13:48

## 2023-02-02 RX ADMIN — ERYTHROPOIETIN 6000 UNIT(S): 10000 INJECTION, SOLUTION INTRAVENOUS; SUBCUTANEOUS at 10:35

## 2023-02-02 RX ADMIN — Medication 50 MILLILITER(S): at 13:49

## 2023-02-02 RX ADMIN — MIDODRINE HYDROCHLORIDE 10 MILLIGRAM(S): 2.5 TABLET ORAL at 13:47

## 2023-02-02 RX ADMIN — Medication 200 GRAM(S): at 13:50

## 2023-02-02 RX ADMIN — CARBIDOPA AND LEVODOPA 1 TABLET(S): 25; 100 TABLET ORAL at 18:30

## 2023-02-02 RX ADMIN — TAMSULOSIN HYDROCHLORIDE 0.4 MILLIGRAM(S): 0.4 CAPSULE ORAL at 22:33

## 2023-02-02 RX ADMIN — FENTANYL CITRATE 25 MICROGRAM(S): 50 INJECTION INTRAVENOUS at 04:18

## 2023-02-02 RX ADMIN — CHLORHEXIDINE GLUCONATE 5 MILLILITER(S): 213 SOLUTION TOPICAL at 05:50

## 2023-02-02 RX ADMIN — CLOPIDOGREL BISULFATE 75 MILLIGRAM(S): 75 TABLET, FILM COATED ORAL at 15:25

## 2023-02-02 RX ADMIN — SEVELAMER CARBONATE 800 MILLIGRAM(S): 2400 POWDER, FOR SUSPENSION ORAL at 18:59

## 2023-02-02 RX ADMIN — FENTANYL CITRATE 25 MICROGRAM(S): 50 INJECTION INTRAVENOUS at 06:54

## 2023-02-02 RX ADMIN — Medication 200 GRAM(S): at 04:12

## 2023-02-02 RX ADMIN — AMIODARONE HYDROCHLORIDE 400 MILLIGRAM(S): 400 TABLET ORAL at 22:34

## 2023-02-02 RX ADMIN — SENNA PLUS 2 TABLET(S): 8.6 TABLET ORAL at 22:33

## 2023-02-02 RX ADMIN — AMIODARONE HYDROCHLORIDE 600 MILLIGRAM(S): 400 TABLET ORAL at 13:53

## 2023-02-02 RX ADMIN — FENTANYL CITRATE 25 MICROGRAM(S): 50 INJECTION INTRAVENOUS at 00:00

## 2023-02-02 RX ADMIN — CHLORHEXIDINE GLUCONATE 1 APPLICATION(S): 213 SOLUTION TOPICAL at 14:32

## 2023-02-02 RX ADMIN — Medication 100 MILLIGRAM(S): at 22:34

## 2023-02-02 RX ADMIN — FENTANYL CITRATE 25 MICROGRAM(S): 50 INJECTION INTRAVENOUS at 05:48

## 2023-02-02 RX ADMIN — ATORVASTATIN CALCIUM 80 MILLIGRAM(S): 80 TABLET, FILM COATED ORAL at 22:33

## 2023-02-02 RX ADMIN — Medication 50 MILLILITER(S): at 09:42

## 2023-02-02 RX ADMIN — Medication 2: at 22:55

## 2023-02-02 RX ADMIN — Medication 81 MILLIGRAM(S): at 15:25

## 2023-02-02 RX ADMIN — Medication 100 GRAM(S): at 00:04

## 2023-02-02 RX ADMIN — CARBIDOPA AND LEVODOPA 1 TABLET(S): 25; 100 TABLET ORAL at 06:21

## 2023-02-02 NOTE — PROGRESS NOTE ADULT - ASSESSMENT
72M hx of , ESRD POST OP #1 s/p OPCAB, HDS on vaso, net + 1L, on HFNC, with stable electrolytes,     Assessment/Plan:   Electrolytes noted Potassium of 4.5, Bicarb of 19  HD today with goal UF 1L      -can go up on pressor if SBP <100 to help facilitate UF    Dry weight TBD    #HTN   -Hold home antihypertensives, currently on Vaso   -UF with HD goal 1L UF     #access   RUE AVF functional     #anemia  Hb not at goal, 8.4  Iron panel noted, Tsat and Ferritin wnl, no indication for IV iron   epo w/ HD  s/p 1UPRBC  2UPRBC today with HD     #renal bone disease   Ca ~7.6  Phos: 4.4  -C/w cinacalcet 60mg Qday  -Hold phos binders

## 2023-02-02 NOTE — PROGRESS NOTE ADULT - SUBJECTIVE AND OBJECTIVE BOX
Patient is a 72y Male seen and evaluated at bedside. No acute distress, does not offer any complaints at this time. Was dialyzed yesterday after procedure with 500L UF. VSS today, on HFNC, on low dose Vaso weaned off Levo. Plan for HD today.       Meds:    aspirin enteric coated 81 daily  atorvastatin 80 at bedtime  carbidopa/levodopa  25/100 1 <User Schedule>  ceFAZolin   IVPB 1000 every 24 hours  chlorhexidine 0.12% Liquid 5 two times a day  chlorhexidine 2% Cloths 1 daily  chlorhexidine 4% Liquid 1 once  clopidogrel Tablet 75 daily  dexMEDEtomidine Infusion 0.2 <Continuous>  dextrose 50% Injectable 50 every 15 minutes  dextrose 50% Injectable 25 every 15 minutes  heparin   Injectable 5000 every 8 hours  insulin regular Infusion 1 <Continuous>  norepinephrine Infusion 0.05 <Continuous>  pantoprazole  Injectable 40 daily  propofol Infusion 10 <Continuous>  sevelamer carbonate 800 every 8 hours  sodium chloride 0.9%. 1000 <Continuous>  tamsulosin 0.4 at bedtime  vasopressin Infusion 0.003 <Continuous>      T(C): , Max: 36.6 (02-01-23 @ 08:57)  T(F): , Max: 97.8 (02-01-23 @ 08:57)  HR: 130 (02-02-23 @ 08:00)  BP: 127/55  RR: 16 (02-02-23 @ 08:00)  SpO2: 97% (02-02-23 @ 08:00)      02-01 @ 07:01  -  02-02 @ 07:00  --------------------------------------------------------  IN: 1778.4 mL / OUT: 1045 mL / NET: 733.4 mL    02-02 @ 07:01  -  02-02 @ 08:49  --------------------------------------------------------  IN: 85.7 mL / OUT: 0 mL / NET: 85.7 mL          Review of Systems:  ROS negative except as per HPI      PHYSICAL EXAM:  GENERAL: Alert, awake, oriented x3 on HFNC   HEENT: no RUSLAN, MMM   CHEST/LUNG: Bilateral clear breath sounds, Left chest tube in place   HEART: Regular rate and rhythm, no murmur, no gallops, no rub   ABDOMEN: Soft, nontender, non distended  : No flank or supra pubic tenderness.  EXTREMITIES: no pedal edema  Neurology: AAOx3, no asterixis   SKIN: No rash or skin lesion   ACCESS: RUE  AVF w/bruit and thrill       LABS:                        8.4    8.76  )-----------( 91       ( 02 Feb 2023 03:10 )             25.5     02-02    139  |  98  |  31<H>  ----------------------------<  168<H>  4.5   |  19<L>  |  4.79<H>    Ca    7.6<L>      02 Feb 2023 03:10  Phos  4.4     02-02  Mg     2.1     02-02    TPro  5.3<L>  /  Alb  3.1<L>  /  TBili  0.4  /  DBili  x   /  AST  22  /  ALT  <5<L>  /  AlkPhos  250<H>  02-02      PT/INR - ( 02 Feb 2023 03:10 )   PT: 15.2 sec;   INR: 1.27          PTT - ( 02 Feb 2023 03:10 )  PTT:30.9 sec          RADIOLOGY & ADDITIONAL STUDIES:           Patient is a 72y Male seen and evaluated at bedside. No acute distress, does not offer any complaints at this time. Was dialyzed yesterday after procedure with 500L UF. VSS today, on HFNC, on low dose Vaso weaned off Levo. Plan for HD today.       Meds:    aspirin enteric coated 81 daily  atorvastatin 80 at bedtime  carbidopa/levodopa  25/100 1 <User Schedule>  ceFAZolin   IVPB 1000 every 24 hours  chlorhexidine 0.12% Liquid 5 two times a day  chlorhexidine 2% Cloths 1 daily  chlorhexidine 4% Liquid 1 once  clopidogrel Tablet 75 daily  dexMEDEtomidine Infusion 0.2 <Continuous>  dextrose 50% Injectable 50 every 15 minutes  dextrose 50% Injectable 25 every 15 minutes  heparin   Injectable 5000 every 8 hours  insulin regular Infusion 1 <Continuous>  norepinephrine Infusion 0.05 <Continuous>  pantoprazole  Injectable 40 daily  propofol Infusion 10 <Continuous>  sevelamer carbonate 800 every 8 hours  sodium chloride 0.9%. 1000 <Continuous>  tamsulosin 0.4 at bedtime  vasopressin Infusion 0.003 <Continuous>      T(C): , Max: 36.6 (23 @ 08:57)  T(F): , Max: 97.8 (23 @ 08:57)  HR: 130 (23 @ 08:00)  BP: 127/55  RR: 16 (23 @ 08:00)  SpO2: 97% (23 @ 08:00)       @ 07:  -   @ 07:00  --------------------------------------------------------  IN: 1778.4 mL / OUT: 1045 mL / NET: 733.4 mL     @ 07:01  -   @ 08:49  --------------------------------------------------------  IN: 85.7 mL / OUT: 0 mL / NET: 85.7 mL          Review of Systems:  ROS negative except as per HPI      PHYSICAL EXAM:  GENERAL: Alert, awake, oriented x3 on HFNC   HEENT: no RUSLAN, MMM   CHEST/LUNG: Bilateral clear breath sounds, Left chest tube in place   HEART: Regular rate and rhythm, no murmur, no gallops, no rub   ABDOMEN: Soft, nontender, non distended  : No flank or supra pubic tenderness.  EXTREMITIES: no pedal edema  Neurology: AAOx3, no asterixis   SKIN: No rash or skin lesion   ACCESS: RUE  AVF w/bruit and thrill       LABS:                        8.4    8.76  )-----------( 91       ( 2023 03:10 )             25.5         139  |  98  |  31<H>  ----------------------------<  168<H>  4.5   |  19<L>  |  4.79<H>    Ca    7.6<L>      2023 03:10  Phos  4.4       Mg     2.1         TPro  5.3<L>  /  Alb  3.1<L>  /  TBili  0.4  /  DBili  x   /  AST  22  /  ALT  <5<L>  /  AlkPhos  250<H>        PT/INR - ( 2023 03:10 )   PT: 15.2 sec;   INR: 1.27          PTT - ( 2023 03:10 )  PTT:30.9 sec          RADIOLOGY & ADDITIONAL STUDIES:      Phosphorus Level, Serum: 3.8 mg/dL (23 @ 15:40)  Hemoglobin: 10.2 g/dL (23 @ 15:40)  Hemoglobin: 7.5 g/dL (23 @ 09:34)  Phosphorus Level, Serum: 4.9 mg/dL (23 @ 09:34)    Albumin, Serum: 4.1 g/dL (23 @ 15:40)  Albumin, Serum: 3.9 g/dL (23 @ 09:34)    cinacalcet 60 milliGRAM(s) Oral daily, 23 @ 14:50, Routine  epoetin olu-epbx (RETACRIT) Injectable 6000 Unit(s) IV Push once, 23 @ 09:00, Routine, Stop order after: 1 Doses  epoetin olu-epbx (RETACRIT) Injectable 6000 Unit(s) IV Push once, 23 @ 07:39, Routine, Stop order after: 1 Doses  sevelamer carbonate 800 milliGRAM(s) Oral every 8 hours, 23 @ 17:48, Routine  sevelamer carbonate 2400 milliGRAM(s) Oral three times a day with meals, 23 @ 14:53, Routine      Hemodialysis Treatment.:     Schedule: Once, Modality: Hemodialysis, Access: Arteriovenous Fistula    Dialyzer: Optiflux [id number], Time: 180 Min    Blood Flow: 400 mL/Min , Dialysate Flow: 500 mL/Min, Dialysate Temp: 36.5, Tubinmm (Adult)    Target Fluid Removal: 1 Liters    Dialysate Electrolytes (mEq/L): Potassium 2, Calcium 2.5, Sodium 138, Bicarbonate 35    Additional Instructions: Patient to receive 2UPRBC with HD (23 @ 08:48) [Completed]

## 2023-02-02 NOTE — PROGRESS NOTE ADULT - SUBJECTIVE AND OBJECTIVE BOX
CTICU  CRITICAL  CARE  attending     Hand off received 					   Pertinent clinical, laboratory, radiographic, hemodynamic, echocardiographic, respiratory data, microbiologic data and chart were reviewed and analyzed frequently throughout the course of the day and night  Patient seen and examined with CTS/ SH attending at bedside  Pt is a 72y , Male, HEALTH ISSUES - PROBLEM Dx:      , FAMILY HISTORY:  FH: type 2 diabetes  In father    PAST MEDICAL & SURGICAL HISTORY:  Diabetes      Hypertension      Hyperphosphatemia      Coronary artery disease  with stent      Hyperlipidemia      Stroke  , residual right sided weakness      History of BPH      Back pain      ESRD on hemodialysis      Parkinsons disease      ESRD (end stage renal disease)      Hypertension      CAD (coronary artery disease)      S/P dialysis catheter insertion  Right chest perma cath removed in 2020      AVF (arteriovenous fistula)  right arm x with revision, left arm x 1      Stented coronary artery  - at [facility]      S/P primary angioplasty with coronary stent        Patient is a 72y old  Male who presents with a chief complaint of CAD (2023 14:29)      14 system review limited by mentation and multiorgan morbidity     Vital signs, hemodynamic and respiratory parameters were reviewed from the bedside nursing flowsheet.  ICU Vital Signs Last 24 Hrs  T(C): 36.1 (2023 17:08), Max: 36.8 (2023 13:30)  T(F): 97 (2023 17:08), Max: 98.2 (2023 13:30)  HR: 65 (2023 22:00) (58 - 130)  BP: --  BP(mean): --  ABP: 148/41 (2023 22:00) (119/37 - 183/60)  ABP(mean): 78 (2023 22:00) (63 - 102)  RR: 18 (2023 22:00) (16 - 26)  SpO2: 100% (2023 22:00) (88% - 100%)    O2 Parameters below as of 2023 22:00  Patient On (Oxygen Delivery Method): nasal cannula, high flow  O2 Flow (L/min): 50  O2 Concentration (%): 50      Adult Advanced Hemodynamics Last 24 Hrs  CVP(mm Hg): 5 (2023 22:00) (5 - 26)  CVP(cm H2O): --  CO: --  CI: --  PA: --  PA(mean): --  PCWP: --  SVR: --  SVRI: --  PVR: --  PVRI: --, ABG - ( 2023 15:40 )  pH, Arterial: 7.50  pH, Blood: x     /  pCO2: 29    /  pO2: 147   / HCO3: 23    / Base Excess: 0.4   /  SaO2: 98.9              Mode: standby    Intake and output was reviewed and the fluid balance was calculated  Daily     Daily Weight in k.8 (2023 13:30)  I&O's Summary    2023 07:01  -  2023 07:00  --------------------------------------------------------  IN: 1778.4 mL / OUT: 1045 mL / NET: 733.4 mL    2023 07:01  -  2023 22:21  --------------------------------------------------------  IN: 1614.5 mL / OUT: 2310 mL / NET: -695.5 mL        All lines and drain sites were assessed  Glycemic trend was reviewedCAPILLARY BLOOD GLUCOSE      POCT Blood Glucose.: 147 mg/dL (2023 14:29)    No acute change in focality  Auscultation of the chest reveals equal bs  Abdomen is soft  Extremities are warm and well perfused  Wounds appear clean and unremarkable  Antibiotics are periop    labs  CBC Full  -  ( 2023 15:40 )  WBC Count : 9.00 K/uL  RBC Count : 3.32 M/uL  Hemoglobin : 10.2 g/dL  Hematocrit : 30.2 %  Platelet Count - Automated : 75 K/uL  Mean Cell Volume : 91.0 fl  Mean Cell Hemoglobin : 30.7 pg  Mean Cell Hemoglobin Concentration : 33.8 gm/dL  Auto Neutrophil # : x  Auto Lymphocyte # : x  Auto Monocyte # : x  Auto Eosinophil # : x  Auto Basophil # : x  Auto Neutrophil % : x  Auto Lymphocyte % : x  Auto Monocyte % : x  Auto Eosinophil % : x  Auto Basophil % : x        136  |  94<L>  |  18  ----------------------------<  178<H>  4.1   |  22  |  2.82<H>    Ca    9.2      2023 15:40  Phos  3.8       Mg     2.0         TPro  6.1  /  Alb  4.1  /  TBili  0.5  /  DBili  x   /  AST  24  /  ALT  <5<L>  /  AlkPhos  226<H>      PT/INR - ( 2023 15:40 )   PT: 16.9 sec;   INR: 1.42          PTT - ( 2023 15:40 )  PTT:33.4 sec  The current medications were reviewed   MEDICATIONS  (STANDING):  aMIOdarone    Tablet 400 milliGRAM(s) Oral every 8 hours  aspirin enteric coated 81 milliGRAM(s) Oral daily  atorvastatin 80 milliGRAM(s) Oral at bedtime  carbidopa/levodopa  25/100 1 Tablet(s) Oral <User Schedule>  ceFAZolin   IVPB 1000 milliGRAM(s) IV Intermittent every 24 hours  chlorhexidine 2% Cloths 1 Application(s) Topical daily  chlorhexidine 4% Liquid 1 Application(s) Topical once  clopidogrel Tablet 75 milliGRAM(s) Oral daily  dextrose 5%. 1000 milliLiter(s) (50 mL/Hr) IV Continuous <Continuous>  dextrose 5%. 1000 milliLiter(s) (100 mL/Hr) IV Continuous <Continuous>  dextrose 50% Injectable 50 milliLiter(s) IV Push every 15 minutes  dextrose 50% Injectable 25 milliLiter(s) IV Push every 15 minutes  glucagon  Injectable 1 milliGRAM(s) IntraMuscular once  heparin   Injectable 5000 Unit(s) SubCutaneous every 8 hours  insulin lispro (ADMELOG) corrective regimen sliding scale   SubCutaneous Before meals and at bedtime  midodrine 10 milliGRAM(s) Oral every 8 hours  norepinephrine Infusion 0.05 MICROgram(s)/kG/Min (5.91 mL/Hr) IV Continuous <Continuous>  pantoprazole    Tablet 40 milliGRAM(s) Oral before breakfast  polyethylene glycol 3350 17 Gram(s) Oral daily  senna 2 Tablet(s) Oral at bedtime  sevelamer carbonate 800 milliGRAM(s) Oral every 8 hours  sodium chloride 0.9%. 1000 milliLiter(s) (10 mL/Hr) IV Continuous <Continuous>  tamsulosin 0.4 milliGRAM(s) Oral at bedtime  vasopressin Infusion 0.003 Unit(s)/Min (0.45 mL/Hr) IV Continuous <Continuous>    MEDICATIONS  (PRN):  dextrose Oral Gel 15 Gram(s) Oral once PRN Blood Glucose LESS THAN 70 milliGRAM(s)/deciliter       PROBLEM LIST/ ASSESSMENT:  HEALTH ISSUES - PROBLEM Dx:      ,   Patient is a 72y old  Male who presents with a chief complaint of CAD (2023 14:29)     s/p cardiac surgery                My plan includes :  close hemodynamic, ventilatory and drain monitoring and management per post op routine    Monitor for arrhythmias and monitor parameters for organ perfusion  beta blockade not administered due to hemodynamic instability and bradycardia  monitor neurologic status  Head of the bed should remain elevated to 45 deg .   chest PT and IS will be encouraged  monitor adequacy of oxygenation and ventilation and attempt to wean oxygen  antibiotic regimen will be tailored to the clinical, laboratory and microbiologic data  Nutritional goals will be met using po eventually , ensure adequate caloric intake and montior the same  Stress ulcer and VTE prophylaxis will be achieved    Glycemic control is satisfactory  Electrolytes have been repleted as necessary and wound care has been carried out. Pain control has been achieved.   agressive physical therapy and early mobility and ambulation goals will be met   The family was updated about the course and plan  CRITICAL CARE TIME personally provided by me  in evaluation and management, reassessments, review and interpretation of labs and x-rays, ventilator and hemodynamic management, formulating a plan and coordinating care: ___90____ MIN.  Time does not include procedural time. Time spent was non routine post-operarive caRE and included multiple and repeated evaluations at the bedside  CTICU ATTENDING     					    Robert Orourke MD

## 2023-02-02 NOTE — PROGRESS NOTE ADULT - SUBJECTIVE AND OBJECTIVE BOX
Patient tolerated Blood Transfusion during HD, no acute complaints, VSS, continue HD as prescribed     Dialyzer: Optiflux [id number]  QB: 400 mL/min  QD: 500 mL/min  K bath: 2  Goal UF: 1L   Duration: 180 min

## 2023-02-02 NOTE — PROGRESS NOTE ADULT - SUBJECTIVE AND OBJECTIVE BOX
Patient seen on HD tolerating procedure well. Patient does not offer any complaints and is hemodynamically stable.  Continue HD as prescribed. Receiving blood transfusion.         Hemodialysis Treatment.:     Schedule: Once, Modality: Hemodialysis, Access: Arteriovenous Fistula    Dialyzer: Optiflux [id number], Time: 180 Min    Blood Flow: 400 mL/Min , Dialysate Flow: 500 mL/Min, Dialysate Temp: 36.5, Tubinmm (Adult)    Target Fluid Removal: 1 Liters    Dialysate Electrolytes (mEq/L): Potassium 2, Calcium 2.5, Sodium 138, Bicarbonate 35    Additional Instructions: Patient to receive 2UPRBC with HD            Vitals   T(F): 98.2  HR: 92  BP: 153/67  ABP: 151/62  RR: 18  SpO2: 90%          PHYSICAL EXAM:  GENERAL: Alert, awake, oriented x3 on HFNC tolerating HD   HEENT: no RUSLAN, MMM   CHEST/LUNG: Bilateral clear breath sounds, Left chest tube in place   HEART: Regular rate and rhythm, no murmur, no gallops, no rub   ABDOMEN: Soft, nontender, non distended  : No flank or supra pubic tenderness.  EXTREMITIES: no pedal edema  Neurology: AAOx3, no asterixis   SKIN: No rash or skin lesion   ACCESS: RUE AVF accessed

## 2023-02-02 NOTE — PROGRESS NOTE ADULT - ASSESSMENT
72 M w/PMHx of HTN, CAD s/p multiple SURYA stents, type II DM, ESRD HD TTS, CVA w/ right sided weakness &  parkinson's disease admitted for unstable angina and multi vCAD for surgical mgmt.  S/p   OPCAB x 2 ( LIMA - LAD, SVG - PDA)   EF 55-60%  2/1   Extubated POD 1  came off pressors   A/p  Adequate pain control with tylenol and low dose oxy prn/ Home sinemet resumed   HDS, A-fib converted to sinus with amio- HR in mid 60s/ hypertensive--> midodrine dc'd   Continue PO amio load- HR in the latrice end holding bb as HR in   O2 req decreasing, will transition HF to NC in am   HCT responded to blood transfusion/ Plt drifting to 70s- CTs output low continue to monitor no need for transfusion at this time   ESRD s/p HD post op day 0 and 1 for clearance and UF - Might need daily sessions in perio-op course will address according to labs and volume status    Hold sevelamer for now   DASH-Renal restricted diet   Glycemic control with SSC   Home flomax resumed   DAPT/Statin   Ppx: Sq hep/PPI/HOB 30  bilateral axillary/subclavian and brachiocephalic stents present/A-line and TLC in right groin     OOB in am   Can dc central line and a-line in am     ATTENDING: I have personally and independently provided 30 Min of critical care services.  72 M w/PMHx of HTN, CAD s/p multiple SURYA stents, type II DM, ESRD HD TTS, CVA w/ right sided weakness &  parkinson's disease admitted for unstable angina and multi vCAD for surgical mgmt.  S/p   OPCAB x 2 ( LIMA - LAD, SVG - PDA)   EF 55-60%  2/1   Extubated POD 1  came off pressors   A/p  Adequate pain control with tylenol and low dose oxy prn/ Home sinemet resumed   HDS, A-fib converted to sinus with amio- HR in mid 60s/ hypertensive--> midodrine dc'd   Will consider to resume lower dose midodrine if MAPs< 75 given hx of CVA   Continue PO amio load- HR in the latrice end holding bb   O2 req decreasing, will transition HF to NC in am   HCT responded to blood transfusion/ Plt drifting to 70s- CTs output low continue to monitor no need for transfusion at this time   ESRD s/p HD post op day 0 and 1 for clearance and UF - Might need daily sessions in perio-op course will address according to labs and volume status    Hold sevelamer for now   DASH-Renal restricted diet   Glycemic control with SSC   Home flomax resumed   DAPT/Statin   Ppx: Sq hep/PPI/HOB 30  bilateral axillary/subclavian and brachiocephalic stents present/A-line and TLC in right groin     OOB in am   Can dc central line and a-line in am     ATTENDING: I have personally and independently provided 30 Min of critical care services.

## 2023-02-02 NOTE — PROGRESS NOTE ADULT - SUBJECTIVE AND OBJECTIVE BOX
INTERVAL COURSE  Extubated in am   Additional HD session in am for clearance and UF  2u pRBC with HD/ off levo  Afib converted to sinus with 2 amio boluses started on PO load     VITALS  Vital Signs Last 24 Hrs  T(C): 36.3 (03 Feb 2023 01:01), Max: 36.8 (02 Feb 2023 13:30)  T(F): 97.4 (03 Feb 2023 01:01), Max: 98.2 (02 Feb 2023 13:30)  HR: 65 (03 Feb 2023 02:00) (58 - 130)  BP: 152/44  BP(mean): 81  RR: 16 (03 Feb 2023 02:00) (16 - 26)  SpO2: 100% (03 Feb 2023 02:00) (88% - 100%)  Parameters below as of 03 Feb 2023 02:00  Patient On (Oxygen Delivery Method): nasal cannula, high flow  O2 Flow (L/min): 50  O2 Concentration (%): 50    I&O's Summary    01 Feb 2023 07:01  -  02 Feb 2023 07:00  --------------------------------------------------------  IN: 1778.4 mL / OUT: 1045 mL / NET: 733.4 mL    02 Feb 2023 07:01  -  03 Feb 2023 02:13  --------------------------------------------------------  IN: 1664.5 mL / OUT: 2395 mL / NET: -730.5 mL      CAPILLARY BLOOD GLUCOSE    POCT Blood Glucose.: 173 mg/dL (02 Feb 2023 22:39)  POCT Blood Glucose.: 147 mg/dL (02 Feb 2023 14:29)      PHYSICAL EXAM  General: A&Ox 3; NAD  Respiratory: CTA B/L; no wheezes  Cardiovascular: Regular rhythm/rate  Gastrointestinal: Soft; NTND  Extremities: WWP; no edema  Neurological:  CNII-XII grossly intact; no obvious focal deficits  Right upper arm AVF with good thrill and pulse   Right groin A line and TLC c/d/i    MEDICATIONS  (STANDING):  aMIOdarone    Tablet 400 milliGRAM(s) Oral every 8 hours  aspirin enteric coated 81 milliGRAM(s) Oral daily  atorvastatin 80 milliGRAM(s) Oral at bedtime  carbidopa/levodopa  25/100 1 Tablet(s) Oral <User Schedule>  ceFAZolin   IVPB 1000 milliGRAM(s) IV Intermittent every 24 hours  chlorhexidine 2% Cloths 1 Application(s) Topical daily  chlorhexidine 4% Liquid 1 Application(s) Topical once  clopidogrel Tablet 75 milliGRAM(s) Oral daily  dextrose 5%. 1000 milliLiter(s) (50 mL/Hr) IV Continuous <Continuous>  dextrose 5%. 1000 milliLiter(s) (100 mL/Hr) IV Continuous <Continuous>  dextrose 50% Injectable 50 milliLiter(s) IV Push every 15 minutes  dextrose 50% Injectable 25 milliLiter(s) IV Push every 15 minutes  glucagon  Injectable 1 milliGRAM(s) IntraMuscular once  heparin   Injectable 5000 Unit(s) SubCutaneous every 8 hours  insulin lispro (ADMELOG) corrective regimen sliding scale   SubCutaneous Before meals and at bedtime  midodrine 10 milliGRAM(s) Oral every 8 hours  norepinephrine Infusion 0.05 MICROgram(s)/kG/Min (5.91 mL/Hr) IV Continuous <Continuous>  pantoprazole    Tablet 40 milliGRAM(s) Oral before breakfast  polyethylene glycol 3350 17 Gram(s) Oral daily  senna 2 Tablet(s) Oral at bedtime  sevelamer carbonate 800 milliGRAM(s) Oral every 8 hours  sodium chloride 0.9%. 1000 milliLiter(s) (10 mL/Hr) IV Continuous <Continuous>  tamsulosin 0.4 milliGRAM(s) Oral at bedtime  vasopressin Infusion 0.003 Unit(s)/Min (0.45 mL/Hr) IV Continuous <Continuous>    MEDICATIONS  (PRN):  dextrose Oral Gel 15 Gram(s) Oral once PRN Blood Glucose LESS THAN 70 milliGRAM(s)/deciliter      LABS                        10.2   9.00  )-----------( 75       ( 02 Feb 2023 15:40 )             30.2     02-02    136  |  94<L>  |  18  ----------------------------<  178<H>  4.1   |  22  |  2.82<H>    Ca    9.2      02 Feb 2023 15:40  Phos  3.8     02-02  Mg     2.0     02-02    TPro  6.1  /  Alb  4.1  /  TBili  0.5  /  DBili  x   /  AST  24  /  ALT  <5<L>  /  AlkPhos  226<H>  02-02    LIVER FUNCTIONS - ( 02 Feb 2023 15:40 )  Alb: 4.1 g/dL / Pro: 6.1 g/dL / ALK PHOS: 226 U/L / ALT: <5 U/L / AST: 24 U/L / GGT: x           PT/INR - ( 02 Feb 2023 15:40 )   PT: 16.9 sec;   INR: 1.42          PTT - ( 02 Feb 2023 15:40 )  PTT:33.4 sec      IMAGING/EKG/ETC  Reviewed.

## 2023-02-03 LAB
ALBUMIN SERPL ELPH-MCNC: 3.6 G/DL — SIGNIFICANT CHANGE UP (ref 3.3–5)
ALP SERPL-CCNC: 231 U/L — HIGH (ref 40–120)
ALT FLD-CCNC: <5 U/L — LOW (ref 10–45)
ANION GAP SERPL CALC-SCNC: 14 MMOL/L — SIGNIFICANT CHANGE UP (ref 5–17)
APTT BLD: 44.6 SEC — HIGH (ref 27.5–35.5)
AST SERPL-CCNC: 25 U/L — SIGNIFICANT CHANGE UP (ref 10–40)
BILIRUB SERPL-MCNC: 0.3 MG/DL — SIGNIFICANT CHANGE UP (ref 0.2–1.2)
BUN SERPL-MCNC: 30 MG/DL — HIGH (ref 7–23)
CALCIUM SERPL-MCNC: 8.3 MG/DL — LOW (ref 8.4–10.5)
CHLORIDE SERPL-SCNC: 97 MMOL/L — SIGNIFICANT CHANGE UP (ref 96–108)
CO2 SERPL-SCNC: 25 MMOL/L — SIGNIFICANT CHANGE UP (ref 22–31)
CREAT SERPL-MCNC: 4.13 MG/DL — HIGH (ref 0.5–1.3)
EGFR: 15 ML/MIN/1.73M2 — LOW
GAS PNL BLDA: SIGNIFICANT CHANGE UP
GLUCOSE BLDC GLUCOMTR-MCNC: 152 MG/DL — HIGH (ref 70–99)
GLUCOSE BLDC GLUCOMTR-MCNC: 213 MG/DL — HIGH (ref 70–99)
GLUCOSE BLDC GLUCOMTR-MCNC: 245 MG/DL — HIGH (ref 70–99)
GLUCOSE BLDC GLUCOMTR-MCNC: 265 MG/DL — HIGH (ref 70–99)
GLUCOSE SERPL-MCNC: 172 MG/DL — HIGH (ref 70–99)
HCT VFR BLD CALC: 29.9 % — LOW (ref 39–50)
HGB BLD-MCNC: 10.3 G/DL — LOW (ref 13–17)
INR BLD: 1.5 — HIGH (ref 0.88–1.16)
MAGNESIUM SERPL-MCNC: 2 MG/DL — SIGNIFICANT CHANGE UP (ref 1.6–2.6)
MCHC RBC-ENTMCNC: 31.1 PG — SIGNIFICANT CHANGE UP (ref 27–34)
MCHC RBC-ENTMCNC: 34.4 GM/DL — SIGNIFICANT CHANGE UP (ref 32–36)
MCV RBC AUTO: 90.3 FL — SIGNIFICANT CHANGE UP (ref 80–100)
NRBC # BLD: 0 /100 WBCS — SIGNIFICANT CHANGE UP (ref 0–0)
PHOSPHATE SERPL-MCNC: 5 MG/DL — HIGH (ref 2.5–4.5)
PLATELET # BLD AUTO: 84 K/UL — LOW (ref 150–400)
POTASSIUM SERPL-MCNC: 4.4 MMOL/L — SIGNIFICANT CHANGE UP (ref 3.5–5.3)
POTASSIUM SERPL-SCNC: 4.4 MMOL/L — SIGNIFICANT CHANGE UP (ref 3.5–5.3)
PROT SERPL-MCNC: 5.9 G/DL — LOW (ref 6–8.3)
PROTHROM AB SERPL-ACNC: 17.9 SEC — HIGH (ref 10.5–13.4)
RBC # BLD: 3.31 M/UL — LOW (ref 4.2–5.8)
RBC # FLD: 15.8 % — HIGH (ref 10.3–14.5)
SODIUM SERPL-SCNC: 136 MMOL/L — SIGNIFICANT CHANGE UP (ref 135–145)
WBC # BLD: 8.12 K/UL — SIGNIFICANT CHANGE UP (ref 3.8–10.5)
WBC # FLD AUTO: 8.12 K/UL — SIGNIFICANT CHANGE UP (ref 3.8–10.5)

## 2023-02-03 PROCEDURE — 99292 CRITICAL CARE ADDL 30 MIN: CPT

## 2023-02-03 PROCEDURE — 99232 SBSQ HOSP IP/OBS MODERATE 35: CPT

## 2023-02-03 PROCEDURE — 71045 X-RAY EXAM CHEST 1 VIEW: CPT | Mod: 26

## 2023-02-03 PROCEDURE — 99291 CRITICAL CARE FIRST HOUR: CPT

## 2023-02-03 RX ORDER — FENTANYL CITRATE 50 UG/ML
50 INJECTION INTRAVENOUS ONCE
Refills: 0 | Status: DISCONTINUED | OUTPATIENT
Start: 2023-02-03 | End: 2023-02-03

## 2023-02-03 RX ORDER — AMIODARONE HYDROCHLORIDE 400 MG/1
150 TABLET ORAL ONCE
Refills: 0 | Status: COMPLETED | OUTPATIENT
Start: 2023-02-03 | End: 2023-02-03

## 2023-02-03 RX ORDER — METOPROLOL TARTRATE 50 MG
12.5 TABLET ORAL
Refills: 0 | Status: DISCONTINUED | OUTPATIENT
Start: 2023-02-03 | End: 2023-02-06

## 2023-02-03 RX ORDER — ACETAMINOPHEN 500 MG
650 TABLET ORAL EVERY 6 HOURS
Refills: 0 | Status: DISCONTINUED | OUTPATIENT
Start: 2023-02-03 | End: 2023-02-07

## 2023-02-03 RX ORDER — HYDRALAZINE HCL 50 MG
10 TABLET ORAL ONCE
Refills: 0 | Status: COMPLETED | OUTPATIENT
Start: 2023-02-03 | End: 2023-02-03

## 2023-02-03 RX ADMIN — Medication 650 MILLIGRAM(S): at 19:52

## 2023-02-03 RX ADMIN — FENTANYL CITRATE 50 MICROGRAM(S): 50 INJECTION INTRAVENOUS at 07:00

## 2023-02-03 RX ADMIN — Medication 4: at 22:25

## 2023-02-03 RX ADMIN — FENTANYL CITRATE 50 MICROGRAM(S): 50 INJECTION INTRAVENOUS at 06:36

## 2023-02-03 RX ADMIN — CLOPIDOGREL BISULFATE 75 MILLIGRAM(S): 75 TABLET, FILM COATED ORAL at 14:28

## 2023-02-03 RX ADMIN — Medication 2: at 06:44

## 2023-02-03 RX ADMIN — HEPARIN SODIUM 5000 UNIT(S): 5000 INJECTION INTRAVENOUS; SUBCUTANEOUS at 21:15

## 2023-02-03 RX ADMIN — Medication 100 MILLIGRAM(S): at 21:15

## 2023-02-03 RX ADMIN — HEPARIN SODIUM 5000 UNIT(S): 5000 INJECTION INTRAVENOUS; SUBCUTANEOUS at 06:35

## 2023-02-03 RX ADMIN — PANTOPRAZOLE SODIUM 40 MILLIGRAM(S): 20 TABLET, DELAYED RELEASE ORAL at 06:36

## 2023-02-03 RX ADMIN — Medication 12.5 MILLIGRAM(S): at 19:52

## 2023-02-03 RX ADMIN — CHLORHEXIDINE GLUCONATE 1 APPLICATION(S): 213 SOLUTION TOPICAL at 14:29

## 2023-02-03 RX ADMIN — CARBIDOPA AND LEVODOPA 1 TABLET(S): 25; 100 TABLET ORAL at 19:51

## 2023-02-03 RX ADMIN — Medication 4: at 12:58

## 2023-02-03 RX ADMIN — Medication 6: at 17:08

## 2023-02-03 RX ADMIN — AMIODARONE HYDROCHLORIDE 400 MILLIGRAM(S): 400 TABLET ORAL at 14:25

## 2023-02-03 RX ADMIN — CARBIDOPA AND LEVODOPA 1 TABLET(S): 25; 100 TABLET ORAL at 06:36

## 2023-02-03 RX ADMIN — AMIODARONE HYDROCHLORIDE 600 MILLIGRAM(S): 400 TABLET ORAL at 12:00

## 2023-02-03 RX ADMIN — TAMSULOSIN HYDROCHLORIDE 0.4 MILLIGRAM(S): 0.4 CAPSULE ORAL at 21:15

## 2023-02-03 RX ADMIN — POLYETHYLENE GLYCOL 3350 17 GRAM(S): 17 POWDER, FOR SOLUTION ORAL at 14:28

## 2023-02-03 RX ADMIN — Medication 10 MILLIGRAM(S): at 06:36

## 2023-02-03 RX ADMIN — Medication 650 MILLIGRAM(S): at 20:52

## 2023-02-03 RX ADMIN — Medication 12.5 MILLIGRAM(S): at 11:27

## 2023-02-03 RX ADMIN — HEPARIN SODIUM 5000 UNIT(S): 5000 INJECTION INTRAVENOUS; SUBCUTANEOUS at 14:24

## 2023-02-03 RX ADMIN — SENNA PLUS 2 TABLET(S): 8.6 TABLET ORAL at 21:15

## 2023-02-03 RX ADMIN — ATORVASTATIN CALCIUM 80 MILLIGRAM(S): 80 TABLET, FILM COATED ORAL at 21:15

## 2023-02-03 RX ADMIN — Medication 81 MILLIGRAM(S): at 14:25

## 2023-02-03 RX ADMIN — AMIODARONE HYDROCHLORIDE 400 MILLIGRAM(S): 400 TABLET ORAL at 21:15

## 2023-02-03 RX ADMIN — AMIODARONE HYDROCHLORIDE 400 MILLIGRAM(S): 400 TABLET ORAL at 06:35

## 2023-02-03 NOTE — PHYSICAL THERAPY INITIAL EVALUATION ADULT - PERTINENT HX OF CURRENT PROBLEM, REHAB EVAL
Patient is a 72 year old male who originally presented to [facility] 12/22/2022 c/o sharp left sided chest pain at rest overnight, relieved with sublingual nitroglycerin. At that time, he had an ECHO which revealed EF 55% and underwent dx cardiac cath 12/23/2022 found to have multivessel disease. Patient transferred to [facility] for under the cardiology team for possible PCI. On 12/27/22 patient underwent PCI which revealed PTCA oLCX 90% ISR, PTCA dLCX 90% ISR w/ subtotal small LCX distally; oLAD 80% ISR with + IFR (0.80), mLAD 70-80% & CTS was consulted & the patient was evaluated by Dr. Hernandez in the outpatient setting. Today, 1/30/23 patient presents for pre-surgical testing prior to CABG with Dr. Hernandez on 2/1/23.

## 2023-02-03 NOTE — PROGRESS NOTE ADULT - ASSESSMENT
72M hx of , ESRD POST OP #2 s/p OPCAB, HDS on NC, last HD 2/2 with stable electrolytes,     Assessment/Plan:   Electrolytes noted Potassium of 4.5, Bicarb of 19  HD planned tomorrow  Dry weight TBD    #HTN   -Hold home antihypertensives to facilitate UF tomorrow  -UF with HD    #access   RUE AVF functional     #anemia  Hb not at goal, 10.3  Iron panel noted, Tsat and Ferritin wnl, no indication for IV iron   epo w/ HD  s/p 3U PRBC for hospitalization thus far    #renal bone disease   Ca ~8.3  Phos: 5  -C/w cinacalcet 60mg Qday  -Hold phos binders     Discussed with primary team

## 2023-02-03 NOTE — PHYSICAL THERAPY INITIAL EVALUATION ADULT - WORK/LEISURE ACTIVITY, REHAB EVAL
needs device and assist Cimzia Pregnancy And Lactation Text: This medication crosses the placenta but can be considered safe in certain situations. Cimzia may be excreted in breast milk.

## 2023-02-03 NOTE — PROGRESS NOTE ADULT - SUBJECTIVE AND OBJECTIVE BOX
NEPHROLOGY PROGRESS NOTE    Subjective: Patient seen and examined at bedside. No acute distress, tolerated dialysis well yesterady without issue, Not using incentive spirometer well despite coaching but breathing better anyway    [OBJECTIVE]:    Vital Signs:  T(F): , Max: 98.2 (02-02-23 @ 13:30)  HR:  (58 - 121)  BP: --  BP(mean): --  ABP: 164/59 (02-03-23 @ 08:00) (135/60 - 192/53)  ABP(mean):  (73 - 102)  RR:  (16 - 20)  SpO2:  (88% - 100%)  CVP(mm Hg): 19 (02-03-23 @ 08:00) (-35 - 26)  CVP(cm H2O): --      02-02 @ 07:01  -  02-03 @ 07:00  --------------------------------------------------------  IN: 1664.5 mL / OUT: 2505 mL / NET: -840.5 mL    02-03 @ 07:01  -  02-03 @ 11:44  --------------------------------------------------------  IN: 0 mL / OUT: 0 mL / NET: 0 mL      CAPILLARY BLOOD GLUCOSE      POCT Blood Glucose.: 152 mg/dL (03 Feb 2023 06:43)      Physical Exam:  T(F): 97.2 (02-03-23 @ 08:25)  HR: 84 (02-03-23 @ 08:00)  BP: --  RR: 20 (02-03-23 @ 08:00)  SpO2: 100% (02-03-23 @ 08:00)  Wt(kg): --    GENERAL: Alert, awake, on NC   HEENT: no RUSLAN, MMM   CHEST/LUNG: Bilateral clear breath sounds, Left chest tube in place   HEART: Regular rate and rhythm, no murmur, no gallops, no rub   ABDOMEN: Soft, nontender, non distended  : No flank or supra pubic tenderness.  EXTREMITIES: no pedal edema  Neurology: Awake, no asterixis   SKIN: No rash or skin lesion   ACCESS: RUE  AVF w/bruit and thrill     Medications:  MEDICATIONS  (STANDING):  aMIOdarone    Tablet 400 milliGRAM(s) Oral every 8 hours  aspirin enteric coated 81 milliGRAM(s) Oral daily  atorvastatin 80 milliGRAM(s) Oral at bedtime  carbidopa/levodopa  25/100 1 Tablet(s) Oral <User Schedule>  ceFAZolin   IVPB 1000 milliGRAM(s) IV Intermittent every 24 hours  chlorhexidine 2% Cloths 1 Application(s) Topical daily  clopidogrel Tablet 75 milliGRAM(s) Oral daily  dextrose 5%. 1000 milliLiter(s) (50 mL/Hr) IV Continuous <Continuous>  dextrose 5%. 1000 milliLiter(s) (100 mL/Hr) IV Continuous <Continuous>  dextrose 50% Injectable 50 milliLiter(s) IV Push every 15 minutes  dextrose 50% Injectable 25 milliLiter(s) IV Push every 15 minutes  glucagon  Injectable 1 milliGRAM(s) IntraMuscular once  heparin   Injectable 5000 Unit(s) SubCutaneous every 8 hours  insulin lispro (ADMELOG) corrective regimen sliding scale   SubCutaneous Before meals and at bedtime  metoprolol tartrate 12.5 milliGRAM(s) Oral two times a day  pantoprazole    Tablet 40 milliGRAM(s) Oral before breakfast  polyethylene glycol 3350 17 Gram(s) Oral daily  senna 2 Tablet(s) Oral at bedtime  sodium chloride 0.9%. 1000 milliLiter(s) (10 mL/Hr) IV Continuous <Continuous>  tamsulosin 0.4 milliGRAM(s) Oral at bedtime    MEDICATIONS  (PRN):  dextrose Oral Gel 15 Gram(s) Oral once PRN Blood Glucose LESS THAN 70 milliGRAM(s)/deciliter      Allergies:  Allergies    No Known Allergies    Intolerances        Labs:                        10.3   8.12  )-----------( 84       ( 03 Feb 2023 03:54 )             29.9     02-03    136  |  97  |  30<H>  ----------------------------<  172<H>  4.4   |  25  |  4.13<H>    Ca    8.3<L>      03 Feb 2023 03:54  Phos  5.0     02-03  Mg     2.0     02-03    TPro  5.9<L>  /  Alb  3.6  /  TBili  0.3  /  DBili  x   /  AST  25  /  ALT  <5<L>  /  AlkPhos  231<H>  02-03    PT/INR - ( 03 Feb 2023 03:54 )   PT: 17.9 sec;   INR: 1.50          PTT - ( 03 Feb 2023 03:54 )  PTT:44.6 sec      Radiology and other tests:

## 2023-02-03 NOTE — PROGRESS NOTE ADULT - SUBJECTIVE AND OBJECTIVE BOX
INTERVAL HPI/OVERNIGHT EVENTS:    POD#1 OPCAB x 2  EF normal (55%)    71yo Cypriot speaking male - Hx tobacco use, HTN, HLD, CAD/multiple SURYA stents (pLAD 5/14/21, OM1 3/21/21, pLCx on 10/2021at [facility]), BPH, DM, ESRD/HD, CVA residual Rt sided weakness, Parkinson's disease presented to [facility] 12/22 with chest pain/unstable angina    Cath 12/23: multivessel disease.  Cath/PCI 12/27 - PTCA oLCX 90% ISR, PTCA dLCX 90% ISR w/ subtotal small LCX distally; oLAD 80% ISR with + IFR (0.80), mLAD 70-80%    CTS consulted     To OR 2/2  Intraop -  1U pRBC/2.3 L crystalloid  arrived to ICU on low dose levophed  - rapidly off   HD session performed overnight - immediately post-op - given 2 U pRBC during session (Hct 22)  Extubated this am 6a to HFNC - now on 6L NC     Amiodarone initiated IV and enteral load started   BB started     ICU Vital Signs Last 24 Hrs  T(C): 36.2 (03 Feb 2023 08:25), Max: 36.8 (02 Feb 2023 13:30)  T(F): 97.2 (03 Feb 2023 08:25), Max: 98.2 (02 Feb 2023 13:30)  HR: 78 (03 Feb 2023 08:59) (58 - 117) sinus   ABP: 164/59 (03 Feb 2023 08:00) (135/60 - 192/53)  ABP(mean): 94 (03 Feb 2023 08:00) (73 - 102)  RR: 20 (03 Feb 2023 08:59) (16 - 20)  SpO2: 100% (03 Feb 2023 08:59) (88% - 100%) 6 L NC     Qtts: None     I&O's Summary    02 Feb 2023 07:01  -  03 Feb 2023 07:00  --------------------------------------------------------  IN: 1664.5 mL / OUT: 2505 mL / NET: -840.5 mL    03 Feb 2023 07:01  -  03 Feb 2023 11:53  --------------------------------------------------------  IN: 0 mL / OUT: 0 mL / NET: 0 mL    Physical Exam    Heart irregular irregular (-)rub/gallop  Lungs - poor inspiratory effort - improves with prompting - no rhonchi/wheeze  Abd - (+)BS soft NTND (-)r/r/g  Ext - warm to touch - no cyanosis/clubbing   Chest - Op Bandage in place  Neuro - alert/oriented and interactive - following simple commands - baseline relative weakness on right  Skin - no rash     LABS:                        10.3   8.12  )-----------( 84       ( 03 Feb 2023 03:54 )             29.9     02-03    136  |  97  |  30<H>  ----------------------------<  172<H>  4.4   |  25  |  4.13<H>    Ca    8.3<L>      03 Feb 2023 03:54  Phos  5.0     02-03  Mg     2.0     02-03    TPro  5.9<L>  /  Alb  3.6  /  TBili  0.3  /  DBili  x   /  AST  25  /  ALT  <5<L>  /  AlkPhos  231<H>  02-03    PT/INR - ( 03 Feb 2023 03:54 )   PT: 17.9 sec;   INR: 1.50     PTT - ( 03 Feb 2023 03:54 )  PTT:44.6 sec    ABG - ( 03 Feb 2023 03:55 )  pH, Arterial: 7.50  pH, Blood: x     /  pCO2: 31    /  pO2: 148   / HCO3: 24    / Base Excess: 1.7   /  SaO2: 99.3      RADIOLOGY & ADDITIONAL STUDIES:    Patient with anginal sxs - now s/p OPCAB - with post-op atrial fib - on amiodarone load    1. CV  hemodynamically stable  prior sinus  now in atrial fib - amio bolus - ongoing enteral load  ASA/plavix   Metoprolol 12.5 BID     2. Pulm   extubated this am   titrated off HFNC - now on Nasal cannula - titrate down as clinical scenario allows  monitor chest tube output  incentive spirometry/ambulation with staff assist  Complete periop Abx prophylaxis    maintain glycemic contorl   \  DVT and GI prophylaxis  d/w patient/staff and CTS       I have spent/provided stated minutes of critical care time to this patient: 90

## 2023-02-03 NOTE — PHYSICAL THERAPY INITIAL EVALUATION ADULT - GENERAL OBSERVATIONS, REHAB EVAL
Spoke with RN Cat who cleared for OOB. Patient received sitting in chair in NAD with +ECG +heplock +6L O2 NC +CT

## 2023-02-03 NOTE — PHYSICAL THERAPY INITIAL EVALUATION ADULT - GAIT DEVIATIONS NOTED, PT EVAL
patient with unsteady gait, no LOB, veering toward left with decreased step length/shuffling steps and forward flexed posture/decreased melinda/decreased step length

## 2023-02-04 LAB
GLUCOSE BLDC GLUCOMTR-MCNC: 141 MG/DL — HIGH (ref 70–99)
GLUCOSE BLDC GLUCOMTR-MCNC: 161 MG/DL — HIGH (ref 70–99)
GLUCOSE BLDC GLUCOMTR-MCNC: 220 MG/DL — HIGH (ref 70–99)

## 2023-02-04 PROCEDURE — 90935 HEMODIALYSIS ONE EVALUATION: CPT

## 2023-02-04 PROCEDURE — 71045 X-RAY EXAM CHEST 1 VIEW: CPT | Mod: 26

## 2023-02-04 PROCEDURE — 99232 SBSQ HOSP IP/OBS MODERATE 35: CPT

## 2023-02-04 RX ORDER — ERYTHROPOIETIN 10000 [IU]/ML
6000 INJECTION, SOLUTION INTRAVENOUS; SUBCUTANEOUS ONCE
Refills: 0 | Status: COMPLETED | OUTPATIENT
Start: 2023-02-04 | End: 2023-02-04

## 2023-02-04 RX ADMIN — CARBIDOPA AND LEVODOPA 1 TABLET(S): 25; 100 TABLET ORAL at 20:41

## 2023-02-04 RX ADMIN — AMIODARONE HYDROCHLORIDE 400 MILLIGRAM(S): 400 TABLET ORAL at 21:36

## 2023-02-04 RX ADMIN — PANTOPRAZOLE SODIUM 40 MILLIGRAM(S): 20 TABLET, DELAYED RELEASE ORAL at 07:08

## 2023-02-04 RX ADMIN — POLYETHYLENE GLYCOL 3350 17 GRAM(S): 17 POWDER, FOR SOLUTION ORAL at 12:40

## 2023-02-04 RX ADMIN — Medication 2: at 17:28

## 2023-02-04 RX ADMIN — Medication 12.5 MILLIGRAM(S): at 18:43

## 2023-02-04 RX ADMIN — CHLORHEXIDINE GLUCONATE 1 APPLICATION(S): 213 SOLUTION TOPICAL at 13:29

## 2023-02-04 RX ADMIN — SENNA PLUS 2 TABLET(S): 8.6 TABLET ORAL at 21:37

## 2023-02-04 RX ADMIN — CARBIDOPA AND LEVODOPA 1 TABLET(S): 25; 100 TABLET ORAL at 07:08

## 2023-02-04 RX ADMIN — Medication 100 MILLIGRAM(S): at 21:36

## 2023-02-04 RX ADMIN — TAMSULOSIN HYDROCHLORIDE 0.4 MILLIGRAM(S): 0.4 CAPSULE ORAL at 21:37

## 2023-02-04 RX ADMIN — Medication 81 MILLIGRAM(S): at 12:40

## 2023-02-04 RX ADMIN — ATORVASTATIN CALCIUM 80 MILLIGRAM(S): 80 TABLET, FILM COATED ORAL at 21:36

## 2023-02-04 RX ADMIN — HEPARIN SODIUM 5000 UNIT(S): 5000 INJECTION INTRAVENOUS; SUBCUTANEOUS at 07:08

## 2023-02-04 RX ADMIN — Medication 4: at 12:18

## 2023-02-04 RX ADMIN — AMIODARONE HYDROCHLORIDE 400 MILLIGRAM(S): 400 TABLET ORAL at 07:08

## 2023-02-04 RX ADMIN — CLOPIDOGREL BISULFATE 75 MILLIGRAM(S): 75 TABLET, FILM COATED ORAL at 12:40

## 2023-02-04 RX ADMIN — HEPARIN SODIUM 5000 UNIT(S): 5000 INJECTION INTRAVENOUS; SUBCUTANEOUS at 21:37

## 2023-02-04 RX ADMIN — ERYTHROPOIETIN 6000 UNIT(S): 10000 INJECTION, SOLUTION INTRAVENOUS; SUBCUTANEOUS at 12:53

## 2023-02-04 RX ADMIN — Medication 12.5 MILLIGRAM(S): at 07:08

## 2023-02-04 RX ADMIN — HEPARIN SODIUM 5000 UNIT(S): 5000 INJECTION INTRAVENOUS; SUBCUTANEOUS at 15:59

## 2023-02-04 RX ADMIN — AMIODARONE HYDROCHLORIDE 400 MILLIGRAM(S): 400 TABLET ORAL at 15:59

## 2023-02-04 NOTE — CHART NOTE - NSCHARTNOTEFT_GEN_A_CORE
Surgical chest tubes, blakes, and pacing wires are removed at bedside who tolerated the procedures well.  A follow up CXR was performed and reviewed

## 2023-02-04 NOTE — PROGRESS NOTE ADULT - SUBJECTIVE AND OBJECTIVE BOX
----- Message from Ondina Briones MD sent at 12/27/2021  8:14 AM EST -----  Please call Joaquin and ask her if we are unable to get the echo moved up to this week at the imaging place she requested, would she want to try to have it done at [facility]?     KZ      CTICU  CRITICAL  CARE  attending     Hand off received 					   Pertinent clinical, laboratory, radiographic, hemodynamic, echocardiographic, respiratory data, microbiologic data and chart were reviewed and analyzed frequently throughout the course of the day and night    72 years old [place]amanian-speaking Male, former smoker, with DM, HTN, HLD, ESRD,(HD Tues/Thurs/Sat), CAD s/p multiple SURYA stents (pLAD 5/14/21, OM1 3/21/21, pLCx on 10/2021at [facility]).  He had CVA 2 years ago with right sided weakness) &  Parkinson's disease.  He presented to [facility] 12/22/2022 with angina.    He was complaining of  sharp left sided chest pain at rest overnight, relieved with sublingual nitroglycerin. At that time, he had an ECHO which revealed EF 55%.  Cardiac cath 12/23/2022 found to have multivessel disease.   The patient was transferred to [facility] for under the cardiology team for possible PCI.   On 12/27/22 patient underwent PCI which revealed PTCA oLCX 90% ISR, PTCA dLCX 90% ISR w/ subtotal small LCX distally; oLAD 80% ISR with + IFR (0.80), mLAD 70-80%    S/P Off pump CABG x 2.       FAMILY HISTORY:  FH: type 2 diabetes  In father    PAST MEDICAL & SURGICAL HISTORY:  Diabetes  Hypertension  Hyperphosphatemia  Coronary artery disease S/P PCI.   Hyperlipidemia  Stroke in the year 2017, residual right sided weakness  History of BPH  Back pain  ESRD on hemodialysis  Parkinsons disease  ESRD (end stage renal disease)  Hypertension  CAD (coronary artery disease)  S/P dialysis catheter insertion  Right chest perma cath removed in jan 2020      AVF (arteriovenous fistula)  right arm x with revision, left arm x 1      Stented coronary artery in 2004 and 2005 at [facility]  S/P primary angioplasty with coronary stent          14 system review was unremarkable    Vital signs, hemodynamic and respiratory parameters were reviewed from the bedside nursing flow sheet.  ICU Vital Signs Last 24 Hrs  T(C): 36.6 (03 Feb 2023 22:33), Max: 36.6 (03 Feb 2023 17:16)  T(F): 97.9 (03 Feb 2023 22:33), Max: 97.9 (03 Feb 2023 22:33)  HR: 67 (03 Feb 2023 23:00) (61 - 150)  BP: 107/45 (03 Feb 2023 23:00) (107/45 - 134/56)  BP(mean): 65 (03 Feb 2023 23:00) (65 - 84)  ABP: 110/44 (03 Feb 2023 12:00) (110/44 - 192/53)  ABP(mean): 66 (03 Feb 2023 12:00) (66 - 101)  RR: 16 (03 Feb 2023 23:00) (14 - 20)  SpO2: 92% (03 Feb 2023 23:00) (91% - 100%)    O2 Parameters below as of 03 Feb 2023 23:00  Patient On (Oxygen Delivery Method): nasal cannula w/ humidification  O2 Flow (L/min): 6        Adult Advanced Hemodynamics Last 24 Hrs  CVP(mm Hg): 14 (03 Feb 2023 11:00) (-35 - 19)  CVP(cm H2O): --  CO: --  CI: --  PA: --  PA(mean): --  PCWP: --  SVR: --  SVRI: --  PVR: --  PVRI: --, ABG - ( 03 Feb 2023 03:55 )  pH, Arterial: 7.50  pH, Blood: x     /  pCO2: 31    /  pO2: 148   / HCO3: 24    / Base Excess: 1.7   /  SaO2: 99.3                Intake and output was reviewed and the fluid balance was calculated  Daily     Daily   I&O's Summary    02 Feb 2023 07:01  -  03 Feb 2023 07:00  --------------------------------------------------------  IN: 1664.5 mL / OUT: 2505 mL / NET: -840.5 mL    03 Feb 2023 07:01  -  04 Feb 2023 00:36  --------------------------------------------------------  IN: 535 mL / OUT: 125 mL / NET: 410 mL        All lines and drain sites were assessed    Neuro: No change in the mental status from the baseline. Follows commands. Moves all 4 extremities. Right sided weakness as present pre op.   Neck: No JVD.  CVS: S1, S2, No S3.  Lungs: Good air entry bilaterally.  Abd: Soft. No tenderness. + Bowel sounds.  Vascular: + DP/PT.  Extremities: No edema.  Lymphatic: Normal.  Skin: No abnormalities.      labs  CBC Full  -  ( 03 Feb 2023 03:54 )  WBC Count : 8.12 K/uL  RBC Count : 3.31 M/uL  Hemoglobin : 10.3 g/dL  Hematocrit : 29.9 %  Platelet Count - Automated : 84 K/uL  Mean Cell Volume : 90.3 fl  Mean Cell Hemoglobin : 31.1 pg  Mean Cell Hemoglobin Concentration : 34.4 gm/dL  Auto Neutrophil # : x  Auto Lymphocyte # : x  Auto Monocyte # : x  Auto Eosinophil # : x  Auto Basophil # : x  Auto Neutrophil % : x  Auto Lymphocyte % : x  Auto Monocyte % : x  Auto Eosinophil % : x  Auto Basophil % : x    02-03    136  |  97  |  30<H>  ----------------------------<  172<H>  4.4   |  25  |  4.13<H>    Ca    8.3<L>      03 Feb 2023 03:54  Phos  5.0     02-03  Mg     2.0     02-03    TPro  5.9<L>  /  Alb  3.6  /  TBili  0.3  /  DBili  x   /  AST  25  /  ALT  <5<L>  /  AlkPhos  231<H>  02-03    PT/INR - ( 03 Feb 2023 03:54 )   PT: 17.9 sec;   INR: 1.50          PTT - ( 03 Feb 2023 03:54 )  PTT:44.6 sec  The current medications were reviewed   MEDICATIONS  (STANDING):  aMIOdarone    Tablet 400 milliGRAM(s) Oral every 8 hours  aspirin enteric coated 81 milliGRAM(s) Oral daily  atorvastatin 80 milliGRAM(s) Oral at bedtime  carbidopa/levodopa  25/100 1 Tablet(s) Oral <User Schedule>  ceFAZolin   IVPB 1000 milliGRAM(s) IV Intermittent every 24 hours  chlorhexidine 2% Cloths 1 Application(s) Topical daily  clopidogrel Tablet 75 milliGRAM(s) Oral daily  dextrose 5%. 1000 milliLiter(s) (50 mL/Hr) IV Continuous <Continuous>  dextrose 5%. 1000 milliLiter(s) (100 mL/Hr) IV Continuous <Continuous>  dextrose 50% Injectable 50 milliLiter(s) IV Push every 15 minutes  dextrose 50% Injectable 25 milliLiter(s) IV Push every 15 minutes  glucagon  Injectable 1 milliGRAM(s) IntraMuscular once  heparin   Injectable 5000 Unit(s) SubCutaneous every 8 hours  insulin lispro (ADMELOG) corrective regimen sliding scale   SubCutaneous Before meals and at bedtime  metoprolol tartrate 12.5 milliGRAM(s) Oral two times a day  pantoprazole    Tablet 40 milliGRAM(s) Oral before breakfast  polyethylene glycol 3350 17 Gram(s) Oral daily  senna 2 Tablet(s) Oral at bedtime  sodium chloride 0.9%. 1000 milliLiter(s) (10 mL/Hr) IV Continuous <Continuous>  tamsulosin 0.4 milliGRAM(s) Oral at bedtime    MEDICATIONS  (PRN):  acetaminophen     Tablet .. 650 milliGRAM(s) Oral every 6 hours PRN Mild Pain (1 - 3)  dextrose Oral Gel 15 Gram(s) Oral once PRN Blood Glucose LESS THAN 70 milliGRAM(s)/deciliter          72 years old  Male admitted with CAD.  S/P Off pump CABG x 2 (LIMA to LAD, SVG to PDA).  Hemodynamically stable.  Good oxygenation.  On intermittent HD.      My plan includes :  Statin and Betablocker.  Dual antiplatelet Rx.  Close hemodynamic, ventilatory and drain monitoring and management  Monitor for arrhythmias and monitor parameters for organ perfusion  Monitor neurologic status  Monitor renal function.  Head of the bed should remain elevated to 45 deg .   Chest PT and IS will be encouraged  Monitor adequacy of oxygenation and ventilation and attempt to wean oxygen  Nutritional goals will be met using po eventually , ensure adequate caloric intake and monitor the same  Stress ulcer and VTE prophylaxis will be achieved    OPTIMIZE Glycemic control.   Electrolytes have been repleted as necessary and wound care has been carried out. Pain control has been achieved.   Aggressive physical therapy and early mobility and ambulation goals will be met   The family was updated about the course and plan  CRITICAL CARE TIME SPENT in evaluation and management, reassessments, review and interpretation of labs and x-rays, ventilator and hemodynamic management, formulating a plan and coordinating care: ___30____ MIN.  Time does not include procedural time.  CTICU ATTENDING     					    Rohit Finney MD

## 2023-02-04 NOTE — PROGRESS NOTE ADULT - ASSESSMENT
72y Male with PMH significant for CAD underwent CABG is visited at bedside who is not in acute distress.  He requires couple assistant for ambulation.  Patient is encouraged for ambulation and weaned off oxygenation.     Neurovascular: No delirium. Pain well controlled with current regimen.  -PRN's.    Cardiovascular:   CAD  - post op care   - continue ASA/plavix/statin/BB   - increase ambulation     Respiratory: 02 Sat = 98% on RA.  -If on oxygen wean to RA from for O2 Sat > 93%.  -Encourage C+DB and Use of IS 10x / hr while awake.  -CXR.    GI: Stable.  -NPO after MN.  -PPX.  -PO Diet.    Renal / : ESRD  - HD today     Endocrine:    -A1c.  -TSH.    Hematologic: stable   -CBC.  -Coagulation Panel.    ID: afebrile   -Tempature.  -CBC.  -Observe for SIRS/Sepsis Syndrome.    Prophylaxis:  -DVT prophylaxis with 5000 SubQ Heparin q8h.  -SCD's    Disposition:  - home when medical optimized

## 2023-02-04 NOTE — PROGRESS NOTE ADULT - SUBJECTIVE AND OBJECTIVE BOX
----- Message from [facility] sent at 12/8/2020 10:57 AM EST -----  Subject: Message to Provider    QUESTIONS  Information for Provider? Patient's wife   Enmanuel Barron   is requesting labs to be done for blood work for patient before his   physical exam. Wife is adamant on getting his labs prior to the   appointment. Patient also needs a 90 day supply of his medications. Would   like a call back. ---------------------------------------------------------------------------  --------------  Lisa JURADO  What is the best way for the office to contact you? OK to leave message on   voicemail  Preferred Call Back Phone Number? [phone]  ---------------------------------------------------------------------------  --------------  SCRIPT ANSWERS  Relationship to Patient?  Self Patient discussed on morning rounds with Dr. Hernandez     Operation / Date: 2/1/23 [email or web address]    SUBJECTIVE ASSESSMENT:  72y Male with PMH significant for CAD underwent CABG is visited at bedside who is not in acute distress.  He requires couple assistant for ambulation.  Patient is encouraged for ambulation and weaned off oxygenation.      Vital Signs Last 24 Hrs  T(C): 36.3 (04 Feb 2023 14:30), Max: 36.6 (03 Feb 2023 17:16)  T(F): 97.4 (04 Feb 2023 14:30), Max: 97.9 (03 Feb 2023 22:33)  HR: 64 (04 Feb 2023 11:30) (64 - 150)  BP: 117/71 (04 Feb 2023 11:30) (105/46 - 132/57)  BP(mean): 68 (04 Feb 2023 10:00) (65 - 82)  RR: 18 (04 Feb 2023 11:30) (14 - 20)  SpO2: 95% (04 Feb 2023 11:30) (87% - 99%)    Parameters below as of 04 Feb 2023 11:30  Patient On (Oxygen Delivery Method): nasal cannula, high flow  O2 Flow (L/min): 50  O2 Concentration (%): 60  I&O's Detail    03 Feb 2023 07:01  -  04 Feb 2023 07:00  --------------------------------------------------------  IN:    IV PiggyBack: 50 mL    Oral Fluid: 450 mL    sodium chloride 0.9%: 95 mL  Total IN: 595 mL    OUT:    Chest Tube (mL): 90 mL    Drain (mL): 70 mL    Voided (mL): 0 mL  Total OUT: 160 mL    Total NET: 435 mL      04 Feb 2023 07:01  -  04 Feb 2023 14:34  --------------------------------------------------------  IN:    Other (mL): 400 mL  Total IN: 400 mL    OUT:    Other (mL): 1400 mL  Total OUT: 1400 mL    Total NET: -1000 mL      CHEST TUBE: yes   SHAHEED DRAIN:  no  EPICARDIAL WIRES: no  TIE DOWNS: yes  TIRADO: no    PHYSICAL EXAM:    General: NAD     Neurological: grossly intact     Cardiovascular: RRR without murmur    Respiratory: no wheezing and non rhonchi     Gastrointestinal: soft and non distended     Extremities: no edema bilaterally     Vascular: pulses are intact     Incision Sites: intact     LABS:                        10.3   8.12  )-----------( 84       ( 03 Feb 2023 03:54 )             29.9       COUMADIN:  no    PT/INR - ( 03 Feb 2023 03:54 )   PT: 17.9 sec;   INR: 1.50  PTT - ( 03 Feb 2023 03:54 )  PTT:44.6 sec    02-03    136  |  97  |  30<H>  ----------------------------<  172<H>  4.4   |  25  |  4.13<H>    Ca    8.3<L>      03 Feb 2023 03:54  Phos  5.0     02-03  Mg     2.0     02-03    TPro  5.9<L>  /  Alb  3.6  /  TBili  0.3  /  DBili  x   /  AST  25  /  ALT  <5<L>  /  AlkPhos  231<H>  02-03      MEDICATIONS  (STANDING):  aMIOdarone    Tablet 400 milliGRAM(s) Oral every 8 hours  aspirin enteric coated 81 milliGRAM(s) Oral daily  atorvastatin 80 milliGRAM(s) Oral at bedtime  carbidopa/levodopa  25/100 1 Tablet(s) Oral <User Schedule>  ceFAZolin   IVPB 1000 milliGRAM(s) IV Intermittent every 24 hours  chlorhexidine 2% Cloths 1 Application(s) Topical daily  clopidogrel Tablet 75 milliGRAM(s) Oral daily  dextrose 5%. 1000 milliLiter(s) (50 mL/Hr) IV Continuous <Continuous>  dextrose 5%. 1000 milliLiter(s) (100 mL/Hr) IV Continuous <Continuous>  dextrose 50% Injectable 50 milliLiter(s) IV Push every 15 minutes  dextrose 50% Injectable 25 milliLiter(s) IV Push every 15 minutes  glucagon  Injectable 1 milliGRAM(s) IntraMuscular once  heparin   Injectable 5000 Unit(s) SubCutaneous every 8 hours  insulin lispro (ADMELOG) corrective regimen sliding scale   SubCutaneous Before meals and at bedtime  metoprolol tartrate 12.5 milliGRAM(s) Oral two times a day  pantoprazole    Tablet 40 milliGRAM(s) Oral before breakfast  polyethylene glycol 3350 17 Gram(s) Oral daily  senna 2 Tablet(s) Oral at bedtime  sodium chloride 0.9%. 1000 milliLiter(s) (10 mL/Hr) IV Continuous <Continuous>  tamsulosin 0.4 milliGRAM(s) Oral at bedtime    MEDICATIONS  (PRN):  acetaminophen     Tablet .. 650 milliGRAM(s) Oral every 6 hours PRN Mild Pain (1 - 3)  dextrose Oral Gel 15 Gram(s) Oral once PRN Blood Glucose LESS THAN 70 milliGRAM(s)/deciliter        RADIOLOGY & ADDITIONAL TESTS:

## 2023-02-04 NOTE — PROGRESS NOTE ADULT - SUBJECTIVE AND OBJECTIVE BOX
Hemoglobin: 10.3 g/dL (23 @ 03:54)  Phosphorus Level, Serum: 5.0 mg/dL (23 @ 03:54)  Phosphorus Level, Serum: 3.8 mg/dL (23 @ 15:40)  Hemoglobin: 10.2 g/dL (23 @ 15:40)    Albumin, Serum: 3.6 g/dL (23 @ 03:54)  Albumin, Serum: 4.1 g/dL (23 @ 15:40)    T(C): 36.3 (23 @ 12:48), Max: 36.6 (23 @ 17:16)  HR: 64 (23 @ 11:30) (64 - 150)  BP: 117/71 (23 @ 11:30) (105/46 - 134/56)  RR: 18 (23 @ 11:30) (14 - 20)  SpO2: 95% (23 @ 11:30) (87% - 99%)  cinacalcet 60 milliGRAM(s) Oral daily, 23 @ 14:50, Routine  epoetin olu-epbx (RETACRIT) Injectable 6000 Unit(s) IV Push once, 23 @ 09:00, Routine, Stop order after: 1 Doses  epoetin olu-epbx (RETACRIT) Injectable 6000 Unit(s) IV Push once, 23 @ 07:39, Routine, Stop order after: 1 Doses  epoetin olu-epbx (RETACRIT) Injectable 6000 Unit(s) IV Push once, 23 @ 07:37, Routine, Stop order after: 1 Doses  sevelamer carbonate 800 milliGRAM(s) Oral every 8 hours, 23 @ 17:48, Routine  sevelamer carbonate 2400 milliGRAM(s) Oral three times a day with meals, 23 @ 14:53, Routine      Hemodialysis Treatment.:     Schedule: Once, Modality: Hemodialysis, Access: Arteriovenous Fistula    Dialyzer: Optiflux [id number], Time: 180 Min    Blood Flow: 400 mL/Min , Dialysate Flow: 500 mL/Min, Dialysate Temp: 35, Tubinmm (Adult)    Target Fluid Removal: 2 Liters    Dialysate Electrolytes (mEq/L): Potassium 2, Calcium 2.5, Sodium 138, Bicarbonate 35 (23 @ 07:38) [Completed]    Seen on HD, had episode of hypotension, now improved. UF goal changed to 1L

## 2023-02-04 NOTE — PROGRESS NOTE ADULT - SUBJECTIVE AND OBJECTIVE BOX
--------------------------------------------------------------------------------  Chief Complaint: ESRD/Ongoing hemodialysis requirement    24 hour events/subjective:    Seen this AM, on HFNC. Scheduled for HD today, no acute complaints.     PAST HISTORY  --------------------------------------------------------------------------------  No significant changes to PMH, PSH, FHx, SHx, unless otherwise noted    ALLERGIES & MEDICATIONS  --------------------------------------------------------------------------------  Allergies    No Known Allergies    Intolerances      Standing Inpatient Medications  aMIOdarone    Tablet 400 milliGRAM(s) Oral every 8 hours  aspirin enteric coated 81 milliGRAM(s) Oral daily  atorvastatin 80 milliGRAM(s) Oral at bedtime  carbidopa/levodopa  25/100 1 Tablet(s) Oral <User Schedule>  ceFAZolin   IVPB 1000 milliGRAM(s) IV Intermittent every 24 hours  chlorhexidine 2% Cloths 1 Application(s) Topical daily  clopidogrel Tablet 75 milliGRAM(s) Oral daily  dextrose 5%. 1000 milliLiter(s) IV Continuous <Continuous>  dextrose 5%. 1000 milliLiter(s) IV Continuous <Continuous>  dextrose 50% Injectable 50 milliLiter(s) IV Push every 15 minutes  dextrose 50% Injectable 25 milliLiter(s) IV Push every 15 minutes  epoetin olu-epbx (RETACRIT) Injectable 6000 Unit(s) IV Push once  glucagon  Injectable 1 milliGRAM(s) IntraMuscular once  heparin   Injectable 5000 Unit(s) SubCutaneous every 8 hours  insulin lispro (ADMELOG) corrective regimen sliding scale   SubCutaneous Before meals and at bedtime  metoprolol tartrate 12.5 milliGRAM(s) Oral two times a day  pantoprazole    Tablet 40 milliGRAM(s) Oral before breakfast  polyethylene glycol 3350 17 Gram(s) Oral daily  senna 2 Tablet(s) Oral at bedtime  sodium chloride 0.9%. 1000 milliLiter(s) IV Continuous <Continuous>  tamsulosin 0.4 milliGRAM(s) Oral at bedtime    PRN Inpatient Medications  acetaminophen     Tablet .. 650 milliGRAM(s) Oral every 6 hours PRN  dextrose Oral Gel 15 Gram(s) Oral once PRN      REVIEW OF SYSTEMS  --------------------------------------------------------------------------------  All other systems were reviewed and are negative, except as noted.    VITALS/PHYSICAL EXAM  --------------------------------------------------------------------------------  T(C): 36.2 (23 @ 10:11), Max: 36.6 (23 @ 17:16)  HR: 109 (23 @ 10:00) (67 - 150)  BP: 128/47 (23 @ 10:00) (105/46 - 134/56)  RR: 20 (23 @ 10:00) (14 - 20)  SpO2: 96% (23 @ 10:00) (87% - 99%)  Wt(kg): --  Drug Dosing Weight  Height (cm): 157.5 (2023 08:35)  Weight (kg): 63 (2023 08:35)  BMI (kg/m2): 25.4 (2023 08:35)  BSA (m2): 1.64 (2023 08:35)        23 @ 07:01  -  23 @ 07:00  --------------------------------------------------------  IN: 595 mL / OUT: 160 mL / NET: 435 mL      Physical Exam:  GENERAL: Alert, awake, on HFNC  HEENT: no RUSLAN, MMM   CHEST/LUNG: Bilateral clear breath sounds, Left chest tube in place   HEART: Regular rate and rhythm, no murmur, no gallops, no rub   ABDOMEN: Soft, nontender, non distended  : No flank or supra pubic tenderness.  EXTREMITIES: no pedal edema  Neurology: Awake, no asterixis   SKIN: No rash or skin lesion   ACCESS: RUE  AVF w/bruit and thrill     LABS/STUDIES  --------------------------------------------------------------------------------              10.3   8.12  >-----------<  84       [23 @ 03:54]              29.9     136  |  97  |  30  ----------------------------<  172      [23 @ 03:54]  4.4   |  25  |  4.13        Ca     8.3     [23 03:54]      Mg     2.0     [23 03:54]      Phos  5.0     [23 03:54]    TPro  5.9  /  Alb  3.6  /  TBili  0.3  /  DBili  x   /  AST  25  /  ALT  <5  /  AlkPhos  231  [23 @ 03:54]    PT/INR: PT 17.9 , INR 1.50       [02-03-23 @ 03:54]  PTT: 44.6       [23 03:54]      Iron 50, TIBC 156, %sat 32      [23 @ 09:45]  Ferritin 1144      [23 09:45]  HbA1c 5.9      [10-25-19 @ 09:45]  TSH 0.903      [23 @ 16:10]  Lipid: chol 97, TG 90, HDL 41, LDL --      [23 @ 16:10]        RADIOLOGY:  --------------------------------------------------------------------------------------    Hemoglobin: 10.3 g/dL (23 @ 03:54)  Phosphorus Level, Serum: 5.0 mg/dL (23 03:54)  Phosphorus Level, Serum: 3.8 mg/dL (23 @ 15:40)  Hemoglobin: 10.2 g/dL (23 @ 15:40)    Albumin, Serum: 3.6 g/dL (23 @ 03:54)  Albumin, Serum: 4.1 g/dL (23 @ 15:40)    T(C): 36.2 (23 @ 10:11), Max: 36.6 (23 @ 17:16)  HR: 109 (23 @ 10:00) (67 - 150)  BP: 128/47 (23 @ 10:00) (105/46 - 134/56)  RR: 20 (23 @ 10:00) (14 - 20)  SpO2: 96% (23 @ 10:00) (87% - 99%)  cinacalcet 60 milliGRAM(s) Oral daily, 23 @ 14:50, Routine  epoetin olu-epbx (RETACRIT) Injectable 6000 Unit(s) IV Push once, 23 @ 09:00, Routine, Stop order after: 1 Doses  epoetin olu-epbx (RETACRIT) Injectable 6000 Unit(s) IV Push once, 23 @ 07:39, Routine, Stop order after: 1 Doses  epoetin olu-epbx (RETACRIT) Injectable 6000 Unit(s) IV Push once, 23 @ 07:37, Routine, Stop order after: 1 Doses  sevelamer carbonate 800 milliGRAM(s) Oral every 8 hours, 23 @ 17:48, Routine  sevelamer carbonate 2400 milliGRAM(s) Oral three times a day with meals, 23 @ 14:53, Routine      Hemodialysis Treatment.:     Schedule: Once, Modality: Hemodialysis, Access: Arteriovenous Fistula    Dialyzer: Optiflux [id number], Time: 180 Min    Blood Flow: 400 mL/Min , Dialysate Flow: 500 mL/Min, Dialysate Temp: 35, Tubinmm (Adult)    Target Fluid Removal: 2 Liters    Dialysate Electrolytes (mEq/L): Potassium 2, Calcium 2.5, Sodium 138, Bicarbonate 35 (23 @ 07:38) [Active]

## 2023-02-04 NOTE — PROGRESS NOTE ADULT - ASSESSMENT
72M hx of , ESRD POST OP #2 s/p OPCAB, HDS on NC, last HD 2/2 with stable electrolytes,     Assessment/Plan:   Electrolytes noted Potassium of 4.4, Bicarb of 25  HD today as per schedule  Renal diet  Dry weight TBD    #HTN   -Hold Anti-HTN meds prior to HD  -UF with HD    #access   RUE AVF functional     #anemia  Hgb within goal  Iron panel noted, Tsat and Ferritin wnl, no indication for IV iron   epo w/ HD  s/p 3U PRBC for hospitalization thus far    #renal bone disease   Ca ~8.3  Phos: 5  -C/w cinacalcet 60mg Qday  -Hold phos binders

## 2023-02-05 LAB
GLUCOSE BLDC GLUCOMTR-MCNC: 102 MG/DL — HIGH (ref 70–99)
GLUCOSE BLDC GLUCOMTR-MCNC: 180 MG/DL — HIGH (ref 70–99)
GLUCOSE BLDC GLUCOMTR-MCNC: 212 MG/DL — HIGH (ref 70–99)
GLUCOSE BLDC GLUCOMTR-MCNC: 269 MG/DL — HIGH (ref 70–99)

## 2023-02-05 PROCEDURE — 71046 X-RAY EXAM CHEST 2 VIEWS: CPT | Mod: 26

## 2023-02-05 RX ADMIN — CARBIDOPA AND LEVODOPA 1 TABLET(S): 25; 100 TABLET ORAL at 06:15

## 2023-02-05 RX ADMIN — HEPARIN SODIUM 5000 UNIT(S): 5000 INJECTION INTRAVENOUS; SUBCUTANEOUS at 06:16

## 2023-02-05 RX ADMIN — Medication 81 MILLIGRAM(S): at 14:31

## 2023-02-05 RX ADMIN — ATORVASTATIN CALCIUM 80 MILLIGRAM(S): 80 TABLET, FILM COATED ORAL at 21:41

## 2023-02-05 RX ADMIN — Medication 6: at 12:14

## 2023-02-05 RX ADMIN — AMIODARONE HYDROCHLORIDE 400 MILLIGRAM(S): 400 TABLET ORAL at 06:16

## 2023-02-05 RX ADMIN — CLOPIDOGREL BISULFATE 75 MILLIGRAM(S): 75 TABLET, FILM COATED ORAL at 14:31

## 2023-02-05 RX ADMIN — AMIODARONE HYDROCHLORIDE 400 MILLIGRAM(S): 400 TABLET ORAL at 21:41

## 2023-02-05 RX ADMIN — HEPARIN SODIUM 5000 UNIT(S): 5000 INJECTION INTRAVENOUS; SUBCUTANEOUS at 21:41

## 2023-02-05 RX ADMIN — Medication 12.5 MILLIGRAM(S): at 06:15

## 2023-02-05 RX ADMIN — PANTOPRAZOLE SODIUM 40 MILLIGRAM(S): 20 TABLET, DELAYED RELEASE ORAL at 06:16

## 2023-02-05 RX ADMIN — TAMSULOSIN HYDROCHLORIDE 0.4 MILLIGRAM(S): 0.4 CAPSULE ORAL at 21:41

## 2023-02-05 RX ADMIN — CARBIDOPA AND LEVODOPA 1 TABLET(S): 25; 100 TABLET ORAL at 17:44

## 2023-02-05 RX ADMIN — POLYETHYLENE GLYCOL 3350 17 GRAM(S): 17 POWDER, FOR SOLUTION ORAL at 14:31

## 2023-02-05 RX ADMIN — Medication 12.5 MILLIGRAM(S): at 17:44

## 2023-02-05 RX ADMIN — Medication 4: at 07:10

## 2023-02-05 RX ADMIN — AMIODARONE HYDROCHLORIDE 400 MILLIGRAM(S): 400 TABLET ORAL at 14:30

## 2023-02-05 RX ADMIN — HEPARIN SODIUM 5000 UNIT(S): 5000 INJECTION INTRAVENOUS; SUBCUTANEOUS at 14:30

## 2023-02-05 RX ADMIN — Medication 2: at 16:49

## 2023-02-05 NOTE — PROGRESS NOTE ADULT - ASSESSMENT
72y Male with medical history of CAD and ESRD underwent CABG at this admission is visited at bedside who is not in acute distress.  When visited, he is sitting up and weaned off his HFNC and continues to do well.  He tolerated his dialysis.  He denies chest pain, shortness of breath or syncopes.      Neurovascular: No delirium. Pain well controlled with current regimen.  -PRN's.    Cardiovascular:  CAD with s/p CABG  - ASA/plavix/bb/statin continue   - lab in AM  - repeat CXR  - increase pulmonary toilet    Respiratory: 02 Sat = 98% on RA.  -If on oxygen wean to RA from for O2 Sat > 93%.  -Encourage C+DB and Use of IS 10x / hr while awake.  -CXR.    GI: stable   - GI PPX    Renal / : ESRD  - renal recommendation   - HD per schedule    Hematologic: h/h stable   -CBC.  -Coagulation Panel.    ID: afebrile   - lab in AM  -Observe for SIRS/Sepsis Syndrome.    Prophylaxis:  -DVT prophylaxis with 5000 SubQ Heparin q8h.  -SCD's    Disposition:  - home when medical optimized

## 2023-02-05 NOTE — PROGRESS NOTE ADULT - SUBJECTIVE AND OBJECTIVE BOX
Patient discussed on morning rounds with Dr. Hernandez     Operation / Date: 1/31/23 cabgx2    SUBJECTIVE ASSESSMENT:  72y Male with medical history of CAD and ESRD underwent CABG at this admission is visited at bedside who is not in acute distress.  When visited, he is sitting up and weaned off his HFNC and continues to do well.  He tolerated his dialysis.  He denies chest pain, shortness of breath or syncopes.      Vital Signs Last 24 Hrs  T(C): 36.9 (05 Feb 2023 12:43), Max: 36.9 (05 Feb 2023 12:43)  T(F): 98.5 (05 Feb 2023 12:43), Max: 98.5 (05 Feb 2023 12:43)  HR: 61 (05 Feb 2023 08:34) (61 - 107)  BP: 123/60 (05 Feb 2023 08:34) (111/51 - 130/60)  BP(mean): 84 (05 Feb 2023 08:34) (73 - 87)  RR: 18 (05 Feb 2023 08:34) (18 - 18)  SpO2: 96% (05 Feb 2023 08:34) (93% - 100%)    Parameters below as of 05 Feb 2023 08:34  Patient On (Oxygen Delivery Method): nasal cannula  O2 Flow (L/min): 4    I&O's Detail    04 Feb 2023 07:01  -  05 Feb 2023 07:00  --------------------------------------------------------  IN:    IV PiggyBack: 50 mL    Other (mL): 400 mL    sodium chloride 0.9%: 20 mL  Total IN: 470 mL    OUT:    Other (mL): 1400 mL    Stool (mL): 1 mL    Voided (mL): 0 mL  Total OUT: 1401 mL    Total NET: -931 mL      CHEST TUBE:  no  SHAHEED DRAIN:  no  EPICARDIAL WIRES: no  TIE DOWNS: no  TIRADO: no    PHYSICAL EXAM:    General: NAD    Neurological: grossly intact     Cardiovascular: RRR without murmur    Respiratory: no wheezing and non rhonchi     Gastrointestinal: BM and BS are intact     Extremities: no edema bilaterally     Vascular: pulses are intact     Incision Sites: n/a    LABS:      COUMADIN: no       MEDICATIONS  (STANDING):  aMIOdarone    Tablet 400 milliGRAM(s) Oral every 8 hours  aspirin enteric coated 81 milliGRAM(s) Oral daily  atorvastatin 80 milliGRAM(s) Oral at bedtime  carbidopa/levodopa  25/100 1 Tablet(s) Oral <User Schedule>  chlorhexidine 2% Cloths 1 Application(s) Topical daily  clopidogrel Tablet 75 milliGRAM(s) Oral daily  dextrose 5%. 1000 milliLiter(s) (50 mL/Hr) IV Continuous <Continuous>  dextrose 5%. 1000 milliLiter(s) (100 mL/Hr) IV Continuous <Continuous>  dextrose 50% Injectable 50 milliLiter(s) IV Push every 15 minutes  dextrose 50% Injectable 25 milliLiter(s) IV Push every 15 minutes  glucagon  Injectable 1 milliGRAM(s) IntraMuscular once  heparin   Injectable 5000 Unit(s) SubCutaneous every 8 hours  insulin lispro (ADMELOG) corrective regimen sliding scale   SubCutaneous Before meals and at bedtime  metoprolol tartrate 12.5 milliGRAM(s) Oral two times a day  pantoprazole    Tablet 40 milliGRAM(s) Oral before breakfast  polyethylene glycol 3350 17 Gram(s) Oral daily  senna 2 Tablet(s) Oral at bedtime  sodium chloride 0.9%. 1000 milliLiter(s) (10 mL/Hr) IV Continuous <Continuous>  tamsulosin 0.4 milliGRAM(s) Oral at bedtime    MEDICATIONS  (PRN):  acetaminophen     Tablet .. 650 milliGRAM(s) Oral every 6 hours PRN Mild Pain (1 - 3)  dextrose Oral Gel 15 Gram(s) Oral once PRN Blood Glucose LESS THAN 70 milliGRAM(s)/deciliter      RADIOLOGY & ADDITIONAL TESTS:  CXR  - < from: Xray Chest 1 View- PORTABLE-Routine (Xray Chest 1 View- PORTABLE-Routine in AM.) (02.04.23 @ 06:39) >  Frontal examination of the chest demonstrates limited inspiration.   Cardiomegaly. Congestion and/or infiltrates. Status post sternotomy. No   interval change position remaining support devices in comparison to prior   examination of the chest 2/3/2023.    IMPRESSION: Limited inspiration. Congestion and/or infiltrates

## 2023-02-06 ENCOUNTER — TRANSCRIPTION ENCOUNTER (OUTPATIENT)
Age: 73
End: 2023-02-06

## 2023-02-06 LAB
GLUCOSE BLDC GLUCOMTR-MCNC: 136 MG/DL — HIGH (ref 70–99)
GLUCOSE BLDC GLUCOMTR-MCNC: 138 MG/DL — HIGH (ref 70–99)
GLUCOSE BLDC GLUCOMTR-MCNC: 173 MG/DL — HIGH (ref 70–99)
GLUCOSE BLDC GLUCOMTR-MCNC: 258 MG/DL — HIGH (ref 70–99)

## 2023-02-06 PROCEDURE — 99232 SBSQ HOSP IP/OBS MODERATE 35: CPT

## 2023-02-06 PROCEDURE — 71045 X-RAY EXAM CHEST 1 VIEW: CPT | Mod: 26

## 2023-02-06 RX ORDER — METOPROLOL TARTRATE 50 MG
12.5 TABLET ORAL ONCE
Refills: 0 | Status: COMPLETED | OUTPATIENT
Start: 2023-02-06 | End: 2023-02-06

## 2023-02-06 RX ORDER — METOPROLOL TARTRATE 50 MG
25 TABLET ORAL EVERY 12 HOURS
Refills: 0 | Status: DISCONTINUED | OUTPATIENT
Start: 2023-02-06 | End: 2023-02-07

## 2023-02-06 RX ADMIN — HEPARIN SODIUM 5000 UNIT(S): 5000 INJECTION INTRAVENOUS; SUBCUTANEOUS at 14:43

## 2023-02-06 RX ADMIN — POLYETHYLENE GLYCOL 3350 17 GRAM(S): 17 POWDER, FOR SOLUTION ORAL at 11:06

## 2023-02-06 RX ADMIN — PANTOPRAZOLE SODIUM 40 MILLIGRAM(S): 20 TABLET, DELAYED RELEASE ORAL at 07:00

## 2023-02-06 RX ADMIN — HEPARIN SODIUM 5000 UNIT(S): 5000 INJECTION INTRAVENOUS; SUBCUTANEOUS at 21:47

## 2023-02-06 RX ADMIN — TAMSULOSIN HYDROCHLORIDE 0.4 MILLIGRAM(S): 0.4 CAPSULE ORAL at 21:46

## 2023-02-06 RX ADMIN — SENNA PLUS 2 TABLET(S): 8.6 TABLET ORAL at 21:49

## 2023-02-06 RX ADMIN — CARBIDOPA AND LEVODOPA 1 TABLET(S): 25; 100 TABLET ORAL at 06:00

## 2023-02-06 RX ADMIN — AMIODARONE HYDROCHLORIDE 400 MILLIGRAM(S): 400 TABLET ORAL at 06:00

## 2023-02-06 RX ADMIN — Medication 2: at 21:46

## 2023-02-06 RX ADMIN — Medication 6: at 11:57

## 2023-02-06 RX ADMIN — CLOPIDOGREL BISULFATE 75 MILLIGRAM(S): 75 TABLET, FILM COATED ORAL at 10:14

## 2023-02-06 RX ADMIN — CARBIDOPA AND LEVODOPA 1 TABLET(S): 25; 100 TABLET ORAL at 18:15

## 2023-02-06 RX ADMIN — AMIODARONE HYDROCHLORIDE 400 MILLIGRAM(S): 400 TABLET ORAL at 21:46

## 2023-02-06 RX ADMIN — Medication 81 MILLIGRAM(S): at 10:14

## 2023-02-06 RX ADMIN — AMIODARONE HYDROCHLORIDE 400 MILLIGRAM(S): 400 TABLET ORAL at 14:43

## 2023-02-06 RX ADMIN — Medication 25 MILLIGRAM(S): at 18:28

## 2023-02-06 RX ADMIN — ATORVASTATIN CALCIUM 80 MILLIGRAM(S): 80 TABLET, FILM COATED ORAL at 21:46

## 2023-02-06 RX ADMIN — CHLORHEXIDINE GLUCONATE 1 APPLICATION(S): 213 SOLUTION TOPICAL at 11:48

## 2023-02-06 RX ADMIN — HEPARIN SODIUM 5000 UNIT(S): 5000 INJECTION INTRAVENOUS; SUBCUTANEOUS at 06:00

## 2023-02-06 RX ADMIN — Medication 12.5 MILLIGRAM(S): at 10:14

## 2023-02-06 RX ADMIN — Medication 12.5 MILLIGRAM(S): at 06:00

## 2023-02-06 NOTE — DISCHARGE NOTE PROVIDER - EXTENDED VTE YES NO FOR MLM ENOXAPARIN
Patient comes to clinic for follow up anticoagulation visit.  Last INR on 5/12/21 was 2.5.  Dose maintained.   Today's INR is 2.1 and is within goal range.    Current warfarin total weekly dose of 27.5 mg verified.  Informed the INR result is within therapeutic range and instructed to maintain current dose per protocol. Discussed dose and return date of 8/9/21 for next INR. See Anticoagulation flowsheet.    INR today is 2.1, in range, up from previous INR 2.5, in six weeks on usual TWD of 27.5mg/wk. Denies diet/health/med changes since last visit. Continue TWD of 27.5mg/wk and recheck INR in 6 weeks.    Dr Blake is in the office today supervising the treatment.    Instructed to contact the clinic with any unusual bleeding or bruising, any changes in medications, diet, health status, lifestyle, or any other changes, questions or concerns. Verbalized understanding of all discussed.     
,

## 2023-02-06 NOTE — DISCHARGE NOTE PROVIDER - CARE PROVIDERS DIRECT ADDRESSES
[email or web address]

## 2023-02-06 NOTE — PROGRESS NOTE ADULT - SUBJECTIVE AND OBJECTIVE BOX
Patient discussed on morning rounds with Dr. Hernandez    Operation / Date: OPCAB x 2 (LIMA to LAD, SVG to PDA) on 2/1/23    SUBJECTIVE ASSESSMENT: Patient seen and examined at bedside.  #[id number] utilized. States that he feels fine, but is constipated. Denies chills, chest pain, SOB, palpitations, N/V.     Vital Signs Last 24 Hrs  T(C): 36.9 (06 Feb 2023 09:00), Max: 37.6 (06 Feb 2023 01:18)  T(F): 98.4 (06 Feb 2023 09:00), Max: 99.7 (06 Feb 2023 01:18)  HR: 63 (06 Feb 2023 08:40) (61 - 72)  BP: 138/62 (06 Feb 2023 08:40) (120/58 - 147/66)  BP(mean): 89 (06 Feb 2023 08:40) (82 - 95)  RR: 18 (06 Feb 2023 08:40) (16 - 18)  SpO2: 91% (06 Feb 2023 08:40) (91% - 100%)    Parameters below as of 06 Feb 2023 05:30  Patient On (Oxygen Delivery Method): room air    I&O's Detail    06 Feb 2023 07:01  -  06 Feb 2023 09:44  --------------------------------------------------------  IN:    Oral Fluid: 100 mL  Total IN: 100 mL    OUT:    Stool (mL): 1 mL  Total OUT: 1 mL    Total NET: 99 mL    CHEST TUBE: None  SHAHEED DRAIN:  None  EPICARDIAL WIRES: None  TIE DOWNS: None  TIRADO: None    PHYSICAL EXAM:  GENERAL: NAD, sitting upright in chair   HEAD:  Atraumatic, Normocephalic  EYES: EOMI, PERRLA, conjunctiva and sclera clear  ENT: Moist mucous membranes  NECK: Supple, No JVD  CHEST/LUNG: CTAB; No rales, rhonchi, wheezing, or rubs. Unlabored respirations. Sternotomy incision well-approximated.   HEART: Regular rate and rhythm; No murmurs, rubs, or gallops  ABDOMEN: Bowel sounds present; Soft, Nontender, Nondistended. No hepatomegally  EXTREMITIES:  2+ Peripheral Pulses, brisk capillary refill. No clubbing, cyanosis, or edema  GROINS: R groin dressing c/d/i  NERVOUS SYSTEM:  Alert & Oriented X3, speech clear. No deficits     LABS:    MEDICATIONS  (STANDING):  aMIOdarone    Tablet 400 milliGRAM(s) Oral every 8 hours  aspirin enteric coated 81 milliGRAM(s) Oral daily  atorvastatin 80 milliGRAM(s) Oral at bedtime  carbidopa/levodopa  25/100 1 Tablet(s) Oral <User Schedule>  chlorhexidine 2% Cloths 1 Application(s) Topical daily  clopidogrel Tablet 75 milliGRAM(s) Oral daily  dextrose 5%. 1000 milliLiter(s) (50 mL/Hr) IV Continuous <Continuous>  dextrose 5%. 1000 milliLiter(s) (100 mL/Hr) IV Continuous <Continuous>  dextrose 50% Injectable 50 milliLiter(s) IV Push every 15 minutes  dextrose 50% Injectable 25 milliLiter(s) IV Push every 15 minutes  glucagon  Injectable 1 milliGRAM(s) IntraMuscular once  heparin   Injectable 5000 Unit(s) SubCutaneous every 8 hours  insulin lispro (ADMELOG) corrective regimen sliding scale   SubCutaneous Before meals and at bedtime  metoprolol tartrate 12.5 milliGRAM(s) Oral two times a day  pantoprazole    Tablet 40 milliGRAM(s) Oral before breakfast  polyethylene glycol 3350 17 Gram(s) Oral daily  senna 2 Tablet(s) Oral at bedtime  sodium chloride 0.9%. 1000 milliLiter(s) (10 mL/Hr) IV Continuous <Continuous>  tamsulosin 0.4 milliGRAM(s) Oral at bedtime    MEDICATIONS  (PRN):  acetaminophen     Tablet .. 650 milliGRAM(s) Oral every 6 hours PRN Mild Pain (1 - 3)  dextrose Oral Gel 15 Gram(s) Oral once PRN Blood Glucose LESS THAN 70 milliGRAM(s)/deciliter    RADIOLOGY & ADDITIONAL TESTS:  CXR: atelectasis

## 2023-02-06 NOTE — OCCUPATIONAL THERAPY INITIAL EVALUATION ADULT - GENERAL OBSERVATIONS, REHAB EVAL
Patient A&Ox4, agreeable and tolerated session fairly. Patient received seated in bedside chair +tele, +heplock IV, +sternal incision C/D/I, room air, NAD.

## 2023-02-06 NOTE — OCCUPATIONAL THERAPY INITIAL EVALUATION ADULT - MODIFIED CLINICAL TEST OF SENSORY INTEGRATION IN BALANCE TEST
Patient functionally ambulated in the hallway with RW and Min A x 1, noted with decreased step length, narrow base of support, deficits with postural control and activity tolerance requiring min verbal/tactile cues throughout for proper positioning and safety. Patient functionally ambulated in the hallway with RW and Min A x 1, noted with RYAN however SPO2% 96-99% on room air throughout, decreased step length, narrow base of support, deficits with postural control and activity tolerance requiring min verbal/tactile cues throughout for proper positioning and safety.

## 2023-02-06 NOTE — DISCHARGE NOTE PROVIDER - HOSPITAL COURSE
Patient discussed on morning rounds with  _____    Operation Date: OPCAB x 2 (LIMA to LAD, SVG to PDA) on 2/1/23    Primary Surgeon/Attending MD: Dr. Hernandez    Referring Physician: Dr. Carlin Ramos  _ _ _ _ _ _ _ _ _ _ _ _  HOSPITAL COURSE:  73 YO, Macedonian-speaking, Male w/ PMHx of HTN, HLD, CAD s/p multiple SURYA (pLAD 5/14/21, OM1 3/21/21, pLCx on 10/2021), DMII, ESRD (HD T/Th/Sat), CVA (approx. 2 yrs ago w/ R sided weakness), & Parkinson's disease who originally presented to [facility] 12/22/2022 c/o sharp left sided chest pain at rest overnight, relieved with sublingual nitroglycerin. At that time, he had an ECHO which revealed EF 55% and underwent dx cardiac cath 12/23/2022 found to have multivessel disease. Patient transferred to [facility] for under the cardiology team for possible PCI. On 12/27/22 patient underwent PCI which revealed PTCA oLCX 90% ISR, PTCA dLCX 90% ISR w/ subtotal small LCX distally; oLAD 80% ISR with + IFR (0.80), mLAD 70-80% & CTS was consulted & the patient was evaluated by Dr. Hernandez in the outpatient setting. Pt re-presented to [facility] on 1/31/23 for pre-surgical testing. On 2/1/23 patient underwent OPCAB x 2 (LIMA-LAD, SVG-PDA) with Dr. Hernandez. Patient recovered in CTICU and had HD in the PM. POD1 patient received HD and 2u PRBC for low H&H. Midodrine started, weaned off vaso. POD2 Midodrine off, started BB. In A-fib in the AM and converted after amio bolus x 1. Transferred to [facility]. Back in Afib and started PO amio. POD3 chest tubes and pacing wires removed, HD given. POD4 weaned off HFNC. POD5 ambulating well. POD6 cleared for discharge home after HD per Dr. Hernandez on 2/7/23.    _ _ _ _ _ _ _ _ _ _ _ _  DISCHARGE PHYSICAL EXAM:  GENERAL: NAD, lying in bed comfortably  HEAD:  Atraumatic, Normocephalic  EYES: EOMI, PERRLA, conjunctiva and sclera clear  ENT: Moist mucous membranes  NECK: Supple, No JVD  CHEST/LUNG: Clear to auscultation bilaterally; No rales, rhonchi, wheezing, or rubs. Unlabored respirations  HEART: Regular rate and rhythm; No murmurs, rubs, or gallops  ABDOMEN: Bowel sounds present; Soft, Nontender, Nondistended. No hepatomegally  EXTREMITIES:  2+ Peripheral Pulses, brisk capillary refill. No clubbing, cyanosis, or edema  NERVOUS SYSTEM:  Alert & Oriented X3, speech clear. No deficits   _ _ _ _ _ _ _ _ _ _ _ _  REMOVAL CHECKLIST:        [ ] Epicardial wires          [ ] Stitches/tie downs,   If no, why? ______          [ ] PICC/Midline,   If no, why? ________  _ _ _ _ _ _ _ _ _ _ _ _   MEDICATION DISCHARGE CHECKLIST    CABG        [ ] Aspirin, [  ] Contraindicated, Reason_______________________________        [ ] Plavix, [  ] Contraindicated, Reason_______________________________        [ ] Statin, [  ] Contraindicated, Reason_______________________________        [ ] Lasix , [  ] Contraindicated, Reason_______________________________              Duration: _____        [ ] Beta-Blocker, [  ] Contraindicated, Reason_______________________________  _ _ _ _ _ _ _ _ _ _ _ _  RELEVANT LABS/IMAGING:    _ _ _ _ _ _ _ _ _ _ _ _  CLINICAL FOLLOW UP NEEDS:     [ ] Labwork           Labs needed:           When/Timing:           Outpatient team aware: YES/NO         [ ] Imaging            Type:           When/Timing:           Outpatient team aware: YES/NO       [ ] Home equipment           Type: (i.e. wound vac, pneumostat, prevena, wet/dry dressings, picc/midlines, MCOT, manning etc)           Specific needs:           Outpatient team aware: YES/NO  _ _ _ _ _ _ _ _ _ _ _ _  Over 35 minutes was spent with the patient reviewing the discharge material including medications, follow up appointments, recovery, concerning symptoms, and how to contact their health care providers if they have questions   Patient discussed on morning rounds with Dr. Hernandez    Operation Date: OPCAB x 2 (LIMA to LAD, SVG to PDA) on 2/1/23    Primary Surgeon/Attending MD: Dr. Hernandez    Referring Physician: Dr. Carlin Ramos  _ _ _ _ _ _ _ _ _ _ _ _  HOSPITAL COURSE:  73 YO, Emirati-speaking, Male w/ PMHx of HTN, HLD, CAD s/p multiple SURYA (pLAD 5/14/21, OM1 3/21/21, pLCx on 10/2021), DMII, ESRD (HD T/Th/Sat), CVA (approx. 2 yrs ago w/ R sided weakness), & Parkinson's disease who originally presented to [facility] 12/22/2022 c/o sharp left sided chest pain at rest overnight, relieved with sublingual nitroglycerin. At that time, he had an ECHO which revealed EF 55% and underwent dx cardiac cath 12/23/2022 found to have multivessel disease. Patient transferred to [facility] for under the cardiology team for possible PCI. On 12/27/22 patient underwent PCI which revealed PTCA oLCX 90% ISR, PTCA dLCX 90% ISR w/ subtotal small LCX distally; oLAD 80% ISR with + IFR (0.80), mLAD 70-80% & CTS was consulted & the patient was evaluated by Dr. Hernandez in the outpatient setting. Pt re-presented to [facility] on 1/31/23 for pre-surgical testing. On 2/1/23 patient underwent OPCAB x 2 (LIMA-LAD, SVG-PDA) with Dr. Hernandez. Patient recovered in CTICU and had HD in the PM. POD1 patient received HD and 2u PRBC for low H&H. Midodrine started, weaned off vaso. POD2 Midodrine off, started BB. In A-fib in the AM and converted after amio bolus x 1. Transferred to [facility]. Back in Afib and started PO amio. POD3 chest tubes and pacing wires removed, HD given. POD4 weaned off HFNC. POD5 ambulating well. POD6 Pt feeling well, ambulating and eating without difficulty. Will be discharged home where he lives with wife. Cleared for discharge home after HD per Dr. Hernandez on 2/7/23.    _ _ _ _ _ _ _ _ _ _ _ _  PHYSICAL EXAM:  General: Resting comfortably in chair in NAD  Neurological: AOx3. Motor skills grossly intact  Cardiovascular: Normal S1/S2. Regular rate/rhythm. No murmurs  Respiratory: Lungs CTA bilaterally. No wheezing or rales  Gastrointestinal: +BS in all 4 quadrants. Non-distended. Soft. Non-tender  Extremities: Strength 5/5 b/l upper/lower extremities. Sensation grossly intact upper/lower extremities. No edema. No calf tenderness.  Vascular: Radial 2+bilaterally, DP 2+ b/l  Incision Sites: Sternal incision healing well with no erythema, dehisence. RLE graft site healing will with mild surrounding echymosis.   _ _ _ _   _ _ _ _ _ _ _ _  REMOVAL CHECKLIST:        [ x] Epicardial wires          [ x] Stitches/tie downs,   If no, why? ______          [ x] PICC/Midline,   If no, why? ________  _ _ _ _ _ _ _ _ _ _ _ _   MEDICATION DISCHARGE CHECKLIST    CABG        [ x] Aspirin, [  ] Contraindicated, Reason_______________________________        [ x] Plavix, [  ] Contraindicated, Reason_______________________________        [ x] Statin, [  ] Contraindicated, Reason_______________________________        [ ] Lasix , [  x] Contraindicated, Reason____dialysis pt____              Duration: _____        [ x] Beta-Blocker, [  ] Contraindicated, Reason_______________________________  _ _ _ _ _ _ _ _ _ _ _ _  RELEVANT LABS/IMAGING:    < from: Xray Chest 1 View- PORTABLE-Routine (Xray Chest 1 View- PORTABLE-Routine in AM.) (02.06.23 @ 05:21) >      Single frontal view of the chest demonstrates the lungs to be clear. The   cardiomediastinal silhouette is enlarged. Mediastinal sternotomy wires.   No acute osseous abnormalities. Overlying EKG leads and wires are noted.   Bilateral extensive vascular stent grafts    IMPRESSION: No acute cardiopulmonary disease process.    < end of copied text >        _ _ _ _ _ _ _ _ _ _ _ _  Over 35 minutes was spent with the patient reviewing the discharge material including medications, follow up appointments, recovery, concerning symptoms, and how to contact their health care providers if they have questions

## 2023-02-06 NOTE — DISCHARGE NOTE PROVIDER - NSDCQMERRANDS_GEN_ALL_CORE
OB follow up     Kim Alexis is a 35 y.o.  16w0d being seen today for her obstetrical visit.  Patient reports headache. Fetal movement: normal.    Review of Systems  No bleeding, No cramping/contractions     /63   Wt 67.6 kg (149 lb)   BMI 23.34 kg/m²     FHT: 156 BPM   Uterine Size: 16 cm       Assessment    1) pregnancy at 16w0d   Labs, ultrasound and culture reviewed.   Doing well overall.   Quickening discussed  Down syndrome screening offered   Anatomy scan in 3-4 weeks   2) Flu vaccine given, no reaction noted        Plan    Reviewed this stage of pregnancy  Problem list updated   Follow up in 4 weeks    Idris Olivas MD   10/2/2019  1:42 PM     No

## 2023-02-06 NOTE — OCCUPATIONAL THERAPY INITIAL EVALUATION ADULT - PERTINENT HX OF CURRENT PROBLEM, REHAB EVAL
Patient is a 73 y/o Rwandan-speaking Male, former smoker, with PMHx of HTN, HLD, CAD s/p multiple SURYA stents (pLAD 5/14/21, OM1 3/21/21, pLCx on 10/2021at [facility]), DMII, ESRD (HD Tues/Thurs/Sat), CVA (approx. 2 years ago w/ right sided weakness) &  parkinson's disease who originally presented to [facility] 12/22/2022 c/o sharp left sided chest pain at rest overnight, relieved with sublingual nitroglycerin. At that time, he had an ECHO which revealed EF 55% and underwent dx cardiac cath 12/23/2022 found to have multivessel disease. Patient transferred to [facility] for under the cardiology team for possible PCI. On 12/27/22 patient underwent PCI which revealed PTCA oLCX 90% ISR, PTCA dLCX 90% ISR w/ subtotal small LCX distally; oLAD 80% ISR with + IFR (0.80), mLAD 70-80% & CTS was consulted & the patient was evaluated by Dr. Hernandez in the outpatient setting. Today, 1/30/23 patient presents for pre-surgical testing prior to CABG with Dr. Hernandez on 2/1/23. Today he feels well, denies chest pain, shortness of breath, RYAN, n/v, fever/chills, LE swelling, recent sick contacts.

## 2023-02-06 NOTE — OCCUPATIONAL THERAPY INITIAL EVALUATION ADULT - DIAGNOSIS, OT EVAL
Patient POD #5 s/p CABG x 2, presents with sternal precautions, deficits with overall strength, postural control, balance and activity tolerance impacting independence with functional activities and mobility.

## 2023-02-06 NOTE — OCCUPATIONAL THERAPY INITIAL EVALUATION ADULT - ADDITIONAL COMMENTS
Patient reports living with his wife in apartment building with [address]. Patient states he requires assistance for ADL's and functional mobility from his wife utilizing a SC in the home and RW in the community. Patient reports being R hand dominant and owning a bathtub shower with shower chair.

## 2023-02-06 NOTE — PROGRESS NOTE ADULT - SUBJECTIVE AND OBJECTIVE BOX
NEPHROLOGY PROGRESS NOTE    Subjective: Patient seen and examined at bedside. States feeling well, no issues, has not had shortness of breath. Tolerated HD well on saturday.     [OBJECTIVE]:    Vital Signs:  T(F): , Max: 99.7 (02-06-23 @ 01:18)  HR:  (61 - 72)  BP:  (120/58 - 154/67)  BP(mean):  (82 - 96)  ABP: --  ABP(mean): --  RR:  (16 - 18)  SpO2:  (91% - 100%)  CVP(mm Hg): --  CVP(cm H2O): --      02-06 @ 07:01  -  02-06 @ 10:18  --------------------------------------------------------  IN: 100 mL / OUT: 1 mL / NET: 99 mL      CAPILLARY BLOOD GLUCOSE      POCT Blood Glucose.: 138 mg/dL (06 Feb 2023 06:35)      Physical Exam:  T(F): 98.4 (02-06-23 @ 09:00)  HR: 68 (02-06-23 @ 09:46)  BP: 154/67 (02-06-23 @ 09:46)  RR: 18 (02-06-23 @ 08:40)  SpO2: 95% (02-06-23 @ 09:46)  Wt(kg): --    General: NAD, Comfortable, on RA  HEENT: no scleral icterus, no ptosis, MMM, no JVD  Respiratory: CTA b/l, no wheezes, rales or rhonchi  Cardiovascular: Regular, +S1 + S2  Abdomen: Soft, NTND  Extremities: No cyanosis, no edema  Skin: No rashes or wounds visible  Lymphatic: No cervical/supraclavicular LAD  Neurological: CN II-XII grossly intact, follows commands, moves all extremities    Medications:  MEDICATIONS  (STANDING):  aMIOdarone    Tablet 400 milliGRAM(s) Oral every 8 hours  aspirin enteric coated 81 milliGRAM(s) Oral daily  atorvastatin 80 milliGRAM(s) Oral at bedtime  bisacodyl 5 milliGRAM(s) Oral daily  carbidopa/levodopa  25/100 1 Tablet(s) Oral <User Schedule>  chlorhexidine 2% Cloths 1 Application(s) Topical daily  clopidogrel Tablet 75 milliGRAM(s) Oral daily  dextrose 5%. 1000 milliLiter(s) (50 mL/Hr) IV Continuous <Continuous>  dextrose 5%. 1000 milliLiter(s) (100 mL/Hr) IV Continuous <Continuous>  dextrose 50% Injectable 50 milliLiter(s) IV Push every 15 minutes  dextrose 50% Injectable 25 milliLiter(s) IV Push every 15 minutes  glucagon  Injectable 1 milliGRAM(s) IntraMuscular once  heparin   Injectable 5000 Unit(s) SubCutaneous every 8 hours  insulin lispro (ADMELOG) corrective regimen sliding scale   SubCutaneous Before meals and at bedtime  metoprolol tartrate 25 milliGRAM(s) Oral every 12 hours  pantoprazole    Tablet 40 milliGRAM(s) Oral before breakfast  polyethylene glycol 3350 17 Gram(s) Oral daily  senna 2 Tablet(s) Oral at bedtime  sodium chloride 0.9%. 1000 milliLiter(s) (10 mL/Hr) IV Continuous <Continuous>  tamsulosin 0.4 milliGRAM(s) Oral at bedtime    MEDICATIONS  (PRN):  acetaminophen     Tablet .. 650 milliGRAM(s) Oral every 6 hours PRN Mild Pain (1 - 3)  dextrose Oral Gel 15 Gram(s) Oral once PRN Blood Glucose LESS THAN 70 milliGRAM(s)/deciliter      Allergies:  Allergies    No Known Allergies    Intolerances        Labs:                Radiology and other tests:  Hemoglobin: 10.3 g/dL (02-03-23 @ 03:54)  Phosphorus Level, Serum: 5.0 mg/dL (02-03-23 @ 03:54)  Phosphorus Level, Serum: 3.8 mg/dL (02-02-23 @ 15:40)  Hemoglobin: 10.2 g/dL (02-02-23 @ 15:40)    Albumin, Serum: 3.6 g/dL (02-03-23 @ 03:54)  Albumin, Serum: 4.1 g/dL (02-02-23 @ 15:40)    cinacalcet 60 milliGRAM(s) Oral daily, 01-30-23 @ 14:50, Routine  epoetin olu-epbx (RETACRIT) Injectable 6000 Unit(s) IV Push once, 02-02-23 @ 09:00, Routine, Stop order after: 1 Doses  epoetin olu-epbx (RETACRIT) Injectable 6000 Unit(s) IV Push once, 01-31-23 @ 07:39, Routine, Stop order after: 1 Doses  epoetin olu-epbx (RETACRIT) Injectable 6000 Unit(s) IV Push once, 02-04-23 @ 07:37, Routine, Stop order after: 1 Doses  sevelamer carbonate 800 milliGRAM(s) Oral every 8 hours, 02-01-23 @ 17:48, Routine  sevelamer carbonate 2400 milliGRAM(s) Oral three times a day with meals, 01-30-23 @ 14:53, Routine

## 2023-02-06 NOTE — DISCHARGE NOTE PROVIDER - CARE PROVIDER_API CALL
Mark Hernandez)  Cardiovascular Surgery  [address], [address] [place] [address]  Phone: [phone]  Fax: [phone]  Follow Up Time:     Mundo Prakash)  Internal Medicine; Nephrology  [address]  [place] [address]  Phone: [phone]  Fax: [phone]  Follow Up Time:    Mark Hernandez)  Cardiovascular Surgery  [address]  [place] [address]  Phone: [phone]  Fax: [phone]  Scheduled Appointment: 02/13/2023 10:00 AM    Carlin Ramos)  Cardiovascular Disease; Interventional Cardiology  [address], [address] [place] [address]  Phone: [phone]  Fax: [phone]  Scheduled Appointment: 03/01/2023 10:00 AM    Ashok Beaver)  [facility] Hypertension  [address] [place] [id number]  Phone: [phone]  Fax: [phone]  Follow Up Time: 2 weeks

## 2023-02-06 NOTE — PROGRESS NOTE ADULT - ASSESSMENT
72M hx of , ESRD POST OP #2 s/p OPCAB, HDS on NC, last HD 2/4 in house    Assessment/Plan:   HD tomorrow as per schedule unless plan to d/c, please discuss with nephrology   Renal diet  Dry weight TBD    #HTN   -Hold Anti-HTN meds prior to HD  -UF with HD    #access   RUE AVF functional     #anemia  Hgb within goal  Iron panel noted, Tsat and Ferritin wnl, no indication for IV iron   epo w/ HD  s/p 3U PRBC for hospitalization thus far    #renal bone disease   Ca ~8.3  Phos: 5  -C/w cinacalcet 60mg Qday  -Hold phos binders     Discussed with primary team

## 2023-02-06 NOTE — DISCHARGE NOTE PROVIDER - NSDCMRMEDTOKEN_GEN_ALL_CORE_FT
amLODIPine 5 mg oral tablet: 1 tab(s) orally once a day  Aspirin Enteric Coated 81 mg oral delayed release tablet: 1 tab(s) orally once a day  atorvastatin 80 mg oral tablet: 1 tab(s) orally once a day  carbidopa-levodopa 25 mg-100 mg oral tablet: 1 tab(s) orally 2 times a day  carvedilol 25 mg oral tablet: 1 tab(s) orally 2 times a day  cinacalcet 60 mg oral tablet: 1 tab(s) orally once a day  cloNIDine 0.1 mg oral tablet: 1 tab(s) orally 2 times a day  clopidogrel 75 mg oral tablet: 1 tab(s) orally once a day  ferrous sulfate 324 mg (65 mg elemental iron) oral delayed release tablet: 1 tab(s) orally 2 times a day  isosorbide mononitrate 30 mg oral tablet, extended release: 1 tab(s) orally once a day (in the morning)  lisinopril 10 mg oral tablet: 1 tab(s) orally once a day  pantoprazole 40 mg oral delayed release tablet: 1 tab(s) orally once a day  sevelamer carbonate 800 mg oral tablet: 3 tab(s) orally 3 times a day (with meals)  tamsulosin 0.4 mg oral capsule: 1 cap(s) orally once a day   acetaminophen 325 mg oral tablet: 2 tab(s) orally every 6 hours, As needed, Mild Pain (1 - 3)  amiodarone 200 mg oral tablet: 1 tab(s) orally once a day   aspirin 81 mg oral delayed release tablet: 1 tab(s) orally once a day  atorvastatin 80 mg oral tablet: 1 tab(s) orally once a day (at bedtime)  carbidopa-levodopa 25 mg-100 mg oral tablet: 1 tab(s) orally 2 times a day  cinacalcet 60 mg oral tablet: 1 tab(s) orally once a day  clopidogrel 75 mg oral tablet: 1 tab(s) orally once a day  ferrous sulfate 324 mg (65 mg elemental iron) oral delayed release tablet: 1 tab(s) orally 2 times a day  metoprolol tartrate 25 mg oral tablet: 1 tab(s) orally every 12 hours  pantoprazole 40 mg oral delayed release tablet: 1 tab(s) orally once a day (before a meal)  polyethylene glycol 3350 oral powder for reconstitution: 17 gram(s) orally once a day  sevelamer carbonate 800 mg oral tablet: 3 tab(s) orally 3 times a day (with meals)  tamsulosin 0.4 mg oral capsule: 1 cap(s) orally once a day

## 2023-02-06 NOTE — CHART NOTE - NSCHARTNOTEFT_GEN_A_CORE
Admitting Diagnosis:   Patient is a 72y old  Male who presents with a chief complaint of CAD (06 Feb 2023 10:17)      PAST MEDICAL & SURGICAL HISTORY:  Diabetes      Hypertension      Hyperphosphatemia      Coronary artery disease  with stent      Hyperlipidemia      Stroke  2017, residual right sided weakness      History of BPH      Back pain      ESRD on hemodialysis      Parkinsons disease      ESRD (end stage renal disease)      Hypertension      CAD (coronary artery disease)      S/P dialysis catheter insertion  Right chest perma cath removed in jan 2020      AVF (arteriovenous fistula)  right arm x with revision, left arm x 1      Stented coronary artery  2004-5 at [facility]      S/P primary angioplasty with coronary stent    Current Nutrition Order: Renal diet    PO Intake: Good (%) [   ]  Fair (50-75%) [  x ] Poor (<25%) [   ]    GI Issues: No nausea/vomiting at this time. Last documented bowel movement 2/6.    Pain: No noted pain at time of RD interview.    Skin Integrity: Generalized, B/L arm edema 2+. Surgical incisions per chart. Luigi score: 18.    Labs:         CAPILLARY BLOOD GLUCOSE      POCT Blood Glucose.: 258 mg/dL (06 Feb 2023 11:49)  POCT Blood Glucose.: 138 mg/dL (06 Feb 2023 06:35)  POCT Blood Glucose.: 102 mg/dL (05 Feb 2023 21:34)  POCT Blood Glucose.: 180 mg/dL (05 Feb 2023 15:53)      Medications:  MEDICATIONS  (STANDING):  aMIOdarone    Tablet 400 milliGRAM(s) Oral every 8 hours  aspirin enteric coated 81 milliGRAM(s) Oral daily  atorvastatin 80 milliGRAM(s) Oral at bedtime  bisacodyl 5 milliGRAM(s) Oral daily  carbidopa/levodopa  25/100 1 Tablet(s) Oral <User Schedule>  chlorhexidine 2% Cloths 1 Application(s) Topical daily  clopidogrel Tablet 75 milliGRAM(s) Oral daily  dextrose 5%. 1000 milliLiter(s) (50 mL/Hr) IV Continuous <Continuous>  dextrose 5%. 1000 milliLiter(s) (100 mL/Hr) IV Continuous <Continuous>  dextrose 50% Injectable 50 milliLiter(s) IV Push every 15 minutes  dextrose 50% Injectable 25 milliLiter(s) IV Push every 15 minutes  glucagon  Injectable 1 milliGRAM(s) IntraMuscular once  heparin   Injectable 5000 Unit(s) SubCutaneous every 8 hours  insulin lispro (ADMELOG) corrective regimen sliding scale   SubCutaneous Before meals and at bedtime  metoprolol tartrate 25 milliGRAM(s) Oral every 12 hours  pantoprazole    Tablet 40 milliGRAM(s) Oral before breakfast  polyethylene glycol 3350 17 Gram(s) Oral daily  senna 2 Tablet(s) Oral at bedtime  sodium chloride 0.9%. 1000 milliLiter(s) (10 mL/Hr) IV Continuous <Continuous>  tamsulosin 0.4 milliGRAM(s) Oral at bedtime    MEDICATIONS  (PRN):  acetaminophen     Tablet .. 650 milliGRAM(s) Oral every 6 hours PRN Mild Pain (1 - 3)  dextrose Oral Gel 15 Gram(s) Oral once PRN Blood Glucose LESS THAN 70 milliGRAM(s)/deciliter    Dosing Anthropometrics:  Height for BMI (FEET)	5 Feet  Height for BMI (INCHES)	2 Inch(s)  Height for BMI (CENTIMETERS)	157.48 Centimeter(s)  Weight for BMI (lbs)	138 lb  Weight for BMI (kg)	62.6 kg  Body Mass Index	25.2  IBW:118 pounds   %IBW:117%    Weight Change:   1/31: 143.7/139.3 pounds  2/1: 141 pounds   2/2: 149.6/147.2 pounds   2/4: 149.4/147.2 pounds; likely in setting of fluid retention. Will continue to monitor weight changes/trends during hospital stay.    Estimated energy needs: Based on Standards of Care pt within % IBW thus actual body weight used for all calculations. Needs adjusted for advanced age and post op. Fluids per team.   Estimated Energy Needs From (mackenzie/kg) 25  Estimated Energy Needs To (mackenzie/kg)	30  Estimated Energy Needs Calculated From (mackenzie/kg) 1562  Estimated Energy Needs Calculated To (mackenzie/kg)	1875    Estimated Protein Needs From (g/kg)	1.2  Estimated Protein Needs To (g/kg)	1.4  Estimated Protein Needs Calculated From (g/kg)	75  Estimated Protein Needs Calculated To (g/kg)	87.5  Estimated Fluid Needs Weight (lbs)	138 lb    Subjective: 73 YO, Arabic-speaking, Male w/ PMHx of HTN, HLD, CAD s/p multiple SURYA (pLAD 5/14/21, OM1 3/21/21, pLCx on 10/2021), DMII, ESRD (HD T/Th/Sat), CVA (approx. 2 yrs ago w/ R sided weakness), & Parkinson's disease who originally presented to [facility] 12/22/2022 c/o sharp left sided chest pain at rest overnight, relieved with sublingual nitroglycerin. At that time, he had an ECHO which revealed EF 55% and underwent dx cardiac cath 12/23/2022 found to have multivessel disease. Patient transferred to [facility] for under the cardiology team for possible PCI. On 12/27/22 patient underwent PCI which revealed PTCA oLCX 90% ISR, PTCA dLCX 90% ISR w/ subtotal small LCX distally; oLAD 80% ISR with + IFR (0.80), mLAD 70-80% & CTS was consulted & the patient was evaluated by Dr. Hernandez in the outpatient setting. Pt re-presented to [facility] on 1/31/23 for pre-surgical testing. On 2/1/23 patient underwent OPCAB x 2 (LIMA-LAD, SVG-PDA) with Dr. Hernandez. Patient recovered in CTICU and had HD in the PM. POD1 patient received HD and 2u PRBC for low H&H. Midodrine started, weaned off vaso. POD2 Midodrine off, started BB. In A-fib in the AM and converted after amio bolus x 1. Transferred to [facility]. Back in Afib and started PO amio. POD3 chest tubes and pacing wires removed, HD given. POD4 weaned off HFNC. POD5 ambulating and pushing for discharge home tm after HD.     Pt seen at bedside for follow up assessment- pt appeared lethargic thus limited subjective information obtained. RD able to communicate in Arabic thus  services not used at this time. LAbs reviewed 2/6; electrolytes within normal limits at this time. Fingersticks 2/5-2/6: 102-269 mg/dL; insulin regimen ordered. RD attempted to obtain subjective information: Confirms NKFA. Pt reports current body weight 139 pounds; relatively consistent w/ dosing weight 138 pounds. Pt reports his  pounds; prior to being "sick" (unable to provide exact time frame of weight loss). Per [place], HIE, pt 140 pounds 12/26/22; thus weight has been stable x ~1 month. RD to follow up and assess timing of weight loss as able. Observed pt w/ no s/s of muscle or fat wasting at this time. Discussed importance of adequate PO intake to meet nutritional needs and importance of limiting CHO and sodium intake; pt appeared amenable to education, however, would benefit from reinforcement as able. Observed breakfast tray >50% completed. Made aware RD remains available. RD to follow up. See nutrition recommendations below.     Previous Nutrition Diagnosis: Increased kcal/protein needs r/t physiological demand for nutrient AEB post op    Active [ x  ]  Resolved [   ]    If resolved, new PES:     Goal: Pt to meet at least 75% of nutritional needs during hospital stay consistently     Recommendations:  1. Recommend change diet to DASH/TLC CTSCHO Diet   2. Encourage pt to meet nutritional needs as able   3. RD to obtain additional subjective information and provide diet ed reinforcement as able   4. Pain and bowel regimen per team   5. Will assess/honor preferences as able   6. Monitor electrolytes; replete PRN; BGq6h    Education: DASH/TLC CTSCHO Diet    Risk Level: High [ x  ] Moderate [   ] Low [   ]

## 2023-02-06 NOTE — PROGRESS NOTE ADULT - ASSESSMENT
71 YO, Bengali-speaking, Male w/ PMHx of HTN, HLD, CAD s/p multiple SURYA (pLAD 5/14/21, OM1 3/21/21, pLCx on 10/2021), DMII, ESRD (HD T/Th/Sat), CVA (approx. 2 yrs ago w/ R sided weakness), & Parkinson's disease who originally presented to [facility] 12/22/2022 c/o sharp left sided chest pain at rest overnight, relieved with sublingual nitroglycerin. At that time, he had an ECHO which revealed EF 55% and underwent dx cardiac cath 12/23/2022 found to have multivessel disease. Patient transferred to [facility] for under the cardiology team for possible PCI. On 12/27/22 patient underwent PCI which revealed PTCA oLCX 90% ISR, PTCA dLCX 90% ISR w/ subtotal small LCX distally; oLAD 80% ISR with + IFR (0.80), mLAD 70-80% & CTS was consulted & the patient was evaluated by Dr. Hernandez in the outpatient setting. Pt re-presented to [facility] on 1/31/23 for pre-surgical testing. On 2/1/23 patient underwent OPCAB x 2 (LIMA-LAD, SVG-PDA) with Dr. Hernandez. Patient recovered in CTICU and had HD in the PM. POD1 patient received HD and 2u PRBC for low H&H. Midodrine started, weaned off vaso. POD2 Midodrine off, started BB. In A-fib in the AM and converted after amio bolus x 1. Transferred to [facility]. Back in Afib and started PO amio. POD3 chest tubes and pacing wires removed, HD given. POD4 weaned off HFNC. POD5 ambulating and pushing for discharge home tm after HD.     Plan:    Neurovascular:   -Hx of Parkinson's disease  -Cont Carbidopa/Levodopa  -Hx of CVA (2 yrs ago)  -Pain well controlled with current regimen. PRN's: Tylenol    Cardiovascular:   -CAD s/p multiple SURYA (2021), s/p OPCAB x 2 (LIMA to LAD, SVG to PDA) on 2/1/23  -Cont ASA  -Cont Plavix  -Cont BB  -Cont Lipitor   -A-Fib  -Cont Amiodarone Q8H  -Hx of HTN  -Cont Lopressor 25mg PO BID  -Hx of HLD  -Cont Lipitor   -Hemodynamically stable.   -Monitor: BP, HR, tele    Respiratory:   -Oxygenating well on room air  -Encourage continued use of IS 10x/hr and frequent ambulation  -CXR: atelectasis     GI:  -GI PPX: protonix  -PO Diet  -Bowel Regimen: miralax, senna, dulcolax     Renal / :  -Hx of ESRD  -Cont HD T/Th/Sat  -Plan for HD tomorrow before D/C  -Continue to monitor renal function: BUN/Cr: unable to obtain labs today   -Monitor I/O's daily     Endocrine:    -Hx of DMII  -A1c: 6.1  -No hx of thyroid dx  -TSH: 0.903    Hematologic:  -CBC: H/H- unable to obtain labs today   -Coagulation Panel.    ID:  -Temperature: Afebrile   -CBC: WBC- unable to obtain labs today   -Continue to observe for SIRS/Sepsis Syndrome.    Prophylaxis:  -DVT prophylaxis with 5000 SubQ Heparin q8h.  -Continue with SCD's b/l while patient is at rest     Disposition:  -Discharge home tomorrow after HD

## 2023-02-06 NOTE — DISCHARGE NOTE PROVIDER - PROVIDER TOKENS
PROVIDER:[TOKEN:[[id number]]],PROVIDER:[TOKEN:[[id number]]] PROVIDER:[TOKEN:[[id number]],SCHEDULEDAPPT:[02/13/2023],SCHEDULEDAPPTTIME:[10:00 AM]],PROVIDER:[TOKEN:[[id number]],SCHEDULEDAPPT:[03/01/2023],SCHEDULEDAPPTTIME:[10:00 AM]],PROVIDER:[TOKEN:[[id number]],FOLLOWUP:[2 weeks]]

## 2023-02-07 ENCOUNTER — TRANSCRIPTION ENCOUNTER (OUTPATIENT)
Age: 73
End: 2023-02-07

## 2023-02-07 VITALS — HEART RATE: 69 BPM

## 2023-02-07 LAB
ANION GAP SERPL CALC-SCNC: 16 MMOL/L — SIGNIFICANT CHANGE UP (ref 5–17)
BUN SERPL-MCNC: 20 MG/DL — SIGNIFICANT CHANGE UP (ref 7–23)
CALCIUM SERPL-MCNC: 9.2 MG/DL — SIGNIFICANT CHANGE UP (ref 8.4–10.5)
CHLORIDE SERPL-SCNC: 95 MMOL/L — LOW (ref 96–108)
CO2 SERPL-SCNC: 25 MMOL/L — SIGNIFICANT CHANGE UP (ref 22–31)
CREAT SERPL-MCNC: 2.99 MG/DL — HIGH (ref 0.5–1.3)
EGFR: 22 ML/MIN/1.73M2 — LOW
GLUCOSE BLDC GLUCOMTR-MCNC: 140 MG/DL — HIGH (ref 70–99)
GLUCOSE BLDC GLUCOMTR-MCNC: 176 MG/DL — HIGH (ref 70–99)
GLUCOSE SERPL-MCNC: 103 MG/DL — HIGH (ref 70–99)
HCT VFR BLD CALC: 30.2 % — LOW (ref 39–50)
HGB BLD-MCNC: 10.2 G/DL — LOW (ref 13–17)
MAGNESIUM SERPL-MCNC: 2 MG/DL — SIGNIFICANT CHANGE UP (ref 1.6–2.6)
MCHC RBC-ENTMCNC: 30.5 PG — SIGNIFICANT CHANGE UP (ref 27–34)
MCHC RBC-ENTMCNC: 33.8 GM/DL — SIGNIFICANT CHANGE UP (ref 32–36)
MCV RBC AUTO: 90.4 FL — SIGNIFICANT CHANGE UP (ref 80–100)
NRBC # BLD: 0 /100 WBCS — SIGNIFICANT CHANGE UP (ref 0–0)
PLATELET # BLD AUTO: 150 K/UL — SIGNIFICANT CHANGE UP (ref 150–400)
POTASSIUM SERPL-MCNC: 3.5 MMOL/L — SIGNIFICANT CHANGE UP (ref 3.5–5.3)
POTASSIUM SERPL-SCNC: 3.5 MMOL/L — SIGNIFICANT CHANGE UP (ref 3.5–5.3)
RBC # BLD: 3.34 M/UL — LOW (ref 4.2–5.8)
RBC # FLD: 15.8 % — HIGH (ref 10.3–14.5)
SODIUM SERPL-SCNC: 136 MMOL/L — SIGNIFICANT CHANGE UP (ref 135–145)
WBC # BLD: 6.35 K/UL — SIGNIFICANT CHANGE UP (ref 3.8–10.5)
WBC # FLD AUTO: 6.35 K/UL — SIGNIFICANT CHANGE UP (ref 3.8–10.5)

## 2023-02-07 PROCEDURE — C8929: CPT

## 2023-02-07 PROCEDURE — P9045: CPT

## 2023-02-07 PROCEDURE — 93880 EXTRACRANIAL BILAT STUDY: CPT

## 2023-02-07 PROCEDURE — 80061 LIPID PANEL: CPT

## 2023-02-07 PROCEDURE — 83880 ASSAY OF NATRIURETIC PEPTIDE: CPT

## 2023-02-07 PROCEDURE — 84443 ASSAY THYROID STIM HORMONE: CPT

## 2023-02-07 PROCEDURE — 90935 HEMODIALYSIS ONE EVALUATION: CPT

## 2023-02-07 PROCEDURE — 84132 ASSAY OF SERUM POTASSIUM: CPT

## 2023-02-07 PROCEDURE — 83605 ASSAY OF LACTIC ACID: CPT

## 2023-02-07 PROCEDURE — 94150 VITAL CAPACITY TEST: CPT

## 2023-02-07 PROCEDURE — 36430 TRANSFUSION BLD/BLD COMPNT: CPT

## 2023-02-07 PROCEDURE — 86900 BLOOD TYPING SEROLOGIC ABO: CPT

## 2023-02-07 PROCEDURE — 71045 X-RAY EXAM CHEST 1 VIEW: CPT

## 2023-02-07 PROCEDURE — 80048 BASIC METABOLIC PNL TOTAL CA: CPT

## 2023-02-07 PROCEDURE — 71046 X-RAY EXAM CHEST 2 VIEWS: CPT

## 2023-02-07 PROCEDURE — 86901 BLOOD TYPING SEROLOGIC RH(D): CPT

## 2023-02-07 PROCEDURE — 82962 GLUCOSE BLOOD TEST: CPT

## 2023-02-07 PROCEDURE — 84100 ASSAY OF PHOSPHORUS: CPT

## 2023-02-07 PROCEDURE — 97116 GAIT TRAINING THERAPY: CPT

## 2023-02-07 PROCEDURE — C1751: CPT

## 2023-02-07 PROCEDURE — 82550 ASSAY OF CK (CPK): CPT

## 2023-02-07 PROCEDURE — 86923 COMPATIBILITY TEST ELECTRIC: CPT

## 2023-02-07 PROCEDURE — 94002 VENT MGMT INPAT INIT DAY: CPT

## 2023-02-07 PROCEDURE — 86850 RBC ANTIBODY SCREEN: CPT

## 2023-02-07 PROCEDURE — 85610 PROTHROMBIN TIME: CPT

## 2023-02-07 PROCEDURE — 86891 AUTOLOGOUS BLOOD OP SALVAGE: CPT

## 2023-02-07 PROCEDURE — P9047: CPT

## 2023-02-07 PROCEDURE — 93005 ELECTROCARDIOGRAM TRACING: CPT

## 2023-02-07 PROCEDURE — 83550 IRON BINDING TEST: CPT

## 2023-02-07 PROCEDURE — 70450 CT HEAD/BRAIN W/O DYE: CPT

## 2023-02-07 PROCEDURE — 82553 CREATINE MB FRACTION: CPT

## 2023-02-07 PROCEDURE — 85025 COMPLETE CBC W/AUTO DIFF WBC: CPT

## 2023-02-07 PROCEDURE — 97165 OT EVAL LOW COMPLEX 30 MIN: CPT

## 2023-02-07 PROCEDURE — 36415 COLL VENOUS BLD VENIPUNCTURE: CPT

## 2023-02-07 PROCEDURE — P9016: CPT

## 2023-02-07 PROCEDURE — 97162 PT EVAL MOD COMPLEX 30 MIN: CPT

## 2023-02-07 PROCEDURE — C1894: CPT

## 2023-02-07 PROCEDURE — 82803 BLOOD GASES ANY COMBINATION: CPT

## 2023-02-07 PROCEDURE — 83735 ASSAY OF MAGNESIUM: CPT

## 2023-02-07 PROCEDURE — 80053 COMPREHEN METABOLIC PANEL: CPT

## 2023-02-07 PROCEDURE — 71250 CT THORAX DX C-: CPT

## 2023-02-07 PROCEDURE — 83036 HEMOGLOBIN GLYCOSYLATED A1C: CPT

## 2023-02-07 PROCEDURE — 82330 ASSAY OF CALCIUM: CPT

## 2023-02-07 PROCEDURE — 83540 ASSAY OF IRON: CPT

## 2023-02-07 PROCEDURE — 84295 ASSAY OF SERUM SODIUM: CPT

## 2023-02-07 PROCEDURE — 85027 COMPLETE CBC AUTOMATED: CPT

## 2023-02-07 PROCEDURE — 85730 THROMBOPLASTIN TIME PARTIAL: CPT

## 2023-02-07 PROCEDURE — C1889: CPT

## 2023-02-07 PROCEDURE — 84484 ASSAY OF TROPONIN QUANT: CPT

## 2023-02-07 PROCEDURE — 97530 THERAPEUTIC ACTIVITIES: CPT

## 2023-02-07 PROCEDURE — 82728 ASSAY OF FERRITIN: CPT

## 2023-02-07 RX ORDER — PANTOPRAZOLE SODIUM 20 MG/1
1 TABLET, DELAYED RELEASE ORAL
Qty: 0 | Refills: 0 | DISCHARGE

## 2023-02-07 RX ORDER — ACETAMINOPHEN 500 MG
2 TABLET ORAL
Qty: 50 | Refills: 0
Start: 2023-02-07

## 2023-02-07 RX ORDER — POLYETHYLENE GLYCOL 3350 17 G/17G
17 POWDER, FOR SOLUTION ORAL
Qty: 170 | Refills: 0
Start: 2023-02-07 | End: 2023-02-16

## 2023-02-07 RX ORDER — PANTOPRAZOLE SODIUM 20 MG/1
1 TABLET, DELAYED RELEASE ORAL
Qty: 30 | Refills: 0
Start: 2023-02-07 | End: 2023-03-08

## 2023-02-07 RX ORDER — ATORVASTATIN CALCIUM 80 MG/1
1 TABLET, FILM COATED ORAL
Qty: 30 | Refills: 0
Start: 2023-02-07 | End: 2023-03-08

## 2023-02-07 RX ORDER — METOPROLOL TARTRATE 50 MG
1 TABLET ORAL
Qty: 60 | Refills: 0
Start: 2023-02-07 | End: 2023-03-08

## 2023-02-07 RX ORDER — CLOPIDOGREL BISULFATE 75 MG/1
1 TABLET, FILM COATED ORAL
Qty: 30 | Refills: 0
Start: 2023-02-07 | End: 2023-03-08

## 2023-02-07 RX ORDER — AMIODARONE HYDROCHLORIDE 400 MG/1
1 TABLET ORAL
Qty: 30 | Refills: 0
Start: 2023-02-07 | End: 2023-03-08

## 2023-02-07 RX ORDER — ERYTHROPOIETIN 10000 [IU]/ML
6000 INJECTION, SOLUTION INTRAVENOUS; SUBCUTANEOUS ONCE
Refills: 0 | Status: COMPLETED | OUTPATIENT
Start: 2023-02-07 | End: 2023-02-07

## 2023-02-07 RX ORDER — ASPIRIN/CALCIUM CARB/MAGNESIUM 324 MG
1 TABLET ORAL
Qty: 30 | Refills: 0
Start: 2023-02-07 | End: 2023-03-08

## 2023-02-07 RX ADMIN — CHLORHEXIDINE GLUCONATE 1 APPLICATION(S): 213 SOLUTION TOPICAL at 13:19

## 2023-02-07 RX ADMIN — Medication 650 MILLIGRAM(S): at 06:40

## 2023-02-07 RX ADMIN — Medication 2: at 07:17

## 2023-02-07 RX ADMIN — AMIODARONE HYDROCHLORIDE 400 MILLIGRAM(S): 400 TABLET ORAL at 13:34

## 2023-02-07 RX ADMIN — HEPARIN SODIUM 5000 UNIT(S): 5000 INJECTION INTRAVENOUS; SUBCUTANEOUS at 13:19

## 2023-02-07 RX ADMIN — CLOPIDOGREL BISULFATE 75 MILLIGRAM(S): 75 TABLET, FILM COATED ORAL at 13:16

## 2023-02-07 RX ADMIN — POLYETHYLENE GLYCOL 3350 17 GRAM(S): 17 POWDER, FOR SOLUTION ORAL at 13:17

## 2023-02-07 RX ADMIN — Medication 5 MILLIGRAM(S): at 13:18

## 2023-02-07 RX ADMIN — HEPARIN SODIUM 5000 UNIT(S): 5000 INJECTION INTRAVENOUS; SUBCUTANEOUS at 05:42

## 2023-02-07 RX ADMIN — CARBIDOPA AND LEVODOPA 1 TABLET(S): 25; 100 TABLET ORAL at 05:42

## 2023-02-07 RX ADMIN — ERYTHROPOIETIN 6000 UNIT(S): 10000 INJECTION, SOLUTION INTRAVENOUS; SUBCUTANEOUS at 12:37

## 2023-02-07 RX ADMIN — PANTOPRAZOLE SODIUM 40 MILLIGRAM(S): 20 TABLET, DELAYED RELEASE ORAL at 07:17

## 2023-02-07 RX ADMIN — Medication 650 MILLIGRAM(S): at 05:43

## 2023-02-07 RX ADMIN — AMIODARONE HYDROCHLORIDE 400 MILLIGRAM(S): 400 TABLET ORAL at 05:42

## 2023-02-07 RX ADMIN — Medication 81 MILLIGRAM(S): at 13:17

## 2023-02-07 NOTE — PROGRESS NOTE ADULT - SUBJECTIVE AND OBJECTIVE BOX
Patient is a 72y Male seen and evaluated at bedside. No acute distress, sitting in chair comfortably, does not offer any complaints. Tolerated last HD session on 2/4 tolerated 2L UF. HD today as per schedule.        Meds:    acetaminophen     Tablet .. 650 every 6 hours PRN  aMIOdarone    Tablet 400 every 8 hours  aspirin enteric coated 81 daily  atorvastatin 80 at bedtime  bisacodyl 5 daily  carbidopa/levodopa  25/100 1 <User Schedule>  chlorhexidine 2% Cloths 1 daily  clopidogrel Tablet 75 daily  dextrose 5%. 1000 <Continuous>  dextrose 5%. 1000 <Continuous>  dextrose 50% Injectable 50 every 15 minutes  dextrose 50% Injectable 25 every 15 minutes  dextrose Oral Gel 15 once PRN  epoetin olu-epbx (RETACRIT) Injectable 6000 once  glucagon  Injectable 1 once  heparin   Injectable 5000 every 8 hours  insulin lispro (ADMELOG) corrective regimen sliding scale  Before meals and at bedtime  metoprolol tartrate 25 every 12 hours  pantoprazole    Tablet 40 before breakfast  polyethylene glycol 3350 17 daily  senna 2 at bedtime  sodium chloride 0.9%. 1000 <Continuous>  tamsulosin 0.4 at bedtime      T(C): , Max: 36.8 (02-07-23 @ 08:52)  T(F): , Max: 98.3 (02-07-23 @ 08:52)  HR: 60 (02-07-23 @ 09:00)  BP: 134/64 (02-07-23 @ 09:00)  BP(mean): 92 (02-07-23 @ 09:00)  RR: 18 (02-07-23 @ 05:05)  SpO2: 97% (02-07-23 @ 09:00)  Wt(kg): --    02-06 @ 07:01  -  02-07 @ 07:00  --------------------------------------------------------  IN: 160 mL / OUT: 1 mL / NET: 159 mL    02-07 @ 07:01  -  02-07 @ 09:44  --------------------------------------------------------  IN: 0 mL / OUT: 0 mL / NET: 0 mL          Review of Systems:  ROS negative except as per HPI      PHYSICAL EXAM:  GENERAL: Alert, awake, on HFNC  HEENT: no RUSLAN, MMM   CHEST/LUNG: Bilateral clear breath sounds, left chest surgical wound c/d/i   HEART: Regular rate and rhythm, no murmur, no gallops, no rub   ABDOMEN: Soft, nontender, non distended  : No flank or supra pubic tenderness.  EXTREMITIES: no pedal edema  Neurology: Awake, no asterixis   SKIN: No rash or skin lesion   ACCESS: RUE  AVF w/bruit and thrill       LABS:                      RADIOLOGY & ADDITIONAL STUDIES:           Patient is a 72y Male seen and evaluated at bedside. No acute distress, sitting in chair comfortably, does not offer any complaints. Tolerated last HD session on  tolerated 2L UF. HD today as per schedule.        Meds:    acetaminophen     Tablet .. 650 every 6 hours PRN  aMIOdarone    Tablet 400 every 8 hours  aspirin enteric coated 81 daily  atorvastatin 80 at bedtime  bisacodyl 5 daily  carbidopa/levodopa  25/100 1 <User Schedule>  chlorhexidine 2% Cloths 1 daily  clopidogrel Tablet 75 daily  dextrose 5%. 1000 <Continuous>  dextrose 5%. 1000 <Continuous>  dextrose 50% Injectable 50 every 15 minutes  dextrose 50% Injectable 25 every 15 minutes  dextrose Oral Gel 15 once PRN  epoetin olu-epbx (RETACRIT) Injectable 6000 once  glucagon  Injectable 1 once  heparin   Injectable 5000 every 8 hours  insulin lispro (ADMELOG) corrective regimen sliding scale  Before meals and at bedtime  metoprolol tartrate 25 every 12 hours  pantoprazole    Tablet 40 before breakfast  polyethylene glycol 3350 17 daily  senna 2 at bedtime  sodium chloride 0.9%. 1000 <Continuous>  tamsulosin 0.4 at bedtime      T(C): , Max: 36.8 (23 @ 08:52)  T(F): , Max: 98.3 (23 @ 08:52)  HR: 60 (23 @ 09:00)  BP: 134/64 (23 @ 09:00)  BP(mean): 92 (23 @ 09:00)  RR: 18 (23 @ 05:05)  SpO2: 97% (23 @ 09:00)  Wt(kg): --     07:  -   @ 07:00  --------------------------------------------------------  IN: 160 mL / OUT: 1 mL / NET: 159 mL     07:01  -   @ 09:44  --------------------------------------------------------  IN: 0 mL / OUT: 0 mL / NET: 0 mL          Review of Systems:  ROS negative except as per HPI      PHYSICAL EXAM:  GENERAL: Alert, awake, on HFNC  HEENT: no RUSLAN, MMM   CHEST/LUNG: Bilateral clear breath sounds, left chest surgical wound c/d/i   HEART: Regular rate and rhythm, no murmur, no gallops, no rub   ABDOMEN: Soft, nontender, non distended  : No flank or supra pubic tenderness.  EXTREMITIES: no pedal edema  Neurology: Awake, no asterixis   SKIN: No rash or skin lesion   ACCESS: RUE  AVF w/bruit and thrill       LABS:                      RADIOLOGY & ADDITIONAL STUDIES:    Hemoglobin: 10.2 g/dL (23 @ 13:42)  Hemoglobin: 10.3 g/dL (23 @ 03:54)  Phosphorus Level, Serum: 5.0 mg/dL (23 @ 03:54)  Phosphorus Level, Serum: 3.8 mg/dL (23 @ 15:40)    Albumin, Serum: 3.6 g/dL (23 @ 03:54)  Albumin, Serum: 4.1 g/dL (23 @ 15:40)    cinacalcet 60 milliGRAM(s) Oral daily, 23 @ 14:50, Routine  epoetin olu-epbx (RETACRIT) Injectable 6000 Unit(s) IV Push once, 23 @ 09:00, Routine, Stop order after: 1 Doses  epoetin olu-epbx (RETACRIT) Injectable 6000 Unit(s) IV Push once, 23 @ 07:39, Routine, Stop order after: 1 Doses  epoetin olu-epbx (RETACRIT) Injectable 6000 Unit(s) IV Push once, 23 @ 07:37, Routine, Stop order after: 1 Doses  epoetin olu-epbx (RETACRIT) Injectable 6000 Unit(s) IV Push once, 23 @ 08:22, Routine, Stop order after: 1 Doses  sevelamer carbonate 800 milliGRAM(s) Oral every 8 hours, 23 @ 17:48, Routine  sevelamer carbonate 2400 milliGRAM(s) Oral three times a day with meals, 23 @ 14:53, Routine      Hemodialysis Treatment.:     Schedule: Once, Modality: Hemodialysis, Access: Arteriovenous Fistula    Dialyzer: Optiflux I556ZHb, Time: 180 Min    Blood Flow: 400 mL/Min , Dialysate Flow: 500 mL/Min, Dialysate Temp: 35, Tubinmm (Adult)    Target Fluid Removal: 2 Liters    Dialysate Electrolytes (mEq/L): Potassium 2, Calcium 2.5, Sodium 138, Bicarbonate 35 (23 @ 08:22) [Completed]

## 2023-02-07 NOTE — DISCHARGE NOTE NURSING/CASE MANAGEMENT/SOCIAL WORK - NSDCPEFALRISK_GEN_ALL_CORE
For information on Fall & Injury Prevention, visit: [email or web address] OR  [email or web address] OR  https://www.cdc.gov/steadi/patient.html

## 2023-02-07 NOTE — PROGRESS NOTE ADULT - ATTENDING COMMENTS
I agree with the fellow's findings and plans as written above with the following additions/amendments:    Seen and examined at bedside on HD for second time, tolerating transfusion and HD. Continue HD as above
seen on HD with Dr Bowman, agree with above  tolerating rx, BP ok but s/p hypotension earlier   cont rx as above-- UF goal lowered
have reviewed and discussed course to date, and have seen pt during HD.  agree with findings and plans.
plan HD for UF and clearance   on HFNC -- though not clearly overloaded-- will try for UF as tolerates
I agree with the fellow's findings and plans as written above with the following additions/amendments:    Seen and examined at bedside on HD, tolerating well, no particular issues with HD. Denies CP, palpitations, or cramps. Will continue HD as above
I agree with the fellow's findings and plans as written above with the following additions/amendments:    Seen and examined on HD, tolerating, without issue, asking if he can go home after HD. Further recs as above, continue HD as above

## 2023-02-07 NOTE — PROGRESS NOTE ADULT - ASSESSMENT
72M hx of , ESRD POST OP #2 s/p OPCAB, HDS on NC, last HD 2/4 with stable electrolytes,     Assessment/Plan:   Electrolytes noted from 2/3 Potassium of 4.4, Bicarb of 25  HD today as per schedule with goal 2L UF   Renal diet  Dry weight TBD    #HTN   -Hold Anti-HTN meds prior to HD  -UF with HD    #access   RUE AVF functional     #anemia  Hgb within goal  Iron panel noted, Tsat and Ferritin wnl, no indication for IV iron   epo w/ HD  s/p 3U PRBC for hospitalization thus far    #renal bone disease   Ca ~8.3  Phos: 5  -C/w cinacalcet 60mg Qday  -Hold phos binders

## 2023-02-07 NOTE — PROGRESS NOTE ADULT - PROVIDER SPECIALTY LIST ADULT
Nephrology
CT Surgery
Critical Care
Nephrology
CT Surgery
Critical Care
Nephrology
CT Surgery
CT Surgery
Nephrology

## 2023-02-07 NOTE — DISCHARGE NOTE NURSING/CASE MANAGEMENT/SOCIAL WORK - PATIENT PORTAL LINK FT
You can access the FollowMyHealth Patient Portal offered by [facility] by registering at the following website: [email or web address]. By joining [employer]’s FollowMyHealth portal, you will also be able to view your health information using other applications (apps) compatible with our system.

## 2023-02-08 ENCOUNTER — NON-APPOINTMENT (OUTPATIENT)
Age: 73
End: 2023-02-08

## 2023-02-09 ENCOUNTER — NON-APPOINTMENT (OUTPATIENT)
Age: 73
End: 2023-02-09

## 2023-02-10 ENCOUNTER — APPOINTMENT (OUTPATIENT)
Dept: CARE COORDINATION | Facility: HOME HEALTH | Age: 73
End: 2023-02-10
Payer: MEDICARE

## 2023-02-10 VITALS
SYSTOLIC BLOOD PRESSURE: 147 MMHG | DIASTOLIC BLOOD PRESSURE: 82 MMHG | RESPIRATION RATE: 18 BRPM | TEMPERATURE: 97.9 F | BODY MASS INDEX: 25.42 KG/M2 | WEIGHT: 139 LBS

## 2023-02-10 PROCEDURE — 99024 POSTOP FOLLOW-UP VISIT: CPT

## 2023-02-10 RX ORDER — ISOSORBIDE MONONITRATE 30 MG/1
30 TABLET, EXTENDED RELEASE ORAL DAILY
Refills: 1 | Status: DISCONTINUED | COMMUNITY
End: 2023-02-10

## 2023-02-10 RX ORDER — CARVEDILOL 25 MG/1
25 TABLET, FILM COATED ORAL TWICE DAILY
Qty: 60 | Refills: 3 | Status: DISCONTINUED | COMMUNITY
End: 2023-02-10

## 2023-02-10 RX ORDER — CLONIDINE HYDROCHLORIDE 0.1 MG/1
0.1 TABLET ORAL TWICE DAILY
Qty: 60 | Refills: 3 | Status: DISCONTINUED | COMMUNITY
End: 2023-02-10

## 2023-02-10 NOTE — REVIEW OF SYSTEMS
[As Noted in HPI] : as noted in HPI [Negative] : Heme/Lymph [de-identified] : Spouse reports minimal intermittent serosanguineous drainage from lower pole of MSI

## 2023-02-10 NOTE — REASON FOR VISIT
[Home] : at home, [unfilled] , at the time of the visit. [Other Location: e.g. Home (Enter Location, City,State)___] : at [unfilled] [Spouse] : spouse [[employer] ] : provided by [employer]   [Interpreters_IDNumber] : [id number] [place]; [id number] Jamie; [id number] [TWNoteComboBox1] : Ukrainian

## 2023-02-10 NOTE — PHYSICAL EXAM
[Sclera] : the sclera and conjunctiva were normal [Extraocular Movements] : extraocular movements were intact [Neck Appearance] : the appearance of the neck was normal [] : no respiratory distress [Respiration, Rhythm And Depth] : normal respiratory rhythm and effort [Breast Appearance] : normal in appearance [Skin Color & Pigmentation] : normal skin color and pigmentation [Oriented To Time, Place, And Person] : oriented to person, place, and time [FreeTextEntry1] : TH LIMITED ASSESSMENT

## 2023-02-10 NOTE — ASSESSMENT
[FreeTextEntry1] : ASSESSMENT \par \par Patient recovering well at home s/p 2V OPCAB accompanied by spouse. Reviewed all medications and dosages with patient understanding. Patient has all medications in home and is taking as prescribed. Pain controlled with current medication regimen. No new symptoms, issues or concerns. Reports ambulating around home independently, without the use if assistive device. Denies chest pain, SOB/RYAN, nausea/vomiting, constipation/diarrhea. \par \par MSI, CT and SVG sites no erythema, purulent exudate or edema noted, edges well approximated, patient denies pain. \par \par SVG: LLE SVG harvest site mild ecchymosis area, denies pain or tenderness to site. \par \par Follow Up: \par Mark Hernandez (MD) Cardiovascular Surgery: Scheduled Appointment: 02/13/2023 10:00 AM \par   \par Carlin Ramos)Cardiovascular Disease; Interventional Cardiology: Scheduled Appointment: 03/01/2023 10:00 AM \par   \par [facility] Beaver) [facility] Follow [facility]: 2 weeks\par \par [facility] RN: per spouse seen in home\par \par [facility] Your [facility] team will continue to follow up with patient's status. NP/CCC roles explained with patient understanding, contact information provided. Patient agrees to call with any questions, issues or concerns. Worsening symptoms reviewed with patient with reiteration and understanding.\par \par POST OP PLAN\par \par *Adhere to Renal diet.\par \par *Do not drive or operate machinery, No heavy lifting/straining, Showering allowed, Stairs allowed, Walking - Indoors allowed, Walking - Outdoors allowed \par \par *Walk daily as tolerated and use your incentive spirometer 10 times every hour while you are awake. Walk around the apartment and transition slowly to stairs and outdoors as tolerated, avoid extreme temperatures when outdoors.\par  \par *Please weigh yourself daily. If you notice over a 3 pound weight gain in 3 days, this is a sign you are likely retaining too much fluid. It is imperative you call our right away with unexplained rapid weight gain. A scale was mailed to your home.\par  \par *Please continue to wear the compression stockings given to you in the hospital at home. This is a way to prevent fluid from building up in your legs. You can remove intermittently for breaks or to wash.\par  \par *No driving or strenuous activity/exercise until cleared by your surgeon. \par  \par *Gently clean your incisions with unscented/antibacterial soap and water, pat dry. You may leave them open to air. \par  \par *Call your doctor if you have shortness of breath, chest pain not relieved by pain medication, dizziness, fever >100.5, or increased redness or drainage from incisions. \par \par Sternal Precautions\par \par Sternal precautions are used to help protect your sternum (breastbone) after open chest surgery. Wires are placed during surgery to hold the sternum together as it heals. Sternal precautions help prevent the wires from cutting through the sternum. The precautions also help prevent the sternum from coming apart from an injury, and prevent pain and bleeding. You may need to use the precautions for up to 12 weeks after surgery. It is important to follow the instructions carefully. An injury to the healing sternum can be life-threatening.\par \par General sternal precautions: Start slowly and do more as you get stronger. Pain medicine might make it harder for you to know when to slow down or be careful. Stop immediately if you hear a crunch or pop in your sternum.\par \par Protect your sternum. Hug a pillow to your chest or cross your arms over your chest when you laugh, sneeze, or cough.\par \par Be careful when you get into or out of a chair or bed. Hug a pillow or cross your arms when you stand or sit. Do not twist as you move. Use only your legs to sit and stand. You may need to use a raised toilet seat if you have trouble standing up without using your arms. \par \par Ask when you may take a bath or shower. You may need to use a bath chair if you have trouble getting into or out of the tub. Do not use a grab bar.\par \par Do not lift or carry anything heavier than 5 pounds. For example, a gallon of milk weighs 8 pounds.\par \par Keep your arms down as much as possible. Do not put your arms out to the side, behind you, or over your head. Do not let anyone pull your arms to help you move or dress. Do not reach for items.\par \par Do not push or pull anything. Examples include a car door or a vacuum .\par \par Do not drive while you are healing. Your surgeon will tell you when it is safe for you to start driving again.\par \par

## 2023-02-10 NOTE — HISTORY OF PRESENT ILLNESS
[FreeTextEntry1] : [facility]: [facility]\par Post-discharge follow-up asssessment\par \par Rivas Palmer is a 71 y/o  Irish-speaking, Male w/ PMHx of HTN, HLD, CAD s/p multiple SURYA (pLAD 5/14/21, OM1 3/21/21, pLCx on 10/2021), DMII, ESRD (HD T/Th/Sat), CVA (approx. 2 yrs ago w/ R sided weakness), & Parkinson's disease who originally presented to [facility] 12/22/2022 c/o sharp left sided chest pain at rest overnight, relieved with sublingual nitroglycerin. At that time, he had an ECHO which revealed EF 55% and underwent dx cardiac cath 12/23/2022 found to have multivessel disease. Patient transferred to [facility] for under the cardiology team for possible PCI. On 12/27/22 patient underwent PCI which revealed PTCA oLCX 90% ISR, PTCA dLCX 90% ISR w/ subtotal small LCX distally; oLAD 80% ISR with + IFR (0.80), mLAD 70-80% & CTS was consulted & the patient was evaluated by Dr. Hernandez in the outpatient setting. Pt re-presented to [facility] on 1/31/23 for pre-surgical testing. \par \par On 2/1/23 patient underwent OPCAB x 2 (LIMA-LAD, SVG-PDA) with Dr. Hernandez. Patient recovered in CTICU and had HD in the PM. Patient discharged to home on 2/7/2023 with [facility] services. Of note on HD every Tues/Thurs/Sat. \par \par Post-op incision sites: Sternal incision healing well with no erythema, dehiscence. RLE graft site no erythema, drainage with mild resolving ecchymosis, patient denies pain with ambulation, adduction and abduction.\par

## 2023-02-13 ENCOUNTER — OUTPATIENT (OUTPATIENT)
Dept: OUTPATIENT SERVICES | Facility: HOSPITAL | Age: 73
LOS: 1 days | End: 2023-02-13
Payer: MEDICARE

## 2023-02-13 ENCOUNTER — APPOINTMENT (OUTPATIENT)
Dept: CARDIOTHORACIC SURGERY | Facility: CLINIC | Age: 73
End: 2023-02-13
Payer: MEDICARE

## 2023-02-13 VITALS
TEMPERATURE: 97 F | WEIGHT: 139 LBS | HEART RATE: 77 BPM | BODY MASS INDEX: 25.58 KG/M2 | DIASTOLIC BLOOD PRESSURE: 72 MMHG | SYSTOLIC BLOOD PRESSURE: 152 MMHG | OXYGEN SATURATION: 95 % | HEIGHT: 62 IN | RESPIRATION RATE: 18 BRPM

## 2023-02-13 DIAGNOSIS — I77.0 ARTERIOVENOUS FISTULA, ACQUIRED: Chronic | ICD-10-CM

## 2023-02-13 DIAGNOSIS — Z95.5 PRESENCE OF CORONARY ANGIOPLASTY IMPLANT AND GRAFT: Chronic | ICD-10-CM

## 2023-02-13 DIAGNOSIS — Z09 ENCOUNTER FOR FOLLOW-UP EXAMINATION AFTER COMPLETED TREATMENT FOR CONDITIONS OTHER THAN MALIGNANT NEOPLASM: ICD-10-CM

## 2023-02-13 DIAGNOSIS — Z95.1 PRESENCE OF AORTOCORONARY BYPASS GRAFT: ICD-10-CM

## 2023-02-13 DIAGNOSIS — Z95.5 PRESENCE OF CORONARY ANGIOPLASTY IMPLANT AND GRAFT: ICD-10-CM

## 2023-02-13 DIAGNOSIS — Z95.828 PRESENCE OF OTHER VASCULAR IMPLANTS AND GRAFTS: Chronic | ICD-10-CM

## 2023-02-13 PROCEDURE — 71046 X-RAY EXAM CHEST 2 VIEWS: CPT

## 2023-02-13 PROCEDURE — 99024 POSTOP FOLLOW-UP VISIT: CPT

## 2023-02-13 PROCEDURE — 71046 X-RAY EXAM CHEST 2 VIEWS: CPT | Mod: 26

## 2023-02-14 DIAGNOSIS — D62 ACUTE POSTHEMORRHAGIC ANEMIA: ICD-10-CM

## 2023-02-14 DIAGNOSIS — I97.89 OTHER POSTPROCEDURAL COMPLICATIONS AND DISORDERS OF THE CIRCULATORY SYSTEM, NOT ELSEWHERE CLASSIFIED: ICD-10-CM

## 2023-02-14 DIAGNOSIS — N18.6 END STAGE RENAL DISEASE: ICD-10-CM

## 2023-02-14 DIAGNOSIS — I25.110 ATHEROSCLEROTIC HEART DISEASE OF NATIVE CORONARY ARTERY WITH UNSTABLE ANGINA PECTORIS: ICD-10-CM

## 2023-02-14 DIAGNOSIS — Z79.84 LONG TERM (CURRENT) USE OF ORAL HYPOGLYCEMIC DRUGS: ICD-10-CM

## 2023-02-14 DIAGNOSIS — N40.0 BENIGN PROSTATIC HYPERPLASIA WITHOUT LOWER URINARY TRACT SYMPTOMS: ICD-10-CM

## 2023-02-14 DIAGNOSIS — E83.51 HYPOCALCEMIA: ICD-10-CM

## 2023-02-14 DIAGNOSIS — G20 PARKINSON'S DISEASE: ICD-10-CM

## 2023-02-14 DIAGNOSIS — E83.39 OTHER DISORDERS OF PHOSPHORUS METABOLISM: ICD-10-CM

## 2023-02-14 DIAGNOSIS — I48.91 UNSPECIFIED ATRIAL FIBRILLATION: ICD-10-CM

## 2023-02-14 DIAGNOSIS — Z95.5 PRESENCE OF CORONARY ANGIOPLASTY IMPLANT AND GRAFT: ICD-10-CM

## 2023-02-14 DIAGNOSIS — Z79.82 LONG TERM (CURRENT) USE OF ASPIRIN: ICD-10-CM

## 2023-02-14 DIAGNOSIS — E11.22 TYPE 2 DIABETES MELLITUS WITH DIABETIC CHRONIC KIDNEY DISEASE: ICD-10-CM

## 2023-02-14 DIAGNOSIS — Z87.891 PERSONAL HISTORY OF NICOTINE DEPENDENCE: ICD-10-CM

## 2023-02-14 DIAGNOSIS — I95.9 HYPOTENSION, UNSPECIFIED: ICD-10-CM

## 2023-02-14 DIAGNOSIS — E87.3 ALKALOSIS: ICD-10-CM

## 2023-02-14 DIAGNOSIS — E78.5 HYPERLIPIDEMIA, UNSPECIFIED: ICD-10-CM

## 2023-02-14 DIAGNOSIS — I25.84 CORONARY ATHEROSCLEROSIS DUE TO CALCIFIED CORONARY LESION: ICD-10-CM

## 2023-02-14 DIAGNOSIS — Z99.2 DEPENDENCE ON RENAL DIALYSIS: ICD-10-CM

## 2023-02-14 DIAGNOSIS — Z79.4 LONG TERM (CURRENT) USE OF INSULIN: ICD-10-CM

## 2023-02-14 DIAGNOSIS — E87.5 HYPERKALEMIA: ICD-10-CM

## 2023-02-14 DIAGNOSIS — E87.20 ACIDOSIS, UNSPECIFIED: ICD-10-CM

## 2023-02-14 DIAGNOSIS — Z83.3 FAMILY HISTORY OF DIABETES MELLITUS: ICD-10-CM

## 2023-02-14 DIAGNOSIS — I69.351 HEMIPLEGIA AND HEMIPARESIS FOLLOWING CEREBRAL INFARCTION AFFECTING RIGHT DOMINANT SIDE: ICD-10-CM

## 2023-02-14 DIAGNOSIS — I12.0 HYPERTENSIVE CHRONIC KIDNEY DISEASE WITH STAGE 5 CHRONIC KIDNEY DISEASE OR END STAGE RENAL DISEASE: ICD-10-CM

## 2023-02-14 DIAGNOSIS — I25.10 ATHEROSCLEROTIC HEART DISEASE OF NATIVE CORONARY ARTERY WITHOUT ANGINA PECTORIS: ICD-10-CM

## 2023-02-14 PROBLEM — Z09 POSTOP CHECK: Status: ACTIVE | Noted: 2023-02-09

## 2023-02-14 PROBLEM — Z95.1 S/P CABG X 2: Status: ACTIVE | Noted: 2023-02-09

## 2023-02-14 NOTE — END OF VISIT
[FreeTextEntry3] : I, SHELBY SEBASTIAN , am scribing for and in the presence of MARCIA WASHINGTON the following sections: History of present illness, past Medical/family/surgical/family/social history, review of systems, vital signs, physical exam and disposition.\par

## 2023-02-14 NOTE — PHYSICAL EXAM
[] : no respiratory distress [Respiration, Rhythm And Depth] : normal respiratory rhythm and effort [Exaggerated Use Of Accessory Muscles For Inspiration] : no accessory muscle use [Apical Impulse] : the apical impulse was normal [Heart Rate And Rhythm] : heart rate was normal and rhythm regular [Heart Sounds] : normal S1 and S2 [FreeTextEntry1] : scant amount of serosanguineous noted on gauze dressing that was removed  [Clean] : clean [Dry] : dry [Healing Well] : healing well [Erythema] : not erythematous [Warm] : not warm [Tender] : not tender [___ +] : bilateral pretibial [unfilled]U+ pitting edema

## 2023-02-14 NOTE — CONSULT LETTER
[Dear  ___] : Dear  [unfilled], [Please see my note below.] : Please see my note below. [Sincerely,] : Sincerely, [FreeTextEntry2] : Carlin Ramos MD\par [address]\par [facility] [address]\par P: [phone]\par F: [phone]\par \par \par  [FreeTextEntry1] : JOSE FERNÁNDEZ  was seen today for a post operative visit. He is doing well status post OPCAB x2 on 2/1/23.  We have asked that the patient followup in your office. We have instructed the patient to return to see us in 1 month. Enclosed is a copy of the operative report. Please contact our office for any questions or concerns at [phone].\par \par Thank you for allowing us to participate in this patients care.\par \par   [FreeTextEntry3] : MARCIA Gamboa MD FACS\par ,Cardiovascular & Thoracic Surgery\par [facility] \par \par Cardiovascular & Thoracic Surgery\par [facility]\par Professor of Cardiovascular & Thoracic Surgery\par [employer] \par \par \par \par

## 2023-02-14 NOTE — DISCUSSION/SUMMARY
[Slow Progress] : is progressing slowly [Excellent Pain Control] : has excellent pain control [No Sign of Infection] : is showing no signs of infection

## 2023-02-14 NOTE — REASON FOR VISIT
[Spouse] : spouse [de-identified] : OPCAB x 2 (LIMA to LAD, SVG to PDA)  [de-identified] : 2/1/23 [de-identified] : dx cardiac cath on 12/23/2022 found to have multivessel disease. Patient transferred to [facility] for under the cardiology team for possible PCI. On 12/27/22 patient underwent PCI which revealed PTCA oLCX 90% ISR, PTCA dLCX 90% ISR w/ subtotal small LCX distally; oLAD 80% ISR with + IFR (0.80), mLAD 70-80% \par \par .On 2/1/23 patient underwent an elective OPCAB x 2 (LIMA-LAD, SVG-PDA) with Dr. Hrenandez. Patient recovered in CTICU and had HD in the PM. POD1 patient received HD and 2u PRBC for low H&H. Midodrine started, weaned off vaso. POD2 Midodrine off, started BB. In A-fib in the AM and converted after amio bolus x 1. Transferred to [facility]. Back in Afib and started PO amio. POD3 chest tubes and pacing wires removed, HD given. POD4 weaned off HFNC. POD5 ambulating well. POD6 Pt feeling well, ambulating and eating without difficulty. He was discharged home on 2/7/23. \par \par Patient presents for post op visit. He appears generally well, denies c/o chest pain, sob/lane, fever, chills, lower or palpitations.He reports lower extremity edema and serosanguineous drainage from lower portion of MSI. \par

## 2023-02-14 NOTE — ASSESSMENT
[FreeTextEntry1] : \par \par BP elevated during office visit, increased Lopressor to 50 mg BID from 25mg BID \par Continue to increase activity and walk daily as tolerated. Continue to use incentive spirometer. \par Advised pt to check BP and weight daily and maintain a log \par No driving or strenuous activity for 4 weeks after surgery. Avoid lifting >10 to 15 lbs.\par Call MD if you experience fever, fatigue, dizziness, confusion, syncope, shortness of breath, chest pain not relieved with analgesics, increased redness/drainage from incision.\par Continue to use compression stockings. Keep legs elevated above heart when resting/sitting/sleeping\par Follow up with Dr. Carlin Ramos \par Follow up in CTS clinic in 1 month \par

## 2023-03-09 ENCOUNTER — TRANSCRIPTION ENCOUNTER (OUTPATIENT)
Age: 73
End: 2023-03-09

## 2023-03-22 ENCOUNTER — FORM ENCOUNTER (OUTPATIENT)
Age: 73
End: 2023-03-22

## 2023-03-22 NOTE — PHYSICAL THERAPY INITIAL EVALUATION ADULT - MANUAL MUSCLE TESTING RESULTS, REHAB EVAL
[MRI] : MRI [Left] : left [Ankle] : ankle [Report was reviewed and noted in the chart] : The report was reviewed and noted in the chart [I independently reviewed and interpreted images and report] : I independently reviewed and interpreted images and report [FreeTextEntry1] : 1. Findings consistent with mild distal Achilles strain with surrounding bursitis greatest dorsally without tendon \par tear. Clinical correlation and follow up is recommended.\par 2. Slight posterior tibial tenosynovitis without tear.\par 3. No acute fracture, osteochondral lesion, malalignment or loose body. grossly assessed at least 3+/5 throughout

## 2023-03-24 ENCOUNTER — APPOINTMENT (OUTPATIENT)
Dept: HOME HEALTH SERVICES | Facility: HOME HEALTH | Age: 73
End: 2023-03-24
Payer: MEDICARE

## 2023-03-24 VITALS
HEART RATE: 61 BPM | DIASTOLIC BLOOD PRESSURE: 60 MMHG | OXYGEN SATURATION: 97 % | TEMPERATURE: 97.1 F | SYSTOLIC BLOOD PRESSURE: 108 MMHG | RESPIRATION RATE: 16 BRPM

## 2023-03-24 DIAGNOSIS — H91.90 UNSPECIFIED HEARING LOSS, UNSPECIFIED EAR: ICD-10-CM

## 2023-03-24 PROCEDURE — G0506: CPT

## 2023-03-24 PROCEDURE — 99344 HOME/RES VST NEW MOD MDM 60: CPT | Mod: 25

## 2023-03-24 RX ORDER — CINACALCET 60 MG/1
60 TABLET ORAL DAILY
Refills: 0 | Status: COMPLETED | COMMUNITY
End: 2023-03-24

## 2023-03-26 PROBLEM — H91.90 HOH (HARD OF HEARING): Status: ACTIVE | Noted: 2023-03-26

## 2023-03-27 NOTE — REASON FOR VISIT
[Initial Eval - Existing Diagnosis] : an initial evaluation of an existing diagnosis [Spouse] : spouse [[employer] ] : provided by [employer]   [Time Spent: ____ minutes] : Total time spent using  services: [unfilled] minutes. The patient's primary language is not English thus required  services. [Pre-Visit Preparation] : pre-visit preparation was done [Intercurrent Specialty/Sub-specialty Visits] : the patient has intercurrent specialty/sub-specialty visits [Interpreters_IDNumber] : [id number] [Interpreters_FullName] : Víctor [FreeTextEntry2] : medical records [FreeTextEntry3] : hemodialysis

## 2023-03-27 NOTE — CHRONIC CARE ASSESSMENT
[Diabetic Diet] : diabetic [General Adherence] : and is generally adherent [PPS Score: ____] : Palliative Performance Scale (PPS) Score: [unfilled] [de-identified] : Renal and diabetic diet [FreeTextEntry1] : Renal

## 2023-03-27 NOTE — HEALTH RISK ASSESSMENT
[Some assistance needed] : using telephone [Full assistance needed] : managing finances [No falls in past year] : Patient reported no falls in the past year [Yes] : The patient does have visual impairment [TimeGetUpGo] : 0

## 2023-03-27 NOTE — HISTORY OF PRESENT ILLNESS
[Patient] : patient [Spouse] : spouse [FreeTextEntry1] : n/a  [FreeTextEntry2] : JOSE PALMER is a 72 year male with PMH of ABE, Afib, HTN, HLD, CAD (S/P SURYA pLAD 5/14/21, OM1 3/21/21, pLCx on 10/2021 and CABG x 2 (2/1/2023), DMII, ESRD (HD TThSat), CVA 2013 (w/ residual right sided weakness), Parkinson’s disease and [place] being seen for initial evaluation. \par \par Interval Events\par -CABG done at [facility] 2/2023. \par \par \par Subjective:\par -Appetite/weight: poor appetite. no dysphagia. Weight loss 5-7lbs (2 months)\par -Gait/falls: unsteady gait due to leg pain. Uses cane to walk around house. No falls \par -Pain: right leg pain, constant, 8/10,  uses CBD and tylenol. \par -Sleep: sleep is good. \par -BMs: regular. \par -Urine: no concerns. still makes urine\par -Skin: intact. \par -DME: cane wheelchair, \par -Mood/memory: mood is depressed. Memory is ok. \par -Communication: conversational \par -Health Maintenance: colonoscopy 2017, Flu shot 11/2023, 3 COVID. \par -Hospitalizations in the past year:\par              a) Dec 2022 : Admitted at [facility] for chest pain and had cath on 12/23/2022 which \par              showed multivessel disease. Patient transferred to [facility] for PCI which revealed revealed PTCA oLCX\par              90% ISR, PTCA dLCX 90% ISR w/ subtotal small LCX distally; oLAD 80% ISR with + IFR (0.80), mLAD 70-             80% & CTS was consulted & the patient was evaluated by Dr. Hernandez in the outpatient setting.\par           b) 1/31/23: Admitted to [facility] for CABG x 2 (LIMA-LAD, SVG-PDA) done 2/1/23.  Patient recovered in CTICU             and had HD in the PM. POD1 patient received HD and 2u PRBC for low H&H. Midodrine started, weaned off              vaso. POD2 Midodrine off, started BB. In A-fib in the AM and converted after amio bolus x 1. Back in Afib                  and started PO amio. POD3 chest tubes and pacing wires removed, HD given. POD4 weaned off HFNC.                   POD5 ambulating well. POD6 Pt feeling well, ambulating and eating without difficulty. Patient discharged                    home. \par \par \par \par Medical Issues:\par a) DM: BG log 95-167mg/dl. No hypoglycemia episodes. Continue Novolin R 3 units TID, Basaglar kwikpen 10units HS. Opthalmology appointment done 3/22/2023\par b) ESRD: HD on TThSat. FU with nephrology. Continue Daphne Caps and Sevelamer\par c) Parkinson's: progressive. Continue Sinemet. FU with neurology\par d) CAD (s/p SURYA and CABG):  No angina. Continue metoprolol, atorvastatin\par e) CVA with residual right sided weakness: continue plavix\par f) HTN: Continue amlodipine, metoprolol and clonidine\par g) Right leg pain: Could be referred pain. Ordered right hip and right knee xray. \par i) Afib: SR today. Continue Amiodarone and metoprolol. No AC? Has Bled score of 5. FU with cardiology regarding need for anticoagulation. \par j) ABE: Hgb 10.2 (Feb 2023). Continue Iron supplementation. \par k) Hyperkalemia: Continue lokelma on non HD days. \par l) BPH: continue flomax. \par m) GERD: continue pantoprazole\par \par Specialists:\par PCP: Dr Kate Gardner\par Nephrology: Dr Meredith Walker\par Cardiology: Dr Carlin Ramos\par Opthalmology: Dr Jeana Ring\par \par \par Social hx: lives in home with spouse and son. 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 \par Caregivers: no HHA\par MOLST: CPR, Long term intubation, Trial feeding, Hospitalize, IV and antibiotics ok. \par HCP: Cristy Palmer (spouse) [phone] and Ishan Palmer (son) [phone]\par

## 2023-03-27 NOTE — COUNSELING
[Overweight - ( BMI 25.1 - 29.9 )] : overweight -  ( BMI 25.1 - 29.9 ) [DASH diet recommended] : DASH diet recommended [Non - Smoker] : non-smoker [Use grab bars] : use grab bars [Use assistive device to avoid falls] : use assistive device to avoid falls [Remove clutter and unsafe carpeting to avoid falls] : remove clutter and unsafe carpeting to avoid falls [Date: ___] : colonoscopy completed on [unfilled] [] : foot exam [Improve exercise tolerance] : improve exercise tolerance [Improve mobility] : improve mobility [Maintain functional ability] : maintain functional ability [Discussed disease trajectory with patient/caregiver] : discussed disease trajectory with patient/caregiver [Completed Healthcare Proxy] : completed healthcare proxy [Completed Medical Orders for Life-Sustaining Treatment] : completed medical orders for life-sustaining treatment [Full Code] : Code Status: Full Code [No Limitations] : Treatment Guidelines: No limitations [Long Term Intubation] : Intubation: Long term intubation [[facility] Ver[facility] Date: _____] [facility]last [facility]: [unfilled] [_____] : HCP: [unfilled] [ - New patient with 2 or more chronic conditions; CCM discussed and patient-centered care plan established] : New patient with 2 or more chronic conditions; CCM discussed and patient-centered care plan established [FreeTextEntry3] : Renal Diet

## 2023-03-27 NOTE — DATA REVIEWED
[FreeTextEntry1] : Labs (2/2023): Hgb 10.2 eGFR 22\par \par CT Chest (5/2020) Aortic artherosclerosis\par \par Echo (1/2023) \par CONCLUSIONS:\par  EF 67%\par  1. Normal left ventricular size and systolic function.\par  2. Mild symmetric left ventricular hypertrophy.\par  3. Grade II left ventricular diastolic dysfunction.\par  4. Normal right ventricular size and systolic function.\par  5. Mildly dilated left atrium.\par  6. Mild mitral regurgitation.\par  7. Aortic sclerosis without significant stenosis.\par  8. Pulmonary artery systolic pressure is 34 mmHg.\par  9. No pericardial effusion.\par

## 2023-03-27 NOTE — PHYSICAL EXAM
[No Acute Distress] : no acute distress [Normal Sclera/Conjunctiva] : normal sclera/conjunctiva [PERRL] : pupils equal, round and reactive to light [EOMI] : extra ocular movement intact [Normal Outer Ear/Nose] : the ears and nose were normal in appearance [Normal Oropharynx] : the oropharynx was normal [No JVD] : no jugular venous distention [Supple] : the neck was supple [No Respiratory Distress] : no respiratory distress [Clear to Auscultation] : lungs were clear to auscultation bilaterally [No Accessory Muscle Use] : no accessory muscle use [Normal Rate] : heart rate was normal  [Regular Rhythm] : with a regular rhythm [Normal S1, S2] : normal S1 and S2 [No Murmurs] : no murmurs heard [No Edema] : there was no peripheral edema [Normal Bowel Sounds] : normal bowel sounds [Non Tender] : non-tender [Soft] : abdomen soft [Not Distended] : not distended [No CVA Tenderness] : no ~M costovertebral angle tenderness [No Spinal Tenderness] : no spinal tenderness [Normal Gait] : normal gait [No Joint Swelling] : no joint swelling seen [Normal Strength/Tone] : muscle strength and tone were normal [No Rash] : no rash [No Skin Lesions] : no skin lesions [Cranial Nerves Intact] : cranial nerves 2-12 were intact [No Motor Deficits] : the motor exam was normal [Oriented x3] : oriented to person, place, and time [de-identified] : sternal scar healed [de-identified] : right AVF thrill+ [de-identified] : left hand pill rolling tremor [de-identified] : depressed affect

## 2023-04-26 ENCOUNTER — NON-APPOINTMENT (OUTPATIENT)
Age: 73
End: 2023-04-26

## 2023-04-26 ENCOUNTER — APPOINTMENT (OUTPATIENT)
Dept: ENDOVASCULAR SURGERY | Facility: CLINIC | Age: 73
End: 2023-04-26
Payer: MEDICARE

## 2023-04-26 PROCEDURE — 36907Z: CUSTOM | Mod: 59

## 2023-04-26 PROCEDURE — 36902Z: CUSTOM

## 2023-04-26 PROCEDURE — 99213 OFFICE O/P EST LOW 20 MIN: CPT | Mod: 25

## 2023-04-27 ENCOUNTER — RESULT REVIEW (OUTPATIENT)
Age: 73
End: 2023-04-27

## 2023-04-28 ENCOUNTER — APPOINTMENT (OUTPATIENT)
Dept: HOME HEALTH SERVICES | Facility: HOME HEALTH | Age: 73
End: 2023-04-28
Payer: MEDICARE

## 2023-04-28 VITALS
HEART RATE: 61 BPM | SYSTOLIC BLOOD PRESSURE: 104 MMHG | DIASTOLIC BLOOD PRESSURE: 60 MMHG | OXYGEN SATURATION: 98 % | TEMPERATURE: 97.3 F | RESPIRATION RATE: 14 BRPM

## 2023-04-28 PROCEDURE — 99349 HOME/RES VST EST MOD MDM 40: CPT | Mod: 25

## 2023-04-28 NOTE — CHRONIC CARE ASSESSMENT
[PPS Score: ____] : Palliative Performance Scale (PPS) Score: [unfilled] [Diabetic Diet] : diabetic [General Adherence] : and is generally adherent [de-identified] : Renal and diabetic diet [FreeTextEntry1] : Renal

## 2023-04-28 NOTE — HISTORY OF PRESENT ILLNESS
[Patient is stable - had PCP appoinment] : patient is stable - had PCP appointment [Patient] : patient [Spouse] : spouse [FreeTextEntry4] :  Dr Meredith Walker [LastSpecialistVisitDate] : 4/2023 [FreeTextEntry1] : n/a  [FreeTextEntry2] : JOSE FERNÁNDEZ is a 72 year male with PMH of ABE, Afib, HTN, HLD, CAD (S/P SURYA pLAD 5/14/21, OM1 3/21/21, pLCx on 10/2021 and CABG x 2 (2/1/2023), DMII, ESRD (HD TThSat), CVA 2013 (w/ residual right sided weakness), Parkinson’s disease and [place] being seen for follow up. \par \par Interval Events\par -He is recovering more every day. \par -Had follow up with cardiology and had vascular to clean the fistula. \par -Reviewed right knee and hip xray-no remarkable findings to explain severe right knee pain. Pain seems to initiate in his back and located mostly between his back and hip. \par \par \par Subjective:\par -Appetite/weight: fair appetite. no dysphagia. Weight stable\par -Gait/falls: unsteady gait due to leg pain. Uses cane to walk around house. No falls \par -Pain: right leg pain, constant, severe, 8/10,  uses CBD and tylenol. \par -Sleep: sleep is fair. Sleeps early and wake up 1-2am then goes back to sleep at 5am. \par -BMs: regular. \par -Urine: no concerns. still makes urine\par -Skin: intact. \par -DME: cane wheelchair, \par -Mood/memory: mood is depressed. Memory is ok. \par -Communication: conversational \par -Health Maintenance: colonoscopy 2017, Flu shot 11/2023, 3 COVID. \par -Hospitalizations in the past year:\par              a) Dec 2022 : Admitted at [facility] for chest pain and had cath on 12/23/2022 which \par              showed multivessel disease. Patient transferred to [facility] for PCI which revealed revealed PTCA oLCX\par              90% ISR, PTCA dLCX 90% ISR w/ subtotal small LCX distally; oLAD 80% ISR with + IFR (0.80), mLAD 70-                 80% & CTS was consulted & the patient was evaluated by Dr. Hernandez in the outpatient setting.\par           b) 1/31/23: Admitted to [facility] for CABG x 2 (LIMA-LAD, SVG-PDA) done 2/1/23.  Patient recovered in CTICU                  and had HD in the PM. POD1 patient received HD and 2u PRBC for low H&H. Midodrine started, weaned off              vaso. POD2 Midodrine off, started BB. In A-fib in the AM and converted after amio bolus x 1. Back in Afib                  and started PO amio. POD3 chest tubes and pacing wires removed, HD given. POD4 weaned off HFNC.                   POD5 ambulating well. POD6 Pt feeling well, ambulating and eating without difficulty. Patient discharged                    home. \par \par \par \par Medical Issues:\par a) DM: A1c 5.7% (April 2023).  BG log 99-157mg/dl. No hypoglycemia episodes. Continue Novolin R 3 units TID, Basaglar kwikpen 10units HS. Opthalmology appointment done 3/22/2023\par b) ESRD: HD on TThSat. FU with nephrology. Continue Harlan Caps and Sevelamer\par c) Parkinson's: progressive. Continue Sinemet. FU with neurology. Referral given for [facility] physicians. \par d) CAD (s/p SURYA and CABG):  No angina. Continue metoprolol, atorvastatin\par e) CVA with residual right sided weakness: continue plavix\par f) HTN: Continue amlodipine, metoprolol and clonidine\par g) Right leg pain: Could be referred pain. Right hip and knee xray unremarkable. Will order LS xray and start tramadol for pain. \par i) Afib: SR today. Continue Amiodarone and metoprolol. No AC? Has Bled score of 5. FU with cardiology regarding need for anticoagulation. \par j) ABE: Hgb 12.5 (April 2023). Continue Iron supplementation. \par k) Hyperkalemia: K 5.8 (April 2023). Continue lokelma on non HD days. \par l) BPH: continue flomax. \par m) GERD: continue pantoprazole\par \par Specialists:\par PCP: Dr Kate Gardner\par Nephrology: Dr Meredith Walker\par Cardiology: Dr Carlin Ramos\par Opthalmology: Dr Jeana Ring\par \par \par Social hx: lives in home with spouse and son. 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 \par Caregivers: no HHA\par MOLST: CPR, Long term intubation, Trial feeding, Hospitalize, IV and antibiotics ok. Reviewed 3/24/2023. \par HCP: Cristy Estela (spouse) [phone] and Ishan Salazardon (son) [phone]\par

## 2023-04-28 NOTE — REASON FOR VISIT
[Follow-Up] : a follow-up visit [Spouse] : spouse [[employer] ] : provided by [employer]   [Time Spent: ____ minutes] : Total time spent using  services: [unfilled] minutes. The patient's primary language is not English thus required  services. [Pre-Visit Preparation] : pre-visit preparation was done [Intercurrent Specialty/Sub-specialty Visits] : the patient has intercurrent specialty/sub-specialty visits [Interpreters_IDNumber] : [id number] [Interpreters_FullName] : Doron  [FreeTextEntry2] : medical records [FreeTextEntry3] : hemodialysis and cardiology

## 2023-04-28 NOTE — HEALTH RISK ASSESSMENT
[HRA Reviewed] : Health risk assessment reviewed [Some assistance needed] : using telephone [Full assistance needed] : managing finances [No falls in past year] : Patient reported no falls in the past year [Yes] : The patient does have visual impairment [TimeGetUpGo] : 10 18-Feb-2021 14:55

## 2023-04-28 NOTE — COUNSELING
[Overweight - ( BMI 25.1 - 29.9 )] : overweight -  ( BMI 25.1 - 29.9 ) [DASH diet recommended] : DASH diet recommended [Non - Smoker] : non-smoker [Use grab bars] : use grab bars [Use assistive device to avoid falls] : use assistive device to avoid falls [Remove clutter and unsafe carpeting to avoid falls] : remove clutter and unsafe carpeting to avoid falls [Date: ___] : colonoscopy completed on [unfilled] [] : foot exam [Improve exercise tolerance] : improve exercise tolerance [Improve mobility] : improve mobility [Maintain functional ability] : maintain functional ability [Discussed disease trajectory with patient/caregiver] : discussed disease trajectory with patient/caregiver [Completed Healthcare Proxy] : completed healthcare proxy [Completed Medical Orders for Life-Sustaining Treatment] : completed medical orders for life-sustaining treatment [Full Code] : Code Status: Full Code [No Limitations] : Treatment Guidelines: No limitations [Long Term Intubation] : Intubation: Long term intubation [[facility]: _____[facility]: [unfilled] [_____] : HCP: [unfilled] [FreeTextEntry3] : Renal Diet

## 2023-04-28 NOTE — PHYSICAL EXAM
[No Acute Distress] : no acute distress [Normal Voice/Communication] : normal voice communication [Normal Sclera/Conjunctiva] : normal sclera/conjunctiva [PERRL] : pupils equal, round and reactive to light [EOMI] : extra ocular movement intact [Normal Outer Ear/Nose] : the ears and nose were normal in appearance [Normal Oropharynx] : the oropharynx was normal [No JVD] : no jugular venous distention [Supple] : the neck was supple [No Respiratory Distress] : no respiratory distress [Clear to Auscultation] : lungs were clear to auscultation bilaterally [No Accessory Muscle Use] : no accessory muscle use [Normal Rate] : heart rate was normal  [Regular Rhythm] : with a regular rhythm [Normal S1, S2] : normal S1 and S2 [No Murmurs] : no murmurs heard [No Edema] : there was no peripheral edema [Normal Bowel Sounds] : normal bowel sounds [Non Tender] : non-tender [Soft] : abdomen soft [No CVA Tenderness] : no ~M costovertebral angle tenderness [No Spinal Tenderness] : no spinal tenderness [No Joint Swelling] : no joint swelling seen [Normal Strength/Tone] : muscle strength and tone were normal [No Rash] : no rash [No Skin Lesions] : no skin lesions [Cranial Nerves Intact] : cranial nerves 2-12 were intact [No Motor Deficits] : the motor exam was normal [Oriented x3] : oriented to person, place, and time [de-identified] : protuberant [de-identified] : left knee in valgus position, RUE fistula thrill +, old LUE fistula no thrill [de-identified] : pill rolling tremor [de-identified] : depressed affect

## 2023-05-03 NOTE — HISTORY OF PRESENT ILLNESS
[FreeTextEntry1] : accompanied by wife Cristy han BS [FreeTextEntry5] : yesterday at [FreeTextEntry6] : Dr. Cordova

## 2023-05-03 NOTE — PROCEDURE
[Resume diet] : resume diet [Site check for bleeding/hematoma/thrill/bruit] : Site check for bleeding/hematoma/thrill/bruit [Vital signs on admission the q 15 mins x2] : Vital signs on admission the q 15 mins x2 [FreeTextEntry1] : right arm fistula fistulogram/angioplasty

## 2023-05-03 NOTE — PAST MEDICAL HISTORY
[Increasing age ( >40 years old)] : Increasing age ( >40 years old) [No therapy indicated for cases scheduled for less than one hour] : No therapy indicated for cases scheduled for less than one hour. [FreeTextEntry1] : Malignant Hyperthermia Screening Tool and Risk of Bleeding Assessment\par \par Mr. JOSE FERNÁNDEZ denies family history of unexpected death following Anesthesia or Exercise.\par Denies Family history of Malignant Hyperthermia, Muscle or Neuromuscular disorder and High Temperature following exercise.\par \par Mr. JOSE FERNÁNDEZ denies history of Muscle Spasm, Dark or Chocolate - Colored urine and Unanticipated fever immediately following anesthesia or serious exercise. \par Mr. FERNÁNDEZ also denies bleeding tendencies/ Risks of Bleeding.\par

## 2023-05-25 ENCOUNTER — NON-APPOINTMENT (OUTPATIENT)
Age: 73
End: 2023-05-25

## 2023-05-31 ENCOUNTER — APPOINTMENT (OUTPATIENT)
Dept: HOME HEALTH SERVICES | Facility: HOME HEALTH | Age: 73
End: 2023-05-31
Payer: MEDICARE

## 2023-05-31 VITALS
SYSTOLIC BLOOD PRESSURE: 108 MMHG | RESPIRATION RATE: 14 BRPM | HEART RATE: 60 BPM | TEMPERATURE: 97.1 F | DIASTOLIC BLOOD PRESSURE: 60 MMHG | OXYGEN SATURATION: 98 %

## 2023-05-31 DIAGNOSIS — I69.30 UNSPECIFIED SEQUELAE OF CEREBRAL INFARCTION: ICD-10-CM

## 2023-05-31 DIAGNOSIS — M25.551 PAIN IN RIGHT HIP: ICD-10-CM

## 2023-05-31 DIAGNOSIS — E55.9 VITAMIN D DEFICIENCY, UNSPECIFIED: ICD-10-CM

## 2023-05-31 PROCEDURE — G0439: CPT

## 2023-05-31 RX ORDER — ASPIRIN 81 MG
81 TABLET, DELAYED RELEASE (ENTERIC COATED) ORAL
Refills: 0 | Status: COMPLETED | COMMUNITY
End: 2023-05-31

## 2023-05-31 RX ORDER — RENO CAPS 100; 1.5; 1.7; 20; 10; 1; 150; 5; 6 MG/1; MG/1; MG/1; MG/1; MG/1; MG/1; UG/1; MG/1; UG/1
1 CAPSULE ORAL
Refills: 0 | Status: COMPLETED | COMMUNITY
Start: 2023-03-24 | End: 2023-05-31

## 2023-05-31 RX ORDER — METOPROLOL TARTRATE 50 MG/1
50 TABLET, FILM COATED ORAL
Qty: 60 | Refills: 0 | Status: COMPLETED | COMMUNITY
End: 2023-05-31

## 2023-05-31 RX ORDER — AMIODARONE HYDROCHLORIDE 200 MG/1
200 TABLET ORAL
Qty: 30 | Refills: 0 | Status: COMPLETED | COMMUNITY
End: 2023-05-31

## 2023-05-31 NOTE — HEALTH RISK ASSESSMENT
[HRA Reviewed] : Health risk assessment reviewed [Some assistance needed] : using telephone [Full assistance needed] : managing finances [No falls in past year] : Patient reported no falls in the past year [Yes] : The patient does have visual impairment [TimeGetUpGo] : 10

## 2023-05-31 NOTE — CHRONIC CARE ASSESSMENT
[Diabetic Diet] : diabetic [General Adherence] : and is generally adherent [PPS Score: ____] : Palliative Performance Scale (PPS) Score: [unfilled] [de-identified] : Renal and diabetic diet [FreeTextEntry1] : Renal

## 2023-05-31 NOTE — PHYSICAL EXAM
[No Acute Distress] : no acute distress [Normal Voice/Communication] : normal voice communication [Normal Sclera/Conjunctiva] : normal sclera/conjunctiva [PERRL] : pupils equal, round and reactive to light [EOMI] : extra ocular movement intact [Normal Outer Ear/Nose] : the ears and nose were normal in appearance [Normal Oropharynx] : the oropharynx was normal [No JVD] : no jugular venous distention [Supple] : the neck was supple [No Respiratory Distress] : no respiratory distress [Clear to Auscultation] : lungs were clear to auscultation bilaterally [No Accessory Muscle Use] : no accessory muscle use [Normal Rate] : heart rate was normal  [Regular Rhythm] : with a regular rhythm [Normal S1, S2] : normal S1 and S2 [No Murmurs] : no murmurs heard [No Edema] : there was no peripheral edema [Normal Bowel Sounds] : normal bowel sounds [Non Tender] : non-tender [Soft] : abdomen soft [No CVA Tenderness] : no ~M costovertebral angle tenderness [No Spinal Tenderness] : no spinal tenderness [No Joint Swelling] : no joint swelling seen [Normal Strength/Tone] : muscle strength and tone were normal [No Rash] : no rash [No Skin Lesions] : no skin lesions [Cranial Nerves Intact] : cranial nerves 2-12 were intact [No Motor Deficits] : the motor exam was normal [Oriented x3] : oriented to person, place, and time [Normal Affect] : the affect was normal [Over the Past 2 Weeks, Have You Felt Down, Depressed, or Hopeless?] : 1.) Over the past 2 weeks, have you felt down, depressed, or hopeless? No [Over the Past 2 Weeks, Have You Felt Little Interest or Pleasure Doing Things?] : 2.) Over the past 2 weeks, have you felt little interest or pleasure doing things? No [de-identified] : protuberant [de-identified] : left knee in valgus position, RUE fistula thrill +, old LUE fistula no thrill [de-identified] : pill rolling tremor

## 2023-05-31 NOTE — COUNSELING
[Overweight - ( BMI 25.1 - 29.9 )] : overweight -  ( BMI 25.1 - 29.9 ) [DASH diet recommended] : DASH diet recommended [Non - Smoker] : non-smoker [Use grab bars] : use grab bars [Use assistive device to avoid falls] : use assistive device to avoid falls [Remove clutter and unsafe carpeting to avoid falls] : remove clutter and unsafe carpeting to avoid falls [Date: ___] : colonoscopy completed on [unfilled] [] : foot exam [Improve exercise tolerance] : improve exercise tolerance [Improve mobility] : improve mobility [Maintain functional ability] : maintain functional ability [Discussed disease trajectory with patient/caregiver] : discussed disease trajectory with patient/caregiver [Completed Healthcare Proxy] : completed healthcare proxy [Completed Medical Orders for Life-Sustaining Treatment] : completed medical orders for life-sustaining treatment [Full Code] : Code Status: Full Code [No Limitations] : Treatment Guidelines: No limitations [Long Term Intubation] : Intubation: Long term intubation [[facility]: _____[facility]last [facility] date: [unfilled] [_____] : HCP: [unfilled] [Established patient, extensive review of history, medical and functional status, risk factors and patient education] : Established patient, extensive review of history, medical and functional status, risk factors and patient education and counseling provided for subsequent annual wellness visit [FreeTextEntry3] : Renal Diet

## 2023-05-31 NOTE — HISTORY OF PRESENT ILLNESS
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 \par \par Interval Events\par -Amiodarone discontinued by cardiologist per spouse. \par -Reviewed right knee, hip xray-no remarkable findings to explain severe right knee pain. LS xray reviewed with degenerative changes and loss of intervertebral space.  Started on tramadol for pain with some relief but pharmacy said insurance will only pay for 7 tablets. \par \par \par Subjective:\par -Appetite/weight: fair appetite. no dysphagia. Weight stable\par -Gait/falls: unsteady gait due to leg pain. Uses cane to walk around house. No falls \par -Pain: right leg pain, constant, severe, 8/10,  uses CBD, tylenol, tramadol \par -Sleep: sleep is fair. Sleeps early and wake up 1-2am then goes back to sleep at 5am. \par -BMs: regular. \par -Urine: no concerns. still makes urine\par -Skin: intact. \par -DME: cane wheelchair, \par -Mood/memory: mood is depressed. Memory is ok someimes forgetful. \par -Communication: conversational \par -Health Maintenance: colonoscopy 2017. UTD vaccine. \par -Hospitalizations in the past year:\par              a) Dec 2022 : Admitted at [facility] for chest pain and had cath on 12/23/2022 which \par              showed multivessel disease. Patient transferred to [facility] for PCI which revealed revealed PTCA oLCX\par              90% ISR, PTCA dLCX 90% ISR w/ subtotal small LCX distally; oLAD 80% ISR with + IFR (0.80), mLAD 70-                 80% & CTS was consulted & the patient was evaluated by Dr. Hernandez in the outpatient setting.\par           b) 1/31/23: Admitted to [facility] for CABG x 2 (LIMA-LAD, SVG-PDA) done 2/1/23.  Patient recovered in CTICU                  and had HD in the PM. POD1 patient received HD and 2u PRBC for low H&H. Midodrine started, weaned off              vaso. POD2 Midodrine off, started BB. In A-fib in the AM and converted after amio bolus x 1. Back in Afib                  and started PO amio. POD3 chest tubes and pacing wires removed, HD given. POD4 weaned off HFNC.                   POD5 ambulating well. POD6 Pt feeling well, ambulating and eating without difficulty. Patient discharged                    home. \par \par \par \par Medical Issues:\par a) DM: A1c 5.7% (April 2023).  BG log 94-194mg/dl. No hypoglycemia episodes. Continue Novolin R 3 units TID, Basaglar kwikpen 10units HS. Opthalmology appointment done 3/22/2023\par b) ESRD: HD on TThSat. FU with nephrology. Continue Sevelamer\par c) Parkinson's: progressive. Continue Sinemet. FU with neurology. Referral given for [facility] physicians but spouse states no answer when calling. \par d) CAD (s/p SURYA and CABG):  No angina. Continue asa, coreg, atorvastatin\par e) CVA with residual right sided weakness: continue plavix\par f) HTN: Continue amlodipine, coreg and clonidine\par g) Right leg pain: Could be referred pain. Right hip and knee xray unremarkable. LS xray with moderate degenerative changes and moderate endplate sclerosis with loss of intervertebral disc space L4-L5. Continue tramadol for pain. Advised to see orthopedic or pain management. \par i) Afib: SR today. Continue coreg. No AC? Has Bled score of 5. FU with cardiology regarding need for anticoagulation. \par j) ABE: Hgb 12.5 (April 2023). Continue Iron supplementation. \par k) Hyperkalemia: K 5.8 (April 2023). Continue lokelma on non HD days. \par l) BPH: continue flomax. \par m) GERD: continue pantoprazole\par \par Specialists:\par PCP: Dr Kate Gardner\par Nephrology: Dr Meredith Walker\par Cardiology: Dr Carlin Ramos\par Opthalmology: Dr Jeana Ring\par \par \par Social hx: lives in home with spouse and son[facility] 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 \par Caregivers: no HHA\par MOLST: CPR, Long term intubation, Trial feeding, Hospitalize, IV and antibiotics ok. Reviewed 3/24/2023. \par HCP: Cristy Palmer (spouse) [phone] and Ishan Estela (son) [phone]\par

## 2023-05-31 NOTE — REASON FOR VISIT
[Initial Annual Medicare Wellness Visit] : an initial annual Medicare wellness visit [Pre-Visit Preparation] : pre-visit preparation was done [Intercurrent Specialty/Sub-specialty Visits] : the patient has intercurrent specialty/sub-specialty visits [Spouse] : spouse [[employer] ] : provided by [employer]   [Time Spent: ____ minutes] : Total time spent using  services: [unfilled] minutes. The patient's primary language is not English thus required  services. [Interpreters_IDNumber] : [id number] [Interpreters_FullName] : Toño  [FreeTextEntry2] : medical records [FreeTextEntry3] : hemodialysis  [TWNoteComboBox1] : [place]ian

## 2023-06-20 PROBLEM — T82.898A INADEQUATE FLOW OF DIALYSIS ARTERIOVENOUS FISTULA: Status: ACTIVE | Noted: 2023-04-25

## 2023-06-21 ENCOUNTER — APPOINTMENT (OUTPATIENT)
Dept: ENDOVASCULAR SURGERY | Facility: CLINIC | Age: 73
End: 2023-06-21
Payer: MEDICARE

## 2023-06-21 ENCOUNTER — RESULT REVIEW (OUTPATIENT)
Age: 73
End: 2023-06-21

## 2023-06-21 VITALS
BODY MASS INDEX: 25.21 KG/M2 | HEART RATE: 58 BPM | HEIGHT: 62 IN | OXYGEN SATURATION: 96 % | RESPIRATION RATE: 18 BRPM | WEIGHT: 137 LBS | SYSTOLIC BLOOD PRESSURE: 166 MMHG | DIASTOLIC BLOOD PRESSURE: 66 MMHG | TEMPERATURE: 98.3 F

## 2023-06-21 DIAGNOSIS — T82.898A OTHER SPECIFIED COMPLICATION OF VASCULAR PROSTHETIC DEVICES, IMPLANTS AND GRAFTS, INITIAL ENCOUNTER: ICD-10-CM

## 2023-06-21 PROCEDURE — 36907Z: CUSTOM | Mod: 59

## 2023-06-21 PROCEDURE — 36902Z: CUSTOM

## 2023-07-14 NOTE — HISTORY OF PRESENT ILLNESS
[] : right brachiocephalic fistula [FreeTextEntry1] : 11/19/2019 Dr. 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 Lunsford

## 2023-08-03 NOTE — ED ADULT NURSE NOTE - CCCP TRG CHIEF CMPLNT
Situation:   Outreach for enrollment.    Key Assessments:   Becca shared that her legs are still swollen.  She is now being seen by [facility]   Left leg wrapped due to sore.  Wound care appointment 8/10 at [facility].    Marisa shared that she cannot wear compression stockings, has challenges getting them on and skin is tender on legs.    She is mindful to elevate legs and this helps edema tremendously.  Bumex - has not had to take extra water bill at noon yet.  Legs currently at baseline.      Respiratory - challenges with weather changes, does have A/C in home.  Shortness of breath with minimal exertion.  Using Advair BID. Combivent BID also but typically uses it once daily - trouble remembering evening dose.       Home health note mentions telemonitoring.  She states she got box in the mail yesterday for tele-monitoring - they will come tomorrow and set it up.     Her daughter is currently staying with her as long as necessary.     Getting around at home - no problems that way.  No current needs that she can identify.     Nutrition - appetite is \"excellent\", daughter makes meals for her.    Drinks Boost as needed.        Actions Taken:   Recent notes including PCP visit and telephone calls, Cardiology visit reviewed and discussed with Marisa.      Enrolled to RNCM program today and direct phone number to writer is given.  Marisa is encouraged to call writer as needs or concerns arise.     Reviewed edema, plan for that, ways to minimize this.      Program plan:   Follow progress of wound healing/ulcer, edema    RNCM program assessments  Medication Review  Discuss Goals  Identify any potential areas of support.      Next follow up:  About 1 week       See hyperlinks within encounter for full documentation.      
facial swelling

## 2023-08-13 NOTE — DISCHARGE NOTE NURSING/CASE MANAGEMENT/SOCIAL WORK - NSDCPEELIQUISFU_GEN_ALL_CORE
Go for blood tests as directed. Your doctor will do lab tests at regular visits to monitor the effects of this medicine. Please follow up with your doctor and keep your health care provider appointments. [phone]

## 2023-08-16 ENCOUNTER — APPOINTMENT (OUTPATIENT)
Dept: HOME HEALTH SERVICES | Facility: HOME HEALTH | Age: 73
End: 2023-08-16
Payer: MEDICARE

## 2023-08-16 VITALS
SYSTOLIC BLOOD PRESSURE: 90 MMHG | HEART RATE: 62 BPM | OXYGEN SATURATION: 98 % | DIASTOLIC BLOOD PRESSURE: 50 MMHG | RESPIRATION RATE: 14 BRPM

## 2023-08-16 DIAGNOSIS — E61.1 IRON DEFICIENCY: ICD-10-CM

## 2023-08-16 PROCEDURE — 99349 HOME/RES VST EST MOD MDM 40: CPT | Mod: 25

## 2023-08-21 PROBLEM — E61.1 IRON DEFICIENCY: Status: ACTIVE | Noted: 2023-03-27

## 2023-08-21 NOTE — REASON FOR VISIT
[Follow-Up] : a follow-up visit [Pre-Visit Preparation] : pre-visit preparation was done [Spouse] : spouse [Other: ______] : provided by DEREK [Time Spent: ____ minutes] : Total time spent using  services: [unfilled] minutes. The patient's primary language is not English thus required  services. [Intercurrent Specialty/Sub-specialty Visits] : the patient has intercurrent specialty/sub-specialty visits [Interpreters_IDNumber] : [id number] [Interpreters_FullName] : Radha  [FreeTextEntry2] : medical records [FreeTextEntry3] : cardiologist [TWNoteComboBox1] : [place] citizen

## 2023-08-21 NOTE — PHYSICAL EXAM
[No Acute Distress] : no acute distress [Normal Voice/Communication] : normal voice communication [Normal Sclera/Conjunctiva] : normal sclera/conjunctiva [PERRL] : pupils equal, round and reactive to light [EOMI] : extra ocular movement intact [Normal Outer Ear/Nose] : the ears and nose were normal in appearance [Normal Oropharynx] : the oropharynx was normal [No JVD] : no jugular venous distention [Supple] : the neck was supple [No Respiratory Distress] : no respiratory distress [Clear to Auscultation] : lungs were clear to auscultation bilaterally [No Accessory Muscle Use] : no accessory muscle use [Normal Rate] : heart rate was normal  [Regular Rhythm] : with a regular rhythm [Normal S1, S2] : normal S1 and S2 [No Murmurs] : no murmurs heard [No Edema] : there was no peripheral edema [Normal Bowel Sounds] : normal bowel sounds [Non Tender] : non-tender [Soft] : abdomen soft [No CVA Tenderness] : no ~M costovertebral angle tenderness [No Spinal Tenderness] : no spinal tenderness [No Joint Swelling] : no joint swelling seen [Normal Strength/Tone] : muscle strength and tone were normal [No Rash] : no rash [No Skin Lesions] : no skin lesions [Cranial Nerves Intact] : cranial nerves 2-12 were intact [No Motor Deficits] : the motor exam was normal [Oriented x3] : oriented to person, place, and time [Normal Affect] : the affect was normal [Over the Past 2 Weeks, Have You Felt Down, Depressed, or Hopeless?] : 1.) Over the past 2 weeks, have you felt down, depressed, or hopeless? No [Over the Past 2 Weeks, Have You Felt Little Interest or Pleasure Doing Things?] : 2.) Over the past 2 weeks, have you felt little interest or pleasure doing things? No [de-identified] : protuberant [de-identified] : left knee in valgus position, RUE fistula thrill +, old LUE fistula no thrill [de-identified] : pill rolling tremor

## 2023-08-21 NOTE — DATA REVIEWED
[FreeTextEntry1] : Labs from August: Albumin 4.3, Potassium 5.5, Hemoglobin 11.8, Phos 4.1, Calcium 9.4, PTH, 1437,

## 2023-08-21 NOTE — CHRONIC CARE ASSESSMENT
[Diabetic Diet] : diabetic [General Adherence] : and is generally adherent [PPS Score: ____] : Palliative Performance Scale (PPS) Score: [unfilled] [de-identified] : Renal and diabetic diet [FreeTextEntry1] : Renal

## 2023-08-21 NOTE — HISTORY OF PRESENT ILLNESS
[Patient is stable - had PCP appoinment] : patient is stable - had PCP appointment [Patient] : patient [Spouse] : spouse [LastPVisitDate] : 5/2023 [FreeTextEntry4] :  Dr Meredith Walker [LastSpecialistVisitDate] : 4/2023 [FreeTextEntry1] : n/a  [FreeTextEntry2] : JOSE PALMER is a 72 year male with PMH of ABE, Afib, HTN, HLD, CAD (S/P SURYA pLAD 5/14/21, OM1 3/21/21, pLCx on 10/2021 and CABG x 2 (2/1/2023), DMII, ESRD (HD TThSat), CVA 2013 (w/ residual right sided weakness), Parkinson's disease and Yocha Dehe being seen for follow up. Visit done with spouse.   Interval Events -Per patient, needs refills for Sinemet but PCP won't refill because they are not neurologist. Patient doesn't want to go see a neurologist because he is frustrated with trying to coordinate appointments. Patient will ask PCP for appointment.  -PCP appointment was rescheduled to end of August.  -Seen by cardiologist and increased Amlodipine to 10mg.   Subjective: -Appetite/weight: good appetite. no dysphagia. Weight stable -Gait/falls: unsteady gait due to leg pain. Uses cane to walk around house. No falls  -Pain: pain controlled (3/10) uses CBD, tylenol, tramadol  -Sleep: sleep is fair. Sleeps early and wake up 1-2am then goes back to sleep at 5am. Naps during day -BMs: regular.  -Urine: no concerns. still makes urine -Skin: intact.  -DME: cane wheelchair,  -Mood/memory: mood is improved. Memory is poor.  -Communication: conversational  -Health Maintenance: colonoscopy 2017. UTD vaccine.  -Hospitalizations in the past year:              a) Dec 2022 : Admitted at [facility] for chest pain and had cath on 12/23/2022 which               showed multivessel disease. Patient transferred to [facility] for PCI which revealed revealed PTCA oLCX              90% ISR, PTCA dLCX 90% ISR w/ subtotal small LCX distally; oLAD 80% ISR with + IFR (0.80), mLAD 70-                            80% & CTS was consulted & the patient was evaluated by Dr. Hernandez in the outpatient setting.           b) 1/31/23: Admitted to [facility] for CABG x 2 (LIMA-LAD, SVG-PDA) done 2/1/23.  Patient recovered in CTICU                             and had HD in the PM. POD1 patient received HD and 2u PRBC for low H&H. Midodrine started, weaned off                         vasopressor. POD2 Midodrine off, started BB. In A-fib in the AM and converted after amio bolus x 1. Back in             Afib  and started PO amio. POD3 chest tubes and pacing wires removed, HD given. POD4 weaned off            HFNC.POD5 ambulating well. POD6 Pt feeling well, ambulating and eating without difficulty. Patient            discharged home.   Medical Issues: a) DM: A1c 6.9 % (July 2023).  BG log 110-140mg/dl. No hypoglycemia episodes. Continue Novolin R 3 units TID, Basaglar kwikpen 10units HS. Opthalmology appointment done during year.  b) ESRD: HD on TThSat. FU with nephrology. Continue Sevelamer c) Parkinson's: progressive. Continue Sinemet. FU with neurology. Referral given for [facility] physicians but spouse states no answer when calling. Will email then.  d) CAD (s/p SURYA and CABG):  No angina. Continue asa, coreg, atorvastatin e) CVA with residual right sided weakness: continue plavix f) HTN: Continue amlodipine, coreg and clonidine g) Right leg pain: Could be referred pain. Right hip and knee xray unremarkable. LS xray with moderate degenerative changes and moderate endplate sclerosis with loss of intervertebral disc space L4-L5. Continue tramadol for pain. Advised to see orthopedic or pain management.  i) Afib: SR today. Continue coreg. No AC? Has Bled score of 5. FU with cardiology regarding need for anticoagulation.  j) ABE: Hgb 11.8 (August 2023). Continue Iron supplementation.  k) Hyperkalemia: K 5.5 (August 2023). Continue lokelma on non HD days.  l) BPH: continue flomax.  m) GERD: continue pantoprazole  Specialists: PCP: Dr Kate Gardner Nephrology: Dr Meredith Walker Cardiology: Dr Carlin Ramos Opthalmology: Dr Jeana Ring   Social hx: lives in home with spouse and son[facility] 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  Caregivers: no HHA MOLST: CPR, Long term intubation, Trial feeding, Hospitalize, IV and antibiotics ok. Reviewed 3/24/2023.  HCP: Cristy Palmer (spouse) [phone] and Ishan Palmer (son) [phone]

## 2023-08-21 NOTE — COUNSELING
[Overweight - ( BMI 25.1 - 29.9 )] : overweight -  ( BMI 25.1 - 29.9 ) [DASH diet recommended] : DASH diet recommended [Non - Smoker] : non-smoker [Use grab bars] : use grab bars [Use assistive device to avoid falls] : use assistive device to avoid falls [Remove clutter and unsafe carpeting to avoid falls] : remove clutter and unsafe carpeting to avoid falls [Date: ___] : colonoscopy completed on [unfilled] [] : foot exam [Improve exercise tolerance] : improve exercise tolerance [Improve mobility] : improve mobility [Maintain functional ability] : maintain functional ability [Discussed disease trajectory with patient/caregiver] : discussed disease trajectory with patient/caregiver [Completed Healthcare Proxy] : completed healthcare proxy [Completed Medical Orders for Life-Sustaining Treatment] : completed medical orders for life-sustaining treatment [Full Code] : Code Status: Full Code [No Limitations] : Treatment Guidelines: No limitations [Long Term Intubation] : Intubation: Long term intubation [[facility] Ver[facility] Date: _____] [facility] Completion/last verification date: [unfilled] [_____] : HCP: [unfilled] [FreeTextEntry3] : Renal Diet

## 2023-09-13 ENCOUNTER — RESULT REVIEW (OUTPATIENT)
Age: 73
End: 2023-09-13

## 2023-09-13 ENCOUNTER — APPOINTMENT (OUTPATIENT)
Dept: ENDOVASCULAR SURGERY | Facility: CLINIC | Age: 73
End: 2023-09-13
Payer: MEDICARE

## 2023-09-13 VITALS
DIASTOLIC BLOOD PRESSURE: 64 MMHG | HEART RATE: 72 BPM | OXYGEN SATURATION: 99 % | WEIGHT: 140 LBS | BODY MASS INDEX: 26.43 KG/M2 | HEIGHT: 61 IN | SYSTOLIC BLOOD PRESSURE: 130 MMHG | TEMPERATURE: 98 F | RESPIRATION RATE: 18 BRPM

## 2023-09-13 PROCEDURE — 36902Z: CUSTOM

## 2023-09-13 PROCEDURE — 36907Z: CUSTOM | Mod: 59

## 2023-10-04 NOTE — OCCUPATIONAL THERAPY INITIAL EVALUATION ADULT - MANUAL MUSCLE TESTING RESULTS, REHAB EVAL
Detail Level: Detailed
BUE/BLE at least 3+/5 grossly based on functional mobility assessment against gravity

## 2023-10-23 ENCOUNTER — APPOINTMENT (OUTPATIENT)
Dept: VASCULAR SURGERY | Facility: CLINIC | Age: 73
End: 2023-10-23

## 2023-10-26 ENCOUNTER — NON-APPOINTMENT (OUTPATIENT)
Age: 73
End: 2023-10-26

## 2023-10-30 ENCOUNTER — APPOINTMENT (OUTPATIENT)
Dept: HOME HEALTH SERVICES | Facility: HOME HEALTH | Age: 73
End: 2023-10-30
Payer: MEDICARE

## 2023-10-30 VITALS
RESPIRATION RATE: 17 BRPM | OXYGEN SATURATION: 98 % | SYSTOLIC BLOOD PRESSURE: 126 MMHG | TEMPERATURE: 97 F | DIASTOLIC BLOOD PRESSURE: 66 MMHG | HEART RATE: 89 BPM

## 2023-10-30 DIAGNOSIS — E87.5 HYPERKALEMIA: ICD-10-CM

## 2023-10-30 DIAGNOSIS — I70.0 ATHEROSCLEROSIS OF AORTA: ICD-10-CM

## 2023-10-30 DIAGNOSIS — E78.5 HYPERLIPIDEMIA, UNSPECIFIED: ICD-10-CM

## 2023-10-30 DIAGNOSIS — Z23 ENCOUNTER FOR IMMUNIZATION: ICD-10-CM

## 2023-10-30 PROCEDURE — 99349 HOME/RES VST EST MOD MDM 40: CPT

## 2023-11-14 PROBLEM — Z87.448 HISTORY OF END STAGE RENAL DISEASE: Status: RESOLVED | Noted: 2023-11-14 | Resolved: 2023-11-14

## 2023-11-15 ENCOUNTER — RESULT REVIEW (OUTPATIENT)
Age: 73
End: 2023-11-15

## 2023-11-15 ENCOUNTER — APPOINTMENT (OUTPATIENT)
Dept: ENDOVASCULAR SURGERY | Facility: CLINIC | Age: 73
End: 2023-11-15
Payer: MEDICARE

## 2023-11-15 VITALS
OXYGEN SATURATION: 99 % | DIASTOLIC BLOOD PRESSURE: 61 MMHG | RESPIRATION RATE: 16 BRPM | SYSTOLIC BLOOD PRESSURE: 139 MMHG | TEMPERATURE: 98 F | HEART RATE: 61 BPM | HEIGHT: 61 IN | BODY MASS INDEX: 26.43 KG/M2 | WEIGHT: 140 LBS

## 2023-11-15 DIAGNOSIS — Z87.448 PERSONAL HISTORY OF OTHER DISEASES OF URINARY SYSTEM: ICD-10-CM

## 2023-11-15 PROCEDURE — 36903Z: CUSTOM

## 2023-11-15 PROCEDURE — 36908Z: CUSTOM | Mod: 59

## 2024-01-23 ENCOUNTER — NON-APPOINTMENT (OUTPATIENT)
Age: 74
End: 2024-01-23

## 2024-01-26 ENCOUNTER — NON-APPOINTMENT (OUTPATIENT)
Age: 74
End: 2024-01-26

## 2024-03-18 ENCOUNTER — APPOINTMENT (OUTPATIENT)
Dept: VASCULAR SURGERY | Facility: CLINIC | Age: 74
End: 2024-03-18

## 2024-03-20 ENCOUNTER — NON-APPOINTMENT (OUTPATIENT)
Age: 74
End: 2024-03-20

## 2024-03-30 ENCOUNTER — APPOINTMENT (OUTPATIENT)
Dept: HOME HEALTH SERVICES | Facility: HOME HEALTH | Age: 74
End: 2024-03-30

## 2024-04-10 ENCOUNTER — APPOINTMENT (OUTPATIENT)
Dept: HOME HEALTH SERVICES | Facility: HOME HEALTH | Age: 74
End: 2024-04-10
Payer: MEDICARE

## 2024-04-10 VITALS
OXYGEN SATURATION: 98 % | SYSTOLIC BLOOD PRESSURE: 118 MMHG | DIASTOLIC BLOOD PRESSURE: 60 MMHG | RESPIRATION RATE: 16 BRPM | HEART RATE: 70 BPM | TEMPERATURE: 98.3 F

## 2024-04-10 DIAGNOSIS — N18.6 TYPE 2 DIABETES MELLITUS WITH DIABETIC CHRONIC KIDNEY DISEASE: ICD-10-CM

## 2024-04-10 DIAGNOSIS — Z99.2 TYPE 2 DIABETES MELLITUS WITH DIABETIC CHRONIC KIDNEY DISEASE: ICD-10-CM

## 2024-04-10 DIAGNOSIS — Z79.4 TYPE 2 DIABETES MELLITUS WITH DIABETIC CHRONIC KIDNEY DISEASE: ICD-10-CM

## 2024-04-10 DIAGNOSIS — E11.22 TYPE 2 DIABETES MELLITUS WITH DIABETIC CHRONIC KIDNEY DISEASE: ICD-10-CM

## 2024-04-10 DIAGNOSIS — K21.9 GASTRO-ESOPHAGEAL REFLUX DISEASE W/OUT ESOPHAGITIS: ICD-10-CM

## 2024-04-10 DIAGNOSIS — I25.10 ATHEROSCLEROTIC HEART DISEASE OF NATIVE CORONARY ARTERY W/OUT ANGINA PECTORIS: ICD-10-CM

## 2024-04-10 DIAGNOSIS — N40.0 BENIGN PROSTATIC HYPERPLASIA WITHOUT LOWER URINARY TRACT SYMPMS: ICD-10-CM

## 2024-04-10 DIAGNOSIS — G20.A1 PARKINSON'S DISEASE WITHOUT DYSKINESIA, WITHOUT MENTION OF FLUCTUATIONS: ICD-10-CM

## 2024-04-10 DIAGNOSIS — I48.91 UNSPECIFIED ATRIAL FIBRILLATION: ICD-10-CM

## 2024-04-10 DIAGNOSIS — I10 ESSENTIAL (PRIMARY) HYPERTENSION: ICD-10-CM

## 2024-04-10 PROCEDURE — 99349 HOME/RES VST EST MOD MDM 40: CPT

## 2024-04-10 PROCEDURE — 99495 TRANSJ CARE MGMT MOD F2F 14D: CPT

## 2024-04-10 RX ORDER — SODIUM ZIRCONIUM CYCLOSILICATE 5 G/5G
5 POWDER, FOR SUSPENSION ORAL
Refills: 0 | Status: DISCONTINUED | COMMUNITY
Start: 2023-03-24 | End: 2024-04-10

## 2024-04-10 RX ORDER — TRAMADOL HYDROCHLORIDE 50 MG/1
50 TABLET, COATED ORAL DAILY
Qty: 30 | Refills: 1 | Status: DISCONTINUED | COMMUNITY
Start: 2023-04-28 | End: 2024-04-10

## 2024-04-10 RX ORDER — INSULIN ASPART 100 [IU]/ML
100 INJECTION, SOLUTION INTRAVENOUS; SUBCUTANEOUS 3 TIMES DAILY
Qty: 1 | Refills: 0 | Status: DISCONTINUED | COMMUNITY
Start: 1900-01-01 | End: 2024-04-10

## 2024-04-10 RX ORDER — INSULIN GLARGINE 100 [IU]/ML
100 INJECTION, SOLUTION SUBCUTANEOUS
Qty: 1 | Refills: 0 | Status: DISCONTINUED | COMMUNITY
Start: 1900-01-01 | End: 2024-04-10

## 2024-04-10 NOTE — REASON FOR VISIT
[Post-hospitalization from ___ Hospital] : Post-hospitalization from [unfilled] Hospital [Admitted on: ___] : The patient was admitted on [unfilled] [Discharged on ___] : discharged on [unfilled] [Other: ____] : [unfilled]

## 2024-04-17 ENCOUNTER — RESULT REVIEW (OUTPATIENT)
Age: 74
End: 2024-04-17

## 2024-04-17 ENCOUNTER — APPOINTMENT (OUTPATIENT)
Dept: ENDOVASCULAR SURGERY | Facility: CLINIC | Age: 74
End: 2024-04-17
Payer: MEDICARE

## 2024-04-17 VITALS
DIASTOLIC BLOOD PRESSURE: 84 MMHG | BODY MASS INDEX: 25.68 KG/M2 | SYSTOLIC BLOOD PRESSURE: 172 MMHG | HEART RATE: 83 BPM | WEIGHT: 136 LBS | RESPIRATION RATE: 18 BRPM | HEIGHT: 61 IN | TEMPERATURE: 98.6 F | OXYGEN SATURATION: 95 %

## 2024-04-17 PROCEDURE — 36902Z: CUSTOM

## 2024-04-17 PROCEDURE — 36907Z: CUSTOM | Mod: 59

## 2024-05-20 ENCOUNTER — APPOINTMENT (OUTPATIENT)
Dept: HOME HEALTH SERVICES | Facility: HOME HEALTH | Age: 74
End: 2024-05-20

## 2024-05-20 RX ORDER — AMLODIPINE BESYLATE 10 MG/1
10 TABLET ORAL DAILY
Qty: 90 | Refills: 3 | Status: ACTIVE | COMMUNITY

## 2024-05-20 RX ORDER — PANTOPRAZOLE 40 MG/1
40 TABLET, DELAYED RELEASE ORAL DAILY
Qty: 30 | Refills: 0 | Status: ACTIVE | COMMUNITY

## 2024-05-20 RX ORDER — ASPIRIN 81 MG
81 TABLET, DELAYED RELEASE (ENTERIC COATED) ORAL DAILY
Refills: 0 | Status: ACTIVE | COMMUNITY

## 2024-05-20 RX ORDER — CHLORHEXIDINE GLUCONATE 4 %
325 (65 FE) LIQUID (ML) TOPICAL TWICE DAILY
Refills: 0 | Status: ACTIVE | COMMUNITY

## 2024-05-20 RX ORDER — TAMSULOSIN HYDROCHLORIDE 0.4 MG/1
0.4 CAPSULE ORAL
Qty: 30 | Refills: 0 | Status: ACTIVE | COMMUNITY

## 2024-05-20 RX ORDER — CARVEDILOL 25 MG/1
25 TABLET, FILM COATED ORAL
Qty: 180 | Refills: 0 | Status: ACTIVE | COMMUNITY

## 2024-05-20 RX ORDER — ATORVASTATIN CALCIUM 40 MG/1
40 TABLET, FILM COATED ORAL
Qty: 1 | Refills: 3 | Status: ACTIVE | COMMUNITY

## 2024-05-20 RX ORDER — CLONIDINE HYDROCHLORIDE 0.1 MG/1
0.1 TABLET ORAL TWICE DAILY
Refills: 0 | Status: ACTIVE | COMMUNITY
Start: 2023-03-24

## 2024-05-20 RX ORDER — ERGOCALCIFEROL 1.25 MG/1
1.25 MG CAPSULE ORAL
Refills: 0 | Status: ACTIVE | COMMUNITY
Start: 2023-03-24

## 2024-05-20 RX ORDER — CINACALCET 30 MG/1
30 TABLET ORAL DAILY
Refills: 0 | Status: ACTIVE | COMMUNITY
Start: 2024-04-10

## 2024-05-20 RX ORDER — HUMAN INSULIN 100 [IU]/ML
100 INJECTION, SOLUTION SUBCUTANEOUS
Refills: 0 | Status: ACTIVE | COMMUNITY
Start: 2024-04-10

## 2024-05-20 RX ORDER — SEVELAMER CARBONATE 800 MG/1
800 TABLET, FILM COATED ORAL
Qty: 810 | Refills: 3 | Status: ACTIVE | COMMUNITY

## 2024-05-20 RX ORDER — CARBIDOPA AND LEVODOPA 25; 100 MG/1; MG/1
25-100 TABLET ORAL
Qty: 60 | Refills: 3 | Status: ACTIVE | COMMUNITY

## 2024-05-20 RX ORDER — CLOPIDOGREL BISULFATE 75 MG/1
75 TABLET, FILM COATED ORAL DAILY
Refills: 0 | Status: ACTIVE | COMMUNITY

## 2024-05-20 RX ORDER — NITROGLYCERIN 0.3 MG/1
0.3 TABLET SUBLINGUAL
Refills: 0 | Status: ACTIVE | COMMUNITY
Start: 2024-04-10

## 2024-05-31 NOTE — ASSESSMENT
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

## 2024-05-31 NOTE — HISTORY OF PRESENT ILLNESS
[] : right brachiocephalic fistula [FreeTextEntry1] : 11/19/2019 Dr. Hoyt (with excision of old AVG) [FreeTextEntry4] : yesterday  [FreeTextEntry5] : yesterday at 5pm  [FreeTextEntry6] : Dr. Cordova

## 2024-05-31 NOTE — REASON FOR VISIT
[Other ___] : a [unfilled] visit for [Spouse] : spouse [Aneurysm] : aneurysm [FreeTextEntry2] : central stenosis

## 2024-05-31 NOTE — PAST MEDICAL HISTORY
[Increasing age ( >40 years old)] : Increasing age ( >40 years old) [No therapy indicated for cases scheduled for less than one hour] : No therapy indicated for cases scheduled for less than one hour. [Altered mobility] : Altered mobility [FreeTextEntry1] : Malignant Hyperthermia Screening Tool and Risk of Bleeding Assessment\par  \par  Mr. JOSE FERNÁNDEZ denies family history of unexpected death following Anesthesia or Exercise.\par  Denies Family history of Malignant Hyperthermia, Muscle or Neuromuscular disorder and High Temperature following exercise.\par  \par  Mr. JOSE FERNÁNDEZ denies history of Muscle Spasm, Dark or Chocolate - Colored urine and Unanticipated fever immediately following anesthesia or serious exercise. \par  Mr. FERNÁNDEZ also denies bleeding tendencies/ Risks of Bleeding.\par

## 2024-06-26 ENCOUNTER — APPOINTMENT (OUTPATIENT)
Dept: ENDOVASCULAR SURGERY | Facility: CLINIC | Age: 74
End: 2024-06-26
Payer: MEDICARE

## 2024-06-26 ENCOUNTER — RESULT REVIEW (OUTPATIENT)
Age: 74
End: 2024-06-26

## 2024-06-26 VITALS
SYSTOLIC BLOOD PRESSURE: 166 MMHG | WEIGHT: 136 LBS | DIASTOLIC BLOOD PRESSURE: 62 MMHG | RESPIRATION RATE: 18 BRPM | OXYGEN SATURATION: 96 % | HEART RATE: 69 BPM | TEMPERATURE: 98.5 F | BODY MASS INDEX: 25.68 KG/M2 | HEIGHT: 61 IN

## 2024-06-26 DIAGNOSIS — Z99.2 END STAGE RENAL DISEASE: ICD-10-CM

## 2024-06-26 DIAGNOSIS — N18.6 END STAGE RENAL DISEASE: ICD-10-CM

## 2024-06-26 PROCEDURE — 36901Z: CUSTOM | Mod: 59

## 2024-06-26 PROCEDURE — 36907Z: CUSTOM

## 2024-07-10 ENCOUNTER — APPOINTMENT (OUTPATIENT)
Dept: HOME HEALTH SERVICES | Facility: HOME HEALTH | Age: 74
End: 2024-07-10

## 2024-07-10 PROCEDURE — 99349 HOME/RES VST EST MOD MDM 40: CPT

## 2024-08-14 ENCOUNTER — APPOINTMENT (OUTPATIENT)
Dept: HOME HEALTH SERVICES | Facility: HOME HEALTH | Age: 74
End: 2024-08-14

## 2024-08-15 NOTE — H&P PST ADULT - NS MD HP PULSE CAROTID
[Negative] : Heme/Lymph [Fever] : no fever [Chills] : no chills [Fatigue] : no fatigue [Hot Flashes] : no hot flashes [Night Sweats] : no night sweats [Recent Change In Weight] : ~T no recent weight change [Discharge] : no discharge [Pain] : no pain [Redness] : no redness [Dryness] : no dryness  [Vision Problems] : no vision problems [Itching] : no itching [Earache] : no earache [Hearing Loss] : no hearing loss [Nosebleed] : no nosebleeds [Hoarseness] : no hoarseness [Nasal Discharge] : no nasal discharge [Sore Throat] : no sore throat [Postnasal Drip] : no postnasal drip [Chest Pain] : no chest pain [Palpitations] : no palpitations [Leg Claudication] : no leg claudication [Lower Ext Edema] : no lower extremity edema [Orthopnea] : no orthopnea [Paroxysmal Nocturnal Dyspnea] : no paroxysmal nocturnal dyspnea [Shortness Of Breath] : no shortness of breath [Wheezing] : no wheezing [Cough] : no cough [Dyspnea on Exertion] : no dyspnea on exertion [Abdominal Pain] : no abdominal pain [Nausea] : no nausea [Constipation] : no constipation [Diarrhea] : diarrhea [Vomiting] : no vomiting [Heartburn] : no heartburn [Melena] : no melena [Dysuria] : no dysuria [Incontinence] : no incontinence [Nocturia] : no nocturia [Hematuria] : no hematuria [Frequency] : no frequency [Vaginal Discharge] : no vaginal discharge [Joint Pain] : no joint pain [Joint Stiffness] : no joint stiffness [Joint Swelling] : no joint swelling [Muscle Weakness] : no muscle weakness [Muscle Pain] : no muscle pain [Back Pain] : no back pain [Mole Changes] : no mole changes [Nail Changes] : no nail changes [Hair Changes] : no hair changes [Skin Rash] : no skin rash [Headache] : no headache [Dizziness] : no dizziness [Fainting] : no fainting [Confusion] : no confusion [Memory Loss] : no memory loss [Unsteady Walking] : no ataxia [Suicidal] : not suicidal [Insomnia] : no insomnia [Anxiety] : no anxiety [Depression] : no depression [Easy Bleeding] : no easy bleeding [Easy Bruising] : no easy bruising [Swollen Glands] : no swollen glands right normal/left normal

## 2024-09-20 ENCOUNTER — APPOINTMENT (OUTPATIENT)
Dept: HOME HEALTH SERVICES | Facility: HOME HEALTH | Age: 74
End: 2024-09-20

## 2024-09-22 NOTE — H&P ADULT - BP NONINVASIVE MEAN (MM HG)
Pt noncompliant with fluid restriction diet, daughter seen supplying pt with beverages on multiple occassions. Daughter and pt educated on fluid restriction multiple times. RN will continue to monitor.       Pt requesting to have tap water enema in AM. Will inform oncoming dayshift RN    73

## 2024-09-25 ENCOUNTER — RESULT REVIEW (OUTPATIENT)
Age: 74
End: 2024-09-25

## 2024-09-25 ENCOUNTER — APPOINTMENT (OUTPATIENT)
Dept: ENDOVASCULAR SURGERY | Facility: CLINIC | Age: 74
End: 2024-09-25
Payer: MEDICARE

## 2024-09-25 VITALS
BODY MASS INDEX: 26.43 KG/M2 | HEIGHT: 61 IN | WEIGHT: 140 LBS | HEART RATE: 70 BPM | TEMPERATURE: 98.2 F | RESPIRATION RATE: 18 BRPM | SYSTOLIC BLOOD PRESSURE: 167 MMHG | OXYGEN SATURATION: 98 % | DIASTOLIC BLOOD PRESSURE: 84 MMHG

## 2024-09-25 PROCEDURE — 36902Z: CUSTOM

## 2024-09-25 PROCEDURE — 36907Z: CUSTOM | Mod: 59

## 2024-09-27 NOTE — HISTORY OF PRESENT ILLNESS
[] : right brachiocephalic fistula [FreeTextEntry1] : 11/19/2019 Dr. 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 Cordova

## 2024-09-27 NOTE — PAST MEDICAL HISTORY
[Increasing age ( >40 years old)] : Increasing age ( >40 years old) [Altered mobility] : Altered mobility [No therapy indicated for cases scheduled for less than one hour] : No therapy indicated for cases scheduled for less than one hour. [FreeTextEntry1] : Malignant Hyperthermia Screening Tool and Risk of Bleeding Assessment Mr. JOSE FERNÁNDEZ denies family history of unexpected death following Anesthesia or Exercise. Denies Family history of Malignant Hyperthermia, Muscle or Neuromuscular disorder and High Temperature following exercise.  Mr. JOSE FERNÁNDEZ denies history of Muscle Spasm, Dark or Chocolate - Colored urine and Unanticipated fever immediately following anesthesia or serious exercise.  Mr. FERNÁNDEZ also denies bleeding tendencies/ Risks of Bleeding.

## 2024-09-27 NOTE — REASON FOR VISIT
[Other ___] : a [unfilled] visit for [Aneurysm] : aneurysm [Inadequate Flow within AVF] : inadequate flow within AVF [Spouse] : spouse [FreeTextEntry2] : central stenosis

## 2024-09-27 NOTE — HISTORY OF PRESENT ILLNESS
[] : right brachiocephalic fistula [FreeTextEntry1] : 11/19/2019 Dr. 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 Cordova

## 2024-10-01 PROBLEM — Z71.89 ADVANCED CARE PLANNING/COUNSELING DISCUSSION: Status: ACTIVE | Noted: 2024-10-01

## 2025-02-12 ENCOUNTER — RESULT REVIEW (OUTPATIENT)
Age: 75
End: 2025-02-12

## 2025-02-12 ENCOUNTER — APPOINTMENT (OUTPATIENT)
Dept: ENDOVASCULAR SURGERY | Facility: CLINIC | Age: 75
End: 2025-02-12
Payer: MEDICARE

## 2025-02-12 VITALS
HEART RATE: 68 BPM | HEIGHT: 61 IN | SYSTOLIC BLOOD PRESSURE: 154 MMHG | BODY MASS INDEX: 26.43 KG/M2 | TEMPERATURE: 98.1 F | RESPIRATION RATE: 18 BRPM | OXYGEN SATURATION: 98 % | WEIGHT: 140 LBS | DIASTOLIC BLOOD PRESSURE: 76 MMHG

## 2025-02-12 DIAGNOSIS — Z99.2 END STAGE RENAL DISEASE: ICD-10-CM

## 2025-02-12 DIAGNOSIS — N18.6 END STAGE RENAL DISEASE: ICD-10-CM

## 2025-02-12 DIAGNOSIS — T82.898A OTHER SPECIFIED COMPLICATION OF VASCULAR PROSTHETIC DEVICES, IMPLANTS AND GRAFTS, INITIAL ENCOUNTER: ICD-10-CM

## 2025-02-12 PROCEDURE — 36907Z: CUSTOM | Mod: 59

## 2025-02-12 PROCEDURE — 36902Z: CUSTOM

## 2025-02-18 NOTE — DISCHARGE NOTE PROVIDER - NSDCCPCAREPLAN_GEN_ALL_CORE_FT
PRINCIPAL DISCHARGE DIAGNOSIS  Diagnosis: Coronary artery disease  Assessment and Plan of Treatment: You had a cardiac angiogram with stent placement to one of your heart arteries (Left Circumflex). Continue Aspirin and Brilinta. DO NOT STOP TAKING THESE MEDICATIONS UNLESS INSTRUCTED BY YOUR CARDIOLOGIST AS YOUR STENT CAN CLOSE RESULTING IN HEART ATTACK AND DEATH. Follow-up with Dr. Ramos in 1-2 weeks.   We have provided you with a prescription for cardiac rehab which is medically supervised exercise program for your heart and has been shown to improve the quantity and quality of life of people with heart disease like yours. You should attend cardiac rehab 3 times per week for 12 weeks. We have provided you with a list of nearby facilities. Please call your insurance carrier to determine which of these facilities are covered under your plan. Please bring this prescription with you to your follow up appointment with your cardiologist who can then further assist you to enroll into a cardiac rehab program.      SECONDARY DISCHARGE DIAGNOSES  Diagnosis: Hypertension  Assessment and Plan of Treatment: Maintain Low Salt Diet-Less than 2000 mg-2g Daily. Continue Clonidine, Carvedilol, Amlodipine, Lisinopril.    Diagnosis: Hyperlipidemia  Assessment and Plan of Treatment: Maintain Low Cholesterol Diet. Continue Atorvastatin.    Diagnosis: Diabetes mellitus, type 2  Assessment and Plan of Treatment: Monitor Fingersticks before meals and at bedtime. Continue Novolin. Hemoglobin A1C (Measurement of how well controlled your sugar levels are over a three month period) is 6.6%-6.8% which means your diabetes is well controlled. Goal Hemoglobin A1C <7%,    Diagnosis: ESRD on dialysis  Assessment and Plan of Treatment: Continue Dialysis Schedule Tuesday, Thursday, Saturday. Continue Lokelma.    
Alert

## 2025-03-05 ENCOUNTER — APPOINTMENT (OUTPATIENT)
Dept: HOME HEALTH SERVICES | Facility: HOME HEALTH | Age: 75
End: 2025-03-05

## 2025-03-05 DIAGNOSIS — G47.00 INSOMNIA, UNSPECIFIED: ICD-10-CM

## 2025-03-05 PROCEDURE — 99349 HOME/RES VST EST MOD MDM 40: CPT

## 2025-03-05 RX ORDER — ZOLPIDEM TARTRATE 5 MG/1
5 TABLET ORAL
Qty: 30 | Refills: 0 | Status: ACTIVE | COMMUNITY
Start: 2025-03-05 | End: 1900-01-01

## 2025-06-25 ENCOUNTER — APPOINTMENT (OUTPATIENT)
Dept: ENDOVASCULAR SURGERY | Facility: CLINIC | Age: 75
End: 2025-06-25
Payer: MEDICARE

## 2025-06-25 ENCOUNTER — NON-APPOINTMENT (OUTPATIENT)
Age: 75
End: 2025-06-25

## 2025-06-25 ENCOUNTER — RESULT REVIEW (OUTPATIENT)
Age: 75
End: 2025-06-25

## 2025-06-25 PROCEDURE — 36907Z: CUSTOM

## 2025-06-25 PROCEDURE — 36902Z: CUSTOM

## 2025-06-26 NOTE — ASU PATIENT PROFILE, ADULT - ABILITY TO HEAR (WITH HEARING AID OR HEARING APPLIANCE IF NORMALLY USED):
Discussed with patient, who after consideration, decided on DNR. Discussed intubation, after seeing her  on prolonged life support, states she would not want that, but for now would consider trial of intubation. DAMIR completed. Mildly to Moderately Impaired: difficulty hearing in some environments or speaker may need to increase volume or speak distinctly

## 2025-07-18 NOTE — PATIENT PROFILE ADULT - VISION (WITH CORRECTIVE LENSES IF THE PATIENT USUALLY WEARS THEM):
2.5 mg today; increase to schedule listed.   Partially impaired: cannot see medication labels or newsprint, but can see obstacles in path, and the surrounding layout; can count fingers at arm's length

## 2025-08-18 ENCOUNTER — APPOINTMENT (OUTPATIENT)
Dept: ENDOVASCULAR SURGERY | Facility: CLINIC | Age: 75
End: 2025-08-18

## 2025-08-18 ENCOUNTER — RESULT REVIEW (OUTPATIENT)
Age: 75
End: 2025-08-18

## 2025-08-18 VITALS
DIASTOLIC BLOOD PRESSURE: 55 MMHG | HEIGHT: 61 IN | OXYGEN SATURATION: 99 % | SYSTOLIC BLOOD PRESSURE: 113 MMHG | RESPIRATION RATE: 18 BRPM | WEIGHT: 149 LBS | BODY MASS INDEX: 28.13 KG/M2 | TEMPERATURE: 98.1 F | HEART RATE: 68 BPM

## 2025-08-18 RX ORDER — TICAGRELOR 90 MG/1
90 TABLET ORAL
Refills: 0 | Status: ACTIVE | COMMUNITY

## (undated) DEVICE — SUT ETHIBOND 0 18" TIES

## (undated) DEVICE — GLV 6 PROTEXIS (WHITE)

## (undated) DEVICE — TUBING STRYKER PNEUMOCLEAR HIGH FLOW

## (undated) DEVICE — ELCTR ZOLL DEFIBRILLATOR PAD NO REPLACEMENT

## (undated) DEVICE — SUT PROLENE 3-0 36" SH-1

## (undated) DEVICE — GOWN TRIMAX XXL

## (undated) DEVICE — ELCTR STRYKER NEPTUNE SMOKE EVACUATION PENCIL (GREEN)

## (undated) DEVICE — DRAPE PROBE COVER LATEX FREE 3X96"

## (undated) DEVICE — BLADE ETHICON HARMONIC SYNERGY HOOK TIP 3MM 4CM-9CM

## (undated) DEVICE — Device

## (undated) DEVICE — WARMING BLANKET FULL ADULT

## (undated) DEVICE — DRAPE IOBAN 33" X 23"

## (undated) DEVICE — LAP PAD 18 X 18"

## (undated) DEVICE — ELCTR REM POLYHESIVE ADULT PT RETURN 15FT

## (undated) DEVICE — VASOVIEW HEMOPRO ENDO SYSTEM

## (undated) DEVICE — SOL ANTI FOG

## (undated) DEVICE — SUT VICRYL 1 36" CTX UNDYED

## (undated) DEVICE — CHEST DRAIN PLEUR-EVAC DRY/WET ADULT-PEDS SINGLE (QUICK)

## (undated) DEVICE — DRAPE TOWEL BLUE 17" X 24"

## (undated) DEVICE — BLADE SCALPEL SAFETY #10 WITH PLASTIC GREEN HANDLE

## (undated) DEVICE — SUT MONOCRYL 4-0 27" PS-2 UNDYED

## (undated) DEVICE — PACK OPEN HEART LNX

## (undated) DEVICE — SUT SILK 0 18" CT-1 (POP-OFF)

## (undated) DEVICE — SUT VICRYL 0 27" CT

## (undated) DEVICE — PREP SCRUB BRUSH W CHG 4%

## (undated) DEVICE — DRAPE TOWEL WHITE 17" X 24"

## (undated) DEVICE — SUT SILK 2-0 18" SH (POP-OFF)

## (undated) DEVICE — SUT VICRYL 2-0 27" CT-1

## (undated) DEVICE — SUMP INTRACARDIAC/PERICARDIAL 20FR 1/4" ADULT

## (undated) DEVICE — DRSG DERMABOND 0.7ML

## (undated) DEVICE — SUT PROLENE 7-0 24" BV175-6

## (undated) DEVICE — SYNOVIS VASCULAR PROBE 1.5MM 15CM

## (undated) DEVICE — POSITIONER FOAM EGG CRATE ULNAR 2PCS (PINK)

## (undated) DEVICE — CATH NG SALEM SUMP 16FR

## (undated) DEVICE — TUBING SMOKE EVAC 3/8" X 10FT FOR NEPTUNE

## (undated) DEVICE — SUT VICRYL PLUS 0 27" CT

## (undated) DEVICE — ELCTR BOVIE TIP BLADE INSULATED 3" EDGE

## (undated) DEVICE — BLOWER MISTER AXIUS WITH IV SET

## (undated) DEVICE — ELCTR STRYKER EXTENSION SUCTION TIP 125MM

## (undated) DEVICE — GLV 6.5 PROTEXIS (WHITE)

## (undated) DEVICE — CATH TRIOX OXIMETRY 8F 3 LUMENS

## (undated) DEVICE — BLADE SCALPEL SAFETY #11 WITH PLASTIC GREEN HANDLE

## (undated) DEVICE — DRAPE PROBE COVER 5" X 96"

## (undated) DEVICE — GLV 7 PROTEXIS (WHITE)

## (undated) DEVICE — DRSG MEPILEX 10 X 25CM (4 X 10") AG

## (undated) DEVICE — NDL BLUNT 18G LOOP VESSEL MAXI WHITE

## (undated) DEVICE — ACROBAT I-POSITIONER

## (undated) DEVICE — KNIFE ALCON STANDARD FULL HANDLE 15 DEG (PINK)

## (undated) DEVICE — CATH IV SAFE BC 24G X 0.75" (YELLOW)

## (undated) DEVICE — DRSG TRACH DRAINAGE 4X4

## (undated) DEVICE — DRAPE FLUID WARMER 44 X 66"

## (undated) DEVICE — SUT PROLENE 6-0 30" RB-2

## (undated) DEVICE — PUNCH VASC STD 7.75" HANDLE 4.8MM DISP

## (undated) DEVICE — SUCTION CATH ARGYLE WHISTLE TIP 14FR STRAIGHT PACKED

## (undated) DEVICE — SUT STAINLESS STEEL 6 4-18" CCS

## (undated) DEVICE — CATH CV TRAY INSR ST UNIV

## (undated) DEVICE — SUT ETHIBOND 4-0 12-18"

## (undated) DEVICE — SUT PROLENE 8-0 24" BV175-6

## (undated) DEVICE — GLV 7.5 PROTEXIS (WHITE)

## (undated) DEVICE — PUNCH VASC STD 7.75" HANDLE 4MM DISP

## (undated) DEVICE — MEDTRONIC URCHIN EVO HEART POSITIONER & CANISTER TUBING SET

## (undated) DEVICE — SUT PROLENE 7-0 24" BV-1

## (undated) DEVICE — ACROBAT I-STABILIZER

## (undated) DEVICE — ELCTR BOVIE TIP BLADE INSULATED 4" EDGE

## (undated) DEVICE — PACK PROC CV DRAPE

## (undated) DEVICE — SUT NUROLON 1 18" OS-8 (POP-OFF)

## (undated) DEVICE — PACING CABLE (BROWN) A/V TEMP SCREW DOWN 12FT

## (undated) DEVICE — GAUZE SPONGE 12PLY DMT MT 8X4